# Patient Record
Sex: MALE | Race: WHITE | NOT HISPANIC OR LATINO | URBAN - METROPOLITAN AREA
[De-identification: names, ages, dates, MRNs, and addresses within clinical notes are randomized per-mention and may not be internally consistent; named-entity substitution may affect disease eponyms.]

---

## 2017-05-25 ENCOUNTER — OUTPATIENT (OUTPATIENT)
Dept: OUTPATIENT SERVICES | Facility: HOSPITAL | Age: 77
LOS: 1 days | End: 2017-05-25
Payer: COMMERCIAL

## 2017-05-25 ENCOUNTER — APPOINTMENT (OUTPATIENT)
Dept: CV DIAGNOSTICS | Facility: HOSPITAL | Age: 77
End: 2017-05-25

## 2017-05-25 DIAGNOSIS — I25.10 ATHEROSCLEROTIC HEART DISEASE OF NATIVE CORONARY ARTERY WITHOUT ANGINA PECTORIS: ICD-10-CM

## 2017-05-25 PROCEDURE — 78452 HT MUSCLE IMAGE SPECT MULT: CPT

## 2017-05-25 PROCEDURE — 93018 CV STRESS TEST I&R ONLY: CPT

## 2017-05-25 PROCEDURE — 93017 CV STRESS TEST TRACING ONLY: CPT

## 2017-05-25 PROCEDURE — A9500: CPT

## 2017-05-25 PROCEDURE — 93016 CV STRESS TEST SUPVJ ONLY: CPT

## 2017-05-25 PROCEDURE — 78452 HT MUSCLE IMAGE SPECT MULT: CPT | Mod: 26

## 2023-01-06 ENCOUNTER — INPATIENT (INPATIENT)
Facility: HOSPITAL | Age: 83
LOS: 17 days | Discharge: NURSING, REHAB OR LONG-TERM CARE | DRG: 177 | End: 2023-01-24
Attending: FAMILY MEDICINE | Admitting: INTERNAL MEDICINE
Payer: COMMERCIAL

## 2023-01-06 VITALS
HEART RATE: 98 BPM | WEIGHT: 220.02 LBS | TEMPERATURE: 98 F | DIASTOLIC BLOOD PRESSURE: 64 MMHG | OXYGEN SATURATION: 96 % | RESPIRATION RATE: 20 BRPM | SYSTOLIC BLOOD PRESSURE: 134 MMHG

## 2023-01-06 LAB
ALBUMIN SERPL ELPH-MCNC: 3.1 G/DL — LOW (ref 3.3–5)
ALP SERPL-CCNC: 58 U/L — SIGNIFICANT CHANGE UP (ref 40–120)
ALT FLD-CCNC: 25 U/L — SIGNIFICANT CHANGE UP (ref 12–78)
ANION GAP SERPL CALC-SCNC: 11 MMOL/L — SIGNIFICANT CHANGE UP (ref 5–17)
AST SERPL-CCNC: 29 U/L — SIGNIFICANT CHANGE UP (ref 15–37)
BASOPHILS # BLD AUTO: 0.01 K/UL — SIGNIFICANT CHANGE UP (ref 0–0.2)
BASOPHILS NFR BLD AUTO: 0.1 % — SIGNIFICANT CHANGE UP (ref 0–2)
BILIRUB SERPL-MCNC: 1.6 MG/DL — HIGH (ref 0.2–1.2)
BUN SERPL-MCNC: 30 MG/DL — HIGH (ref 7–23)
CALCIUM SERPL-MCNC: 8.7 MG/DL — SIGNIFICANT CHANGE UP (ref 8.5–10.1)
CHLORIDE SERPL-SCNC: 101 MMOL/L — SIGNIFICANT CHANGE UP (ref 96–108)
CO2 SERPL-SCNC: 25 MMOL/L — SIGNIFICANT CHANGE UP (ref 22–31)
CREAT SERPL-MCNC: 1.4 MG/DL — HIGH (ref 0.5–1.3)
EGFR: 51 ML/MIN/1.73M2 — LOW
EOSINOPHIL # BLD AUTO: 0 K/UL — SIGNIFICANT CHANGE UP (ref 0–0.5)
EOSINOPHIL NFR BLD AUTO: 0 % — SIGNIFICANT CHANGE UP (ref 0–6)
GLUCOSE SERPL-MCNC: 121 MG/DL — HIGH (ref 70–99)
HCT VFR BLD CALC: 38.6 % — LOW (ref 39–50)
HGB BLD-MCNC: 13.2 G/DL — SIGNIFICANT CHANGE UP (ref 13–17)
IMM GRANULOCYTES NFR BLD AUTO: 0.5 % — SIGNIFICANT CHANGE UP (ref 0–0.9)
LYMPHOCYTES # BLD AUTO: 0.45 K/UL — LOW (ref 1–3.3)
LYMPHOCYTES # BLD AUTO: 4.9 % — LOW (ref 13–44)
MAGNESIUM SERPL-MCNC: 2 MG/DL — SIGNIFICANT CHANGE UP (ref 1.6–2.6)
MCHC RBC-ENTMCNC: 29.2 PG — SIGNIFICANT CHANGE UP (ref 27–34)
MCHC RBC-ENTMCNC: 34.2 GM/DL — SIGNIFICANT CHANGE UP (ref 32–36)
MCV RBC AUTO: 85.4 FL — SIGNIFICANT CHANGE UP (ref 80–100)
MONOCYTES # BLD AUTO: 0.9 K/UL — SIGNIFICANT CHANGE UP (ref 0–0.9)
MONOCYTES NFR BLD AUTO: 9.8 % — SIGNIFICANT CHANGE UP (ref 2–14)
NEUTROPHILS # BLD AUTO: 7.73 K/UL — HIGH (ref 1.8–7.4)
NEUTROPHILS NFR BLD AUTO: 84.7 % — HIGH (ref 43–77)
NRBC # BLD: 0 /100 WBCS — SIGNIFICANT CHANGE UP (ref 0–0)
NT-PROBNP SERPL-SCNC: 2418 PG/ML — HIGH (ref 0–450)
PLATELET # BLD AUTO: 244 K/UL — SIGNIFICANT CHANGE UP (ref 150–400)
POTASSIUM SERPL-MCNC: 3.6 MMOL/L — SIGNIFICANT CHANGE UP (ref 3.5–5.3)
POTASSIUM SERPL-SCNC: 3.6 MMOL/L — SIGNIFICANT CHANGE UP (ref 3.5–5.3)
PROT SERPL-MCNC: 7.1 G/DL — SIGNIFICANT CHANGE UP (ref 6–8.3)
RBC # BLD: 4.52 M/UL — SIGNIFICANT CHANGE UP (ref 4.2–5.8)
RBC # FLD: 15.9 % — HIGH (ref 10.3–14.5)
SODIUM SERPL-SCNC: 137 MMOL/L — SIGNIFICANT CHANGE UP (ref 135–145)
TROPONIN I, HIGH SENSITIVITY RESULT: 18.8 NG/L — SIGNIFICANT CHANGE UP
TROPONIN I, HIGH SENSITIVITY RESULT: 19.9 NG/L — SIGNIFICANT CHANGE UP
WBC # BLD: 9.14 K/UL — SIGNIFICANT CHANGE UP (ref 3.8–10.5)
WBC # FLD AUTO: 9.14 K/UL — SIGNIFICANT CHANGE UP (ref 3.8–10.5)

## 2023-01-06 PROCEDURE — 99285 EMERGENCY DEPT VISIT HI MDM: CPT

## 2023-01-06 PROCEDURE — 71045 X-RAY EXAM CHEST 1 VIEW: CPT | Mod: 26

## 2023-01-06 PROCEDURE — 93010 ELECTROCARDIOGRAM REPORT: CPT

## 2023-01-06 RX ORDER — FUROSEMIDE 40 MG
40 TABLET ORAL ONCE
Refills: 0 | Status: COMPLETED | OUTPATIENT
Start: 2023-01-06 | End: 2023-01-06

## 2023-01-06 RX ORDER — ONDANSETRON 8 MG/1
4 TABLET, FILM COATED ORAL ONCE
Refills: 0 | Status: COMPLETED | OUTPATIENT
Start: 2023-01-06 | End: 2023-01-06

## 2023-01-06 RX ORDER — ATENOLOL 25 MG/1
25 TABLET ORAL ONCE
Refills: 0 | Status: COMPLETED | OUTPATIENT
Start: 2023-01-06 | End: 2023-01-06

## 2023-01-06 RX ORDER — IPRATROPIUM/ALBUTEROL SULFATE 18-103MCG
3 AEROSOL WITH ADAPTER (GRAM) INHALATION ONCE
Refills: 0 | Status: COMPLETED | OUTPATIENT
Start: 2023-01-06 | End: 2023-01-06

## 2023-01-06 RX ADMIN — Medication 3 MILLILITER(S): at 18:32

## 2023-01-06 RX ADMIN — ONDANSETRON 4 MILLIGRAM(S): 8 TABLET, FILM COATED ORAL at 20:18

## 2023-01-06 RX ADMIN — Medication 100 MILLIGRAM(S): at 21:21

## 2023-01-06 RX ADMIN — ATENOLOL 25 MILLIGRAM(S): 25 TABLET ORAL at 21:20

## 2023-01-06 RX ADMIN — Medication 40 MILLIGRAM(S): at 18:56

## 2023-01-06 NOTE — ED PROVIDER NOTE - PHYSICAL EXAMINATION
Constitutional: Awake, Alert, non-toxic. No acute distress. Well appearing, well nourished.   HEAD: Normocephalic, atraumatic.   EYES: PERRL, EOM intact, conjunctiva and sclera are clear bilaterally.  ENT: External ears normal. No rhinorrhea, no tracheal deviation   NECK: Supple, non-tender  CARDIOVASCULAR: tachycardic and irregular rate and rhythm.  RESPIRATORY: Normal respiratory effort; crackles b/l. Speaking in full sentences. No accessory muscle use.   ABDOMEN: Soft; non-tender, non-distended. No rebound or guarding.   EXTREMITIES:  no lower extremity edema, no deformities  SKIN: Warm, dry  NEURO: A&O x3. Sensory and motor functions are grossly intact. Speech is normal. No facial droop.  PSYCH: Appearance and judgement seem appropriate for gender and age.

## 2023-01-06 NOTE — ED PROVIDER NOTE - OBJECTIVE STATEMENT
Patient with a past medical history of hypertension, hyperlipidemia, paroxysmal A. fib, diabetes on aspirin and Plavix is presenting with shortness of breath and cough.  Patient symptoms started 4 days ago when he was found that he tested positive for influenza.  Symptoms have been constant since then.  Went to his primary doctor who sent him to ED for evaluation due to shortness of breath.  Patient denied any chest pain.  He is having cough and nauseousness.  No reported leg swelling.  He has been having to sit up in a chair at night to help sleep.

## 2023-01-06 NOTE — ED PROVIDER NOTE - CARE PLAN
Principal Discharge DX:	Influenza A  Secondary Diagnosis:	Acute CHF  Secondary Diagnosis:	Atrial fibrillation   1

## 2023-01-06 NOTE — ED PROVIDER NOTE - CLINICAL SUMMARY MEDICAL DECISION MAKING FREE TEXT BOX
Patient found to be flu positive likely the cause to his symptoms however with his bilateral crackles, concern now for possible new heart failure.  This also supported with his orthopnea.  Possible component of pneumonia though patient is afebrile and not hypoxic.  He was found to be wheezing with nurse prior to my arrival, given DuoNeb treatment.  However history and exam is not consistent with COPD and he did not have any wheezing on my exam.  Less likely ACS with no chest pain.  We will plan to check lab work, chest x-ray and treat as needed.

## 2023-01-06 NOTE — ED PROVIDER NOTE - PROGRESS NOTE DETAILS
elevated probnp, will give lasix. Derrick Aguilar MD. Patient found to have elevated proBNP, possible in the setting of his known flu but also he does examine volume overloaded.  IV Lasix given.  We will give patient home night meds and hold in ED for morning a.m. cardiology evaluation.  Derrick Aguilar MD.

## 2023-01-07 DIAGNOSIS — Z95.5 PRESENCE OF CORONARY ANGIOPLASTY IMPLANT AND GRAFT: Chronic | ICD-10-CM

## 2023-01-07 DIAGNOSIS — E78.5 HYPERLIPIDEMIA, UNSPECIFIED: ICD-10-CM

## 2023-01-07 DIAGNOSIS — Z29.9 ENCOUNTER FOR PROPHYLACTIC MEASURES, UNSPECIFIED: ICD-10-CM

## 2023-01-07 DIAGNOSIS — I10 ESSENTIAL (PRIMARY) HYPERTENSION: ICD-10-CM

## 2023-01-07 DIAGNOSIS — I25.10 ATHEROSCLEROTIC HEART DISEASE OF NATIVE CORONARY ARTERY WITHOUT ANGINA PECTORIS: ICD-10-CM

## 2023-01-07 DIAGNOSIS — Z95.1 PRESENCE OF AORTOCORONARY BYPASS GRAFT: Chronic | ICD-10-CM

## 2023-01-07 DIAGNOSIS — N17.9 ACUTE KIDNEY FAILURE, UNSPECIFIED: ICD-10-CM

## 2023-01-07 DIAGNOSIS — I48.91 UNSPECIFIED ATRIAL FIBRILLATION: ICD-10-CM

## 2023-01-07 DIAGNOSIS — I50.9 HEART FAILURE, UNSPECIFIED: ICD-10-CM

## 2023-01-07 DIAGNOSIS — J10.1 INFLUENZA DUE TO OTHER IDENTIFIED INFLUENZA VIRUS WITH OTHER RESPIRATORY MANIFESTATIONS: ICD-10-CM

## 2023-01-07 DIAGNOSIS — J11.1 INFLUENZA DUE TO UNIDENTIFIED INFLUENZA VIRUS WITH OTHER RESPIRATORY MANIFESTATIONS: ICD-10-CM

## 2023-01-07 LAB
FLUAV H1 2009 PAND RNA SPEC QL NAA+PROBE: DETECTED
RAPID RVP RESULT: DETECTED
SARS-COV-2 RNA SPEC QL NAA+PROBE: SIGNIFICANT CHANGE UP

## 2023-01-07 PROCEDURE — 99223 1ST HOSP IP/OBS HIGH 75: CPT

## 2023-01-07 PROCEDURE — 93306 TTE W/DOPPLER COMPLETE: CPT | Mod: 26

## 2023-01-07 RX ORDER — ATENOLOL 25 MG/1
50 TABLET ORAL DAILY
Refills: 0 | Status: DISCONTINUED | OUTPATIENT
Start: 2023-01-07 | End: 2023-01-07

## 2023-01-07 RX ORDER — CLOPIDOGREL BISULFATE 75 MG/1
75 TABLET, FILM COATED ORAL DAILY
Refills: 0 | Status: DISCONTINUED | OUTPATIENT
Start: 2023-01-07 | End: 2023-01-24

## 2023-01-07 RX ORDER — FUROSEMIDE 40 MG
40 TABLET ORAL ONCE
Refills: 0 | Status: COMPLETED | OUTPATIENT
Start: 2023-01-07 | End: 2023-01-07

## 2023-01-07 RX ORDER — LOSARTAN POTASSIUM 100 MG/1
100 TABLET, FILM COATED ORAL DAILY
Refills: 0 | Status: DISCONTINUED | OUTPATIENT
Start: 2023-01-07 | End: 2023-01-07

## 2023-01-07 RX ORDER — IPRATROPIUM/ALBUTEROL SULFATE 18-103MCG
3 AEROSOL WITH ADAPTER (GRAM) INHALATION EVERY 6 HOURS
Refills: 0 | Status: DISCONTINUED | OUTPATIENT
Start: 2023-01-07 | End: 2023-01-07

## 2023-01-07 RX ORDER — HEPARIN SODIUM 5000 [USP'U]/ML
5000 INJECTION INTRAVENOUS; SUBCUTANEOUS EVERY 8 HOURS
Refills: 0 | Status: DISCONTINUED | OUTPATIENT
Start: 2023-01-07 | End: 2023-01-07

## 2023-01-07 RX ORDER — IPRATROPIUM/ALBUTEROL SULFATE 18-103MCG
3 AEROSOL WITH ADAPTER (GRAM) INHALATION EVERY 6 HOURS
Refills: 0 | Status: DISCONTINUED | OUTPATIENT
Start: 2023-01-07 | End: 2023-01-08

## 2023-01-07 RX ORDER — GABAPENTIN 400 MG/1
100 CAPSULE ORAL
Refills: 0 | Status: DISCONTINUED | OUTPATIENT
Start: 2023-01-07 | End: 2023-01-24

## 2023-01-07 RX ORDER — ASPIRIN/CALCIUM CARB/MAGNESIUM 324 MG
81 TABLET ORAL DAILY
Refills: 0 | Status: DISCONTINUED | OUTPATIENT
Start: 2023-01-07 | End: 2023-01-07

## 2023-01-07 RX ORDER — FUROSEMIDE 40 MG
40 TABLET ORAL
Refills: 0 | Status: DISCONTINUED | OUTPATIENT
Start: 2023-01-07 | End: 2023-01-12

## 2023-01-07 RX ORDER — APIXABAN 2.5 MG/1
2.5 TABLET, FILM COATED ORAL EVERY 12 HOURS
Refills: 0 | Status: DISCONTINUED | OUTPATIENT
Start: 2023-01-07 | End: 2023-01-09

## 2023-01-07 RX ORDER — AMLODIPINE BESYLATE 2.5 MG/1
5 TABLET ORAL DAILY
Refills: 0 | Status: DISCONTINUED | OUTPATIENT
Start: 2023-01-07 | End: 2023-01-08

## 2023-01-07 RX ORDER — ATORVASTATIN CALCIUM 80 MG/1
10 TABLET, FILM COATED ORAL AT BEDTIME
Refills: 0 | Status: DISCONTINUED | OUTPATIENT
Start: 2023-01-07 | End: 2023-01-24

## 2023-01-07 RX ORDER — METOPROLOL TARTRATE 50 MG
25 TABLET ORAL
Refills: 0 | Status: DISCONTINUED | OUTPATIENT
Start: 2023-01-07 | End: 2023-01-09

## 2023-01-07 RX ORDER — ISOSORBIDE MONONITRATE 60 MG/1
60 TABLET, EXTENDED RELEASE ORAL DAILY
Refills: 0 | Status: DISCONTINUED | OUTPATIENT
Start: 2023-01-07 | End: 2023-01-24

## 2023-01-07 RX ADMIN — Medication 100 MILLIGRAM(S): at 21:47

## 2023-01-07 RX ADMIN — Medication 200 MILLIGRAM(S): at 23:05

## 2023-01-07 RX ADMIN — CLOPIDOGREL BISULFATE 75 MILLIGRAM(S): 75 TABLET, FILM COATED ORAL at 13:10

## 2023-01-07 RX ADMIN — Medication 40 MILLIGRAM(S): at 10:07

## 2023-01-07 RX ADMIN — Medication 3 MILLILITER(S): at 21:48

## 2023-01-07 RX ADMIN — AMLODIPINE BESYLATE 5 MILLIGRAM(S): 2.5 TABLET ORAL at 17:58

## 2023-01-07 RX ADMIN — Medication 25 MILLIGRAM(S): at 23:05

## 2023-01-07 RX ADMIN — Medication 30 MILLIGRAM(S): at 17:58

## 2023-01-07 RX ADMIN — Medication 40 MILLIGRAM(S): at 17:37

## 2023-01-07 RX ADMIN — GABAPENTIN 100 MILLIGRAM(S): 400 CAPSULE ORAL at 17:58

## 2023-01-07 RX ADMIN — Medication 100 MILLIGRAM(S): at 17:37

## 2023-01-07 RX ADMIN — Medication 200 MILLIGRAM(S): at 13:10

## 2023-01-07 RX ADMIN — Medication 25 MILLIGRAM(S): at 17:58

## 2023-01-07 RX ADMIN — Medication 200 MILLIGRAM(S): at 17:58

## 2023-01-07 RX ADMIN — ISOSORBIDE MONONITRATE 60 MILLIGRAM(S): 60 TABLET, EXTENDED RELEASE ORAL at 13:49

## 2023-01-07 RX ADMIN — Medication 3 MILLILITER(S): at 14:50

## 2023-01-07 RX ADMIN — ATORVASTATIN CALCIUM 10 MILLIGRAM(S): 80 TABLET, FILM COATED ORAL at 21:50

## 2023-01-07 RX ADMIN — APIXABAN 2.5 MILLIGRAM(S): 2.5 TABLET, FILM COATED ORAL at 17:58

## 2023-01-07 NOTE — PHYSICAL THERAPY INITIAL EVALUATION ADULT - PERTINENT HX OF CURRENT PROBLEM, REHAB EVAL
Pt is 82 y.o. M who presented with SOB with cough x5days, weakness, admitted for Afib, concern for CHF exacerbation and + influenza.

## 2023-01-07 NOTE — H&P ADULT - PROBLEM SELECTOR PLAN 2
Cards consulted , lolita Salazar f/u recs  - lasix 40 IV bid, strict is and os, daily wts, montor on tele, 2d echo Cards consulted , lolita Salazar f/u recs  - lasix 40 IV bid, strict is and os, daily wts, montor on tele, 2d echo  - no documented prior echo, unclear whether systolic or diastolic Cards consulted , Marie, will f/u recs  - BNP elevated, no prior to compare to  - lasix 40 IV bid, strict is and os, daily wts, montor on tele, 2d echo  - no documented prior echo, unclear whether systolic or diastolic  - f/u final cxr read - shows cardiomegaly and suggestion of pulm vasc congestion

## 2023-01-07 NOTE — H&P ADULT - HISTORY OF PRESENT ILLNESS
83 y/o m w/ PMH od CAD s/p stent, s/p CABG, HTN, HLD, CHF p/w shortness of breath and cough for the last 5 days, worsening.  Pt had began to feel weak and not well about 5 days ago, and had a mild cough that progressively worsened, He also began to feel sob, and had some sandoval.  He felt pain under his left rib side when he took inspirations or coughed, but did not have any chest pressure or palpitations.  In the last couple of days he began to feel wheezing, and also felt some sense of feverishness at home, but did not take his temperature.  He has no known history of asthma, or copd.  Pt does not recall any sick contacts, or sig lower extremity swelling.    In the ED, he was given lasix, and nebulizer treatments, and tessalon perles, and was evaluated by Dr. Salazar in Cards.

## 2023-01-07 NOTE — CONSULT NOTE ADULT - SUBJECTIVE AND OBJECTIVE BOX
[facility] - Donna Jay Pannella, Marie Carolina  Office Number: [phone]    Initial Consult Note    CHIEF COMPLAINT: Patient is a 82y old  Male who presents with a chief complaint of CHF, A fib, Flu (07 Jan 2023 08:53)      HPI:  81 y/o m w/ PMH od CAD s/p stent, s/p CABG, HTN, HLD, CHF p/w shortness of breath and cough for the last 5 days, worsening.  Pt had began to feel weak and not well about 5 days ago, and had a mild cough that progressively worsened, He also began to feel sob, and had some sandoval.  He felt pain under his left rib side when he took inspirations or coughed, but did not have any chest pressure or palpitations.  In the last couple of days he began to feel wheezing, and also felt some sense of feverishness at home, but did not take his temperature.  He has no known history of asthma, or copd.  Pt does not recall any sick contacts, or sig lower extremity swelling.    In the ED, he was given lasix, and nebulizer treatments, and tessalon perles, and was evaluated by Dr. Salazar in Cards. (07 Jan 2023 08:53)      PAST MEDICAL & SURGICAL HISTORY:  HLD (hyperlipidemia)      HTN (hypertension)      CAD (coronary artery disease)      S/P coronary artery stent placement      S/P CABG (coronary artery bypass graft)          SOCIAL HISTORY:  No tobacco, ethanol, or drug abuse.    FAMILY HISTORY:  Family history of cardiovascular disease (Sibling)    Family history of uterine cancer (Mother)      No family history of acute MI or sudden cardiac death.    MEDICATIONS  (STANDING):  albuterol/ipratropium for Nebulization 3 milliLiter(s) Nebulizer every 6 hours  amLODIPine   Tablet 5 milliGRAM(s) Oral daily  aspirin  chewable 81 milliGRAM(s) Oral daily  atenolol  Tablet 50 milliGRAM(s) Oral daily  atorvastatin 10 milliGRAM(s) Oral at bedtime  benzonatate 100 milliGRAM(s) Oral every 8 hours  clopidogrel Tablet 75 milliGRAM(s) Oral daily  furosemide   Injectable 40 milliGRAM(s) IV Push two times a day  gabapentin 100 milliGRAM(s) Oral two times a day  guaiFENesin Oral Liquid (Sugar-Free) 200 milliGRAM(s) Oral every 6 hours  heparin   Injectable 5000 Unit(s) SubCutaneous every 8 hours  isosorbide   mononitrate ER Tablet (IMDUR) 60 milliGRAM(s) Oral daily  losartan 100 milliGRAM(s) Oral daily  oseltamivir 30 milliGRAM(s) Oral two times a day    MEDICATIONS  (PRN):      Allergies    Levaquin (Unknown)  penicillin (Rash)    Intolerances        REVIEW OF SYSTEMS:    CONSTITUTIONAL: No weakness, fevers or chills  EYES/ENT: No visual changes;  No vertigo or throat pain   NECK: No pain or stiffness  RESPIRATORY: No cough, wheezing, hemoptysis; reports shortness of breath  CARDIOVASCULAR: No chest pain or palpitations  GASTROINTESTINAL: No abdominal pain. No nausea, vomiting, or hematemesis; No diarrhea or constipation. No melena or hematochezia.  GENITOURINARY: No dysuria, frequency or hematuria  NEUROLOGICAL: No numbness or weakness  SKIN: No itching or rash  All other review of systems is negative unless indicated above    VITAL SIGNS:   Vital Signs Last 24 Hrs  T(C): 37.3 (07 Jan 2023 07:31), Max: 37.6 (07 Jan 2023 05:44)  T(F): 99.2 (07 Jan 2023 07:31), Max: 99.6 (07 Jan 2023 05:44)  HR: 106 (07 Jan 2023 07:31) (97 - 107)  BP: 130/77 (07 Jan 2023 07:31) (130/77 - 148/66)  BP(mean): --  RR: 20 (07 Jan 2023 07:31) (20 - 20)  SpO2: 100% (07 Jan 2023 07:31) (96% - 100%)    Parameters below as of 07 Jan 2023 05:44  Patient On (Oxygen Delivery Method): room air        I&O's Summary      On Exam:    Constitutional: NAD, alert and oriented x 3  Lungs:  Non-labored, breath sounds are coarse bilaterally with crackles  Cardiovascular: irregular,  S1 and S2 positive.  No murmurs, rubs, gallops or clicks  Gastrointestinal: Bowel Sounds present, soft, nontender.   Lymph: mild peripheral edema. No cervical lymphadenopathy.  Neurological: Alert, no focal deficits  Skin: No rashes or ulcers   Psych:  Mood & affect appropriate.    LABS: All Labs Reviewed:                        13.2   9.14  )-----------( 244      ( 06 Jan 2023 17:00 )             38.6     06 Jan 2023 17:00    137    |  101    |  30     ----------------------------<  121    3.6     |  25     |  1.40     Ca    8.7        06 Jan 2023 17:00  Mg     2.0       06 Jan 2023 17:00    TPro  7.1    /  Alb  3.1    /  TBili  1.6    /  DBili  x      /  AST  29     /  ALT  25     /  AlkPhos  58     06 Jan 2023 17:00          Blood Culture:   01-06 @ 17:00  Pro Bnp [id number]        RADIOLOGY:    EKG af

## 2023-01-07 NOTE — CONSULT NOTE ADULT - ASSESSMENT
Mr. Robertson is a 83 y/o m w/ PMH of CAD s/p stent, s/p CABG, HTN, HLD, CHF p/w shortness of breath and cough for the last 5 days.  He has been found to be in af with rvr, congestive hf, and influenza positive.    - no sign of acute ischemia, and troponins are negative  - patient reports a sig history of cad with stents/cabg and was told that not much more could be done  - cont asa/plavix and statin  - cont imdur    - appears to be volume overloaded on exam, in the setting flu, with elevated bnp  - lasix 40 iv bid  - echocardiogram  - hold losartan in setting of augustine  - strict i/o's and daily weights    - af with rvr, without known history, and has not been on ac  - change atenolol to metoprolol 25 q6hr  - can hold norvasc to allow for more bp for rate control  - would start him on full dose ac with eliquis 2.5 bid if remains in af    - supportive care for flu    - trend creatinine and electrolytes. Keep K>4 mg>2  - will follow with you

## 2023-01-07 NOTE — H&P ADULT - NSICDXPASTMEDICALHX_GEN_ALL_CORE_FT
PAST MEDICAL HISTORY:  Atrial fibrillation     Congestive heart failure (CHF)      PAST MEDICAL HISTORY:  CAD (coronary artery disease)     Congestive heart failure (CHF)     HLD (hyperlipidemia)     HTN (hypertension)      PAST MEDICAL HISTORY:  CAD (coronary artery disease)     HLD (hyperlipidemia)     HTN (hypertension)

## 2023-01-07 NOTE — H&P ADULT - NSICDXFAMILYHX_GEN_ALL_CORE_FT
FAMILY HISTORY:  Mother  Still living? Unknown  Family history of uterine cancer, Age at diagnosis: Age Unknown    Sibling  Still living? Unknown  Family history of cardiovascular disease, Age at diagnosis: Age Unknown

## 2023-01-07 NOTE — H&P ADULT - PROBLEM SELECTOR PLAN 1
Pt started on tamiflu, tylneol prn  - cough - pt started on robitussin and natyslaon perles  - shortness of breath, wheezing - pt on duonebs q 6  - supportive care as needed

## 2023-01-07 NOTE — PROGRESS NOTE ADULT - SUBJECTIVE AND OBJECTIVE BOX
Patient is a 82y old  Male who presents with a chief complaint of CHF, A fib, Flu (07 Jan 2023 08:53)  HPI:  81 yo M with a hx of CAD, CHF, prob CKD ( " off balance" kidneys ) who presented to ED c/o " congestion ", dyspnea, malaise. Dxed with influenza. In addition,   found to have radiographic evidence of HF and elevated pro BNP. Renal evaluation requested to address elevated Cr of 1.4. Denies recent NSAIDs, Abx, angiographic dye or difficulty voiding.         PAST MEDICAL & SURGICAL HISTORY:  HLD (hyperlipidemia)  HTN (hypertension)  CAD (coronary artery disease)  S/P coronary artery stent placement  S/P CABG (coronary artery bypass graft)          Social Hx: not a smoker    FAMILY HISTORY:  Family history of cardiovascular disease (Sibling)    Family history of uterine cancer (Mother)        Allergies    Levaquin (Unknown)  penicillin (Rash)    Intolerances        MEDICATIONS  (STANDING):  albuterol/ipratropium for Nebulization 3 milliLiter(s) Nebulizer every 6 hours  amLODIPine   Tablet 5 milliGRAM(s) Oral daily  aspirin  chewable 81 milliGRAM(s) Oral daily  atenolol  Tablet 50 milliGRAM(s) Oral daily  atorvastatin 10 milliGRAM(s) Oral at bedtime  benzonatate 100 milliGRAM(s) Oral every 8 hours  clopidogrel Tablet 75 milliGRAM(s) Oral daily  furosemide   Injectable 40 milliGRAM(s) IV Push two times a day  gabapentin 100 milliGRAM(s) Oral two times a day  guaiFENesin Oral Liquid (Sugar-Free) 200 milliGRAM(s) Oral every 6 hours  heparin   Injectable 5000 Unit(s) SubCutaneous every 8 hours  isosorbide   mononitrate ER Tablet (IMDUR) 60 milliGRAM(s) Oral daily  losartan 100 milliGRAM(s) Oral daily  oseltamivir 30 milliGRAM(s) Oral two times a day    MEDICATIONS  (PRN):      Daily     Daily     Drug Dosing Weight    Weight (kg): 99.8 (06 Jan 2023 15:42)      REVIEW OF SYSTEMS:    Cough  Sinus congestion  Flud  Hx of HF  ? CKD            I&O's Detail        PHYSICAL EXAM:    GENERAL: NAD  HEAD:  Atraumatic, normocephalic  NECK: Supple. No increase in JVP  NERVOUS SYSTEM:  Alert & Oriented X3. Motor Strength 5/5 B/L upper and lower extremities; DTRs 2+ intact and symmetric  CHEST/LUNG: rhonchi  HEART: Regular rate and rhythm. No murmurs, rubs, or gallops  ABDOMEN: Soft, Nontender, Nondistended. POS BS  EXTREMITIES:  no edema    LABS:  CBC Full  -  ( 06 Jan 2023 17:00 )  WBC Count : 9.14 K/uL  RBC Count : 4.52 M/uL  Hemoglobin : 13.2 g/dL  Hematocrit : 38.6 %  Platelet Count - Automated : 244 K/uL  Mean Cell Volume : 85.4 fl  Mean Cell Hemoglobin : 29.2 pg  Mean Cell Hemoglobin Concentration : 34.2 gm/dL  Auto Neutrophil # : 7.73 K/uL  Auto Lymphocyte # : 0.45 K/uL  Auto Monocyte # : 0.90 K/uL  Auto Eosinophil # : 0.00 K/uL  Auto Basophil # : 0.01 K/uL  Auto Neutrophil % : 84.7 %  Auto Lymphocyte % : 4.9 %  Auto Monocyte % : 9.8 %  Auto Eosinophil % : 0.0 %  Auto Basophil % : 0.1 %    01-06    137  |  101  |  30<H>  ----------------------------<  121<H>  3.6   |  25  |  1.40<H>    Ca    8.7      06 Jan 2023 17:00  Mg     2.0     01-06    TPro  7.1  /  Alb  3.1<L>  /  TBili  1.6<H>  /  DBili  x   /  AST  29  /  ALT  25  /  AlkPhos  58  01-06    CAPILLARY BLOOD GLUCOSE        Impression:  * Prob CKD  * Acute HF  * influenza PNA    Recommendations:   * Monitor Cr on IV diuretic  * If Cr rises, obtain Renal US  * Check UA.   * Outpt w/u for CKD.

## 2023-01-07 NOTE — H&P ADULT - ASSESSMENT
81 y/o m w/ PMH od CAD s/p stent, s/p CABG, HTN, HLD, CHF p/w shortness of breath and cough, found to have a fib, concern for acute CHF exacerbation, + influenza  83 y/o m w/ PMH od CAD s/p stent, s/p CABG, HTN, HLD p/w shortness of breath and cough, found to have a fib, concern for acute CHF exacerbation, + influenza

## 2023-01-07 NOTE — H&P ADULT - PROBLEM SELECTOR PLAN 3
Cards consulted , lolita Salazar f/u recs  - for now pt's HR is within acceptable range, will cont pt on home atenolol, uptitrate as needed  - will check TFTs  - new onset a fib, may be 2/2 acute CHF or tamiflu  - will f/u cards on any role of AC  - f/u 2d echo

## 2023-01-07 NOTE — H&P ADULT - NSICDXPASTSURGICALHX_GEN_ALL_CORE_FT
PAST SURGICAL HISTORY:  S/P CABG (coronary artery bypass graft)     S/P coronary artery stent placement

## 2023-01-07 NOTE — H&P ADULT - PROBLEM SELECTOR PLAN 5
avoid nephrotoxins, monitor Cr on BMP  - pt on IV lasix, Cr may worsen  - will call renal consult with Dr. Lamb, and f/u recs  - will hold losartan in setting of JANES

## 2023-01-07 NOTE — H&P ADULT - NSHPPHYSICALEXAM_GEN_ALL_CORE
VITAL SIGNS:    Vital Signs Last 24 Hrs  T(C): 37.3 (07 Jan 2023 07:31), Max: 37.6 (07 Jan 2023 05:44)  T(F): 99.2 (07 Jan 2023 07:31), Max: 99.6 (07 Jan 2023 05:44)  HR: 106 (07 Jan 2023 07:31) (97 - 107)  BP: 130/77 (07 Jan 2023 07:31) (130/77 - 148/66)  BP(mean): --  RR: 20 (07 Jan 2023 07:31) (20 - 20)  SpO2: 100% (07 Jan 2023 07:31) (96% - 100%)    Parameters below as of 07 Jan 2023 05:44  Patient On (Oxygen Delivery Method): room air        PHYSICAL EXAM:     GENERAL: no acute distress  HEENT: NC/AT, EOMI, neck supple, MMM  RESPIRATORY: LCTAB/L, no rhonchi, rales, or wheezing  CARDIOVASCULAR: RRR, no murmurs, gallops, rubs  ABDOMINAL: soft, non-tender, non-distended, positive bowel sounds   EXTREMITIES: no clubbing, cyanosis, or edema  NEUROLOGICAL: alert and oriented x 3, non-focal  SKIN: no rashes or lesions   MUSCULOSKELETAL: no gross joint deformity VITAL SIGNS:    Vital Signs Last 24 Hrs  T(C): 37.3 (07 Jan 2023 07:31), Max: 37.6 (07 Jan 2023 05:44)  T(F): 99.2 (07 Jan 2023 07:31), Max: 99.6 (07 Jan 2023 05:44)  HR: 106 (07 Jan 2023 07:31) (97 - 107)  BP: 130/77 (07 Jan 2023 07:31) (130/77 - 148/66)  BP(mean): --  RR: 20 (07 Jan 2023 07:31) (20 - 20)  SpO2: 100% (07 Jan 2023 07:31) (96% - 100%)    Parameters below as of 07 Jan 2023 05:44  Patient On (Oxygen Delivery Method): room air        PHYSICAL EXAM:     GENERAL: no acute distress  HEENT: NC/AT, EOMI, neck supple, MMM  RESPIRATORY: breath sounds a little tight, no wheezing heard  CARDIOVASCULAR: irregular, tachycardic, no murmurs, gallops, rubs  ABDOMINAL: soft, non-tender, non-distended, positive bowel sounds   EXTREMITIES: no clubbing, cyanosis, no pittting edema  NEUROLOGICAL: alert and oriented x 3, non-focal  SKIN: no new rashes or lesions   MUSCULOSKELETAL: no gross joint deformity

## 2023-01-07 NOTE — CHART NOTE - NSCHARTNOTEFT_GEN_A_CORE
Care d/w Dr. Salazar in Cards - plan for eliquis 2.5 bid given a fib, and keep plavix, but d/c asa, and change atenolol to metoprolol 25 q6, and hold losartan.    Casey Ponce, Attending Physician

## 2023-01-07 NOTE — H&P ADULT - NSHPREVIEWOFSYSTEMS_GEN_ALL_CORE
CONSTITUTIONAL: No weakness, fevers or chills  EYES/ENT: No visual changes, no throat pain   RESPIRATORY: No cough, wheezing, hemoptysis; No shortness of breath  CARDIOVASCULAR: No chest pain or palpitations  GASTROINTESTINAL: No abdominal, nausea, vomiting, or hematemesis; No diarrhea or constipation. No melena or hematochezia.  GENITOURINARY: No dysuria, frequency or hematuria  NEUROLOGICAL: No dizziness, numbness, or weakness  SKIN: No itching, burning, rashes, or lesions   All other review of systems is negative unless indicated above. CONSTITUTIONAL: + weakness  EYES/ENT: No visual changes, no throat pain   RESPIRATORY: + sob, + wheezing, +cough  CARDIOVASCULAR: No chest pain or palpitations  GASTROINTESTINAL: No abdominal, nausea, vomiting, or hematemesis; No diarrhea or constipation. No melena or hematochezia.  GENITOURINARY: No dysuria, frequency or hematuria  NEUROLOGICAL: No dizziness, numbness, or weakness  SKIN: No itching, burning, rashes, or lesions   All other review of systems is negative unless indicated above.

## 2023-01-07 NOTE — H&P ADULT - NSHPLABSRESULTS_GEN_ALL_CORE
13.2   9.14  )-----------( 244      ( 06 Jan 2023 17:00 )             38.6     01-06    137  |  101  |  30<H>  ----------------------------<  121<H>  3.6   |  25  |  1.40<H>    Ca    8.7      06 Jan 2023 17:00  Mg     2.0     01-06    TPro  7.1  /  Alb  3.1<L>  /  TBili  1.6<H>  /  DBili  x   /  AST  29  /  ALT  25  /  AlkPhos  58  01-06 13.2   9.14  )-----------( 244      ( 06 Jan 2023 17:00 )             38.6     01-06    137  |  101  |  30<H>  ----------------------------<  121<H>  3.6   |  25  |  1.40<H>    Ca    8.7      06 Jan 2023 17:00  Mg     2.0     01-06    TPro  7.1  /  Alb  3.1<L>  /  TBili  1.6<H>  /  DBili  x   /  AST  29  /  ALT  25  /  AlkPhos  58  01-06    BNP = 2418    trop = negative 13.2   9.14  )-----------( 244      ( 06 Jan 2023 17:00 )             38.6     01-06    137  |  101  |  30<H>  ----------------------------<  121<H>  3.6   |  25  |  1.40<H>    Ca    8.7      06 Jan 2023 17:00  Mg     2.0     01-06    TPro  7.1  /  Alb  3.1<L>  /  TBili  1.6<H>  /  DBili  x   /  AST  29  /  ALT  25  /  AlkPhos  58  01-06    BNP = 2418    trop = negative    CXR: final read pending, personally reviewed by me : cardiomegaly, pulm vasc congestion    EKG: No acute St or T wave changes

## 2023-01-08 LAB
A1C WITH ESTIMATED AVERAGE GLUCOSE RESULT: 6.3 % — HIGH (ref 4–5.6)
ALBUMIN SERPL ELPH-MCNC: 3 G/DL — LOW (ref 3.3–5)
ALP SERPL-CCNC: 72 U/L — SIGNIFICANT CHANGE UP (ref 40–120)
ALT FLD-CCNC: 23 U/L — SIGNIFICANT CHANGE UP (ref 12–78)
ANION GAP SERPL CALC-SCNC: 12 MMOL/L — SIGNIFICANT CHANGE UP (ref 5–17)
AST SERPL-CCNC: 29 U/L — SIGNIFICANT CHANGE UP (ref 15–37)
BASOPHILS # BLD AUTO: 0.03 K/UL — SIGNIFICANT CHANGE UP (ref 0–0.2)
BASOPHILS NFR BLD AUTO: 0.4 % — SIGNIFICANT CHANGE UP (ref 0–2)
BILIRUB SERPL-MCNC: 1.9 MG/DL — HIGH (ref 0.2–1.2)
BUN SERPL-MCNC: 32 MG/DL — HIGH (ref 7–23)
CALCIUM SERPL-MCNC: 8.9 MG/DL — SIGNIFICANT CHANGE UP (ref 8.5–10.1)
CHLORIDE SERPL-SCNC: 94 MMOL/L — LOW (ref 96–108)
CHOLEST SERPL-MCNC: 62 MG/DL — SIGNIFICANT CHANGE UP
CO2 SERPL-SCNC: 33 MMOL/L — HIGH (ref 22–31)
CREAT SERPL-MCNC: 1.3 MG/DL — SIGNIFICANT CHANGE UP (ref 0.5–1.3)
EGFR: 56 ML/MIN/1.73M2 — LOW
EOSINOPHIL # BLD AUTO: 0 K/UL — SIGNIFICANT CHANGE UP (ref 0–0.5)
EOSINOPHIL NFR BLD AUTO: 0 % — SIGNIFICANT CHANGE UP (ref 0–6)
ESTIMATED AVERAGE GLUCOSE: 134 MG/DL — HIGH (ref 68–114)
GLUCOSE SERPL-MCNC: 153 MG/DL — HIGH (ref 70–99)
HCT VFR BLD CALC: 45.1 % — SIGNIFICANT CHANGE UP (ref 39–50)
HDLC SERPL-MCNC: 26 MG/DL — LOW
HGB BLD-MCNC: 15.1 G/DL — SIGNIFICANT CHANGE UP (ref 13–17)
IMM GRANULOCYTES NFR BLD AUTO: 0.6 % — SIGNIFICANT CHANGE UP (ref 0–0.9)
LIPID PNL WITH DIRECT LDL SERPL: 23 MG/DL — SIGNIFICANT CHANGE UP
LYMPHOCYTES # BLD AUTO: 0.87 K/UL — LOW (ref 1–3.3)
LYMPHOCYTES # BLD AUTO: 10.3 % — LOW (ref 13–44)
MCHC RBC-ENTMCNC: 29 PG — SIGNIFICANT CHANGE UP (ref 27–34)
MCHC RBC-ENTMCNC: 33.5 GM/DL — SIGNIFICANT CHANGE UP (ref 32–36)
MCV RBC AUTO: 86.6 FL — SIGNIFICANT CHANGE UP (ref 80–100)
MONOCYTES # BLD AUTO: 0.93 K/UL — HIGH (ref 0–0.9)
MONOCYTES NFR BLD AUTO: 11 % — SIGNIFICANT CHANGE UP (ref 2–14)
NEUTROPHILS # BLD AUTO: 6.6 K/UL — SIGNIFICANT CHANGE UP (ref 1.8–7.4)
NEUTROPHILS NFR BLD AUTO: 77.7 % — HIGH (ref 43–77)
NON HDL CHOLESTEROL: 36 MG/DL — SIGNIFICANT CHANGE UP
NRBC # BLD: 0 /100 WBCS — SIGNIFICANT CHANGE UP (ref 0–0)
PLATELET # BLD AUTO: 290 K/UL — SIGNIFICANT CHANGE UP (ref 150–400)
POTASSIUM SERPL-MCNC: 2.9 MMOL/L — CRITICAL LOW (ref 3.5–5.3)
POTASSIUM SERPL-MCNC: 3.7 MMOL/L — SIGNIFICANT CHANGE UP (ref 3.5–5.3)
POTASSIUM SERPL-SCNC: 2.9 MMOL/L — CRITICAL LOW (ref 3.5–5.3)
POTASSIUM SERPL-SCNC: 3.7 MMOL/L — SIGNIFICANT CHANGE UP (ref 3.5–5.3)
PROT SERPL-MCNC: 7.6 G/DL — SIGNIFICANT CHANGE UP (ref 6–8.3)
RBC # BLD: 5.21 M/UL — SIGNIFICANT CHANGE UP (ref 4.2–5.8)
RBC # FLD: 15.4 % — HIGH (ref 10.3–14.5)
SODIUM SERPL-SCNC: 139 MMOL/L — SIGNIFICANT CHANGE UP (ref 135–145)
T3FREE SERPL-MCNC: 1.92 PG/ML — LOW (ref 2–4.4)
T4 FREE SERPL-MCNC: 1.3 NG/DL — SIGNIFICANT CHANGE UP (ref 0.9–1.8)
TRIGL SERPL-MCNC: 66 MG/DL — SIGNIFICANT CHANGE UP
TSH SERPL-MCNC: 1.67 UIU/ML — SIGNIFICANT CHANGE UP (ref 0.36–3.74)
WBC # BLD: 8.48 K/UL — SIGNIFICANT CHANGE UP (ref 3.8–10.5)
WBC # FLD AUTO: 8.48 K/UL — SIGNIFICANT CHANGE UP (ref 3.8–10.5)

## 2023-01-08 PROCEDURE — 99233 SBSQ HOSP IP/OBS HIGH 50: CPT

## 2023-01-08 RX ORDER — POTASSIUM CHLORIDE 20 MEQ
40 PACKET (EA) ORAL ONCE
Refills: 0 | Status: DISCONTINUED | OUTPATIENT
Start: 2023-01-08 | End: 2023-01-08

## 2023-01-08 RX ORDER — POTASSIUM CHLORIDE 20 MEQ
40 PACKET (EA) ORAL EVERY 4 HOURS
Refills: 0 | Status: COMPLETED | OUTPATIENT
Start: 2023-01-08 | End: 2023-01-08

## 2023-01-08 RX ORDER — ALBUTEROL 90 UG/1
2.5 AEROSOL, METERED ORAL EVERY 8 HOURS
Refills: 0 | Status: DISCONTINUED | OUTPATIENT
Start: 2023-01-08 | End: 2023-01-10

## 2023-01-08 RX ORDER — DILTIAZEM HCL 120 MG
5 CAPSULE, EXT RELEASE 24 HR ORAL ONCE
Refills: 0 | Status: COMPLETED | OUTPATIENT
Start: 2023-01-08 | End: 2023-01-08

## 2023-01-08 RX ORDER — DILTIAZEM HCL 120 MG
30 CAPSULE, EXT RELEASE 24 HR ORAL EVERY 6 HOURS
Refills: 0 | Status: DISCONTINUED | OUTPATIENT
Start: 2023-01-08 | End: 2023-01-10

## 2023-01-08 RX ORDER — POTASSIUM CHLORIDE 20 MEQ
20 PACKET (EA) ORAL
Refills: 0 | Status: DISCONTINUED | OUTPATIENT
Start: 2023-01-08 | End: 2023-01-08

## 2023-01-08 RX ADMIN — Medication 30 MILLIGRAM(S): at 18:24

## 2023-01-08 RX ADMIN — APIXABAN 2.5 MILLIGRAM(S): 2.5 TABLET, FILM COATED ORAL at 05:38

## 2023-01-08 RX ADMIN — Medication 40 MILLIGRAM(S): at 14:20

## 2023-01-08 RX ADMIN — Medication 30 MILLIGRAM(S): at 17:59

## 2023-01-08 RX ADMIN — Medication 40 MILLIGRAM(S): at 05:38

## 2023-01-08 RX ADMIN — Medication 5 MILLIGRAM(S): at 16:26

## 2023-01-08 RX ADMIN — Medication 200 MILLIGRAM(S): at 12:32

## 2023-01-08 RX ADMIN — ALBUTEROL 2.5 MILLIGRAM(S): 90 AEROSOL, METERED ORAL at 14:25

## 2023-01-08 RX ADMIN — GABAPENTIN 100 MILLIGRAM(S): 400 CAPSULE ORAL at 18:00

## 2023-01-08 RX ADMIN — AMLODIPINE BESYLATE 5 MILLIGRAM(S): 2.5 TABLET ORAL at 05:38

## 2023-01-08 RX ADMIN — Medication 40 MILLIEQUIVALENT(S): at 12:33

## 2023-01-08 RX ADMIN — CLOPIDOGREL BISULFATE 75 MILLIGRAM(S): 75 TABLET, FILM COATED ORAL at 12:33

## 2023-01-08 RX ADMIN — Medication 3 MILLILITER(S): at 12:36

## 2023-01-08 RX ADMIN — ATORVASTATIN CALCIUM 10 MILLIGRAM(S): 80 TABLET, FILM COATED ORAL at 22:13

## 2023-01-08 RX ADMIN — Medication 3 MILLILITER(S): at 09:05

## 2023-01-08 RX ADMIN — Medication 200 MILLIGRAM(S): at 05:38

## 2023-01-08 RX ADMIN — ISOSORBIDE MONONITRATE 60 MILLIGRAM(S): 60 TABLET, EXTENDED RELEASE ORAL at 12:33

## 2023-01-08 RX ADMIN — Medication 30 MILLIGRAM(S): at 05:39

## 2023-01-08 RX ADMIN — Medication 3 MILLILITER(S): at 03:00

## 2023-01-08 RX ADMIN — Medication 25 MILLIGRAM(S): at 12:33

## 2023-01-08 RX ADMIN — Medication 40 MILLIEQUIVALENT(S): at 16:05

## 2023-01-08 RX ADMIN — Medication 100 MILLIGRAM(S): at 05:38

## 2023-01-08 RX ADMIN — Medication 100 MILLIGRAM(S): at 14:18

## 2023-01-08 RX ADMIN — GABAPENTIN 100 MILLIGRAM(S): 400 CAPSULE ORAL at 05:38

## 2023-01-08 RX ADMIN — Medication 20 MILLIEQUIVALENT(S): at 09:04

## 2023-01-08 RX ADMIN — Medication 25 MILLIGRAM(S): at 05:39

## 2023-01-08 RX ADMIN — Medication 25 MILLIGRAM(S): at 17:59

## 2023-01-08 RX ADMIN — Medication 100 MILLIGRAM(S): at 22:13

## 2023-01-08 RX ADMIN — Medication 30 MILLIGRAM(S): at 10:50

## 2023-01-08 RX ADMIN — APIXABAN 2.5 MILLIGRAM(S): 2.5 TABLET, FILM COATED ORAL at 18:00

## 2023-01-08 RX ADMIN — Medication 600 MILLIGRAM(S): at 18:00

## 2023-01-08 NOTE — PROGRESS NOTE ADULT - SUBJECTIVE AND OBJECTIVE BOX
Subjective: productive cough, myalgia. A.fib on monitor at 120-150s      MEDICATIONS  (STANDING):  albuterol/ipratropium for Nebulization 3 milliLiter(s) Nebulizer every 6 hours  amLODIPine   Tablet 5 milliGRAM(s) Oral daily  apixaban 2.5 milliGRAM(s) Oral every 12 hours  atorvastatin 10 milliGRAM(s) Oral at bedtime  benzonatate 100 milliGRAM(s) Oral every 8 hours  clopidogrel Tablet 75 milliGRAM(s) Oral daily  furosemide   Injectable 40 milliGRAM(s) IV Push two times a day  gabapentin 100 milliGRAM(s) Oral two times a day  guaiFENesin Oral Liquid (Sugar-Free) 200 milliGRAM(s) Oral every 6 hours  isosorbide   mononitrate ER Tablet (IMDUR) 60 milliGRAM(s) Oral daily  metoprolol tartrate 25 milliGRAM(s) Oral four times a day  oseltamivir 30 milliGRAM(s) Oral two times a day  potassium chloride    Tablet ER 20 milliEquivalent(s) Oral every 2 hours  potassium chloride    Tablet ER 40 milliEquivalent(s) Oral once    MEDICATIONS  (PRN):          T(C): 37.1 (01-08-23 @ 05:41), Max: 37.2 (01-07-23 @ 14:54)  HR: 120 (01-08-23 @ 05:41) (108 - 138)  BP: 112/64 (01-08-23 @ 05:41) (93/58 - 129/78)  RR: 18 (01-08-23 @ 05:41) (18 - 20)  SpO2: 96% (01-08-23 @ 05:41) (96% - 100%)  Wt(kg): --        I&O's Detail           PHYSICAL EXAM:    GENERAL: mild dyspnea at rest.   NECK: Supple, no inc in JVP  CHEST/LUNG: coarse rhonchi  HEART: S1S2  ABDOMEN: Soft  EXTREMITIES:  no edema      LABS:  CBC Full  -  ( 08 Jan 2023 06:00 )  WBC Count : 8.48 K/uL  RBC Count : 5.21 M/uL  Hemoglobin : 15.1 g/dL  Hematocrit : 45.1 %  Platelet Count - Automated : 290 K/uL  Mean Cell Volume : 86.6 fl  Mean Cell Hemoglobin : 29.0 pg  Mean Cell Hemoglobin Concentration : 33.5 gm/dL  Auto Neutrophil # : 6.60 K/uL  Auto Lymphocyte # : 0.87 K/uL  Auto Monocyte # : 0.93 K/uL  Auto Eosinophil # : 0.00 K/uL  Auto Basophil # : 0.03 K/uL  Auto Neutrophil % : 77.7 %  Auto Lymphocyte % : 10.3 %  Auto Monocyte % : 11.0 %  Auto Eosinophil % : 0.0 %  Auto Basophil % : 0.4 %    01-08    139  |  94<L>  |  32<H>  ----------------------------<  153<H>  2.9<LL>   |  33<H>  |  1.30    Ca    8.9      08 Jan 2023 06:00  Mg     2.0     01-06    TPro  7.6  /  Alb  3.0<L>  /  TBili  1.9<H>  /  DBili  x   /  AST  29  /  ALT  23  /  AlkPhos  72  01-08      Impression:  * Prob CKD  * Acute HF  * influenza PNA  * New a.fib with RVR    Recommendations:   * Monitor Cr on IV diuretic  * Supplement K  * Check UA.   * Outpt w/u for CKD.

## 2023-01-08 NOTE — PROGRESS NOTE ADULT - ASSESSMENT
82 year old male PMH of CAD s/p stent, s/p CABG, HTN, HLD, CHF p/w shortness of breath and cough for the last 5 days found to be in af with rvr, congestive hf, and influenza positive.      CAD HTN HLD CHF   - no sign of acute ischemia, and troponin are negative  - patient reports a sig history of cad with stents/cabg and was told that not much more could be done  - cont asa/plavix and statin  - cont isosorbide   -on tele with afib with rvr in setting of above , cardizem every 6 hours ordered continue lopressor   -thromboembolism on eliquis BID  - hold losartan in setting of augustine  - strict i/o's and daily weights  -Flu as per pulm/primary   - trend creatinine and electrolytes. Keep K>4 mg>2  - will follow with you    Bita LAWSP-C  Cardiology NP  [id number] [phone]      82 year old male PMH of CAD s/p stent, s/p CABG, HTN, HLD, CHF p/w shortness of breath and cough for the last 5 days found to be in af with rvr, congestive hf, and influenza positive.      CAD HTN HLD CHF   - no sign of acute ischemia, and troponin are negative  - patient reports a sig history of cad with stents/cabg and was told that not much more could be done  - cont asa/plavix and statin  - cont isosorbide   - on tele with afib with rvr in setting of above, cardizem every 6 hours ordered continue lopressor   - thromboembolism on eliquis BID  - hold losartan in setting of augustine  - strict i/o's and daily weights  - flu as per pulm/primary   - trend creatinine and electrolytes. Keep K>4 mg>2  - will follow with you    Bita LAWSP-C  Cardiology NP  [id number] [phone]

## 2023-01-08 NOTE — PROGRESS NOTE ADULT - PROBLEM SELECTOR PLAN 1
Pt started on tamiflu, tylneol prn  - cough - pt started on robitussin and tesslaon perles  - shortness of breath, wheezing - pt on duonebs q 6, continue. No distress, talking in full sentences. O2 Via NC   - supportive care as needed

## 2023-01-08 NOTE — PROGRESS NOTE ADULT - PROBLEM SELECTOR PLAN 3
Cards consulted , lolita Salazar f/u recs  - for now pt's HR is within acceptable range, will cont pt on home atenolol, uptitrate as needed  - TSH Normal   - new onset a fib, may be 2/2 acute CHF   - will f/u cards on any role of AC. Patient takes Eliquis 2.5mg BID at home

## 2023-01-08 NOTE — CHART NOTE - NSCHARTNOTEFT_GEN_A_CORE
Patient of Dr. Arrington, massage sent to her  Called by RN, HR at 150-160, bp stable, cardizem 5mg Iv x1 dose ordered  Also messaged cardiologist Dr. Salazar, will f/u recs on posisbly uptitrating cardizem dosage    Casey Ponce, Attending Physician

## 2023-01-08 NOTE — CONSULT NOTE ADULT - ASSESSMENT
81 y/o m w/ PMH od CAD s/p stent, s/p CABG, HTN, HLD, CHF p/w shortness of breath and cough 81 y/o m w/ PMH od CAD s/p stent, s/p CABG, HTN, HLD, CHF p/w shortness of breath and cough    on tamiflu for FLU  acap prn  cough rx regimen prn  extensive and advanced Heart Disease - [facility]vs rx regimen  judicious diuresis  I and O  AF - on AC - rate and rhythm control  cardio follow up  CXR clear on Admission  pulm congestion - multifactorial - FLU - Bronchitis - Pulm Edema -   cont NEBS - mucociliary clearance, mucinex, chest PT, enc cough, cvs rx regimen  monitor VS and Sat  keep sat > 88 pct  GOC discussion  spoke with daughters  FLU isolation

## 2023-01-08 NOTE — PROGRESS NOTE ADULT - SUBJECTIVE AND OBJECTIVE BOX
[facility] -- Donna Jay Pannella, Patel, [facility], [place]  Office # [phone]      Follow Up:  af     Subjective/Observations:   No events overnight resting comfortably in bed.  No complaints of chest pain, dyspnea, or palpitations reported. No signs of orthopnea or PND.      REVIEW OF SYSTEMS: All other review of systems is negative unless indicated above    PAST MEDICAL & SURGICAL HISTORY:  HLD (hyperlipidemia)      HTN (hypertension)      CAD (coronary artery disease)      S/P coronary artery stent placement      S/P CABG (coronary artery bypass graft)          MEDICATIONS  (STANDING):  albuterol/ipratropium for Nebulization 3 milliLiter(s) Nebulizer every 6 hours  apixaban 2.5 milliGRAM(s) Oral every 12 hours  atorvastatin 10 milliGRAM(s) Oral at bedtime  benzonatate 100 milliGRAM(s) Oral every 8 hours  clopidogrel Tablet 75 milliGRAM(s) Oral daily  diltiazem    Tablet 30 milliGRAM(s) Oral every 6 hours  furosemide   Injectable 40 milliGRAM(s) IV Push two times a day  gabapentin 100 milliGRAM(s) Oral two times a day  guaiFENesin Oral Liquid (Sugar-Free) 200 milliGRAM(s) Oral every 6 hours  isosorbide   mononitrate ER Tablet (IMDUR) 60 milliGRAM(s) Oral daily  metoprolol tartrate 25 milliGRAM(s) Oral four times a day  oseltamivir 30 milliGRAM(s) Oral two times a day  potassium chloride    Tablet ER 40 milliEquivalent(s) Oral every 4 hours    MEDICATIONS  (PRN):      Allergies    Levaquin (Unknown)  penicillin (Rash)    Intolerances        Vital Signs Last 24 Hrs  T(C): 37.1 (2023 12:30), Max: 37.2 (2023 14:54)  T(F): 98.7 (2023 12:30), Max: 99 (2023 14:54)  HR: 115 (2023 12:30) (112 - 138)  BP: 128/61 (2023 12:30) (93/58 - 129/78)  BP(mean): --  RR: 20 (2023 12:30) (18 - 20)  SpO2: 100% (2023 12:30) (96% - 100%)    Parameters below as of 2023 12:30  Patient On (Oxygen Delivery Method): room air        I&O's Summary        PHYSICAL EXAM:  TELE: af   Constitutional: NAD, awake and alert, well-developed  HEENT: Moist Mucous Membranes, Anicteric  Pulmonary: Non-labored, breath sounds are expiratory wheezing   Cardiovascular: irRegular, S1 and S2 nl, No murmurs, rubs, gallops or clicks  Gastrointestinal: Bowel Sounds present, soft, nontender.   Lymph: mild peripheral edema.  Skin: No visible rashes or ulcers.  Psych:  Mood & affect appropriate    LABS: All Labs Reviewed:                        15.1   8.48  )-----------( 290      ( 2023 06:00 )             45.1                         13.2   9.14  )-----------( 244      ( 2023 17:00 )             38.6     2023 06:00    139    |  94     |  32     ----------------------------<  153    2.9     |  33     |  1.30   2023 17:00    137    |  101    |  30     ----------------------------<  121    3.6     |  25     |  1.40     Ca    8.9        2023 06:00  Ca    8.7        2023 17:00  Mg     2.0       2023 17:00    TPro  7.6    /  Alb  3.0    /  TBili  1.9    /  DBili  x      /  AST  29     /  ALT  23     /  AlkPhos  72     2023 06:00  TPro  7.1    /  Alb  3.1    /  TBili  1.6    /  DBili  x      /  AST  29     /  ALT  25     /  AlkPhos  58     2023 17:00             EC Lead ECG:   Ventricular Rate 106 BPM    QRS Duration 90 ms    Q-T Interval 328 ms    QTC Calculation(Bazett) 435 ms    R Axis -26 degrees    T Axis 59 degrees    Diagnosis Line Atrial fibrillation with rapid ventricular response with premature ventricular or aberrantly conducted complexes  Nonspecific ST abnormality  Abnormal ECG  No previous ECGs available  Confirmed by munir Salazar ([id number]) on 2023 12:56:36 PM (23 @ 15:58)      ACC: [id number] EXAM:  ECHO TTE WO CON COMP W DOPP                          PROCEDURE DATE:  2023          INTERPRETATION:  INDICATION: Atrial fibrillation  Sonographer KL    Blood Pressure 130/77    Height 175 cm     Weight 99.8 kg       BSA 2.13   sq m    Dimensions:  LA 4.5       Normal Values: 2.0 - 4.0 cm  Ao 3.4        Normal Values: 2.0 - 3.8 cm  SEPTUM 0.9       Normal Values: 0.6 - 1.2 cm  PWT 0.8       Normal Values: 0.6 - 1.1 cm  LVIDd 5.3         Normal Values: 3.0 - 5.6 cm  LVIDs 3.3         Normal Values: 1.8 - 4.0 cm      OBSERVATIONS:  Technically difficult study  Mitral Valve: normal, trace physiologic MR.  Aortic Valve/Aorta: Sclerotic trileaflet aortic valve with grossly normal   opening.  Tricuspid Valve: normal withtrace TR.  Pulmonic Valve: Trace PI  Left Atrium: Enlarged  Right Atrium: Not well-visualized  Left Ventricle: The left ventricular endocardium is not well-visualized.   Overall, grossly normal LV size and systolic function, estimated LVEF of   55%. Cannot rule out segmental abnormalities  Right Ventricle: Not well-visualized  Pericardium: no significant pericardial effusion.        IMPRESSION:  Technically difficult study  The left ventricular endocardium is not well-visualized. Overall, grossly  normal LV size and systolic function, estimated LVEF of 55%.  The right ventricle is not well-visualized  Left atrial enlargement  Sclerotic trileaflet aortic valve with grossly normal opening, without AI.  Trace physiologic MR and TR.  No significant pericardial effusion.    --- End of Report ---            LIANNA CHAIREZ MD; Attending Cardiologist  This document has been electronically signed. 2023  2:15PM      Radiology:

## 2023-01-08 NOTE — CONSULT NOTE ADULT - SUBJECTIVE AND OBJECTIVE BOX
Date/Time Patient Seen:  		  Referring MD:   Data Reviewed	       Patient is a 82y old  Male who presents with a chief complaint of CHF, A fib, Flu (08 Jan 2023 09:46)      Subjective/HPI    History of Present Illness:  Reason for Admission: CHF, A fib, Flu  History of Present Illness:   81 y/o m w/ PMH od CAD s/p stent, s/p CABG, HTN, HLD, CHF p/w shortness of breath and cough for the last 5 days, worsening.  Pt had began to feel weak and not well about 5 days ago, and had a mild cough that progressively worsened, He also began to feel sob, and had some sandoval.  He felt pain under his left rib side when he took inspirations or coughed, but did not have any chest pressure or palpitations.  In the last couple of days he began to feel wheezing, and also felt some sense of feverishness at home, but did not take his temperature.  He has no known history of asthma, or copd.  Pt does not recall any sick contacts, or sig lower extremity swelling.    In the ED, he was given lasix, and nebulizer treatments, and tessalon perles, and was evaluated by Dr. Salazar in Cards.    PAST MEDICAL & SURGICAL HISTORY:  Congestive heart failure (CHF)    Atrial fibrillation    HLD (hyperlipidemia)    HTN (hypertension)    CAD (coronary artery disease)    S/P coronary artery stent placement    S/P CABG (coronary artery bypass graft)    Past Medical, Past Surgical, and Family History:  PAST MEDICAL HISTORY:  CAD (coronary artery disease)     HLD (hyperlipidemia)     HTN (hypertension).     PAST SURGICAL HISTORY:  S/P CABG (coronary artery bypass graft)     S/P coronary artery stent placement.     FAMILY HISTORY:  Mother  Still living? Unknown  Family history of uterine cancer, Age at diagnosis: Age Unknown    Sibling  Still living? Unknown  Family history of cardiovascular disease, Age at diagnosis: Age Unknown.     Social History:  · Substance use	No  · Social History (marital status, living situation, occupation, and sexual history)	lives alone  + tob quit over 30 yrs ago, smoked about 10 yrs , 1/2 ppd     Tobacco Screening:  · Core Measure Site	Yes  · Has the patient used tobacco in the past 30 days?	No    Risk Assessment:    Present on Admission:  Deep Venous Thrombosis	no  Pulmonary Embolus	no  Urinary Catheter	no  Central Venous Catheter/PICC Line	no  Surgical Site Incision	no  Pressure Ulcer(s)	no        Medication list         MEDICATIONS  (STANDING):  albuterol/ipratropium for Nebulization 3 milliLiter(s) Nebulizer every 6 hours  apixaban 2.5 milliGRAM(s) Oral every 12 hours  atorvastatin 10 milliGRAM(s) Oral at bedtime  benzonatate 100 milliGRAM(s) Oral every 8 hours  clopidogrel Tablet 75 milliGRAM(s) Oral daily  diltiazem    Tablet 30 milliGRAM(s) Oral every 6 hours  furosemide   Injectable 40 milliGRAM(s) IV Push two times a day  gabapentin 100 milliGRAM(s) Oral two times a day  guaiFENesin Oral Liquid (Sugar-Free) 200 milliGRAM(s) Oral every 6 hours  isosorbide   mononitrate ER Tablet (IMDUR) 60 milliGRAM(s) Oral daily  metoprolol tartrate 25 milliGRAM(s) Oral four times a day  oseltamivir 30 milliGRAM(s) Oral two times a day  potassium chloride    Tablet ER 40 milliEquivalent(s) Oral every 4 hours    MEDICATIONS  (PRN):         Vitals log        ICU Vital Signs Last 24 Hrs  T(C): 36.7 (08 Jan 2023 10:40), Max: 37.2 (07 Jan 2023 14:54)  T(F): 98.1 (08 Jan 2023 10:40), Max: 99 (07 Jan 2023 14:54)  HR: 136 (08 Jan 2023 10:40) (112 - 138)  BP: 129/75 (08 Jan 2023 10:40) (93/58 - 129/78)  BP(mean): --  ABP: --  ABP(mean): --  RR: 20 (08 Jan 2023 10:40) (18 - 20)  SpO2: 100% (08 Jan 2023 10:40) (96% - 100%)    O2 Parameters below as of 08 Jan 2023 10:40  Patient On (Oxygen Delivery Method): room air                 Input and Output:  I&O's Detail      Lab Data                        15.1   8.48  )-----------( 290      ( 08 Jan 2023 06:00 )             45.1     01-08    139  |  94<L>  |  32<H>  ----------------------------<  153<H>  2.9<LL>   |  33<H>  |  1.30    Ca    8.9      08 Jan 2023 06:00  Mg     2.0     01-06    TPro  7.6  /  Alb  3.0<L>  /  TBili  1.9<H>  /  DBili  x   /  AST  29  /  ALT  23  /  AlkPhos  72  01-08            Review of Systems	  cough  weakness      Objective     Physical Examination    heart s1s2  lung dec BS  head nc  verbal  alert  hard of hearing      Pertinent Lab findings & Imaging      Alvarado:  NO   Adequate UO     I&O's Detail           Discussed with:     Cultures:	        Radiology      ACC: [id number] EXAM:  XR CHEST PORTABLE URGENT 1V                          PROCEDURE DATE:  01/06/2023          INTERPRETATION:  HISTORY: ;  Chest Pain;  TECHNIQUE: Portable frontal view of the chest, 1 view.  COMPARISON: none.  FINDINGS/  IMPRESSION:    HEART:  Enlarged  LUNGS: free of consolidation,effusion, or pneumothorax.  BONES: sternotomy wires      --- End of Report ---            MARY KATE SOLIZ MD; Attending Interventional Radiologist  This document has been electronically signed. Jan 7 2023  2:16PM

## 2023-01-08 NOTE — PATIENT PROFILE ADULT - NSPROMEDSBROUGHTTOHOSP_GEN_A_NUR
Assumed care for patient at 0730 on 9/26. A&O X4. EEG slightly abnormal, but negative for seizure. SBP 150s-160s (goal for SBP <160). VSS on RA. SR with PACs/ PVCs. ECHO resulted as EF 13-01%, grade 1 diastolic dysfunction. Lungs clear/ diminished.  Debara Bosworth no

## 2023-01-08 NOTE — PROGRESS NOTE ADULT - SUBJECTIVE AND OBJECTIVE BOX
Patient is a 82y old  Male who presents with a chief complaint of CHF, A fib, Flu (07 Jan 2023 11:59)       INTERVAL HPI/OVERNIGHT EVENTS: Seen and examined at bedside. C/o mild wheezing. Denies chest pain, palpitation.     MEDICATIONS  (STANDING):  albuterol/ipratropium for Nebulization 3 milliLiter(s) Nebulizer every 6 hours  amLODIPine   Tablet 5 milliGRAM(s) Oral daily  apixaban 2.5 milliGRAM(s) Oral every 12 hours  atorvastatin 10 milliGRAM(s) Oral at bedtime  benzonatate 100 milliGRAM(s) Oral every 8 hours  clopidogrel Tablet 75 milliGRAM(s) Oral daily  furosemide   Injectable 40 milliGRAM(s) IV Push two times a day  gabapentin 100 milliGRAM(s) Oral two times a day  guaiFENesin Oral Liquid (Sugar-Free) 200 milliGRAM(s) Oral every 6 hours  isosorbide   mononitrate ER Tablet (IMDUR) 60 milliGRAM(s) Oral daily  metoprolol tartrate 25 milliGRAM(s) Oral four times a day  oseltamivir 30 milliGRAM(s) Oral two times a day  potassium chloride    Tablet ER 20 milliEquivalent(s) Oral every 2 hours  potassium chloride    Tablet ER 40 milliEquivalent(s) Oral once    MEDICATIONS  (PRN):      Allergies    Levaquin (Unknown)  penicillin (Rash)    Intolerances        REVIEW OF SYSTEMS:  CONSTITUTIONAL: No fever, + Gen weakness   EYES: No eye pain, visual disturbances, or discharge  ENMT:  No difficulty hearing, tinnitus, vertigo; No sinus or throat pain  NECK: No pain or stiffness  RESPIRATORY: + Wheezing   CARDIOVASCULAR: No chest pain, palpitations, dizziness  GASTROINTESTINAL: No abdominal or epigastric pain. No nausea, vomiting, or hematemesis; No diarrhea or constipation.  GENITOURINARY: No dysuria, frequency, hematuria, or incontinence  NEUROLOGICAL: No headaches, memory loss, loss of strength, numbness, or tremors  SKIN: No itching, burning, rashes, or lesions   LYMPH NODES: No enlarged glands  MUSCULOSKELETAL: No joint pain or swelling; No muscle, back pain   HEME/LYMPH: No easy bruising, or bleeding gums  ALLERGY AND IMMUNOLOGIC: No hives or eczema    Vital Signs Last 24 Hrs  T(C): 37.1 (08 Jan 2023 05:41), Max: 37.2 (07 Jan 2023 14:54)  T(F): 98.7 (08 Jan 2023 05:41), Max: 99 (07 Jan 2023 14:54)  HR: 120 (08 Jan 2023 05:41) (108 - 138)  BP: 112/64 (08 Jan 2023 05:41) (93/58 - 129/78)  BP(mean): --  RR: 18 (08 Jan 2023 05:41) (18 - 20)  SpO2: 96% (08 Jan 2023 05:41) (96% - 100%)    Parameters below as of 08 Jan 2023 05:41  Patient On (Oxygen Delivery Method): room air        PHYSICAL EXAM:  GENERAL: NAD, AAOx3   HEAD:  Atraumatic, Normocephalic  EYES: EOMI, PERRLA, conjunctiva and sclera clear  ENMT: No tonsillar erythema, exudates, or enlargement; Moist mucous membranes  NECK: Supple, No JVD, Normal thyroid  NERVOUS SYSTEM:  Alert & Oriented X3, No focal motor/sensory deficits noted   CHEST/LUNG: Decreased bibasilar breath sounds, + mild bilat expiratory wheezing   HEART: S1S2+, Irregular   ABDOMEN: Soft, Nontender, Nondistended; Bowel sounds present  EXTREMITIES:  2+ Peripheral Pulses, No clubbing, cyanosis  LYMPH: No lymphadenopathy noted  SKIN: No rashes, warm, dry     LABS:                        15.1   8.48  )-----------( 290      ( 08 Jan 2023 06:00 )             45.1     08 Jan 2023 06:00    139    |  94     |  32     ----------------------------<  153    2.9     |  33     |  1.30     Ca    8.9        08 Jan 2023 06:00    TPro  7.6    /  Alb  3.0    /  TBili  1.9    /  DBili  x      /  AST  29     /  ALT  23     /  AlkPhos  72     08 Jan 2023 06:00      CAPILLARY BLOOD GLUCOSE        BLOOD CULTURE    RADIOLOGY & ADDITIONAL TESTS:    Imaging Personally Reviewed:  [ ] YES     Consultant(s) Notes Reviewed:      Care Discussed with Consultants/Other Providers:

## 2023-01-08 NOTE — PATIENT PROFILE ADULT - FALL HARM RISK - RISK INTERVENTIONS

## 2023-01-08 NOTE — CHART NOTE - NSCHARTNOTEFT_GEN_A_CORE
RN called informing that heart rate still very high in 140's. D/w Dr. Salazar, cardio. Added Cardizem 30mg PO Q6 hours for now.

## 2023-01-08 NOTE — PROGRESS NOTE ADULT - ASSESSMENT
81 y/o m w/ PMH od CAD s/p stent, s/p CABG, HTN, HLD p/w shortness of breath and cough, found to have a fib, concern for acute CHF exacerbation, + influenza

## 2023-01-08 NOTE — PATIENT PROFILE ADULT - OVER THE PAST TWO WEEKS HAVE YOU FELT DOWN, DEPRESSED OR HOPELESS?
Patient recently lost his week last week. He stated he is starting to feel depressed since she has left him./no

## 2023-01-08 NOTE — PROGRESS NOTE ADULT - PROBLEM SELECTOR PLAN 2
Cards consulted , Marie, will f/u recs  - BNP elevated, no prior to compare to  - lasix 40 IV bid, strict is and os, daily wts, montor on tele, 2d echo  - no documented prior echo, unclear whether systolic or diastolic  -TTE showed Ef of 55%. No pericardial effusion

## 2023-01-09 LAB
ANION GAP SERPL CALC-SCNC: 7 MMOL/L — SIGNIFICANT CHANGE UP (ref 5–17)
BUN SERPL-MCNC: 32 MG/DL — HIGH (ref 7–23)
CALCIUM SERPL-MCNC: 9 MG/DL — SIGNIFICANT CHANGE UP (ref 8.5–10.1)
CHLORIDE SERPL-SCNC: 94 MMOL/L — LOW (ref 96–108)
CO2 SERPL-SCNC: 36 MMOL/L — HIGH (ref 22–31)
CREAT SERPL-MCNC: 1.1 MG/DL — SIGNIFICANT CHANGE UP (ref 0.5–1.3)
EGFR: 68 ML/MIN/1.73M2 — SIGNIFICANT CHANGE UP
GLUCOSE SERPL-MCNC: 152 MG/DL — HIGH (ref 70–99)
HCT VFR BLD CALC: 41.2 % — SIGNIFICANT CHANGE UP (ref 39–50)
HGB BLD-MCNC: 13.7 G/DL — SIGNIFICANT CHANGE UP (ref 13–17)
MAGNESIUM SERPL-MCNC: 2 MG/DL — SIGNIFICANT CHANGE UP (ref 1.6–2.6)
MCHC RBC-ENTMCNC: 28.9 PG — SIGNIFICANT CHANGE UP (ref 27–34)
MCHC RBC-ENTMCNC: 33.3 GM/DL — SIGNIFICANT CHANGE UP (ref 32–36)
MCV RBC AUTO: 86.9 FL — SIGNIFICANT CHANGE UP (ref 80–100)
NRBC # BLD: 0 /100 WBCS — SIGNIFICANT CHANGE UP (ref 0–0)
NT-PROBNP SERPL-SCNC: 959 PG/ML — HIGH (ref 0–450)
PLATELET # BLD AUTO: 308 K/UL — SIGNIFICANT CHANGE UP (ref 150–400)
POTASSIUM SERPL-MCNC: 3.6 MMOL/L — SIGNIFICANT CHANGE UP (ref 3.5–5.3)
POTASSIUM SERPL-SCNC: 3.6 MMOL/L — SIGNIFICANT CHANGE UP (ref 3.5–5.3)
RBC # BLD: 4.74 M/UL — SIGNIFICANT CHANGE UP (ref 4.2–5.8)
RBC # FLD: 15.5 % — HIGH (ref 10.3–14.5)
SODIUM SERPL-SCNC: 137 MMOL/L — SIGNIFICANT CHANGE UP (ref 135–145)
WBC # BLD: 8.25 K/UL — SIGNIFICANT CHANGE UP (ref 3.8–10.5)
WBC # FLD AUTO: 8.25 K/UL — SIGNIFICANT CHANGE UP (ref 3.8–10.5)

## 2023-01-09 PROCEDURE — 99233 SBSQ HOSP IP/OBS HIGH 50: CPT

## 2023-01-09 RX ORDER — METOPROLOL TARTRATE 50 MG
50 TABLET ORAL EVERY 8 HOURS
Refills: 0 | Status: DISCONTINUED | OUTPATIENT
Start: 2023-01-09 | End: 2023-01-11

## 2023-01-09 RX ORDER — APIXABAN 2.5 MG/1
5 TABLET, FILM COATED ORAL EVERY 12 HOURS
Refills: 0 | Status: DISCONTINUED | OUTPATIENT
Start: 2023-01-09 | End: 2023-01-24

## 2023-01-09 RX ORDER — METOPROLOL TARTRATE 50 MG
2.5 TABLET ORAL ONCE
Refills: 0 | Status: COMPLETED | OUTPATIENT
Start: 2023-01-09 | End: 2023-01-09

## 2023-01-09 RX ADMIN — Medication 30 MILLIGRAM(S): at 06:56

## 2023-01-09 RX ADMIN — Medication 40 MILLIGRAM(S): at 06:56

## 2023-01-09 RX ADMIN — Medication 2.5 MILLIGRAM(S): at 20:27

## 2023-01-09 RX ADMIN — Medication 600 MILLIGRAM(S): at 19:27

## 2023-01-09 RX ADMIN — GABAPENTIN 100 MILLIGRAM(S): 400 CAPSULE ORAL at 19:26

## 2023-01-09 RX ADMIN — APIXABAN 5 MILLIGRAM(S): 2.5 TABLET, FILM COATED ORAL at 19:33

## 2023-01-09 RX ADMIN — Medication 25 MILLIGRAM(S): at 06:56

## 2023-01-09 RX ADMIN — Medication 25 MILLIGRAM(S): at 19:26

## 2023-01-09 RX ADMIN — Medication 100 MILLIGRAM(S): at 14:08

## 2023-01-09 RX ADMIN — Medication 25 MILLIGRAM(S): at 00:50

## 2023-01-09 RX ADMIN — Medication 40 MILLIGRAM(S): at 14:07

## 2023-01-09 RX ADMIN — ISOSORBIDE MONONITRATE 60 MILLIGRAM(S): 60 TABLET, EXTENDED RELEASE ORAL at 12:38

## 2023-01-09 RX ADMIN — Medication 600 MILLIGRAM(S): at 06:56

## 2023-01-09 RX ADMIN — ALBUTEROL 2.5 MILLIGRAM(S): 90 AEROSOL, METERED ORAL at 12:52

## 2023-01-09 RX ADMIN — Medication 30 MILLIGRAM(S): at 19:27

## 2023-01-09 RX ADMIN — ALBUTEROL 2.5 MILLIGRAM(S): 90 AEROSOL, METERED ORAL at 23:49

## 2023-01-09 RX ADMIN — APIXABAN 2.5 MILLIGRAM(S): 2.5 TABLET, FILM COATED ORAL at 06:56

## 2023-01-09 RX ADMIN — Medication 30 MILLIGRAM(S): at 00:50

## 2023-01-09 RX ADMIN — Medication 25 MILLIGRAM(S): at 12:35

## 2023-01-09 RX ADMIN — CLOPIDOGREL BISULFATE 75 MILLIGRAM(S): 75 TABLET, FILM COATED ORAL at 12:36

## 2023-01-09 RX ADMIN — ALBUTEROL 2.5 MILLIGRAM(S): 90 AEROSOL, METERED ORAL at 07:46

## 2023-01-09 RX ADMIN — Medication 30 MILLIGRAM(S): at 12:35

## 2023-01-09 RX ADMIN — Medication 200 MILLIGRAM(S): at 14:07

## 2023-01-09 RX ADMIN — GABAPENTIN 100 MILLIGRAM(S): 400 CAPSULE ORAL at 06:56

## 2023-01-09 RX ADMIN — Medication 100 MILLIGRAM(S): at 06:56

## 2023-01-09 NOTE — PROGRESS NOTE ADULT - ASSESSMENT
* Prerenal azotemia, CKD  * Acute HF  * influenza PNA  * New a.fib with RVR      Renal indices better, To continue current meds. Encourage PO intake as tolerated.   Will follow electrolytes and renal function trend.

## 2023-01-09 NOTE — PROGRESS NOTE ADULT - SUBJECTIVE AND OBJECTIVE BOX
Date/Time Patient Seen:  		  Referring MD:   Data Reviewed	       Patient is a 82y old  Male who presents with a chief complaint of CHF, A fib, Flu (08 Jan 2023 13:07)      Subjective/HPI     PAST MEDICAL & SURGICAL HISTORY:  Congestive heart failure (CHF)    Atrial fibrillation    HLD (hyperlipidemia)    HTN (hypertension)    CAD (coronary artery disease)    S/P coronary artery stent placement    S/P CABG (coronary artery bypass graft)          Medication list         MEDICATIONS  (STANDING):  albuterol    0.083% 2.5 milliGRAM(s) Nebulizer every 8 hours  apixaban 2.5 milliGRAM(s) Oral every 12 hours  atorvastatin 10 milliGRAM(s) Oral at bedtime  benzonatate 100 milliGRAM(s) Oral every 8 hours  clopidogrel Tablet 75 milliGRAM(s) Oral daily  diltiazem    Tablet 30 milliGRAM(s) Oral every 6 hours  furosemide   Injectable 40 milliGRAM(s) IV Push two times a day  gabapentin 100 milliGRAM(s) Oral two times a day  guaiFENesin  milliGRAM(s) Oral every 12 hours  isosorbide   mononitrate ER Tablet (IMDUR) 60 milliGRAM(s) Oral daily  metoprolol tartrate 25 milliGRAM(s) Oral four times a day  oseltamivir 30 milliGRAM(s) Oral two times a day    MEDICATIONS  (PRN):  guaiFENesin Oral Liquid (Sugar-Free) 200 milliGRAM(s) Oral every 6 hours PRN Cough         Vitals log        ICU Vital Signs Last 24 Hrs  T(C): 37.7 (08 Jan 2023 21:17), Max: 37.7 (08 Jan 2023 21:17)  T(F): 99.8 (08 Jan 2023 21:17), Max: 99.8 (08 Jan 2023 21:17)  HR: 106 (08 Jan 2023 21:17) (106 - 151)  BP: 139/66 (08 Jan 2023 21:17) (109/66 - 139/66)  BP(mean): --  ABP: --  ABP(mean): --  RR: 20 (08 Jan 2023 21:17) (18 - 20)  SpO2: 99% (08 Jan 2023 21:17) (96% - 100%)    O2 Parameters below as of 08 Jan 2023 21:17  Patient On (Oxygen Delivery Method): room air                 Input and Output:  I&O's Detail      Lab Data                        15.1   8.48  )-----------( 290      ( 08 Jan 2023 06:00 )             45.1     01-08    x   |  x   |  x   ----------------------------<  x   3.7   |  x   |  x     Ca    8.9      08 Jan 2023 06:00    TPro  7.6  /  Alb  3.0<L>  /  TBili  1.9<H>  /  DBili  x   /  AST  29  /  ALT  23  /  AlkPhos  72  01-08            Review of Systems	      Objective     Physical Examination  heart s1s2  lung dc BS  head nc        Pertinent Lab findings & Imaging      Christiano:  NO   Adequate UO     I&O's Detail           Discussed with:     Cultures:	        Radiology

## 2023-01-09 NOTE — PROGRESS NOTE ADULT - SUBJECTIVE AND OBJECTIVE BOX
[facility] -- Darleen Thompson Patel, [place], [place]  Office # [phone]    Follow Up:  Afib with RVR, CHF    Subjective/Observations: Awake and alert, still on 5L/min NC.  Admits to be breathing better but still c/o cough with some inability to expectorate at times.  Denies CP or palpitations    REVIEW OF SYSTEMS: All other review of systems is negative unless indicated above  PAST MEDICAL & SURGICAL HISTORY:  HLD (hyperlipidemia)  HTN (hypertension)  CAD (coronary artery disease)  S/P coronary artery stent placement  S/P CABG (coronary artery bypass graft)    MEDICATIONS  (STANDING):  albuterol    0.083% 2.5 milliGRAM(s) Nebulizer every 8 hours  apixaban 2.5 milliGRAM(s) Oral every 12 hours  atorvastatin 10 milliGRAM(s) Oral at bedtime  benzonatate 100 milliGRAM(s) Oral every 8 hours  clopidogrel Tablet 75 milliGRAM(s) Oral daily  diltiazem    Tablet 30 milliGRAM(s) Oral every 6 hours  furosemide   Injectable 40 milliGRAM(s) IV Push two times a day  gabapentin 100 milliGRAM(s) Oral two times a day  guaiFENesin  milliGRAM(s) Oral every 12 hours  isosorbide   mononitrate ER Tablet (IMDUR) 60 milliGRAM(s) Oral daily  metoprolol tartrate 25 milliGRAM(s) Oral four times a day  oseltamivir 30 milliGRAM(s) Oral two times a day    MEDICATIONS  (PRN):  guaiFENesin Oral Liquid (Sugar-Free) 200 milliGRAM(s) Oral every 6 hours PRN Cough    Allergies    Levaquin (Unknown)  penicillin (Rash)    Intolerances    Vital Signs Last 24 Hrs  T(C): 36.4 (09 Jan 2023 08:01), Max: 37.7 (08 Jan 2023 21:17)  T(F): 97.6 (09 Jan 2023 08:01), Max: 99.8 (08 Jan 2023 21:17)  HR: 109 (09 Jan 2023 08:01) (106 - 151)  BP: 126/65 (09 Jan 2023 08:01) (109/66 - 139/66)  BP(mean): --  RR: 20 (09 Jan 2023 08:01) (18 - 20)  SpO2: 99% (09 Jan 2023 08:01) (96% - 100%)    Parameters below as of 09 Jan 2023 08:01  Patient On (Oxygen Delivery Method): nasal cannula  O2 Flow (L/min): 5    I&O's Summary      PHYSICAL EXAM:  TELE: Off tele  Constitutional: NAD, awake and alert, obese  HEENT: Moist Mucous Membranes, Anicteric  Pulmonary: Non-labored, breath sounds are diminished bilaterally, + wheezing, no rales or rhonchi  Cardiovascular: IRRR, S1 and S2, No murmurs, rubs, gallops or clicks  Gastrointestinal: Bowel Sounds present, soft, nontender.   Lymph: No peripheral edema. No lymphadenopathy.  Skin: No visible rashes or ulcers.  Psych:  Mood & affect appropriate  LABS: All Labs Reviewed:                        13.7   8.25  )-----------( 308      ( 09 Jan 2023 06:08 )             41.2                         15.1   8.48  )-----------( 290      ( 08 Jan 2023 06:00 )             45.1                         13.2   9.14  )-----------( 244      ( 06 Jan 2023 17:00 )             38.6     09 Jan 2023 06:08    137    |  94     |  32     ----------------------------<  152    3.6     |  36     |  1.10   08 Jan 2023 19:34    x      |  x      |  x      ----------------------------<  x      3.7     |  x      |  x      08 Jan 2023 06:00    139    |  94     |  32     ----------------------------<  153    2.9     |  33     |  1.30     Ca    9.0        09 Jan 2023 06:08  Ca    8.9        08 Jan 2023 06:00  Ca    8.7        06 Jan 2023 17:00  Mg     2.0       09 Jan 2023 06:08  Mg     2.0       06 Jan 2023 17:00    TPro  7.6    /  Alb  3.0    /  TBili  1.9    /  DBili  x      /  AST  29     /  ALT  23     /  AlkPhos  72     08 Jan 2023 06:00  TPro  7.1    /  Alb  3.1    /  TBili  1.6    /  DBili  x      /  AST  29     /  ALT  25     /  AlkPhos  58     06 Jan 2023 17:00      ACC: [id number] EXAM:  ECHO TTE WO CON COMP W DOPP                          PROCEDURE DATE:  01/07/2023          INTERPRETATION:  INDICATION: Atrial fibrillation  Sonographer KL    Blood Pressure 130/77    Height 175 cm     Weight 99.8 kg       BSA 2.13   sq m    Dimensions:  LA 4.5       Normal Values: 2.0 - 4.0 cm  Ao 3.4        Normal Values: 2.0 - 3.8 cm  SEPTUM 0.9       Normal Values: 0.6 - 1.2 cm  PWT 0.8       Normal Values: 0.6 - 1.1 cm  LVIDd 5.3         Normal Values: 3.0 - 5.6 cm  LVIDs 3.3         Normal Values: 1.8 - 4.0 cm      OBSERVATIONS:  Technically difficult study  Mitral Valve: normal, trace physiologic MR.  Aortic Valve/Aorta: Sclerotic trileaflet aortic valve with grossly normal   opening.  Tricuspid Valve: normal withtrace TR.  Pulmonic Valve: Trace PI  Left Atrium: Enlarged  Right Atrium: Not well-visualized  Left Ventricle: The left ventricular endocardium is not well-visualized.   Overall, grossly normal LV size and systolic function, estimated LVEF of   55%. Cannot rule out segmental abnormalities  Right Ventricle: Not well-visualized  Pericardium: no significant pericardial effusion.    IMPRESSION:  Technically difficult study  The left ventricular endocardium is not well-visualized. Overall, grossly  normal LV size and systolic function, estimated LVEF of 55%.  The right ventricle is not well-visualized  Left atrial enlargement  Sclerotic trileaflet aortic valve with grossly normal opening, without AI.  Trace physiologic MR and TR.  No significant pericardial effusion.    --- End of Report ---    LIANNA CHAIREZ MD; Attending Cardiologist  This document has been electronically signed. Jan 7 2023  2:15PM    ACC: [id number] EXAM:  XR CHEST PORTABLE URGENT 1V                          PROCEDURE DATE:  01/06/2023      INTERPRETATION:  HISTORY: ;  Chest Pain;  TECHNIQUE: Portable frontal view of the chest, 1 view.  COMPARISON: none.  FINDINGS/  IMPRESSION:    HEART:  Enlarged  LUNGS: free of consolidation, effusion, or pneumothorax.  BONES: sternotomy wires    --- End of Report ---    MARY KATE SOLIZ MD; Attending Interventional Radiologist  This document has been electronically signed. Jan7 2023  2:16PM    Ventricular Rate 106 BPM    QRS Duration 90 ms    Q-T Interval 328 ms    QTC Calculation(Bazett) 435 ms    R Axis -26 degrees    T Axis 59 degrees    Diagnosis Line Atrial fibrillation with rapid ventricular response with premature ventricular or aberrantly conducted complexes  Nonspecific ST abnormality  Abnormal ECG  No previous ECGs available  Confirmed by munir Salazar ([id number]) on 1/7/2023 12:56:36 PM

## 2023-01-09 NOTE — ED ADULT NURSE REASSESSMENT NOTE - NSIMPLEMENTINTERV_GEN_ALL_ED
Implemented All Fall with Harm Risk Interventions:  [place] to call system. Call bell, personal items and telephone within reach. Instruct patient to call for assistance. Room bathroom lighting operational. Non-slip footwear when patient is off stretcher. Physically safe environment: no spills, clutter or unnecessary equipment. Stretcher in lowest position, wheels locked, appropriate side rails in place. Provide visual cue, wrist band, yellow gown, etc. Monitor gait and stability. Monitor for mental status changes and reorient to person, place, and time. Review medications for side effects contributing to fall risk. Reinforce activity limits and safety measures with patient and family. Provide visual clues: red socks.
Implemented All Fall with Harm Risk Interventions:  [place] to call system. Call bell, personal items and telephone within reach. Instruct patient to call for assistance. Room bathroom lighting operational. Non-slip footwear when patient is off stretcher. Physically safe environment: no spills, clutter or unnecessary equipment. Stretcher in lowest position, wheels locked, appropriate side rails in place. Provide visual cue, wrist band, yellow gown, etc. Monitor gait and stability. Monitor for mental status changes and reorient to person, place, and time. Review medications for side effects contributing to fall risk. Reinforce activity limits and safety measures with patient and family. Provide visual clues: red socks.

## 2023-01-09 NOTE — PROGRESS NOTE ADULT - ASSESSMENT
83 y/o m w/ PMH od CAD s/p stent, s/p CABG, HTN, HLD, CHF p/w shortness of breath and cough    on tamiflu for FLU  acap prn  cough rx regimen prn  extensive and advanced Heart Disease - [facility]vs rx regimen  judicious diuresis  I and O  AF - on AC - rate and rhythm control  cardio follow up  CXR clear on Admission  pulm congestion - multifactorial - FLU - Bronchitis - Pulm Edema -   cont NEBS - mucociliary clearance, mucinex, chest PT, enc cough, cvs rx regimen  monitor VS and Sat  keep sat > 88 pct  GOC discussion  spoke with daughters  FLU isolation

## 2023-01-09 NOTE — PROGRESS NOTE ADULT - PROBLEM SELECTOR PLAN 2
Pt started on Tamiflu, Tylenol prn  - cough - pt started on Robitussin and tessalon perles  - shortness of breath, wheezing - pt on Duoneb q 6, continue. No distress, talking in full sentences. O2 Via NC   - supportive care as needed Pt started on Tamiflu, Tylenol prn  - cough - pt started on Robitussin and tessalon perles  - continue Duoneb   - supportive care

## 2023-01-09 NOTE — PROGRESS NOTE ADULT - ASSESSMENT
82 year old male PMH of CAD s/p stent, s/p CABG, HTN, HLD, CHF p/w shortness of breath and cough for the last 5 days found to be in af with rvr, congestive hf, and influenza positive.    CAD s/p CABG, stent, Afib with RVR, HFpEF  - No anginal symptoms, no sign of acute ischemia, and troponin are negative  - Continue ASA, BB, and statin  - Continue Imdur  - Would resume Losartan in am, 1/10, if creatinine remains normal.  Would start low dose to allow room for uptitrate AVN blocker    - Afib with rates, slightly improved to 100's-110's  - Increase BB to 50 mg q8H with parameter.  Will eventually switch to long-acting  - Continue CCB.  Would switch to long-acting once HR is controlled  - Continue Eliquis    - Compensated from HF standpoint  - Still requiring NC at 5L/min in the setting of Flu.  Downtritrate as tolerated  - TTE with EF 55%, LAE, with no significant valvular pathology  - Continue Lasix IV today and plan to switch to PO in am, 1/10  - Strict i/o's and daily weights    - Flu as per Pulm/primary   - Initiate incentive spirometer    - Trend creatinine and electrolytes. Keep K>4 mg>2  - Will follow with you    Ana Luisa Holcomb DNP, NP-C  Cardiology   Spectra #[phone]       82 year old male PMH of CAD s/p stent, s/p CABG, HTN, HLD, CHF p/w shortness of breath and cough for the last 5 days found to be in af with rvr, congestive hf, and influenza positive.    CAD s/p CABG, stent, Afib with RVR, HFpEF  - No anginal symptoms, no sign of acute ischemia, and troponin are negative  - Continue ASA, BB, and statin  - Continue Imdur  - Would resume Losartan in am, 1/10, if creatinine remains normal.  Would start low dose to allow room for uptitrate AVN blocker    - Afib with rates, slightly improved to 100's-110's.  Tachycardia  with some reactive component (in the setting of flu)  - Increase BB to 50 mg q8H with parameter.  Will eventually switch to long-acting  - Continue CCB.  Would switch to long-acting once HR is controlled  - Continue Eliquis    - Compensated from HF standpoint  - Still requiring NC at 5L/min in the setting of Flu.  Downtritrate as tolerated  - TTE with EF 55%, LAE, with no significant valvular pathology  - Continue Lasix IV today and plan to switch to PO in am, 1/10  - Strict i/o's and daily weights    - Flu as per Pulm/primary   - Initiate incentive spirometer    - Trend creatinine and electrolytes. Keep K>4 mg>2  - Will follow with you    Ana Luisa Holcomb DNP, NP-C  Cardiology   Spectra #[phone]

## 2023-01-09 NOTE — PROGRESS NOTE ADULT - PROBLEM SELECTOR PLAN 1
Increase Metoprolol to every 8 hrs to optimize rate control  Continue Eliquis 2.5mg BID at home Increase Metoprolol to every 8 hrs to optimize rate control  Continue Eliquis 2.5mg BID at home  Monitor

## 2023-01-09 NOTE — PROGRESS NOTE ADULT - SUBJECTIVE AND OBJECTIVE BOX
MEDICAL ATTENDING NOTE    Patient is a 82y old  Male who presents with a chief complaint of CHF, A fib, Flu (09 Jan 2023 12:33)      INTERVAL HPI/OVERNIGHT EVENTS: no acute issues reported    MEDICATIONS  (STANDING):  albuterol    0.083% 2.5 milliGRAM(s) Nebulizer every 8 hours  apixaban 5 milliGRAM(s) Oral every 12 hours  atorvastatin 10 milliGRAM(s) Oral at bedtime  benzonatate 100 milliGRAM(s) Oral every 8 hours  clopidogrel Tablet 75 milliGRAM(s) Oral daily  diltiazem    Tablet 30 milliGRAM(s) Oral every 6 hours  furosemide   Injectable 40 milliGRAM(s) IV Push two times a day  gabapentin 100 milliGRAM(s) Oral two times a day  guaiFENesin  milliGRAM(s) Oral every 12 hours  isosorbide   mononitrate ER Tablet (IMDUR) 60 milliGRAM(s) Oral daily  metoprolol tartrate 25 milliGRAM(s) Oral four times a day  oseltamivir 30 milliGRAM(s) Oral two times a day    MEDICATIONS  (PRN):  guaiFENesin Oral Liquid (Sugar-Free) 200 milliGRAM(s) Oral every 6 hours PRN Cough      __________________________________________________  ----------------------------------------------------------------------------------  REVIEW OF SYSTEMS: no chest pain, no SOB, No fever, no HA      Vital Signs Last 24 Hrs  T(C): 36.4 (09 Jan 2023 08:01), Max: 37.7 (08 Jan 2023 21:17)  T(F): 97.6 (09 Jan 2023 08:01), Max: 99.8 (08 Jan 2023 21:17)  HR: 109 (09 Jan 2023 08:01) (106 - 137)  BP: 126/65 (09 Jan 2023 08:01) (109/66 - 139/66)  BP(mean): --  RR: 20 (09 Jan 2023 08:01) (18 - 20)  SpO2: 92% (09 Jan 2023 12:52) (92% - 99%)    Parameters below as of 09 Jan 2023 12:52  Patient On (Oxygen Delivery Method): nasal cannula w/ humidification        _________________  PHYSICAL EXAM:  ---------------------------   NAD; Normocephalic;   LUNGS - no wheezing  HEART: S1 S2+   ABDOMEN: Soft, Nontender, non distended  EXTREMITIES: no cyanosis; no edema  NERVOUS SYSTEM:  Awake and alert; follows commands, moves extremities; no gross focal neuro deficits    _________________________________________________  LABS:                        13.7   8.25  )-----------( 308      ( 09 Jan 2023 06:08 )             41.2     01-09    137  |  94<L>  |  32<H>  ----------------------------<  152<H>  3.6   |  36<H>  |  1.10    Ca    9.0      09 Jan 2023 06:08  Mg     2.0     01-09    TPro  7.6  /  Alb  3.0<L>  /  TBili  1.9<H>  /  DBili  x   /  AST  29  /  ALT  23  /  AlkPhos  72  01-08        CAPILLARY BLOOD GLUCOSE                    Plan of care was discussed with patient;  care was aligned with patient's wishes.             MEDICAL ATTENDING NOTE    Patient is a 82y old  Male who presents with a chief complaint of CHF, A fib, Flu (09 Jan 2023 12:33)      INTERVAL HPI/OVERNIGHT EVENTS: no acute issues reported    MEDICATIONS  (STANDING):  albuterol    0.083% 2.5 milliGRAM(s) Nebulizer every 8 hours  apixaban 5 milliGRAM(s) Oral every 12 hours  atorvastatin 10 milliGRAM(s) Oral at bedtime  benzonatate 100 milliGRAM(s) Oral every 8 hours  clopidogrel Tablet 75 milliGRAM(s) Oral daily  diltiazem    Tablet 30 milliGRAM(s) Oral every 6 hours  furosemide   Injectable 40 milliGRAM(s) IV Push two times a day  gabapentin 100 milliGRAM(s) Oral two times a day  guaiFENesin  milliGRAM(s) Oral every 12 hours  isosorbide   mononitrate ER Tablet (IMDUR) 60 milliGRAM(s) Oral daily  metoprolol tartrate 25 milliGRAM(s) Oral four times a day  oseltamivir 30 milliGRAM(s) Oral two times a day    MEDICATIONS  (PRN):  guaiFENesin Oral Liquid (Sugar-Free) 200 milliGRAM(s) Oral every 6 hours PRN Cough      __________________________________________________  ----------------------------------------------------------------------------------  REVIEW OF SYSTEMS: no chest pain, no SOB, No fever, no HA      Vital Signs Last 24 Hrs  T(C): 36.4 (09 Jan 2023 08:01), Max: 37.7 (08 Jan 2023 21:17)  T(F): 97.6 (09 Jan 2023 08:01), Max: 99.8 (08 Jan 2023 21:17)  HR: 109 (09 Jan 2023 08:01) (106 - 137)  BP: 126/65 (09 Jan 2023 08:01) (109/66 - 139/66)  RR: 20 (09 Jan 2023 08:01) (18 - 20)  SpO2: 92% (09 Jan 2023 12:52) (92% - 99%)    Parameters below as of 09 Jan 2023 12:52  Patient On (Oxygen Delivery Method): nasal cannula w/ humidification        _________________  PHYSICAL EXAM:  ---------------------------   NAD; Normocephalic;   LUNGS - no wheezing  HEART: S1 S2+   ABDOMEN: Soft, Nontender, non distended, BS+  EXTREMITIES: no cyanosis; no edema  NERVOUS SYSTEM:  Awake and alert; follows commands, moves extremities; no gross focal neuro deficits    _________________________________________________  LABS:                        13.7   8.25  )-----------( 308      ( 09 Jan 2023 06:08 )             41.2     01-09    137  |  94<L>  |  32<H>  ----------------------------<  152<H>  3.6   |  36<H>  |  1.10    Ca    9.0      09 Jan 2023 06:08  Mg     2.0     01-09    TPro  7.6  /  Alb  3.0<L>  /  TBili  1.9<H>  /  DBili  x   /  AST  29  /  ALT  23  /  AlkPhos  72  01-08        CAPILLARY BLOOD GLUCOSE                    Plan of care was discussed with patient;  care was aligned with patient's wishes.

## 2023-01-09 NOTE — PROGRESS NOTE ADULT - SUBJECTIVE AND OBJECTIVE BOX
Patient is a 82y old  Male who presents with a chief complaint of CHF, A fib, Flu (09 Jan 2023 08:23)    Patient seen in follow up for       PAST MEDICAL HISTORY:  Congestive heart failure (CHF)    Atrial fibrillation    HLD (hyperlipidemia)    HTN (hypertension)    CAD (coronary artery disease)      MEDICATIONS  (STANDING):  albuterol    0.083% 2.5 milliGRAM(s) Nebulizer every 8 hours  apixaban 5 milliGRAM(s) Oral every 12 hours  atorvastatin 10 milliGRAM(s) Oral at bedtime  benzonatate 100 milliGRAM(s) Oral every 8 hours  clopidogrel Tablet 75 milliGRAM(s) Oral daily  diltiazem    Tablet 30 milliGRAM(s) Oral every 6 hours  furosemide   Injectable 40 milliGRAM(s) IV Push two times a day  gabapentin 100 milliGRAM(s) Oral two times a day  guaiFENesin  milliGRAM(s) Oral every 12 hours  isosorbide   mononitrate ER Tablet (IMDUR) 60 milliGRAM(s) Oral daily  metoprolol tartrate 25 milliGRAM(s) Oral four times a day  oseltamivir 30 milliGRAM(s) Oral two times a day    MEDICATIONS  (PRN):  guaiFENesin Oral Liquid (Sugar-Free) 200 milliGRAM(s) Oral every 6 hours PRN Cough    T(C): 36.4 (01-09-23 @ 08:01), Max: 37.7 (01-08-23 @ 21:17)  HR: 109 (01-09-23 @ 08:01) (106 - 151)  BP: 126/65 (01-09-23 @ 08:01) (109/66 - 139/66)  RR: 20 (01-09-23 @ 08:01) (18 - 20)  SpO2: 99% (01-09-23 @ 08:01) (96% - 100%)  Wt(kg): --  I&O's Detail      PHYSICAL EXAM:  General: No distress  Respiratory: b/l air entry  Cardiovascular: S1 S2  Gastrointestinal: soft  Extremities:  edema                              13.7   8.25  )-----------( 308      ( 09 Jan 2023 06:08 )             41.2     01-09    137  |  94<L>  |  32<H>  ----------------------------<  152<H>  3.6   |  36<H>  |  1.10    Ca    9.0      09 Jan 2023 06:08  Mg     2.0     01-09    TPro  7.6  /  Alb  3.0<L>  /  TBili  1.9<H>  /  DBili  x   /  AST  29  /  ALT  23  /  AlkPhos  72  01-08        LIVER FUNCTIONS - ( 08 Jan 2023 06:00 )  Alb: 3.0 g/dL / Pro: 7.6 g/dL / ALK PHOS: 72 U/L / ALT: 23 U/L / AST: 29 U/L / GGT: x               Sodium, Serum: 137 (01-09 @ 06:08)  Sodium, Serum: 139 (01-08 @ 06:00)  Sodium, Serum: 137 (01-06 @ 17:00)    Creatinine, Serum: 1.10 (01-09 @ 06:08)  Creatinine, Serum: 1.30 (01-08 @ 06:00)  Creatinine, Serum: 1.40 (01-06 @ 17:00)    Potassium, Serum: 3.6 (01-09 @ 06:08)  Potassium, Serum: 3.7 (01-08 @ 19:34)  Potassium, Serum: 2.9 (01-08 @ 06:00)  Potassium, Serum: 3.6 (01-06 @ 17:00)    Hemoglobin: 13.7 (01-09 @ 06:08)  Hemoglobin: 15.1 (01-08 @ 06:00)  Hemoglobin: 13.2 (01-06 @ 17:00)

## 2023-01-09 NOTE — CHART NOTE - NSCHARTNOTEFT_GEN_A_CORE
Called by RN as patient is in A fib w/ RVR with HR in 130-150s. Patient reciently received diltiazem and lopressor home medication 30 minutes prior to call. However, patient's HR was in the 150-160s before receiving both medications. Patient seen and examined at bedside. Patient is asymptomatic and denies chest pain/ palpitations. Will give IV Lopressor 2.5 mg x1 STAT and will change metoprolol home medication from 25mg to 50mg as per cardio recs. Rn to call with further changes. Discussed w/ Dr. Zhang Called by RN as patient is in A fib w/ RVR with HR in 130-150s. Patient reciently received diltiazem and lopressor home medication 30 minutes prior to call. However, patient's HR was in the 150-160s before receiving both medications. Patient seen and examined at bedside. Patient is asymptomatic and denies chest pain/ palpitations. Will give IV Lopressor 2.5 mg x1 STAT and will change metoprolol home medication from 25mg to 50mg as per cardio recs. Rn to call with further changes. Discussed w/ Dr. Zhang    ADDENDUM: HR have decreased to 110-115. Rn to call with further changes.

## 2023-01-09 NOTE — ED ADULT NURSE REASSESSMENT NOTE - NS ED NURSE REASSESS COMMENT FT1
0730:  patient alert / oriented x4.  no respiratory distress, but is still coughing with some wheezing.  he states that he has been under a lot of stress lately since his wife passed.  Monitor reveals afib, but patient denies ever having afib.  He does however state he is on blood thinners because "I got a crap heart.  I had surgery and I have stents".  he is pleasant, waiting cardio evaluation and hoping "to just get out of here".  zack mccarthy.
1021:  patient taken via hospital bed to echo.  he left in stable condition, no distress noted.  urinal provided as he was given lasix.  zack mccarthy.
1055:  patient back from echo, stable condition, no distress.  zack mccarthy.
Patient has dental pain. tooth was removed last week.
denies complaints at this time. remains afib on cardiac monitor. remains on Ra satting 98%. respirations even and unlabored, completing full sentences. will continue to monitor.
pt evaluated and admitted for flu awaiting no bed available at present  vital signs stable
pt reavaluated pt with sinus rhythm noted no c/o voiced medicated as ordered awaiting tele bed admission
pt resting comfortably in bed, denies complaints at this time. HR 110s on cardiac monitor. pt satting 98% on RA at this time. pt in NAD, respirations even and unlabored. will continue to monitor.
Pt resting comfortably in bed at this time, offers no complaints.  Denies any chest pain or SOB.  No n/v/d.  Pt AFib on cardiac monitor, rate elevated 130s-140s after receiving duoneb tx; intern aware, pt due for metoprolol 25 po at MN, bp borderline for parameters, will reassess.  O2 sat 100% on room air- tolerating well.  Awaiting remote tele bed.  Maintain comfort and safety.
Pt in no acute distress. Pt updated on plan of care.

## 2023-01-10 DIAGNOSIS — J96.01 ACUTE RESPIRATORY FAILURE WITH HYPOXIA: ICD-10-CM

## 2023-01-10 LAB
ALBUMIN SERPL ELPH-MCNC: 2.5 G/DL — LOW (ref 3.3–5)
ALP SERPL-CCNC: 87 U/L — SIGNIFICANT CHANGE UP (ref 40–120)
ALT FLD-CCNC: 19 U/L — SIGNIFICANT CHANGE UP (ref 12–78)
ANION GAP SERPL CALC-SCNC: 10 MMOL/L — SIGNIFICANT CHANGE UP (ref 5–17)
AST SERPL-CCNC: 21 U/L — SIGNIFICANT CHANGE UP (ref 15–37)
BASE EXCESS BLDA CALC-SCNC: 15.5 MMOL/L — HIGH (ref -2–3)
BASOPHILS # BLD AUTO: 0.08 K/UL — SIGNIFICANT CHANGE UP (ref 0–0.2)
BASOPHILS NFR BLD AUTO: 0.6 % — SIGNIFICANT CHANGE UP (ref 0–2)
BILIRUB SERPL-MCNC: 2.3 MG/DL — HIGH (ref 0.2–1.2)
BLOOD GAS COMMENTS ARTERIAL: SIGNIFICANT CHANGE UP
BUN SERPL-MCNC: 38 MG/DL — HIGH (ref 7–23)
CALCIUM SERPL-MCNC: 8.7 MG/DL — SIGNIFICANT CHANGE UP (ref 8.5–10.1)
CHLORIDE SERPL-SCNC: 87 MMOL/L — LOW (ref 96–108)
CO2 SERPL-SCNC: 36 MMOL/L — HIGH (ref 22–31)
CREAT SERPL-MCNC: 1.2 MG/DL — SIGNIFICANT CHANGE UP (ref 0.5–1.3)
EGFR: 61 ML/MIN/1.73M2 — SIGNIFICANT CHANGE UP
EOSINOPHIL # BLD AUTO: 0.01 K/UL — SIGNIFICANT CHANGE UP (ref 0–0.5)
EOSINOPHIL NFR BLD AUTO: 0.1 % — SIGNIFICANT CHANGE UP (ref 0–6)
GAS PNL BLDA: SIGNIFICANT CHANGE UP
GLUCOSE SERPL-MCNC: 219 MG/DL — HIGH (ref 70–99)
HCO3 BLDA-SCNC: 38 MMOL/L — HIGH (ref 21–28)
HCT VFR BLD CALC: 42.1 % — SIGNIFICANT CHANGE UP (ref 39–50)
HGB BLD-MCNC: 14.2 G/DL — SIGNIFICANT CHANGE UP (ref 13–17)
HOROWITZ INDEX BLDA+IHG-RTO: 100 — SIGNIFICANT CHANGE UP
IMM GRANULOCYTES NFR BLD AUTO: 1.8 % — HIGH (ref 0–0.9)
LYMPHOCYTES # BLD AUTO: 0.96 K/UL — LOW (ref 1–3.3)
LYMPHOCYTES # BLD AUTO: 7 % — LOW (ref 13–44)
MAGNESIUM SERPL-MCNC: 1.9 MG/DL — SIGNIFICANT CHANGE UP (ref 1.6–2.6)
MCHC RBC-ENTMCNC: 29 PG — SIGNIFICANT CHANGE UP (ref 27–34)
MCHC RBC-ENTMCNC: 33.7 GM/DL — SIGNIFICANT CHANGE UP (ref 32–36)
MCV RBC AUTO: 85.9 FL — SIGNIFICANT CHANGE UP (ref 80–100)
MONOCYTES # BLD AUTO: 1.07 K/UL — HIGH (ref 0–0.9)
MONOCYTES NFR BLD AUTO: 7.9 % — SIGNIFICANT CHANGE UP (ref 2–14)
NEUTROPHILS # BLD AUTO: 11.26 K/UL — HIGH (ref 1.8–7.4)
NEUTROPHILS NFR BLD AUTO: 82.6 % — HIGH (ref 43–77)
NRBC # BLD: 0 /100 WBCS — SIGNIFICANT CHANGE UP (ref 0–0)
NT-PROBNP SERPL-SCNC: 768 PG/ML — HIGH (ref 0–450)
PCO2 BLDA: 47 MMHG — SIGNIFICANT CHANGE UP (ref 35–48)
PH BLDA: 7.52 — HIGH (ref 7.35–7.45)
PHOSPHATE SERPL-MCNC: 2.7 MG/DL — SIGNIFICANT CHANGE UP (ref 2.5–4.5)
PLATELET # BLD AUTO: 390 K/UL — SIGNIFICANT CHANGE UP (ref 150–400)
PO2 BLDA: 65 MMHG — LOW (ref 83–108)
POTASSIUM SERPL-MCNC: 3.4 MMOL/L — LOW (ref 3.5–5.3)
POTASSIUM SERPL-SCNC: 3.4 MMOL/L — LOW (ref 3.5–5.3)
PROCALCITONIN SERPL-MCNC: 0.98 NG/ML — HIGH
PROT SERPL-MCNC: 7.1 G/DL — SIGNIFICANT CHANGE UP (ref 6–8.3)
RBC # BLD: 4.9 M/UL — SIGNIFICANT CHANGE UP (ref 4.2–5.8)
RBC # FLD: 15.1 % — HIGH (ref 10.3–14.5)
SAO2 % BLDA: 94.2 % — SIGNIFICANT CHANGE UP (ref 94–98)
SARS-COV-2 RNA SPEC QL NAA+PROBE: SIGNIFICANT CHANGE UP
SODIUM SERPL-SCNC: 133 MMOL/L — LOW (ref 135–145)
WBC # BLD: 13.63 K/UL — HIGH (ref 3.8–10.5)
WBC # FLD AUTO: 13.63 K/UL — HIGH (ref 3.8–10.5)

## 2023-01-10 PROCEDURE — 99232 SBSQ HOSP IP/OBS MODERATE 35: CPT

## 2023-01-10 PROCEDURE — 71275 CT ANGIOGRAPHY CHEST: CPT | Mod: 26

## 2023-01-10 PROCEDURE — 99233 SBSQ HOSP IP/OBS HIGH 50: CPT

## 2023-01-10 RX ORDER — DILTIAZEM HCL 120 MG
60 CAPSULE, EXT RELEASE 24 HR ORAL EVERY 6 HOURS
Refills: 0 | Status: DISCONTINUED | OUTPATIENT
Start: 2023-01-10 | End: 2023-01-11

## 2023-01-10 RX ORDER — AZITHROMYCIN 500 MG/1
500 TABLET, FILM COATED ORAL EVERY 24 HOURS
Refills: 0 | Status: DISCONTINUED | OUTPATIENT
Start: 2023-01-11 | End: 2023-01-12

## 2023-01-10 RX ORDER — AZITHROMYCIN 500 MG/1
500 TABLET, FILM COATED ORAL ONCE
Refills: 0 | Status: COMPLETED | OUTPATIENT
Start: 2023-01-10 | End: 2023-01-10

## 2023-01-10 RX ORDER — VANCOMYCIN HCL 1 G
1000 VIAL (EA) INTRAVENOUS ONCE
Refills: 0 | Status: COMPLETED | OUTPATIENT
Start: 2023-01-10 | End: 2023-01-10

## 2023-01-10 RX ORDER — AZITHROMYCIN 500 MG/1
TABLET, FILM COATED ORAL
Refills: 0 | Status: DISCONTINUED | OUTPATIENT
Start: 2023-01-10 | End: 2023-01-12

## 2023-01-10 RX ORDER — CEFEPIME 1 G/1
1000 INJECTION, POWDER, FOR SOLUTION INTRAMUSCULAR; INTRAVENOUS EVERY 8 HOURS
Refills: 0 | Status: DISCONTINUED | OUTPATIENT
Start: 2023-01-10 | End: 2023-01-11

## 2023-01-10 RX ORDER — METOCLOPRAMIDE HCL 10 MG
5 TABLET ORAL EVERY 8 HOURS
Refills: 0 | Status: DISCONTINUED | OUTPATIENT
Start: 2023-01-10 | End: 2023-01-13

## 2023-01-10 RX ORDER — CHLORHEXIDINE GLUCONATE 213 G/1000ML
1 SOLUTION TOPICAL
Refills: 0 | Status: DISCONTINUED | OUTPATIENT
Start: 2023-01-10 | End: 2023-01-11

## 2023-01-10 RX ORDER — CEFEPIME 1 G/1
INJECTION, POWDER, FOR SOLUTION INTRAMUSCULAR; INTRAVENOUS
Refills: 0 | Status: DISCONTINUED | OUTPATIENT
Start: 2023-01-10 | End: 2023-01-11

## 2023-01-10 RX ORDER — VANCOMYCIN HCL 1 G
1250 VIAL (EA) INTRAVENOUS EVERY 12 HOURS
Refills: 0 | Status: DISCONTINUED | OUTPATIENT
Start: 2023-01-11 | End: 2023-01-11

## 2023-01-10 RX ORDER — IPRATROPIUM/ALBUTEROL SULFATE 18-103MCG
3 AEROSOL WITH ADAPTER (GRAM) INHALATION EVERY 6 HOURS
Refills: 0 | Status: COMPLETED | OUTPATIENT
Start: 2023-01-10 | End: 2023-01-13

## 2023-01-10 RX ORDER — ONDANSETRON 8 MG/1
4 TABLET, FILM COATED ORAL ONCE
Refills: 0 | Status: DISCONTINUED | OUTPATIENT
Start: 2023-01-10 | End: 2023-01-10

## 2023-01-10 RX ORDER — CEFEPIME 1 G/1
1000 INJECTION, POWDER, FOR SOLUTION INTRAMUSCULAR; INTRAVENOUS ONCE
Refills: 0 | Status: COMPLETED | OUTPATIENT
Start: 2023-01-10 | End: 2023-01-10

## 2023-01-10 RX ADMIN — Medication 3 MILLILITER(S): at 21:23

## 2023-01-10 RX ADMIN — Medication 50 MILLIGRAM(S): at 06:29

## 2023-01-10 RX ADMIN — Medication 50 MILLIGRAM(S): at 22:10

## 2023-01-10 RX ADMIN — AZITHROMYCIN 255 MILLIGRAM(S): 500 TABLET, FILM COATED ORAL at 22:11

## 2023-01-10 RX ADMIN — Medication 5 MILLIGRAM(S): at 11:57

## 2023-01-10 RX ADMIN — APIXABAN 5 MILLIGRAM(S): 2.5 TABLET, FILM COATED ORAL at 17:27

## 2023-01-10 RX ADMIN — Medication 60 MILLIGRAM(S): at 17:27

## 2023-01-10 RX ADMIN — GABAPENTIN 100 MILLIGRAM(S): 400 CAPSULE ORAL at 17:27

## 2023-01-10 RX ADMIN — CLOPIDOGREL BISULFATE 75 MILLIGRAM(S): 75 TABLET, FILM COATED ORAL at 11:57

## 2023-01-10 RX ADMIN — Medication 50 MILLIGRAM(S): at 00:33

## 2023-01-10 RX ADMIN — Medication 600 MILLIGRAM(S): at 17:27

## 2023-01-10 RX ADMIN — APIXABAN 5 MILLIGRAM(S): 2.5 TABLET, FILM COATED ORAL at 06:28

## 2023-01-10 RX ADMIN — Medication 100 MILLIGRAM(S): at 22:10

## 2023-01-10 RX ADMIN — ALBUTEROL 2.5 MILLIGRAM(S): 90 AEROSOL, METERED ORAL at 15:19

## 2023-01-10 RX ADMIN — ALBUTEROL 2.5 MILLIGRAM(S): 90 AEROSOL, METERED ORAL at 07:45

## 2023-01-10 RX ADMIN — Medication 40 MILLIGRAM(S): at 06:29

## 2023-01-10 RX ADMIN — Medication 600 MILLIGRAM(S): at 06:28

## 2023-01-10 RX ADMIN — Medication 30 MILLIGRAM(S): at 06:28

## 2023-01-10 RX ADMIN — ISOSORBIDE MONONITRATE 60 MILLIGRAM(S): 60 TABLET, EXTENDED RELEASE ORAL at 11:58

## 2023-01-10 RX ADMIN — Medication 100 MILLIGRAM(S): at 00:34

## 2023-01-10 RX ADMIN — GABAPENTIN 100 MILLIGRAM(S): 400 CAPSULE ORAL at 06:28

## 2023-01-10 RX ADMIN — Medication 30 MILLIGRAM(S): at 17:27

## 2023-01-10 RX ADMIN — CEFEPIME 100 MILLIGRAM(S): 1 INJECTION, POWDER, FOR SOLUTION INTRAMUSCULAR; INTRAVENOUS at 22:11

## 2023-01-10 RX ADMIN — ATORVASTATIN CALCIUM 10 MILLIGRAM(S): 80 TABLET, FILM COATED ORAL at 00:34

## 2023-01-10 RX ADMIN — Medication 30 MILLIGRAM(S): at 01:18

## 2023-01-10 RX ADMIN — Medication 100 MILLIGRAM(S): at 06:28

## 2023-01-10 RX ADMIN — CEFEPIME 100 MILLIGRAM(S): 1 INJECTION, POWDER, FOR SOLUTION INTRAMUSCULAR; INTRAVENOUS at 19:01

## 2023-01-10 RX ADMIN — Medication 50 MILLIGRAM(S): at 16:03

## 2023-01-10 RX ADMIN — ATORVASTATIN CALCIUM 10 MILLIGRAM(S): 80 TABLET, FILM COATED ORAL at 22:10

## 2023-01-10 RX ADMIN — Medication 3 MILLILITER(S): at 13:10

## 2023-01-10 RX ADMIN — Medication 250 MILLIGRAM(S): at 20:31

## 2023-01-10 RX ADMIN — Medication 60 MILLIGRAM(S): at 11:58

## 2023-01-10 RX ADMIN — Medication 40 MILLIGRAM(S): at 14:31

## 2023-01-10 RX ADMIN — Medication 100 MILLIGRAM(S): at 14:31

## 2023-01-10 NOTE — PROGRESS NOTE ADULT - SUBJECTIVE AND OBJECTIVE BOX
MEDICAL ATTENDING NOTE    Patient is a 82y old  Male who presents with a chief complaint of CHF, A fib, Flu (10 Matthew 2023 10:14)      INTERVAL HPI/OVERNIGHT EVENTS: still very SOB and hypoxia    MEDICATIONS  (STANDING):  albuterol    0.083% 2.5 milliGRAM(s) Nebulizer every 8 hours  apixaban 5 milliGRAM(s) Oral every 12 hours  atorvastatin 10 milliGRAM(s) Oral at bedtime  benzonatate 100 milliGRAM(s) Oral every 8 hours  clopidogrel Tablet 75 milliGRAM(s) Oral daily  diltiazem    Tablet 60 milliGRAM(s) Oral every 6 hours  furosemide   Injectable 40 milliGRAM(s) IV Push two times a day  gabapentin 100 milliGRAM(s) Oral two times a day  guaiFENesin  milliGRAM(s) Oral every 12 hours  hydrocodone/homatropine Syrup 5 milliLiter(s) Oral every 8 hours  isosorbide   mononitrate ER Tablet (IMDUR) 60 milliGRAM(s) Oral daily  metoprolol tartrate 50 milliGRAM(s) Oral every 8 hours  ondansetron Injectable 4 milliGRAM(s) IV Push once  oseltamivir 30 milliGRAM(s) Oral two times a day    MEDICATIONS  (PRN):  bisacodyl 5 milliGRAM(s) Oral every 12 hours PRN Constipation  guaiFENesin Oral Liquid (Sugar-Free) 200 milliGRAM(s) Oral every 6 hours PRN Cough  metoclopramide Injectable 5 milliGRAM(s) IV Push every 8 hours PRN nausea/vomiting      __________________________________________________  ----------------------------------------------------------------------------------  REVIEW OF SYSTEMS: no chest pain, No fever, no HA      Vital Signs Last 24 Hrs  T(C): 36.3 (10 Matthew 2023 11:46), Max: 37.3 (09 Jan 2023 21:17)  T(F): 97.3 (10 Matthew 2023 11:46), Max: 99.2 (09 Jan 2023 21:17)  HR: 111 (10 Matthew 2023 11:46) (97 - 135)  BP: 138/85 (10 Matthew 2023 11:46) (115/69 - 138/85)  RR: 20 (10 Matthew 2023 11:46) (20 - 20)  SpO2: 89% (10 Matthew 2023 13:10) (89% - 95%)    Parameters below as of 10 Matthew 2023 13:10  Patient On (Oxygen Delivery Method): mask, nonrebreather  O2 Flow (L/min): 10      _________________  PHYSICAL EXAM:  ---------------------------   NAD; Normocephalic;   LUNGS - no wheezing  HEART: S1 S2+   ABDOMEN: Soft, Nontender, non distended, BS+  EXTREMITIES: no cyanosis; no edema  NERVOUS SYSTEM:  Awake and alert; follows commands, moves extremities; no gross focal neuro deficits    _________________________________________________  LABS:                        13.7   8.25  )-----------( 308      ( 09 Jan 2023 06:08 )             41.2     01-09    137  |  94<L>  |  32<H>  ----------------------------<  152<H>  3.6   |  36<H>  |  1.10    Ca    9.0      09 Jan 2023 06:08  Mg     2.0     01-09          CAPILLARY BLOOD GLUCOSE                    Plan of care was discussed with patient;  care was aligned with patient's wishes.             MEDICAL ATTENDING NOTE    Patient is a 82y old  Male who presents with a chief complaint of CHF, A fib, Flu (10 Matthew 2023 10:14)      INTERVAL HPI/OVERNIGHT EVENTS: still very SOB and with hypoxia this morning. Family at bedside; patient was in the bathroom for about half hour witout oxygen and on getting back to bed was noted to be hypoxic down to 83%    MEDICATIONS  (STANDING):  albuterol    0.083% 2.5 milliGRAM(s) Nebulizer every 8 hours  apixaban 5 milliGRAM(s) Oral every 12 hours  atorvastatin 10 milliGRAM(s) Oral at bedtime  benzonatate 100 milliGRAM(s) Oral every 8 hours  clopidogrel Tablet 75 milliGRAM(s) Oral daily  diltiazem    Tablet 60 milliGRAM(s) Oral every 6 hours  furosemide   Injectable 40 milliGRAM(s) IV Push two times a day  gabapentin 100 milliGRAM(s) Oral two times a day  guaiFENesin  milliGRAM(s) Oral every 12 hours  hydrocodone/homatropine Syrup 5 milliLiter(s) Oral every 8 hours  isosorbide   mononitrate ER Tablet (IMDUR) 60 milliGRAM(s) Oral daily  metoprolol tartrate 50 milliGRAM(s) Oral every 8 hours  ondansetron Injectable 4 milliGRAM(s) IV Push once  oseltamivir 30 milliGRAM(s) Oral two times a day    MEDICATIONS  (PRN):  bisacodyl 5 milliGRAM(s) Oral every 12 hours PRN Constipation  guaiFENesin Oral Liquid (Sugar-Free) 200 milliGRAM(s) Oral every 6 hours PRN Cough  metoclopramide Injectable 5 milliGRAM(s) IV Push every 8 hours PRN nausea/vomiting      __________________________________________________  ----------------------------------------------------------------------------------  REVIEW OF SYSTEMS: no chest pain, No fever, no HA; SOB++      Vital Signs Last 24 Hrs  T(C): 36.3 (10 Matthew 2023 11:46), Max: 37.3 (09 Jan 2023 21:17)  T(F): 97.3 (10 Matthew 2023 11:46), Max: 99.2 (09 Jan 2023 21:17)  HR: 111 (10 Matthew 2023 11:46) (97 - 135)  BP: 138/85 (10 Matthew 2023 11:46) (115/69 - 138/85)  RR: 20 (10 Matthew 2023 11:46) (20 - 20)  SpO2: 89% (10 Matthew 2023 13:10) (89% - 95%)    Parameters below as of 10 Matthew 2023 13:10  Patient On (Oxygen Delivery Method): mask, nonrebreather  O2 Flow (L/min): 10      _________________  PHYSICAL EXAM:  ---------------------------   NAD; Normocephalic;   LUNGS - no wheezing  HEART: S1 S2+   ABDOMEN: Soft, Nontender, non distended, BS+  EXTREMITIES: no cyanosis; no edema  NERVOUS SYSTEM:  Awake and alert; follows commands, moves extremities; no gross focal neuro deficits    _________________________________________________  LABS:                        13.7   8.25  )-----------( 308      ( 09 Jan 2023 06:08 )             41.2     01-09    137  |  94<L>  |  32<H>  ----------------------------<  152<H>  3.6   |  36<H>  |  1.10    Ca    9.0      09 Jan 2023 06:08  Mg     2.0     01-09          CAPILLARY BLOOD GLUCOSE                    Plan of care was discussed with patient;  care was aligned with patient's wishes.

## 2023-01-10 NOTE — PROGRESS NOTE ADULT - ASSESSMENT
82 year old male PMH of CAD s/p stent, s/p CABG, HTN, HLD, CHF p/w shortness of breath and cough for the last 5 days found to be in af with rvr, congestive hf, and influenza positive.    CAD s/p CABG, stent, Afib with RVR, HFpEF  - No anginal symptoms, no sign of acute ischemia, and troponin are negative  - Continue ASA, BB, and statin  - Continue Imdur  - Would resume Losartan in am, 1/10, if creatinine remains normal.  Would start low dose to allow room for uptitrate AVN blocker    - Afib with rates, slightly improved to 100s-160's.  Tachycardia  with some reactive component (in the setting of flu)  - Increase BB to 50 mg q6hr with parameter.  Will eventually switch to long-acting  - Continue Cardizem 30mg q6hrs.  Would switch to long-acting once HR is controlled  - Continue Eliquis    - Compensated from HF standpoint  - proBNP 2418-->959  - Still requiring supplemental 02 via NC in the setting of Flu.  Downtritrate as tolerated. Currently at 5L.  - TTE with EF 55%, LAE, with no significant valvular pathology  - Switch to Lasix PO, monitor bicarb which is uptrending  - Strict i/o's and daily weights    - Flu management as per Pulm/primary   - continue incentive spirometer    - Monitor and replete lytes, keep K>4, Mg>2.  - Will continue to follow.    Felipe Smart NP  Nurse Practitioner- Cardiology   Spectra #[phone] 82 year old male PMH of CAD s/p stent, s/p CABG, HTN, HLD, CHF p/w shortness of breath and cough for the last 5 days found to be in af with rvr, congestive hf, and influenza positive.    CAD s/p CABG, stent, Afib with RVR, HFpEF  - No anginal symptoms, no sign of acute ischemia, and troponin are negative  - Continue ASA, BB, and statin  - Continue Imdur  - Would resume Losartan in am, 1/10, if creatinine remains normal.  Would start low dose to allow room for uptitrate AVN blocker    - Afib with rates, slightly improved to 100s-160's.  Tachycardia  with some reactive component (in the setting of flu)  - Increase BB to 50 mg q6hr with parameter.  Will eventually switch to long-acting  - Continue Cardizem 30mg q6hrs.  Would switch to long-acting once HR is controlled  - Continue Eliquis    - Compensated from HF standpoint  - proBNP 2418-->959  - Still requiring supplemental 02 via NC in the setting of Flu.  Downtritrate as tolerated. Currently at 5L.  - TTE with EF 55%, LAE, with no significant valvular pathology  - Hco3 trending up will need to consider transition to PO soon.   - Strict i/o's and daily weights    - Flu management as per Pulm/primary   - continue incentive spirometer    - Monitor and replete lytes, keep K>4, Mg>2.  - Will continue to follow.    Felipe Smart NP  Nurse Practitioner- Cardiology   Spectra #[phone]

## 2023-01-10 NOTE — CHART NOTE - NSCHARTNOTEFT_GEN_A_CORE
Patient was noted to be hypoxic-    ABG and CT chest reviewed- PE ruled out however evidence of consolidations and opacities-  concern for superimposed bacterial infection; broad spectrum antibiotics initiated.    ICU consulted due to progressive hypoxia.  Monitor closely

## 2023-01-10 NOTE — CONSULT NOTE ADULT - SUBJECTIVE AND OBJECTIVE BOX
Patient is a 82y old  Male who presents with a chief complaint of CHF, A fib, Flu (10 Matthew 2023 13:23)      BRIEF HOSPITAL COURSE: ***    Events last 24 hours: ***    PAST MEDICAL & SURGICAL HISTORY:  HLD (hyperlipidemia)      HTN (hypertension)      CAD (coronary artery disease)      S/P coronary artery stent placement      S/P CABG (coronary artery bypass graft)          Review of Systems:  CONSTITUTIONAL: No fever, chills, or fatigue  EYES: No eye pain, visual disturbances, or discharge  ENMT:  No difficulty hearing, tinnitus, vertigo; No sinus or throat pain  NECK: No pain or stiffness  RESPIRATORY: No cough, wheezing, chills or hemoptysis; No shortness of breath  CARDIOVASCULAR: No chest pain, palpitations, dizziness, or leg swelling  GASTROINTESTINAL: No abdominal or epigastric pain. No nausea, vomiting, or hematemesis; No diarrhea or constipation. No melena or hematochezia.  GENITOURINARY: No dysuria, frequency, hematuria, or incontinence  NEUROLOGICAL: No headaches, memory loss, loss of strength, numbness, or tremors  SKIN: No itching, burning, rashes, or lesions   MUSCULOSKELETAL: No joint pain or swelling; No muscle, back, or extremity pain  PSYCHIATRIC: No depression, anxiety, mood swings, or difficulty sleeping      Medications:  azithromycin  IVPB      azithromycin  IVPB 500 milliGRAM(s) IV Intermittent once  cefepime   IVPB      cefepime   IVPB 1000 milliGRAM(s) IV Intermittent every 8 hours  oseltamivir 30 milliGRAM(s) Oral two times a day    diltiazem    Tablet 60 milliGRAM(s) Oral every 6 hours  furosemide   Injectable 40 milliGRAM(s) IV Push two times a day  isosorbide   mononitrate ER Tablet (IMDUR) 60 milliGRAM(s) Oral daily  metoprolol tartrate 50 milliGRAM(s) Oral every 8 hours    albuterol/ipratropium for Nebulization 3 milliLiter(s) Nebulizer every 6 hours  benzonatate 100 milliGRAM(s) Oral every 8 hours  guaiFENesin  milliGRAM(s) Oral every 12 hours  guaiFENesin Oral Liquid (Sugar-Free) 200 milliGRAM(s) Oral every 6 hours PRN  hydrocodone/homatropine Syrup 5 milliLiter(s) Oral every 8 hours    gabapentin 100 milliGRAM(s) Oral two times a day  metoclopramide Injectable 5 milliGRAM(s) IV Push every 8 hours PRN      apixaban 5 milliGRAM(s) Oral every 12 hours  clopidogrel Tablet 75 milliGRAM(s) Oral daily    bisacodyl 5 milliGRAM(s) Oral every 12 hours PRN      atorvastatin 10 milliGRAM(s) Oral at bedtime        chlorhexidine 4% Liquid 1 Application(s) Topical <User Schedule>            ICU Vital Signs Last 24 Hrs  T(C): 37.6 (10 Matthew 2023 19:40), Max: 37.6 (10 Matthew 2023 19:40)  T(F): 99.7 (10 Matthew 2023 19:40), Max: 99.7 (10 Matthew 2023 19:40)  HR: 103 (10 Matthew 2023 21:00) (90 - 111)  BP: 124/71 (10 Matthew 2023 21:00) (122/63 - 138/85)  BP(mean): 92 (10 Matthew 2023 21:00) (86 - 94)  ABP: --  ABP(mean): --  RR: 29 (10 Matthew 2023 21:00) (20 - 32)  SpO2: 99% (10 Matthew 2023 21:00) (89% - 100%)    O2 Parameters below as of 10 Matthew 2023 19:40  Patient On (Oxygen Delivery Method): mask, nonrebreather    O2 Concentration (%): 100        ABG - ( 10 Matthew 2023 15:28 )  pH, Arterial: 7.52  pH, Blood: x     /  pCO2: 47    /  pO2: 65    / HCO3: 38    / Base Excess: 15.5  /  SaO2: 94.2                      LABS:                        13.7   8.25  )-----------( 308      ( 09 Jan 2023 06:08 )             41.2     01-09    137  |  94<L>  |  32<H>  ----------------------------<  152<H>  3.6   |  36<H>  |  1.10    Ca    9.0      09 Jan 2023 06:08  Mg     2.0     01-09            CAPILLARY BLOOD GLUCOSE            CULTURES:  Rapid RVP Result: Detected (01-07 @ 13:50)      Physical Examination:    General: No acute distress.  Alert, oriented, interactive, nonfocal    HEENT: Pupils equal, reactive to light.  Symmetric.    PULM: Clear to auscultation bilaterally, no significant sputum production    CVS: Regular rate and rhythm, no murmurs, rubs, or gallops    ABD: Soft, nondistended, nontender, normoactive bowel sounds, no masses    EXT: No edema, nontender    SKIN: Warm and well perfused, no rashes noted.    RADIOLOGY:     ACC: [id number] EXAM:  CT ANGIO CHEST PULM ART [facility]                          PROCEDURE DATE:  01/10/2023          INTERPRETATION:  HISTORY: Admitting Dxs: J10.1 FLU DUE TO OTH IDENT   INFLUENZA VIRUS W OTH RESP MANIFEST    EXAMINATION: CTA CHEST was performed for evaluation of the pulmonary   arteries. Multiplanar reformatted images and MIPS were acquired.  CONTRAST/COMPLICATIONS:  IV Contrast: Omnipaque 350  70 cc administered   30 cc discarded  Oral Contrast: NONE  Complications: None reported at time of study completion    COMPARISON: No prior CT chest comparison available.    FINDINGS:    PULMONARY ARTERIES: No pulmonary embolism.    MEDIASTINUM: The right atrium is qualitatively dilated. Post CABG. No   pericardial effusion. Thoracic aorta normal caliber.  Mildly enlarged   mediastinal and bilateral hilar nodes.    AIRWAYS, LUNGS, PLEURA: Mild central airways secretions. Tree-in-bud   nodular opacities involving all lung lobes, but most severe throughout   the bilateral lower lobes. Confluent consolidation at the left greater   than right lung bases. No pleural effusion.    IMAGED ABDOMEN: Right renal parapelvic cysts.    SOFT TISSUES: Unremarkable.    BONES: Sternal wires. Age indeterminate loss of height of T6 vertebral   body.      IMPRESSION:.    No pulmonary embolism.    Diffuse tree-in-bud nodular opacities and bibasilar consolidation   compatible with infection.    Mediastinal and bilateral hilar lymphadenopathy, likely reactive.    --- End of Report ---             GARY DE LA PAZ MD; Attending Radiologist  This document has been electronically signed. Matthew 10 2023  4:29PM    ACC: [id number] EXAM:  ECHO TTE WO CON COMP W DOPP                          PROCEDURE DATE:  01/07/2023          INTERPRETATION:  INDICATION: Atrial fibrillation  Sonographer KL    Blood Pressure 130/77    Height 175 cm     Weight 99.8 kg       BSA 2.13   sq m    Dimensions:  LA 4.5       Normal Values: 2.0 - 4.0 cm  Ao 3.4        Normal Values: 2.0 - 3.8 cm  SEPTUM 0.9       Normal Values: 0.6 - 1.2 cm  PWT 0.8       Normal Values: 0.6 - 1.1 cm  LVIDd 5.3         Normal Values: 3.0 - 5.6 cm  LVIDs 3.3         Normal Values: 1.8 - 4.0 cm      OBSERVATIONS:  Technically difficult study  Mitral Valve: normal, trace physiologic MR.  Aortic Valve/Aorta: Sclerotic trileaflet aortic valve with grossly normal   opening.  Tricuspid Valve: normal with trace TR.  Pulmonic Valve: Trace PI  Left Atrium: Enlarged  Right Atrium: Not well-visualized  Left Ventricle: The left ventricular endocardium is not well-visualized.   Overall, grossly normal LV size and systolic function, estimated LVEF of   55%. Cannot rule out segmental abnormalities  Right Ventricle: Not well-visualized  Pericardium: no significant pericardial effusion.        IMPRESSION:  Technically difficult study  The left ventricular endocardium is not well-visualized. Overall, grossly   normal LV size and systolic function, estimated LVEF of 55%.  The right ventricle is not well-visualized  Left atrial enlargement  Sclerotic trileaflet aortic valve with grossly normal opening, without AI.  Trace physiologic MR and TR.  No significant pericardial effusion.    --- End of Report ---            LIANNA CHAIREZ MD; Attending Cardiologist  This document has been electronically signed. Jan 7 2023  2:15PM      CRITICAL CARE TIME SPENT:  35 mins assessing presenting problems of acute illness that poses high probability of life threatening deterioration or end organ damage/dysfunction.  Medical decision making including Initiating plan of care, reviewing data, reviewing radiology, direct patient bedside evaluation and interpretation of vital signs, any necessary ventilator management , discussion with multidisciplinary team, all non inclusive of procedures.    Patient is a 82y old  Male who presents with a chief complaint of CHF, A fib, Flu (10 Matthew 2023 13:23)      BRIEF HOSPITAL COURSE:   82M with PMHx CAD, HTN, sp stent/CABG    Events last 24 hours: today with progressive hypoxia despite escalating O2. NC--> NRB, tachypnea, lethargy, afebrile, CTA without PE, bilateral tree bud densities likely infectious. Transferred to ICU.     PAST MEDICAL & SURGICAL HISTORY:  HLD (hyperlipidemia)      HTN (hypertension)      CAD (coronary artery disease)      S/P coronary artery stent placement      S/P CABG (coronary artery bypass graft)          Review of Systems:  12 pt ROS negative unless otherwise stated above      Medications:  azithromycin  IVPB      azithromycin  IVPB 500 milliGRAM(s) IV Intermittent once  cefepime   IVPB      cefepime   IVPB 1000 milliGRAM(s) IV Intermittent every 8 hours  oseltamivir 30 milliGRAM(s) Oral two times a day    diltiazem    Tablet 60 milliGRAM(s) Oral every 6 hours  furosemide   Injectable 40 milliGRAM(s) IV Push two times a day  isosorbide   mononitrate ER Tablet (IMDUR) 60 milliGRAM(s) Oral daily  metoprolol tartrate 50 milliGRAM(s) Oral every 8 hours    albuterol/ipratropium for Nebulization 3 milliLiter(s) Nebulizer every 6 hours  benzonatate 100 milliGRAM(s) Oral every 8 hours  guaiFENesin  milliGRAM(s) Oral every 12 hours  guaiFENesin Oral Liquid (Sugar-Free) 200 milliGRAM(s) Oral every 6 hours PRN  hydrocodone/homatropine Syrup 5 milliLiter(s) Oral every 8 hours    gabapentin 100 milliGRAM(s) Oral two times a day  metoclopramide Injectable 5 milliGRAM(s) IV Push every 8 hours PRN      apixaban 5 milliGRAM(s) Oral every 12 hours  clopidogrel Tablet 75 milliGRAM(s) Oral daily    bisacodyl 5 milliGRAM(s) Oral every 12 hours PRN      atorvastatin 10 milliGRAM(s) Oral at bedtime        chlorhexidine 4% Liquid 1 Application(s) Topical <User Schedule>            ICU Vital Signs Last 24 Hrs  T(C): 37.6 (10 Matthew 2023 19:40), Max: 37.6 (10 Matthew 2023 19:40)  T(F): 99.7 (10 Matthew 2023 19:40), Max: 99.7 (10 Matthew 2023 19:40)  HR: 103 (10 Matthew 2023 21:00) (90 - 111)  BP: 124/71 (10 Matthew 2023 21:00) (122/63 - 138/85)  BP(mean): 92 (10 Matthew 2023 21:00) (86 - 94)  ABP: --  ABP(mean): --  RR: 29 (10 Matthew 2023 21:00) (20 - 32)  SpO2: 99% (10 Matthew 2023 21:00) (89% - 100%)    O2 Parameters below as of 10 Matthew 2023 19:40  Patient On (Oxygen Delivery Method): mask, nonrebreather    O2 Concentration (%): 100        ABG - ( 10 Matthew 2023 15:28 )  pH, Arterial: 7.52  pH, Blood: x     /  pCO2: 47    /  pO2: 65    / HCO3: 38    / Base Excess: 15.5  /  SaO2: 94.2        I&O's Summary    10 Matthew 2023 07:01  -  10 Matthew 2023 21:32  --------------------------------------------------------  IN: 250 mL / OUT: 1200 mL / NET: -950 mL      LABS:                        13.7   8.25  )-----------( 308      ( 09 Jan 2023 06:08 )             41.2     01-09    137  |  94<L>  |  32<H>  ----------------------------<  152<H>  3.6   |  36<H>  |  1.10    Ca    9.0      09 Jan 2023 06:08  Mg     2.0     01-09            CAPILLARY BLOOD GLUCOSE            CULTURES:  Rapid RVP Result: Detected (01-07 @ 13:50)      Physical Examination:    General: mild tachypnea only, on NRB, sleepy but arouseable, following commands, DERAS x 4    HEENT: Pupils equal, reactive to light.  Symmetric.    PULM: course BS bilaterally, bases > apex    CVS: Regular rate and rhythm    ABD: Soft, nondistended, nontender, normoactive bowel sounds, no rebound or guarding    EXT: No edema, nontender, SCDs    SKIN: Warm and well perfused, no rashes noted.    RADIOLOGY:     ACC: [id number] EXAM:  CT ANGIO CHEST PULM ART [facility]                          PROCEDURE DATE:  01/10/2023          INTERPRETATION:  HISTORY: Admitting Dxs: J10.1 FLU DUE TO OTH IDENT   INFLUENZA VIRUS W OTH RESP MANIFEST    EXAMINATION: CTA CHEST was performed for evaluation of the pulmonary   arteries. Multiplanar reformatted images and MIPS were acquired.  CONTRAST/COMPLICATIONS:  IV Contrast: Omnipaque 350  70 cc administered   30 cc discarded  Oral Contrast: NONE  Complications: None reported at time of study completion    COMPARISON: No prior CT chest comparison available.    FINDINGS:    PULMONARY ARTERIES: No pulmonary embolism.    MEDIASTINUM: The right atrium is qualitatively dilated. Post CABG. No   pericardial effusion. Thoracic aorta normal caliber.  Mildly enlarged   mediastinal and bilateral hilar nodes.    AIRWAYS, LUNGS, PLEURA: Mild central airways secretions. Tree-in-bud   nodular opacities involving all lung lobes, but most severe throughout   the bilateral lower lobes. Confluent consolidation at the left greater   than right lung bases. No pleural effusion.    IMAGED ABDOMEN: Right renal parapelvic cysts.    SOFT TISSUES: Unremarkable.    BONES: Sternal wires. Age indeterminate loss of height of T6 vertebral   body.      IMPRESSION:.    No pulmonary embolism.    Diffuse tree-in-bud nodular opacities and bibasilar consolidation   compatible with infection.    Mediastinal and bilateral hilar lymphadenopathy, likely reactive.    --- End of Report ---             GARY DE LA PAZ MD; Attending Radiologist  This document has been electronically signed. Matthew 10 2023  4:29PM    ACC: [id number] EXAM:  ECHO TTE WO CON COMP W DOPP                          PROCEDURE DATE:  01/07/2023          INTERPRETATION:  INDICATION: Atrial fibrillation  Sonographer KL    Blood Pressure 130/77    Height 175 cm     Weight 99.8 kg       BSA 2.13   sq m    Dimensions:  LA 4.5       Normal Values: 2.0 - 4.0 cm  Ao 3.4        Normal Values: 2.0 - 3.8 cm  SEPTUM 0.9       Normal Values: 0.6 - 1.2 cm  PWT 0.8       Normal Values: 0.6 - 1.1 cm  LVIDd 5.3         Normal Values: 3.0 - 5.6 cm  LVIDs 3.3         Normal Values: 1.8 - 4.0 cm      OBSERVATIONS:  Technically difficult study  Mitral Valve: normal, trace physiologic MR.  Aortic Valve/Aorta: Sclerotic trileaflet aortic valve with grossly normal   opening.  Tricuspid Valve: normal with trace TR.  Pulmonic Valve: Trace PI  Left Atrium: Enlarged  Right Atrium: Not well-visualized  Left Ventricle: The left ventricular endocardium is not well-visualized.   Overall, grossly normal LV size and systolic function, estimated LVEF of   55%. Cannot rule out segmental abnormalities  Right Ventricle: Not well-visualized  Pericardium: no significant pericardial effusion.        IMPRESSION:  Technically difficult study  The left ventricular endocardium is not well-visualized. Overall, grossly   normal LV size and systolic function, estimated LVEF of 55%.  The right ventricle is not well-visualized  Left atrial enlargement  Sclerotic trileaflet aortic valve with grossly normal opening, without AI.  Trace physiologic MR and TR.  No significant pericardial effusion.    --- End of Report ---            LIANNA CHAIREZ MD; Attending Cardiologist  This document has been electronically signed. Jan 7 2023  2:15PM      CRITICAL CARE TIME SPENT:  35 mins assessing presenting problems of acute illness that poses high probability of life threatening deterioration or end organ damage/dysfunction.  Medical decision making including Initiating plan of care, reviewing data, reviewing radiology, direct patient bedside evaluation and interpretation of vital signs, any necessary ventilator management , discussion with multidisciplinary team, all non inclusive of procedures.

## 2023-01-10 NOTE — PROGRESS NOTE ADULT - SUBJECTIVE AND OBJECTIVE BOX
Date/Time Patient Seen:  		  Referring MD:   Data Reviewed	       Patient is a 82y old  Male who presents with a chief complaint of CHF, A fib, Flu (09 Jan 2023 16:30)      Subjective/HPI     PAST MEDICAL & SURGICAL HISTORY:  Congestive heart failure (CHF)    Atrial fibrillation    HLD (hyperlipidemia)    HTN (hypertension)    CAD (coronary artery disease)    S/P coronary artery stent placement    S/P CABG (coronary artery bypass graft)          Medication list         MEDICATIONS  (STANDING):  albuterol    0.083% 2.5 milliGRAM(s) Nebulizer every 8 hours  apixaban 5 milliGRAM(s) Oral every 12 hours  atorvastatin 10 milliGRAM(s) Oral at bedtime  benzonatate 100 milliGRAM(s) Oral every 8 hours  clopidogrel Tablet 75 milliGRAM(s) Oral daily  diltiazem    Tablet 30 milliGRAM(s) Oral every 6 hours  furosemide   Injectable 40 milliGRAM(s) IV Push two times a day  gabapentin 100 milliGRAM(s) Oral two times a day  guaiFENesin  milliGRAM(s) Oral every 12 hours  hydrocodone/homatropine Syrup 5 milliLiter(s) Oral every 8 hours  isosorbide   mononitrate ER Tablet (IMDUR) 60 milliGRAM(s) Oral daily  metoprolol tartrate 50 milliGRAM(s) Oral every 8 hours  oseltamivir 30 milliGRAM(s) Oral two times a day    MEDICATIONS  (PRN):  guaiFENesin Oral Liquid (Sugar-Free) 200 milliGRAM(s) Oral every 6 hours PRN Cough         Vitals log        ICU Vital Signs Last 24 Hrs  T(C): 36.8 (10 Matthew 2023 04:59), Max: 37.3 (09 Jan 2023 21:17)  T(F): 98.3 (10 Matthew 2023 04:59), Max: 99.2 (09 Jan 2023 21:17)  HR: 105 (10 Matthew 2023 04:59) (100 - 135)  BP: 137/76 (10 Matthew 2023 04:59) (115/69 - 137/76)  BP(mean): --  ABP: --  ABP(mean): --  RR: 20 (10 Matthew 2023 04:59) (18 - 20)  SpO2: 95% (10 Matthew 2023 04:59) (91% - 99%)    O2 Parameters below as of 10 Matthew 2023 04:59  Patient On (Oxygen Delivery Method): nasal cannula  O2 Flow (L/min): 5               Input and Output:  I&O's Detail      Lab Data                        13.7   8.25  )-----------( 308      ( 09 Jan 2023 06:08 )             41.2     01-09    137  |  94<L>  |  32<H>  ----------------------------<  152<H>  3.6   |  36<H>  |  1.10    Ca    9.0      09 Jan 2023 06:08  Mg     2.0     01-09    TPro  7.6  /  Alb  3.0<L>  /  TBili  1.9<H>  /  DBili  x   /  AST  29  /  ALT  23  /  AlkPhos  72  01-08            Review of Systems	      Objective     Physical Examination    heart s1s2  lung dec bS  head nc      Pertinent Lab findings & Imaging      Christiano:  NO   Adequate UO     I&O's Detail           Discussed with:     Cultures:	        Radiology

## 2023-01-10 NOTE — PROGRESS NOTE ADULT - SUBJECTIVE AND OBJECTIVE BOX
[facility] -- Pierre Mcfarlane,  Donna, Ge Morejon Savella, Goodger  Office # [phone]    Follow Up:   Afib with RVR, CHF    Subjective/Observations: AF with -160s, asymptomatic. Given Metoprolol 2.5mg IVP x1, with HR improvement to 110-115 bpm. Reports cough with minimal expectorate. On supplemental 02 via NC. No complaints of chest pain, dyspnea, or palpitations reported. No signs of orthopnea or PND.      REVIEW OF SYSTEMS: All other review of systems is negative unless indicated above  PAST MEDICAL & SURGICAL HISTORY:  HLD (hyperlipidemia)      HTN (hypertension)      CAD (coronary artery disease)      S/P coronary artery stent placement      S/P CABG (coronary artery bypass graft)        MEDICATIONS  (STANDING):  albuterol    0.083% 2.5 milliGRAM(s) Nebulizer every 8 hours  apixaban 5 milliGRAM(s) Oral every 12 hours  atorvastatin 10 milliGRAM(s) Oral at bedtime  benzonatate 100 milliGRAM(s) Oral every 8 hours  clopidogrel Tablet 75 milliGRAM(s) Oral daily  diltiazem    Tablet 30 milliGRAM(s) Oral every 6 hours  furosemide   Injectable 40 milliGRAM(s) IV Push two times a day  gabapentin 100 milliGRAM(s) Oral two times a day  guaiFENesin  milliGRAM(s) Oral every 12 hours  hydrocodone/homatropine Syrup 5 milliLiter(s) Oral every 8 hours  isosorbide   mononitrate ER Tablet (IMDUR) 60 milliGRAM(s) Oral daily  metoprolol tartrate 50 milliGRAM(s) Oral every 8 hours  oseltamivir 30 milliGRAM(s) Oral two times a day    MEDICATIONS  (PRN):  guaiFENesin Oral Liquid (Sugar-Free) 200 milliGRAM(s) Oral every 6 hours PRN Cough    Allergies    Levaquin (Unknown)  penicillin (Rash)    Intolerances      Vital Signs Last 24 Hrs  T(C): 36.8 (10 Matthew 2023 04:59), Max: 37.3 (09 Jan 2023 21:17)  T(F): 98.3 (10 Matthew 2023 04:59), Max: 99.2 (09 Jan 2023 21:17)  HR: 97 (10 Matthew 2023 07:45) (97 - 135)  BP: 137/76 (10 Matthew 2023 04:59) (115/69 - 137/76)  BP(mean): --  RR: 20 (10 Matthew 2023 04:59) (20 - 20)  SpO2: 95% (10 Matthew 2023 07:45) (91% - 95%)    Parameters below as of 10 Matthew 2023 08:19  Patient On (Oxygen Delivery Method): nasal cannula      I&O's Summary      TELE: AF RVR, 100-130 bpm  PHYSICAL EXAM:  Constitutional: NAD, awake and alert, obese  HEENT: Moist Mucous Membranes, Anicteric  Pulmonary: Non-labored, breath sounds are diminished bilaterally, + wheezing, no rales or rhonchi  Cardiovascular: IRRR, tachycardic,  S1 and S2, No murmurs, rubs, gallops or clicks  Gastrointestinal: Bowel Sounds present, soft, nontender.   Lymph: No peripheral edema. No lymphadenopathy.  Skin: No visible rashes or ulcers.  Psych:  Mood & affect appropriate    LABS: All Labs Reviewed:                        13.7   8.25  )-----------( 308      ( 09 Jan 2023 06:08 )             41.2                         15.1   8.48  )-----------( 290      ( 08 Jan 2023 06:00 )             45.1     09 Jan 2023 06:08    137    |  94     |  32     ----------------------------<  152    3.6     |  36     |  1.10   08 Jan 2023 19:34    x      |  x      |  x      ----------------------------<  x      3.7     |  x      |  x      08 Jan 2023 06:00    139    |  94     |  32     ----------------------------<  153    2.9     |  33     |  1.30     Ca    9.0        09 Jan 2023 06:08  Ca    8.9        08 Jan 2023 06:00  Mg     2.0       09 Jan 2023 06:08    TPro  7.6    /  Alb  3.0    /  TBili  1.9    /  DBili  x      /  AST  29     /  ALT  23     /  AlkPhos  72     08 Jan 2023 06:00          12 Lead ECG:   Ventricular Rate 106 BPM    QRS Duration 90 ms    Q-T Interval 328 ms    QTC Calculation(Bazett) 435 ms    R Axis -26 degrees    T Axis 59 degrees    Diagnosis Line Atrial fibrillation with rapid ventricular response with premature ventricular or aberrantly conducted complexes  Nonspecific ST abnormality  Abnormal ECG  No previous ECGs available  Confirmed by munir Salazar ([id number]) on 1/7/2023 12:56:36 PM (01-06-23 @ 15:58)      ACC: [id number] EXAM:  ECHO TTE WO CON COMP W DOPP                          PROCEDURE DATE:  01/07/2023          INTERPRETATION:  INDICATION: Atrial fibrillation  Sonographer KL    Blood Pressure 130/77    Height 175 cm     Weight 99.8 kg       BSA 2.13   sq m    Dimensions:  LA 4.5       Normal Values: 2.0 - 4.0 cm  Ao 3.4        Normal Values: 2.0 - 3.8 cm  SEPTUM 0.9       Normal Values: 0.6 - 1.2 cm  PWT 0.8       Normal Values: 0.6 - 1.1 cm  LVIDd 5.3         Normal Values: 3.0 - 5.6 cm  LVIDs 3.3         Normal Values: 1.8 - 4.0 cm      OBSERVATIONS:  Technically difficult study  Mitral Valve: normal, trace physiologic MR.  Aortic Valve/Aorta: Sclerotic trileaflet aortic valve with grossly normal   opening.  Tricuspid Valve: normal withtrace TR.  Pulmonic Valve: Trace PI  Left Atrium: Enlarged  Right Atrium: Not well-visualized  Left Ventricle: The left ventricular endocardium is not well-visualized.   Overall, grossly normal LV size and systolic function, estimated LVEF of   55%. Cannot rule out segmental abnormalities  Right Ventricle: Not well-visualized  Pericardium: no significant pericardial effusion.        IMPRESSION:  Technically difficult study  The left ventricular endocardium is not well-visualized. Overall, grossly  normal LV size and systolic function, estimated LVEF of 55%.  The right ventricle is not well-visualized  Left atrial enlargement  Sclerotic trileaflet aortic valve with grossly normal opening, without AI.  Trace physiologic MR and TR.  No significant pericardial effusion.    --- End of Report ---            LIANNA CHAIREZ MD; Attending Cardiologist  This document has been electronically signed. Jan 7 2023  2:15PM

## 2023-01-10 NOTE — PROGRESS NOTE ADULT - PROBLEM SELECTOR PLAN 1
Patient noted with hypoxia today despite NRM  ABG noted- CTA done earlier today - PE ruled out but evidence of consolidation  Given Vanco x 1; Cefepime started;   Due to risk of decompensation and progressive hypoxia, ICU consulted- accepted to ICU    Discussed with patient and family multiple times at bedside

## 2023-01-10 NOTE — PROGRESS NOTE ADULT - ASSESSMENT
81 y/o m w/ PMH od CAD s/p stent, s/p CABG, HTN, HLD p/w shortness of breath and cough, found to have a fib, concern for acute CHF exacerbation, + influenza.  1/10- patient noted to be significantly hypoxic despite increased supplemental oxygen. He was placed n Vent mask and then NRM with only slight improvement in saturation Lung exam with decreased air entry; CT angio ordered- PE rule out however there was evidence of consolidations concerning for infection. ABG reviewed   Broad spectrum IV antibiotics ordered.   Due to progressive hypoxia and concern for risk of decompensation, ICU consulted.       1. Hypoxic respiratory failure  2. Influenza A infection with suspected superimposed Bacterial infection  3. Congestive heart failure  4, AFIB - with RVR  5. JANES  6. CAD- s/p coronary stent, s/p CABG; - continue asa, Plavix statin  7. Essential HTN- cont pt on atenolol, Imdur Norvasc - holding losartan in setting of JANES  8. Hyperlipidemia- continue statins

## 2023-01-10 NOTE — CONSULT NOTE ADULT - ASSESSMENT
Impression:  1. acute hypoxemic respiratory failure  2. influenza A viral PNA  3. atypical PNA      Plan:  Neuro - Sedation neuromuscular blockade to facilitate safe ventilation    CV -  Pressor support as needed to maintain MAP 65           Avoiding fluid challenges          QTC monitoring while on Azithromycin and Hydroxychloroquine.    Pulm -  ARDS-NET 4-6cc/kg IBW TV as able to maintain plateau pressures <30               Prone ventilation consideration as feasible  Pa02/Fi02 < 150 on Fi02 >60% and PEEP at least 5                 Vent bundle Reviewed     GI -  PPI  Enteric feeds as tolerated in tandem with NMB and prone ventilation    Renal - Even to negative fluid balance as tolerated by hemodynamics and renal fx.  Feeds to be provided in lieu of IVF.     Heme -  Pharmacologic DVT PPx  in addition to SCD's    ID - ABX discontinuation based on discussion with ID in conjunction with clinical features, culture data, and judicious procalcitonin monitoring.      Endo -  Aggressive glycemic control to limit FS glucose to < 180mg/dl.         Impression:  1. acute hypoxemic respiratory failure  2. influenza A viral PNA  3. atypical PNA  4. CAD  5. afib new onset  6. JANES      Plan:  Tx to ICU. Case and plan discussed with ICU attending Dr Calderon.  Neuro - avoiding all neurosuppressants               nonfocal               CO2 47               TSH normal    CV -  hemodynamics stable, NSR          Echo normal EF          fluid balance net negative          trop negative, no CP          new onset afib, no further RVR          cont rate control with lopressor and diltiazem, goal HR<110          TSH normal          full AC with eliquis           APT with plavix          cont statin, LDL appropriate          f/u cardio    Pulm -  starting HFNC with high level flow for PEEP effect to enhance alveolar recruitment             actively titrating FiO2 for sats>91%, currently on 100%             high risk for decompensation requiring intubation             CTA negative for PE             + flu A              now with bilateral tree bud opacities at bases > apex bilaterally              broad spectrum IV abx with Vanco/Cefepime and atypical coverage with Azithro              tamiflu              bronchodialators              check SCx/BcX              legionella and strept Ag              check fungitell/galatamannan              check AMOS/ANCA    GI -  clears    Renal - Cr improved, avoid MAP<65, renally adjust all meds, avoid nephrotoxins, strict I/Os     Heme -  full AC with eliquis, no signs of active bleeding, H/H stable    ID - afebrile, no leukocytosis, flu A+, full RVP otherwise negative, empiric broad spectrum IV abx Cefepime/Vanco/Azithro, MRSA PCR, BCx/SCx, other workup as above, check procal, abx adjustments         based on clinical features and culture data.    Endo -  Aggressive glycemic control to limit FS glucose to < 180mg/dl, BS stable, A1c 6.4

## 2023-01-10 NOTE — PROGRESS NOTE ADULT - PROBLEM SELECTOR PLAN 2
Increase Metoprolol to every 8 hrs to optimize rate control  Continue Eliquis 2.5mg BID at home  Monitor

## 2023-01-10 NOTE — PROGRESS NOTE ADULT - TIME BILLING
direct care of a critically ill patient including but not limited to reviewing chart, medications ,laboratory data, imaging reports, discussion of plan of care with consultants on the case, coordination of care with multidisciplinary team involved in the case and discussion of plan with ICU team and patient's family.      Transfer to ICU  ID consult - Dr Barrera  Continue broad spectrum antibiotics  Discussed with Pulm attending  Cardiology follow up ongoing

## 2023-01-10 NOTE — PROGRESS NOTE ADULT - ASSESSMENT
81 y/o m w/ PMH od CAD s/p stent, s/p CABG, HTN, HLD, CHF p/w shortness of breath and cough    AF overnight  on hycodan for cough  vs noted    on tamiflu for FLU  acap prn  cough rx regimen prn  extensive and advanced Heart Disease - cvs rx regimen  judicious diuresis  I and O  AF - on AC - rate and rhythm control  cardio follow up  CXR clear on Admission  pulm congestion - multifactorial - FLU - Bronchitis - Pulm Edema -   cont NEBS - mucociliary clearance, mucinex, chest PT, enc cough, cvs rx regimen  monitor VS and Sat  keep sat > 88 pct  GOC discussion  spoke with daughters  FLU isolation

## 2023-01-11 LAB
ALBUMIN SERPL ELPH-MCNC: 2.6 G/DL — LOW (ref 3.3–5)
ALP SERPL-CCNC: 94 U/L — SIGNIFICANT CHANGE UP (ref 40–120)
ALT FLD-CCNC: 21 U/L — SIGNIFICANT CHANGE UP (ref 12–78)
ANION GAP SERPL CALC-SCNC: 6 MMOL/L — SIGNIFICANT CHANGE UP (ref 5–17)
APPEARANCE UR: CLEAR — SIGNIFICANT CHANGE UP
APTT BLD: 38.1 SEC — HIGH (ref 27.5–35.5)
AST SERPL-CCNC: 19 U/L — SIGNIFICANT CHANGE UP (ref 15–37)
B PERT DNA SPEC QL NAA+PROBE: SIGNIFICANT CHANGE UP
BACTERIA # UR AUTO: ABNORMAL
BASE EXCESS BLDA CALC-SCNC: 17.4 MMOL/L — HIGH (ref -2–3)
BASOPHILS # BLD AUTO: 0 K/UL — SIGNIFICANT CHANGE UP (ref 0–0.2)
BASOPHILS NFR BLD AUTO: 0 % — SIGNIFICANT CHANGE UP (ref 0–2)
BILIRUB SERPL-MCNC: 2.6 MG/DL — HIGH (ref 0.2–1.2)
BILIRUB UR-MCNC: NEGATIVE — SIGNIFICANT CHANGE UP
BLOOD GAS COMMENTS ARTERIAL: SIGNIFICANT CHANGE UP
BUN SERPL-MCNC: 35 MG/DL — HIGH (ref 7–23)
C PNEUM DNA SPEC QL NAA+PROBE: SIGNIFICANT CHANGE UP
CALCIUM SERPL-MCNC: 9.1 MG/DL — SIGNIFICANT CHANGE UP (ref 8.5–10.1)
CHLORIDE SERPL-SCNC: 88 MMOL/L — LOW (ref 96–108)
CO2 SERPL-SCNC: 40 MMOL/L — HIGH (ref 22–31)
COLOR SPEC: YELLOW — SIGNIFICANT CHANGE UP
CREAT SERPL-MCNC: 1 MG/DL — SIGNIFICANT CHANGE UP (ref 0.5–1.3)
DIFF PNL FLD: ABNORMAL
EGFR: 76 ML/MIN/1.73M2 — SIGNIFICANT CHANGE UP
EOSINOPHIL # BLD AUTO: 0 K/UL — SIGNIFICANT CHANGE UP (ref 0–0.5)
EOSINOPHIL NFR BLD AUTO: 0 % — SIGNIFICANT CHANGE UP (ref 0–6)
EPI CELLS # UR: SIGNIFICANT CHANGE UP
ERYTHROCYTE [SEDIMENTATION RATE] IN BLOOD: 74 MM/HR — HIGH (ref 0–20)
FLUAV H1 2009 PAND RNA SPEC QL NAA+PROBE: SIGNIFICANT CHANGE UP
FLUAV H1 RNA SPEC QL NAA+PROBE: SIGNIFICANT CHANGE UP
FLUAV H3 RNA SPEC QL NAA+PROBE: SIGNIFICANT CHANGE UP
FLUAV SUBTYP SPEC NAA+PROBE: DETECTED
FLUBV RNA SPEC QL NAA+PROBE: SIGNIFICANT CHANGE UP
GAS PNL BLDA: SIGNIFICANT CHANGE UP
GLUCOSE SERPL-MCNC: 126 MG/DL — HIGH (ref 70–99)
GLUCOSE UR QL: NEGATIVE — SIGNIFICANT CHANGE UP
HADV DNA SPEC QL NAA+PROBE: SIGNIFICANT CHANGE UP
HCO3 BLDA-SCNC: 41 MMOL/L — HIGH (ref 21–28)
HCOV PNL SPEC NAA+PROBE: SIGNIFICANT CHANGE UP
HCT VFR BLD CALC: 45.5 % — SIGNIFICANT CHANGE UP (ref 39–50)
HGB BLD-MCNC: 15.2 G/DL — SIGNIFICANT CHANGE UP (ref 13–17)
HMPV RNA SPEC QL NAA+PROBE: SIGNIFICANT CHANGE UP
HOROWITZ INDEX BLDA+IHG-RTO: 60 — SIGNIFICANT CHANGE UP
HPIV1 RNA SPEC QL NAA+PROBE: SIGNIFICANT CHANGE UP
HPIV2 RNA SPEC QL NAA+PROBE: SIGNIFICANT CHANGE UP
HPIV3 RNA SPEC QL NAA+PROBE: SIGNIFICANT CHANGE UP
HPIV4 RNA SPEC QL NAA+PROBE: SIGNIFICANT CHANGE UP
INR BLD: 2.47 RATIO — HIGH (ref 0.88–1.16)
KETONES UR-MCNC: NEGATIVE — SIGNIFICANT CHANGE UP
LEGIONELLA AG UR QL: NEGATIVE — SIGNIFICANT CHANGE UP
LEUKOCYTE ESTERASE UR-ACNC: NEGATIVE — SIGNIFICANT CHANGE UP
LYMPHOCYTES # BLD AUTO: 1.72 K/UL — SIGNIFICANT CHANGE UP (ref 1–3.3)
LYMPHOCYTES # BLD AUTO: 13 % — SIGNIFICANT CHANGE UP (ref 13–44)
MAGNESIUM SERPL-MCNC: 2.7 MG/DL — HIGH (ref 1.6–2.6)
MCHC RBC-ENTMCNC: 29.2 PG — SIGNIFICANT CHANGE UP (ref 27–34)
MCHC RBC-ENTMCNC: 33.4 GM/DL — SIGNIFICANT CHANGE UP (ref 32–36)
MCV RBC AUTO: 87.5 FL — SIGNIFICANT CHANGE UP (ref 80–100)
MONOCYTES # BLD AUTO: 1.06 K/UL — HIGH (ref 0–0.9)
MONOCYTES NFR BLD AUTO: 8 % — SIGNIFICANT CHANGE UP (ref 2–14)
MRSA PCR RESULT.: SIGNIFICANT CHANGE UP
NEUTROPHILS # BLD AUTO: 9.78 K/UL — HIGH (ref 1.8–7.4)
NEUTROPHILS NFR BLD AUTO: 71 % — SIGNIFICANT CHANGE UP (ref 43–77)
NITRITE UR-MCNC: NEGATIVE — SIGNIFICANT CHANGE UP
NRBC # BLD: SIGNIFICANT CHANGE UP /100 WBCS (ref 0–0)
PCO2 BLDA: 52 MMHG — HIGH (ref 35–48)
PH BLDA: 7.5 — HIGH (ref 7.35–7.45)
PH UR: 5 — SIGNIFICANT CHANGE UP (ref 5–8)
PHOSPHATE SERPL-MCNC: 2.3 MG/DL — LOW (ref 2.5–4.5)
PLATELET # BLD AUTO: 400 K/UL — SIGNIFICANT CHANGE UP (ref 150–400)
PO2 BLDA: 71 MMHG — LOW (ref 83–108)
POTASSIUM SERPL-MCNC: 3.6 MMOL/L — SIGNIFICANT CHANGE UP (ref 3.5–5.3)
POTASSIUM SERPL-SCNC: 3.6 MMOL/L — SIGNIFICANT CHANGE UP (ref 3.5–5.3)
PROT SERPL-MCNC: 7.6 G/DL — SIGNIFICANT CHANGE UP (ref 6–8.3)
PROT UR-MCNC: 15
PROTHROM AB SERPL-ACNC: 29.2 SEC — HIGH (ref 10.5–13.4)
RAPID RVP RESULT: DETECTED
RBC # BLD: 5.2 M/UL — SIGNIFICANT CHANGE UP (ref 4.2–5.8)
RBC # FLD: 15.2 % — HIGH (ref 10.3–14.5)
RBC CASTS # UR COMP ASSIST: SIGNIFICANT CHANGE UP /HPF (ref 0–4)
RV+EV RNA SPEC QL NAA+PROBE: SIGNIFICANT CHANGE UP
S AUREUS DNA NOSE QL NAA+PROBE: SIGNIFICANT CHANGE UP
SAO2 % BLDA: 95.6 % — SIGNIFICANT CHANGE UP (ref 94–98)
SARS-COV-2 RNA SPEC QL NAA+PROBE: SIGNIFICANT CHANGE UP
SODIUM SERPL-SCNC: 134 MMOL/L — LOW (ref 135–145)
SP GR SPEC: 1.01 — SIGNIFICANT CHANGE UP (ref 1.01–1.02)
UROBILINOGEN FLD QL: NEGATIVE — SIGNIFICANT CHANGE UP
WBC # BLD: 13.21 K/UL — HIGH (ref 3.8–10.5)
WBC # FLD AUTO: 13.21 K/UL — HIGH (ref 3.8–10.5)
WBC UR QL: SIGNIFICANT CHANGE UP

## 2023-01-11 PROCEDURE — 99233 SBSQ HOSP IP/OBS HIGH 50: CPT

## 2023-01-11 PROCEDURE — 99222 1ST HOSP IP/OBS MODERATE 55: CPT

## 2023-01-11 PROCEDURE — 99291 CRITICAL CARE FIRST HOUR: CPT | Mod: GC

## 2023-01-11 PROCEDURE — 99291 CRITICAL CARE FIRST HOUR: CPT

## 2023-01-11 RX ORDER — POTASSIUM CHLORIDE 20 MEQ
20 PACKET (EA) ORAL ONCE
Refills: 0 | Status: COMPLETED | OUTPATIENT
Start: 2023-01-11 | End: 2023-01-11

## 2023-01-11 RX ORDER — SODIUM,POTASSIUM PHOSPHATES 278-250MG
1 POWDER IN PACKET (EA) ORAL ONCE
Refills: 0 | Status: COMPLETED | OUTPATIENT
Start: 2023-01-11 | End: 2023-01-11

## 2023-01-11 RX ORDER — CEFEPIME 1 G/1
2000 INJECTION, POWDER, FOR SOLUTION INTRAMUSCULAR; INTRAVENOUS EVERY 8 HOURS
Refills: 0 | Status: DISCONTINUED | OUTPATIENT
Start: 2023-01-11 | End: 2023-01-18

## 2023-01-11 RX ORDER — DILTIAZEM HCL 120 MG
60 CAPSULE, EXT RELEASE 24 HR ORAL EVERY 6 HOURS
Refills: 0 | Status: DISCONTINUED | OUTPATIENT
Start: 2023-01-11 | End: 2023-01-18

## 2023-01-11 RX ORDER — METOPROLOL TARTRATE 50 MG
50 TABLET ORAL EVERY 6 HOURS
Refills: 0 | Status: DISCONTINUED | OUTPATIENT
Start: 2023-01-11 | End: 2023-01-24

## 2023-01-11 RX ORDER — MAGNESIUM SULFATE 500 MG/ML
2 VIAL (ML) INJECTION ONCE
Refills: 0 | Status: COMPLETED | OUTPATIENT
Start: 2023-01-11 | End: 2023-01-11

## 2023-01-11 RX ADMIN — APIXABAN 5 MILLIGRAM(S): 2.5 TABLET, FILM COATED ORAL at 05:24

## 2023-01-11 RX ADMIN — Medication 40 MILLIGRAM(S): at 14:38

## 2023-01-11 RX ADMIN — Medication 60 MILLIGRAM(S): at 05:24

## 2023-01-11 RX ADMIN — Medication 600 MILLIGRAM(S): at 05:23

## 2023-01-11 RX ADMIN — AZITHROMYCIN 255 MILLIGRAM(S): 500 TABLET, FILM COATED ORAL at 18:12

## 2023-01-11 RX ADMIN — Medication 100 MILLIGRAM(S): at 21:28

## 2023-01-11 RX ADMIN — ATORVASTATIN CALCIUM 10 MILLIGRAM(S): 80 TABLET, FILM COATED ORAL at 21:28

## 2023-01-11 RX ADMIN — Medication 40 MILLIGRAM(S): at 05:23

## 2023-01-11 RX ADMIN — ISOSORBIDE MONONITRATE 60 MILLIGRAM(S): 60 TABLET, EXTENDED RELEASE ORAL at 12:07

## 2023-01-11 RX ADMIN — GABAPENTIN 100 MILLIGRAM(S): 400 CAPSULE ORAL at 18:12

## 2023-01-11 RX ADMIN — Medication 100 MILLIGRAM(S): at 05:24

## 2023-01-11 RX ADMIN — Medication 3 MILLILITER(S): at 07:50

## 2023-01-11 RX ADMIN — Medication 166.67 MILLIGRAM(S): at 09:40

## 2023-01-11 RX ADMIN — Medication 50 MILLIGRAM(S): at 12:06

## 2023-01-11 RX ADMIN — APIXABAN 5 MILLIGRAM(S): 2.5 TABLET, FILM COATED ORAL at 18:11

## 2023-01-11 RX ADMIN — Medication 60 MILLIGRAM(S): at 18:11

## 2023-01-11 RX ADMIN — CEFEPIME 100 MILLIGRAM(S): 1 INJECTION, POWDER, FOR SOLUTION INTRAMUSCULAR; INTRAVENOUS at 21:28

## 2023-01-11 RX ADMIN — Medication 30 MILLIGRAM(S): at 18:11

## 2023-01-11 RX ADMIN — Medication 1 PACKET(S): at 09:40

## 2023-01-11 RX ADMIN — Medication 20 MILLIEQUIVALENT(S): at 00:14

## 2023-01-11 RX ADMIN — CEFEPIME 100 MILLIGRAM(S): 1 INJECTION, POWDER, FOR SOLUTION INTRAMUSCULAR; INTRAVENOUS at 05:22

## 2023-01-11 RX ADMIN — Medication 50 MILLIGRAM(S): at 05:24

## 2023-01-11 RX ADMIN — CEFEPIME 100 MILLIGRAM(S): 1 INJECTION, POWDER, FOR SOLUTION INTRAMUSCULAR; INTRAVENOUS at 14:39

## 2023-01-11 RX ADMIN — Medication 3 MILLILITER(S): at 19:42

## 2023-01-11 RX ADMIN — Medication 30 MILLIGRAM(S): at 05:24

## 2023-01-11 RX ADMIN — CLOPIDOGREL BISULFATE 75 MILLIGRAM(S): 75 TABLET, FILM COATED ORAL at 12:06

## 2023-01-11 RX ADMIN — Medication 50 MILLIGRAM(S): at 18:11

## 2023-01-11 RX ADMIN — Medication 3 MILLILITER(S): at 14:14

## 2023-01-11 RX ADMIN — Medication 3 MILLILITER(S): at 00:39

## 2023-01-11 RX ADMIN — Medication 60 MILLIGRAM(S): at 12:07

## 2023-01-11 RX ADMIN — Medication 25 GRAM(S): at 00:14

## 2023-01-11 RX ADMIN — GABAPENTIN 100 MILLIGRAM(S): 400 CAPSULE ORAL at 05:24

## 2023-01-11 RX ADMIN — Medication 60 MILLIGRAM(S): at 00:14

## 2023-01-11 NOTE — PROGRESS NOTE ADULT - ASSESSMENT
82 year old male PMH of CAD s/p stent, s/p CABG, HTN, HLD, CHF p/w shortness of breath and cough for the last 5 days found to be in af with rvr, congestive hf, and influenza positive.    CAD s/p CABG, stent, Afib with RVR, HFpEF  - No anginal symptoms, no sign of acute ischemia, and troponin are negative  - Continue ASA, BB, and statin  - Continue Imdur  - Would resume Losartan if creatinine remains normal.  Would start low dose to allow room for uptitrate AVN blocker    - Afib with rates improved.  Tachycardia  with some reactive component (in the setting of flu)  - c/w BB, will eventually switch to long-acting  - Continue Cardizem 30mg q6hrs.  Would switch to long-acting once HR is controlled  - Continue Eliquis    - HFpEF  - proBNP 2418-->959  - Still requiring supplemental 02 via HFNC  - c/w IV lasix for now  - TTE with EF 55%, LAE, with no significant valvular pathology  - Strict i/o's and daily weights    - Flu management as per Pulm/primary /ICU team  - continue incentive spirometer    - Monitor and replete lytes, keep K>4, Mg>2.  - Will continue to follow.

## 2023-01-11 NOTE — PROGRESS NOTE ADULT - SUBJECTIVE AND OBJECTIVE BOX
MEDICAL ATTENDING NOTE    Patient is a 82y old  Male who presents with a chief complaint of CHF, A fib, Flu (11 Jan 2023 14:38)      INTERVAL HPI/OVERNIGHT EVENTS: no acute issues reported    MEDICATIONS  (STANDING):  albuterol/ipratropium for Nebulization 3 milliLiter(s) Nebulizer every 6 hours  apixaban 5 milliGRAM(s) Oral every 12 hours  atorvastatin 10 milliGRAM(s) Oral at bedtime  azithromycin  IVPB      azithromycin  IVPB 500 milliGRAM(s) IV Intermittent every 24 hours  benzonatate 100 milliGRAM(s) Oral every 8 hours  cefepime   IVPB      cefepime   IVPB 1000 milliGRAM(s) IV Intermittent every 8 hours  clopidogrel Tablet 75 milliGRAM(s) Oral daily  diltiazem    Tablet 60 milliGRAM(s) Oral every 6 hours  furosemide   Injectable 40 milliGRAM(s) IV Push two times a day  gabapentin 100 milliGRAM(s) Oral two times a day  hydrocodone/homatropine Syrup 5 milliLiter(s) Oral every 8 hours  isosorbide   mononitrate ER Tablet (IMDUR) 60 milliGRAM(s) Oral daily  metoprolol tartrate 50 milliGRAM(s) Oral every 6 hours  oseltamivir 30 milliGRAM(s) Oral two times a day    MEDICATIONS  (PRN):  bisacodyl 5 milliGRAM(s) Oral every 12 hours PRN Constipation  guaiFENesin Oral Liquid (Sugar-Free) 200 milliGRAM(s) Oral every 6 hours PRN Cough  metoclopramide Injectable 5 milliGRAM(s) IV Push every 8 hours PRN nausea/vomiting      __________________________________________________  ----------------------------------------------------------------------------------  REVIEW OF SYSTEMS: no chest pain, no SOB, No fever, no HA      Vital Signs Last 24 Hrs  T(C): 36.9 (11 Jan 2023 12:00), Max: 37.6 (10 Matthew 2023 19:40)  T(F): 98.4 (11 Jan 2023 12:00), Max: 99.7 (10 Matthew 2023 19:40)  HR: 82 (11 Jan 2023 13:00) (80 - 110)  BP: 108/60 (11 Jan 2023 13:00) (102/61 - 134/65)  BP(mean): 79 (11 Jan 2023 13:00) (76 - 94)  RR: 25 (11 Jan 2023 13:00) (20 - 37)  SpO2: 94% (11 Jan 2023 13:00) (92% - 100%)    Parameters below as of 11 Jan 2023 05:41  Patient On (Oxygen Delivery Method): nasal cannula, high flow        _________________  PHYSICAL EXAM:  ---------------------------   NAD; Normocephalic;   LUNGS - no wheezing  HEART: S1 S2+   ABDOMEN: Soft, Nontender, non distended  EXTREMITIES: no cyanosis; no edema  NERVOUS SYSTEM:  Awake and alert; follows commands, moves extremities; no gross focal neuro deficits    _________________________________________________  LABS:                        15.2   13.21 )-----------( 400      ( 11 Jan 2023 05:30 )             45.5     01-11    134<L>  |  88<L>  |  35<H>  ----------------------------<  126<H>  3.6   |  40<H>  |  1.00    Ca    9.1      11 Jan 2023 05:30  Phos  2.3     01-11  Mg     2.7     01-11    TPro  7.6  /  Alb  2.6<L>  /  TBili  2.6<H>  /  DBili  x   /  AST  19  /  ALT  21  /  AlkPhos  94  01-11    PT/INR - ( 11 Jan 2023 05:30 )   PT: 29.2 sec;   INR: 2.47 ratio         PTT - ( 11 Jan 2023 05:30 )  PTT:38.1 sec    CAPILLARY BLOOD GLUCOSE                    Plan of care was discussed with patient and daughter at bedside;  care was aligned with patient's wishes.             MEDICAL ATTENDING NOTE    Patient is a 82y old  Male who presents with a chief complaint of CHF, A fib, Flu (11 Jan 2023 14:38)      INTERVAL HPI/OVERNIGHT EVENTS: no acute issues reported    MEDICATIONS  (STANDING):  albuterol/ipratropium for Nebulization 3 milliLiter(s) Nebulizer every 6 hours  apixaban 5 milliGRAM(s) Oral every 12 hours  atorvastatin 10 milliGRAM(s) Oral at bedtime  azithromycin  IVPB      azithromycin  IVPB 500 milliGRAM(s) IV Intermittent every 24 hours  benzonatate 100 milliGRAM(s) Oral every 8 hours  cefepime   IVPB      cefepime   IVPB 1000 milliGRAM(s) IV Intermittent every 8 hours  clopidogrel Tablet 75 milliGRAM(s) Oral daily  diltiazem    Tablet 60 milliGRAM(s) Oral every 6 hours  furosemide   Injectable 40 milliGRAM(s) IV Push two times a day  gabapentin 100 milliGRAM(s) Oral two times a day  hydrocodone/homatropine Syrup 5 milliLiter(s) Oral every 8 hours  isosorbide   mononitrate ER Tablet (IMDUR) 60 milliGRAM(s) Oral daily  metoprolol tartrate 50 milliGRAM(s) Oral every 6 hours  oseltamivir 30 milliGRAM(s) Oral two times a day    MEDICATIONS  (PRN):  bisacodyl 5 milliGRAM(s) Oral every 12 hours PRN Constipation  guaiFENesin Oral Liquid (Sugar-Free) 200 milliGRAM(s) Oral every 6 hours PRN Cough  metoclopramide Injectable 5 milliGRAM(s) IV Push every 8 hours PRN nausea/vomiting      __________________________________________________  ----------------------------------------------------------------------------------  REVIEW OF SYSTEMS: no chest pain, ++ SOB, No fever, no HA      Vital Signs Last 24 Hrs  T(C): 36.9 (11 Jan 2023 12:00), Max: 37.6 (10 Matthew 2023 19:40)  T(F): 98.4 (11 Jan 2023 12:00), Max: 99.7 (10 Matthew 2023 19:40)  HR: 82 (11 Jan 2023 13:00) (80 - 110)  BP: 108/60 (11 Jan 2023 13:00) (102/61 - 134/65)  BP(mean): 79 (11 Jan 2023 13:00) (76 - 94)  RR: 25 (11 Jan 2023 13:00) (20 - 37)  SpO2: 94% (11 Jan 2023 13:00) (92% - 100%)    Parameters below as of 11 Jan 2023 05:41  Patient On (Oxygen Delivery Method): nasal cannula, high flow        _________________  PHYSICAL EXAM:  ---------------------------   NAD; Normocephalic;   LUNGS - no wheezing  HEART: S1 S2+   ABDOMEN: Soft, Nontender, non distended, BS+  EXTREMITIES: no cyanosis; no edema  NERVOUS SYSTEM:  Awake and alert; follows commands, moves extremities; no gross focal neuro deficits    _________________________________________________  LABS:                        15.2   13.21 )-----------( 400      ( 11 Jan 2023 05:30 )             45.5     01-11    134<L>  |  88<L>  |  35<H>  ----------------------------<  126<H>  3.6   |  40<H>  |  1.00    Ca    9.1      11 Jan 2023 05:30  Phos  2.3     01-11  Mg     2.7     01-11    TPro  7.6  /  Alb  2.6<L>  /  TBili  2.6<H>  /  DBili  x   /  AST  19  /  ALT  21  /  AlkPhos  94  01-11    PT/INR - ( 11 Jan 2023 05:30 )   PT: 29.2 sec;   INR: 2.47 ratio         PTT - ( 11 Jan 2023 05:30 )  PTT:38.1 sec    CAPILLARY BLOOD GLUCOSE                    Plan of care was discussed with patient and daughter at bedside;  care was aligned with patient's wishes.

## 2023-01-11 NOTE — PROGRESS NOTE ADULT - SUBJECTIVE AND OBJECTIVE BOX
[facility] - Donna Jay, Ge Morejon Sav[place], [place]  Office Number: [phone]    Follow Up:   Afib with RVR, CHF    Subjective/Observations:   AF with -160s, asymptomatic. Given Metoprolol 2.5mg IVP x1, with HR improvement to 110-115 bpm. Transferred to ICU for hypoxia. On supplemental HFNC. No complaints of chest pain, dyspnea, or palpitations reported. No signs of orthopnea or PND.    REVIEW OF SYSTEMS:   All other review of systems are negative unless indicated above    TELE: AF 80-90's    PAST MEDICAL & SURGICAL HISTORY:  HLD (hyperlipidemia)      HTN (hypertension)      CAD (coronary artery disease)      S/P coronary artery stent placement      S/P CABG (coronary artery bypass graft)          SOCIAL HISTORY:  No tobacco, ethanol, or drug abuse.    FAMILY HISTORY:  Family history of cardiovascular disease (Sibling)    Family history of uterine cancer (Mother)      No family history of acute MI or sudden cardiac death.    MEDICATIONS  (STANDING):  albuterol/ipratropium for Nebulization 3 milliLiter(s) Nebulizer every 6 hours  apixaban 5 milliGRAM(s) Oral every 12 hours  atorvastatin 10 milliGRAM(s) Oral at bedtime  azithromycin  IVPB      azithromycin  IVPB 500 milliGRAM(s) IV Intermittent every 24 hours  benzonatate 100 milliGRAM(s) Oral every 8 hours  cefepime   IVPB      cefepime   IVPB 1000 milliGRAM(s) IV Intermittent every 8 hours  clopidogrel Tablet 75 milliGRAM(s) Oral daily  diltiazem    Tablet 60 milliGRAM(s) Oral every 6 hours  furosemide   Injectable 40 milliGRAM(s) IV Push two times a day  gabapentin 100 milliGRAM(s) Oral two times a day  guaiFENesin  milliGRAM(s) Oral every 12 hours  hydrocodone/homatropine Syrup 5 milliLiter(s) Oral every 8 hours  isosorbide   mononitrate ER Tablet (IMDUR) 60 milliGRAM(s) Oral daily  metoprolol tartrate 50 milliGRAM(s) Oral every 6 hours  oseltamivir 30 milliGRAM(s) Oral two times a day  vancomycin  IVPB 1250 milliGRAM(s) IV Intermittent every 12 hours    MEDICATIONS  (PRN):  bisacodyl 5 milliGRAM(s) Oral every 12 hours PRN Constipation  guaiFENesin Oral Liquid (Sugar-Free) 200 milliGRAM(s) Oral every 6 hours PRN Cough  metoclopramide Injectable 5 milliGRAM(s) IV Push every 8 hours PRN nausea/vomiting      Allergies    Levaquin (Unknown)  penicillin (Rash)    Intolerances        VITAL SIGNS:   Vital Signs Last 24 Hrs  T(C): 36.5 (11 Jan 2023 07:00), Max: 37.6 (10 Matthew 2023 19:40)  T(F): 97.7 (11 Jan 2023 07:00), Max: 99.7 (10 Matthew 2023 19:40)  HR: 81 (11 Jan 2023 08:00) (80 - 111)  BP: 122/69 (11 Jan 2023 08:00) (102/61 - 138/85)  BP(mean): 88 (11 Jan 2023 08:00) (76 - 94)  RR: 33 (11 Jan 2023 08:00) (20 - 33)  SpO2: 96% (11 Jan 2023 08:00) (89% - 100%)    Parameters below as of 11 Jan 2023 05:41  Patient On (Oxygen Delivery Method): nasal cannula, high flow        I&O's Summary    10 Matthew 2023 07:01  -  11 Jan 2023 07:00  --------------------------------------------------------  IN: 1490 mL / OUT: 1200 mL / NET: 290 mL        On Exam:  Constitutional: NAD, awake and alert, well-developed  HEENT: Moist Mucous Membranes, Anicteric  Pulmonary: Non-labored, breath sounds are clear bilaterally, No wheezing, rales or rhonchi  Cardiovascular: Regular, S1 and S2, No murmurs, rubs, gallops or clicks  Gastrointestinal: Bowel Sounds present, soft, nontender.   Lymph: No peripheral edema. No lymphadenopathy.  Skin: No visible rashes or ulcers.  Psych:  Mood & affect appropriate    LABS: All Labs Reviewed:                        15.2   13.21 )-----------( 400      ( 11 Jan 2023 05:30 )             45.5                         14.2   13.63 )-----------( 390      ( 10 Matthew 2023 21:15 )             42.1                         13.7   8.25  )-----------( 308      ( 09 Jan 2023 06:08 )             41.2     11 Jan 2023 05:30    134    |  88     |  35     ----------------------------<  126    3.6     |  40     |  1.00   10 Matthew 2023 21:15    133    |  87     |  38     ----------------------------<  219    3.4     |  36     |  1.20   09 Jan 2023 06:08    137    |  94     |  32     ----------------------------<  152    3.6     |  36     |  1.10     Ca    9.1        11 Jan 2023 05:30  Ca    8.7        10 Matthew 2023 21:15  Ca    9.0        09 Jan 2023 06:08  Phos  2.3       11 Jan 2023 05:30  Phos  2.7       10 Jan 2023 21:15  Mg     2.7       11 Jan 2023 05:30  Mg     1.9       10 Matthew 2023 21:15  Mg     2.0       09 Jan 2023 06:08    TPro  7.6    /  Alb  2.6    /  TBili  2.6    /  DBili  x      /  AST  19     /  ALT  21     /  AlkPhos  94     11 Jan 2023 05:30  TPro  7.1    /  Alb  2.5    /  TBili  2.3    /  DBili  x      /  AST  21     /  ALT  19     /  AlkPhos  87     10 Matthew 2023 21:15    PT/INR - ( 11 Jan 2023 05:30 )   PT: 29.2 sec;   INR: 2.47 ratio         PTT - ( 11 Jan 2023 05:30 )  PTT:38.1 sec      Blood Culture:   01-10 @ 21:15  Pro Bnp 768  01-09 @ 06:08  Pro Bnp 959        RADIOLOGY:    ECG:

## 2023-01-11 NOTE — PROGRESS NOTE ADULT - SUBJECTIVE AND OBJECTIVE BOX
Date/Time Patient Seen:  		  Referring MD:   Data Reviewed	       Patient is a 82y old  Male who presents with a chief complaint of CHF, A fib, Flu (10 Matthew 2023 21:23)      Subjective/HPI     PAST MEDICAL & SURGICAL HISTORY:  Congestive heart failure (CHF)    Atrial fibrillation    HLD (hyperlipidemia)    HTN (hypertension)    CAD (coronary artery disease)    S/P coronary artery stent placement    S/P CABG (coronary artery bypass graft)          Medication list         MEDICATIONS  (STANDING):  albuterol/ipratropium for Nebulization 3 milliLiter(s) Nebulizer every 6 hours  apixaban 5 milliGRAM(s) Oral every 12 hours  atorvastatin 10 milliGRAM(s) Oral at bedtime  azithromycin  IVPB      azithromycin  IVPB 500 milliGRAM(s) IV Intermittent every 24 hours  benzonatate 100 milliGRAM(s) Oral every 8 hours  cefepime   IVPB      cefepime   IVPB 1000 milliGRAM(s) IV Intermittent every 8 hours  chlorhexidine 4% Liquid 1 Application(s) Topical <User Schedule>  clopidogrel Tablet 75 milliGRAM(s) Oral daily  diltiazem    Tablet 60 milliGRAM(s) Oral every 6 hours  furosemide   Injectable 40 milliGRAM(s) IV Push two times a day  gabapentin 100 milliGRAM(s) Oral two times a day  guaiFENesin  milliGRAM(s) Oral every 12 hours  hydrocodone/homatropine Syrup 5 milliLiter(s) Oral every 8 hours  isosorbide   mononitrate ER Tablet (IMDUR) 60 milliGRAM(s) Oral daily  metoprolol tartrate 50 milliGRAM(s) Oral every 6 hours  oseltamivir 30 milliGRAM(s) Oral two times a day  vancomycin  IVPB 1250 milliGRAM(s) IV Intermittent every 12 hours    MEDICATIONS  (PRN):  bisacodyl 5 milliGRAM(s) Oral every 12 hours PRN Constipation  guaiFENesin Oral Liquid (Sugar-Free) 200 milliGRAM(s) Oral every 6 hours PRN Cough  metoclopramide Injectable 5 milliGRAM(s) IV Push every 8 hours PRN nausea/vomiting         Vitals log        ICU Vital Signs Last 24 Hrs  T(C): 36.7 (11 Jan 2023 04:40), Max: 37.6 (10 Matthew 2023 19:40)  T(F): 98 (11 Jan 2023 04:40), Max: 99.7 (10 Matthew 2023 19:40)  HR: 90 (11 Jan 2023 04:00) (85 - 111)  BP: 128/70 (11 Jan 2023 04:00) (102/61 - 138/85)  BP(mean): 93 (11 Jan 2023 04:00) (76 - 94)  ABP: --  ABP(mean): --  RR: 30 (11 Jan 2023 04:00) (20 - 32)  SpO2: 95% (11 Jan 2023 04:00) (89% - 100%)    O2 Parameters below as of 11 Jan 2023 00:39  Patient On (Oxygen Delivery Method): nasal cannula, high flow                 Input and Output:  I&O's Detail    10 Matthew 2023 07:01  -  11 Jan 2023 05:36  --------------------------------------------------------  IN:    IV PiggyBack: 250 mL    IV PiggyBack: 250 mL    IV PiggyBack: 50 mL    IV PiggyBack: 50 mL    Oral Fluid: 360 mL  Total IN: 960 mL    OUT:    Voided (mL): 1200 mL  Total OUT: 1200 mL    Total NET: -240 mL          Lab Data                        14.2   13.63 )-----------( 390      ( 10 Matthew 2023 21:15 )             42.1     01-10    133<L>  |  87<L>  |  38<H>  ----------------------------<  219<H>  3.4<L>   |  36<H>  |  1.20    Ca    8.7      10 Matthew 2023 21:15  Phos  2.7     01-10  Mg     1.9     01-10    TPro  7.1  /  Alb  2.5<L>  /  TBili  2.3<H>  /  DBili  x   /  AST  21  /  ALT  19  /  AlkPhos  87  01-10    ABG - ( 10 Matthew 2023 15:28 )  pH, Arterial: 7.52  pH, Blood: x     /  pCO2: 47    /  pO2: 65    / HCO3: 38    / Base Excess: 15.5  /  SaO2: 94.2                    Review of Systems	      Objective     Physical Examination    heart s1s2  lung dc BS  head nc      Pertinent Lab findings & Imaging      Christiano:  NO   Adequate UO     I&O's Detail    10 Matthew 2023 07:01  -  11 Jan 2023 05:36  --------------------------------------------------------  IN:    IV PiggyBack: 250 mL    IV PiggyBack: 250 mL    IV PiggyBack: 50 mL    IV PiggyBack: 50 mL    Oral Fluid: 360 mL  Total IN: 960 mL    OUT:    Voided (mL): 1200 mL  Total OUT: 1200 mL    Total NET: -240 mL               Discussed with:     Cultures:	        Radiology

## 2023-01-11 NOTE — PROGRESS NOTE ADULT - ASSESSMENT
81 y/o m w/ PMH od CAD s/p stent, s/p CABG, HTN, HLD, CHF, admitted for acute CHF exacerbation, found to be positive for influenza, and with new onset AFib. Had progressive hypoxia, and was transferred to ICU for management of acute hypoxemic respiratory failure, influenza A viral PNA, atypical PNA, new onset AFib, and JANES.    Neuro - pt is mentating well     CV - hemodynamics stable, NSR  -HFpEF:  Echo shows EF 55%  On Lasix 40 mg IV BID  -New onset AFib: cont rate control with lopressor and diltiazem, goal HR<110  full AC with eliquis   -CAD: trop negative   cont statin and plavix  f/u cardio    Pulm -  acute hypoxemic resp failure on HFNC, etiology unknown,   f/u infectious vs. rheum workup.  Currently HFNC on 40 L at 65%  actively titrating FiO2 for sats>91%  CTA negative for PE  continue bronchodilators   Bad cough - tessalon perles, hycodan PRN, d/c mucinex  FU AMOS/ANCA    GI -  clears    Renal - Cr improved, avoid MAP<65, renally adjust all meds, avoid nephrotoxins, strict I/Os   repleted phos    ID - Pt with influenza A viral PNA and suspect bacterial PNA  CTA: with bilateral tree bud opacities at bases > apex bilaterally  Continue broad spectrum IV abx with Vanco/Cefepime and atypical coverage with Azithro  continue tamiflu  full RVP otherwise negative,   Procal: 0.98   FU SCx/BcX  FU legionella and strept Ag  FU fungitell/galatamannan  FU MRSA PCR    Endo - no insulin necessary    Heme -  full AC with eliquis, no signs of active bleeding, H/H stable    Skin: no lines or roth    Dispo: Full code  - family updated at bedside

## 2023-01-11 NOTE — PROGRESS NOTE ADULT - ASSESSMENT
83 y/o m w/ PMH od CAD s/p stent, s/p CABG, HTN, HLD p/w shortness of breath and cough, found to have a fib, concern for acute CHF exacerbation, + influenza.  1/10- patient noted to be significantly hypoxic despite increased supplemental oxygen. He was placed n Vent mask and then NRM with only slight improvement in saturation Lung exam with decreased air entry; CT angio ordered- PE rule out however there was evidence of consolidations concerning for infection. ABG reviewed   Broad spectrum IV antibiotics ordered.   Due to progressive hypoxia and concern for risk of decompensation, ICU consulted.       1. Hypoxic respiratory failure  2. Influenza A infection with suspected superimposed Bacterial infection  3. Congestive heart failure  4, AFIB - with RVR  5. JANES  6. CAD- s/p coronary stent, s/p CABG; - continue asa, Plavix statin  7. Essential HTN- cont pt on atenolol, Imdur Norvasc - holding losartan in setting of JANES  8. Hyperlipidemia- continue statins

## 2023-01-11 NOTE — PROGRESS NOTE ADULT - ASSESSMENT
83 y/o m w/ PMH od CAD s/p stent, s/p CABG, HTN, HLD, CHF p/w shortness of breath and cough    HF NC  VS noted  Labs reviewed  NEBS  LASIX  ABX  prognosis guarded  ICU care in progress    on tamiflu for FLU  acap prn  cough rx regimen prn  extensive and advanced Heart Disease - cvs rx regimen  judicious diuresis  I and O  AF - on AC - rate and rhythm control  cardio follow up  CXR clear on Admission  pulm congestion - multifactorial - FLU - Bronchitis - Pulm Edema -   cont NEBS - mucociliary clearance, mucinex, chest PT, enc cough, cvs rx regimen  monitor VS and Sat  keep sat > 88 pct  GOC discussion  spoke with daughters  FLU isolation

## 2023-01-11 NOTE — PROGRESS NOTE ADULT - PROBLEM SELECTOR PLAN 1
Acute respiratory failure as a result of Influenza with superimposed bacterial PNA  Continue broad spectrum antibiotics  Cultures if develops fever  On High Flow in the ICU - saturating ok  Continue bronchodilators as needed  Continue to monitor  Care per critical care team  ID consult nnoted

## 2023-01-11 NOTE — PROGRESS NOTE ADULT - SUBJECTIVE AND OBJECTIVE BOX
Patient is a 82y old  Male who presents with a chief complaint of CHF, A fib, Flu (09 Jan 2023 08:23)    Patient seen in follow up for abnormal renal function        PAST MEDICAL HISTORY:  Congestive heart failure (CHF)    Atrial fibrillation    HLD (hyperlipidemia)    HTN (hypertension)    CAD (coronary artery disease)      MEDICATIONS  (STANDING):  albuterol/ipratropium for Nebulization 3 milliLiter(s) Nebulizer every 6 hours  apixaban 5 milliGRAM(s) Oral every 12 hours  atorvastatin 10 milliGRAM(s) Oral at bedtime  azithromycin  IVPB      azithromycin  IVPB 500 milliGRAM(s) IV Intermittent every 24 hours  benzonatate 100 milliGRAM(s) Oral every 8 hours  cefepime   IVPB      cefepime   IVPB 1000 milliGRAM(s) IV Intermittent every 8 hours  clopidogrel Tablet 75 milliGRAM(s) Oral daily  diltiazem    Tablet 60 milliGRAM(s) Oral every 6 hours  furosemide   Injectable 40 milliGRAM(s) IV Push two times a day  gabapentin 100 milliGRAM(s) Oral two times a day  hydrocodone/homatropine Syrup 5 milliLiter(s) Oral every 8 hours  isosorbide   mononitrate ER Tablet (IMDUR) 60 milliGRAM(s) Oral daily  metoprolol tartrate 50 milliGRAM(s) Oral every 6 hours  oseltamivir 30 milliGRAM(s) Oral two times a day    MEDICATIONS  (PRN):  bisacodyl 5 milliGRAM(s) Oral every 12 hours PRN Constipation  guaiFENesin Oral Liquid (Sugar-Free) 200 milliGRAM(s) Oral every 6 hours PRN Cough  metoclopramide Injectable 5 milliGRAM(s) IV Push every 8 hours PRN nausea/vomiting    T(C): 36.9 (01-11-23 @ 12:00), Max: 37.6 (01-10-23 @ 19:40)  HR: 82 (01-11-23 @ 13:00) (80 - 135)  BP: 108/60 (01-11-23 @ 13:00) (102/61 - 138/85)  RR: 25 (01-11-23 @ 13:00)  SpO2: 94% (01-11-23 @ 13:00)  Wt(kg): --  I&O's Detail    10 Matthew 2023 07:01  -  11 Jan 2023 07:00  --------------------------------------------------------  IN:    IV PiggyBack: 250 mL    IV PiggyBack: 250 mL    IV PiggyBack: 50 mL    IV PiggyBack: 100 mL    Oral Fluid: 840 mL  Total IN: 1490 mL    OUT:    Voided (mL): 1200 mL  Total OUT: 1200 mL    Total NET: 290 mL      11 Jan 2023 07:01  -  11 Jan 2023 14:39  --------------------------------------------------------  IN:    IV PiggyBack: 250 mL    Oral Fluid: 240 mL  Total IN: 490 mL    OUT:    Voided (mL): 400 mL  Total OUT: 400 mL    Total NET: 90 mL          PHYSICAL EXAM:  General: No distress  Respiratory: b/l air entry  Cardiovascular: S1 S2  Gastrointestinal: soft  Extremities:  edema                           LABORATORY:                        15.2   13.21 )-----------( 400      ( 11 Jan 2023 05:30 )             45.5     01-11    134<L>  |  88<L>  |  35<H>  ----------------------------<  126<H>  3.6   |  40<H>  |  1.00    Ca    9.1      11 Jan 2023 05:30  Phos  2.3     01-11  Mg     2.7     01-11    TPro  7.6  /  Alb  2.6<L>  /  TBili  2.6<H>  /  DBili  x   /  AST  19  /  ALT  21  /  AlkPhos  94  01-11    Sodium, Serum: 134 mmol/L (01-11 @ 05:30)  Sodium, Serum: 133 mmol/L (01-10 @ 21:15)    Potassium, Serum: 3.6 mmol/L (01-11 @ 05:30)  Potassium, Serum: 3.4 mmol/L (01-10 @ 21:15)    Hemoglobin: 15.2 g/dL (01-11 @ 05:30)  Hemoglobin: 14.2 g/dL (01-10 @ 21:15)  Hemoglobin: 13.7 g/dL (01-09 @ 06:08)    Creatinine, Serum 1.00 (01-11 @ 05:30)  Creatinine, Serum 1.20 (01-10 @ 21:15)  Creatinine, Serum 1.10 (01-09 @ 06:08)        LIVER FUNCTIONS - ( 11 Jan 2023 05:30 )  Alb: 2.6 g/dL / Pro: 7.6 g/dL / ALK PHOS: 94 U/L / ALT: 21 U/L / AST: 19 U/L / GGT: x             ABG - ( 11 Jan 2023 14:14 )  pH, Arterial: 7.50  pH, Blood: x     /  pCO2: 52    /  pO2: 71    / HCO3: 41    / Base Excess: 17.4  /  SaO2: 95.6

## 2023-01-11 NOTE — CONSULT NOTE ADULT - ASSESSMENT
CHARTING IN PROGRESS, INCOMPLETE  CHARTING IN PROGRESS, INCOMPLETE  CHARTING IN PROGRESS, INCOMPLETE      81 y/o m w/ PMH od CAD s/p stent, s/p CABG, HTN, HLD, CHF presented to \[facility]"" ED 1/7/23 for shortness of breath and wheezing. He was found to be influenza positive and with CHF exacerbation and new onset afib. Started on tamiflu and IV lasix, eliquis and metoprolol for afib. Now transferred to ICU for worsening acute hypoxic respiratory failure. CTA negative for PE but showing likely superimposed bacterial PNA. ID consultation requested.     #Acute hypoxic respiratory failure  #influenza A  #Superimposed bacterial pna  - pt admitted for acute CHF exacerbation, found to be influenza A positive and now with worsening hypoxia. Procal 0.98 Likely due to superimposed bacterial pna  - MRSA nares PCR negative, would discontinue vancomycin. Start zosyn.  - f/u blood cultures     83 y/o m w/ PMH od CAD s/p stent, s/p CABG, HTN, HLD, CHF presented to [facility] ED 1/7/23 for shortness of breath and wheezing. He was found to be influenza positive and with CHF exacerbation and new onset afib. Started on tamiflu and IV lasix, eliquis and metoprolol for afib. Now transferred to ICU for worsening acute hypoxic respiratory failure. CTA negative for PE but showing likely superimposed bacterial PNA. ID consultation requested.     #Acute hypoxic respiratory failure  #influenza A  #Superimposed bacterial pna  - pt admitted for acute CHF exacerbation, found to be influenza A positive and now with worsening hypoxia. Procal 0.98 Likely due to superimposed bacterial pna  - MRSA nares PCR negative, would discontinue vancomycin. Start zosyn and add zithro for atypical coverage.  - check Strep pneumo and legionella urine antigens  - check sputum culture  - f/u blood cultures  - Will continue to follow    83 y/o m w/ PMH od CAD s/p stent, s/p CABG, HTN, HLD, CHF presented to [facility] ED 1/7/23 for shortness of breath and wheezing. He was found to be influenza positive and with CHF exacerbation and new onset afib. Started on tamiflu and IV lasix, eliquis and metoprolol for afib. Now transferred to ICU for worsening acute hypoxic respiratory failure. CTA negative for PE but showing likely superimposed bacterial PNA. ID consultation requested.     #Acute hypoxic respiratory failure  #influenza A  #Superimposed bacterial pna  - pt admitted for acute CHF exacerbation, found to be influenza A positive and now with worsening hypoxia. Procal 0.98 Likely due to superimposed bacterial pna  - MRSA nares PCR negative, would discontinue vancomycin.   - continue cefepime and zithro  - check Strep pneumo and legionella urine antigens  - check sputum culture  - f/u blood cultures  - Will continue to follow    83 y/o m w/ PMH od CAD s/p stent, s/p CABG, HTN, HLD, CHF presented to [facility] ED 1/7/23 for shortness of breath and wheezing. He was found to be influenza positive and with CHF exacerbation and new onset afib. Started on tamiflu and IV lasix, eliquis and metoprolol for afib. Now transferred to ICU for worsening acute hypoxic respiratory failure. CTA negative for PE but showing likely superimposed bacterial PNA. ID consultation requested.     #Acute hypoxic respiratory failure  #influenza A  #Superimposed bacterial pna  - pt admitted for acute CHF exacerbation, found to be influenza A positive and now with worsening hypoxia. Procal 0.98 Likely due to superimposed bacterial pna  - MRSA/MSSA nares PCR negative, would discontinue vancomycin.   - continue cefepime and zithro  - check Strep pneumo and legionella urine antigens  - check sputum culture  - f/u blood cultures  - Will continue to follow

## 2023-01-11 NOTE — PROGRESS NOTE ADULT - SUBJECTIVE AND OBJECTIVE BOX
INTERVAL HPI/OVERNIGHT EVENTS: yesterday pt was on the floors and had progressive hypoxia despite escalating O2. NC--> NRB, tachypnea, lethargy, afebrile, CTA without PE, bilateral tree bud densities likely infectious. Transferred to ICU for management. Afebrile, vitals stable except some tachycardia to 110 last night.    SUBJECTIVE: Patient seen and examined at bedside.     ROS:      OBJECTIVE:    VITAL SIGNS:  ICU Vital Signs Last 24 Hrs  T(C): 36.5 (11 Jan 2023 07:00), Max: 37.6 (10 Matthew 2023 19:40)  T(F): 97.7 (11 Jan 2023 07:00), Max: 99.7 (10 Matthew 2023 19:40)  HR: 89 (11 Jan 2023 11:00) (80 - 111)  BP: 132/64 (11 Jan 2023 11:00) (102/61 - 138/85)  BP(mean): 89 (11 Jan 2023 11:00) (76 - 94)  ABP: --  ABP(mean): --  RR: 24 (11 Jan 2023 11:00) (20 - 37)  SpO2: 97% (11 Jan 2023 11:00) (89% - 100%)    O2 Parameters below as of 11 Jan 2023 05:41  Patient On (Oxygen Delivery Method): nasal cannula, high flow              01-10 @ 07:01  -  01-11 @ 07:00  --------------------------------------------------------  IN: 1490 mL / OUT: 1200 mL / NET: 290 mL      CAPILLARY BLOOD GLUCOSE          PHYSICAL EXAM:  General: NAD, appears comfortable  HEENT: NCAT  Respiratory: crackles at bases  Cardiovascular: RRR  Abdomen: soft, NT/ND  Extremities: no LE edema or calf tenderness  Skin: WWP  Neurology: mentating well    MEDICATIONS:  MEDICATIONS  (STANDING):  albuterol/ipratropium for Nebulization 3 milliLiter(s) Nebulizer every 6 hours  apixaban 5 milliGRAM(s) Oral every 12 hours  atorvastatin 10 milliGRAM(s) Oral at bedtime  azithromycin  IVPB      azithromycin  IVPB 500 milliGRAM(s) IV Intermittent every 24 hours  benzonatate 100 milliGRAM(s) Oral every 8 hours  cefepime   IVPB      cefepime   IVPB 1000 milliGRAM(s) IV Intermittent every 8 hours  clopidogrel Tablet 75 milliGRAM(s) Oral daily  diltiazem    Tablet 60 milliGRAM(s) Oral every 6 hours  furosemide   Injectable 40 milliGRAM(s) IV Push two times a day  gabapentin 100 milliGRAM(s) Oral two times a day  hydrocodone/homatropine Syrup 5 milliLiter(s) Oral every 8 hours  isosorbide   mononitrate ER Tablet (IMDUR) 60 milliGRAM(s) Oral daily  metoprolol tartrate 50 milliGRAM(s) Oral every 6 hours  oseltamivir 30 milliGRAM(s) Oral two times a day    MEDICATIONS  (PRN):  bisacodyl 5 milliGRAM(s) Oral every 12 hours PRN Constipation  guaiFENesin Oral Liquid (Sugar-Free) 200 milliGRAM(s) Oral every 6 hours PRN Cough  metoclopramide Injectable 5 milliGRAM(s) IV Push every 8 hours PRN nausea/vomiting      ALLERGIES:  Allergies    Levaquin (Unknown)  penicillin (Rash)    Intolerances        LABS:                        15.2   13.21 )-----------( 400      ( 11 Jan 2023 05:30 )             45.5     01-11    134<L>  |  88<L>  |  35<H>  ----------------------------<  126<H>  3.6   |  40<H>  |  1.00    Ca    9.1      11 Jan 2023 05:30  Phos  2.3     01-11  Mg     2.7     01-11    TPro  7.6  /  Alb  2.6<L>  /  TBili  2.6<H>  /  DBili  x   /  AST  19  /  ALT  21  /  AlkPhos  94  01-11    PT/INR - ( 11 Jan 2023 05:30 )   PT: 29.2 sec;   INR: 2.47 ratio         PTT - ( 11 Jan 2023 05:30 )  PTT:38.1 sec      RADIOLOGY & ADDITIONAL TESTS: Reviewed.   EXAM:  CT ANGIO CHEST PULM ART [facility]                          PROCEDURE DATE:  01/10/2023          INTERPRETATION:  HISTORY: Admitting Dxs: J10.1 FLU DUE TO OTH IDENT   INFLUENZA VIRUS W OTH RESP MANIFEST    EXAMINATION: CTA CHEST was performed for evaluation of the pulmonary   arteries. Multiplanar reformatted images and MIPS were acquired.  CONTRAST/COMPLICATIONS:  IV Contrast: Omnipaque 350  70 cc administered   30 cc discarded  Oral Contrast: NONE  Complications: None reported at time of study completion    COMPARISON: No prior CT chest comparison available.    FINDINGS:    PULMONARY ARTERIES: No pulmonary embolism.    MEDIASTINUM: The right atrium is qualitatively dilated. Post CABG. No   pericardial effusion. Thoracic aorta normal caliber.  Mildly enlarged   mediastinal and bilateral hilar nodes.    AIRWAYS, LUNGS, PLEURA: Mild central airways secretions. Tree-in-bud   nodular opacities involving all lung lobes, but most severe throughout   the bilateral lower lobes. Confluent consolidation at the left greater   than right lung bases. No pleural effusion.    IMAGED ABDOMEN: Right renal parapelvic cysts.    SOFT TISSUES: Unremarkable.    BONES: Sternal wires. Age indeterminate loss of height of T6 vertebral   body.      IMPRESSION:.    No pulmonary embolism.    Diffuse tree-in-bud nodular opacities and bibasilar consolidation   compatible with infection.    Mediastinal and bilateral hilar lymphadenopathy, likely reactive.      CENTRAL LINE: N        DATE INSERTED:             REMOVE: N  RUBY: N                       DATE INSERTED:              REMOVE: Y/N  A-LINE: N                       DATE INSERTED:              REMOVE: Y/N      GLOBAL ISSUE/BEST PRACTICE  Analgesia: N  Sedation: N  HOB elevation: yes  Stress ulcer prophylaxis: N  VTE prophylaxis: Y  Glycemic control: N  Nutrition: Y    CODE STATUS: Full Code INTERVAL HPI/OVERNIGHT EVENTS: yesterday pt was on the floors and had progressive hypoxia despite escalating O2. NC--> NRB, tachypnea, lethargy, afebrile, CTA without PE, bilateral tree bud densities likely infectious. Transferred to ICU for management. Afebrile, NRB transitioned to HFNC 40L and 100%, this am weaned to FiO2 60%    SUBJECTIVE: Patient seen and examined at bedside.     ROS:      OBJECTIVE:    VITAL SIGNS:  ICU Vital Signs Last 24 Hrs  T(C): 36.5 (11 Jan 2023 07:00), Max: 37.6 (10 Matthew 2023 19:40)  T(F): 97.7 (11 Jan 2023 07:00), Max: 99.7 (10 Matthew 2023 19:40)  HR: 89 (11 Jan 2023 11:00) (80 - 111)  BP: 132/64 (11 Jan 2023 11:00) (102/61 - 138/85)  BP(mean): 89 (11 Jan 2023 11:00) (76 - 94)  ABP: --  ABP(mean): --  RR: 24 (11 Jan 2023 11:00) (20 - 37)  SpO2: 97% (11 Jan 2023 11:00) (89% - 100%)    O2 Parameters below as of 11 Jan 2023 05:41  Patient On (Oxygen Delivery Method): nasal cannula, high flow              01-10 @ 07:01  -  01-11 @ 07:00  --------------------------------------------------------  IN: 1490 mL / OUT: 1200 mL / NET: 290 mL      CAPILLARY BLOOD GLUCOSE          PHYSICAL EXAM:  General: NAD, appears comfortable  HEENT: NCAT  Respiratory: crackles at bases b/l  Cardiovascular: RRR  Abdomen: soft, NT/ND  Extremities: no LE edema or calf tenderness  Skin: WWP  Neurology: mentating well    MEDICATIONS:  MEDICATIONS  (STANDING):  albuterol/ipratropium for Nebulization 3 milliLiter(s) Nebulizer every 6 hours  apixaban 5 milliGRAM(s) Oral every 12 hours  atorvastatin 10 milliGRAM(s) Oral at bedtime  azithromycin  IVPB      azithromycin  IVPB 500 milliGRAM(s) IV Intermittent every 24 hours  benzonatate 100 milliGRAM(s) Oral every 8 hours  cefepime   IVPB      cefepime   IVPB 1000 milliGRAM(s) IV Intermittent every 8 hours  clopidogrel Tablet 75 milliGRAM(s) Oral daily  diltiazem    Tablet 60 milliGRAM(s) Oral every 6 hours  furosemide   Injectable 40 milliGRAM(s) IV Push two times a day  gabapentin 100 milliGRAM(s) Oral two times a day  hydrocodone/homatropine Syrup 5 milliLiter(s) Oral every 8 hours  isosorbide   mononitrate ER Tablet (IMDUR) 60 milliGRAM(s) Oral daily  metoprolol tartrate 50 milliGRAM(s) Oral every 6 hours  oseltamivir 30 milliGRAM(s) Oral two times a day    MEDICATIONS  (PRN):  bisacodyl 5 milliGRAM(s) Oral every 12 hours PRN Constipation  guaiFENesin Oral Liquid (Sugar-Free) 200 milliGRAM(s) Oral every 6 hours PRN Cough  metoclopramide Injectable 5 milliGRAM(s) IV Push every 8 hours PRN nausea/vomiting      ALLERGIES:  Allergies    Levaquin (Unknown)  penicillin (Rash)    Intolerances        LABS:                        15.2   13.21 )-----------( 400      ( 11 Jan 2023 05:30 )             45.5     01-11    134<L>  |  88<L>  |  35<H>  ----------------------------<  126<H>  3.6   |  40<H>  |  1.00    Ca    9.1      11 Jan 2023 05:30  Phos  2.3     01-11  Mg     2.7     01-11    TPro  7.6  /  Alb  2.6<L>  /  TBili  2.6<H>  /  DBili  x   /  AST  19  /  ALT  21  /  AlkPhos  94  01-11    PT/INR - ( 11 Jan 2023 05:30 )   PT: 29.2 sec;   INR: 2.47 ratio         PTT - ( 11 Jan 2023 05:30 )  PTT:38.1 sec      RADIOLOGY & ADDITIONAL TESTS: Reviewed.   EXAM:  CT ANGIO CHEST PULM ART [facility]                          PROCEDURE DATE:  01/10/2023          INTERPRETATION:  HISTORY: Admitting Dxs: J10.1 FLU DUE TO OTH IDENT   INFLUENZA VIRUS W OTH RESP MANIFEST    EXAMINATION: CTA CHEST was performed for evaluation of the pulmonary   arteries. Multiplanar reformatted images and MIPS were acquired.  CONTRAST/COMPLICATIONS:  IV Contrast: Omnipaque 350  70 cc administered   30 cc discarded  Oral Contrast: NONE  Complications: None reported at time of study completion    COMPARISON: No prior CT chest comparison available.    FINDINGS:    PULMONARY ARTERIES: No pulmonary embolism.    MEDIASTINUM: The right atrium is qualitatively dilated. Post CABG. No   pericardial effusion. Thoracic aorta normal caliber.  Mildly enlarged   mediastinal and bilateral hilar nodes.    AIRWAYS, LUNGS, PLEURA: Mild central airways secretions. Tree-in-bud   nodular opacities involving all lung lobes, but most severe throughout   the bilateral lower lobes. Confluent consolidation at the left greater   than right lung bases. No pleural effusion.    IMAGED ABDOMEN: Right renal parapelvic cysts.    SOFT TISSUES: Unremarkable.    BONES: Sternal wires. Age indeterminate loss of height of T6 vertebral   body.      IMPRESSION:.    No pulmonary embolism.    Diffuse tree-in-bud nodular opacities and bibasilar consolidation   compatible with infection.    Mediastinal and bilateral hilar lymphadenopathy, likely reactive.      CENTRAL LINE: N        DATE INSERTED:             REMOVE: N  RUBY: N                       DATE INSERTED:              REMOVE: Y/N  A-LINE: N                       DATE INSERTED:              REMOVE: Y/N      GLOBAL ISSUE/BEST PRACTICE  Analgesia: N  Sedation: N  HOB elevation: yes  Stress ulcer prophylaxis: N  VTE prophylaxis: Y  Glycemic control: N  Nutrition: Y    CODE STATUS: Full Code

## 2023-01-11 NOTE — PROGRESS NOTE ADULT - PROBLEM SELECTOR PLAN 4
continue Lasix; switch=ch to oral once ok by Cardio  Monitor  Echo - showed EF of 55%. No pericardial effusion

## 2023-01-11 NOTE — PROGRESS NOTE ADULT - ASSESSMENT
* Prerenal azotemia, CKD  * Acute HF  * influenza PNA  * New a.fib with RVR      Events noted. Pt transferred to ICU. Renal indices stable. To continue current meds. D/w ICU team.   Will follow electrolytes and renal function trend.

## 2023-01-11 NOTE — PROGRESS NOTE ADULT - NSPROGADDITIONALINFOA_GEN_ALL_CORE
Plan discussed with patient's family in details at bedside and all their questions / concerns were addressed.
Plan discussed with patient's family in details at bedside and all their questions / concerns were addressed.  OOb to chair  PT as tolerated

## 2023-01-11 NOTE — CONSULT NOTE ADULT - SUBJECTIVE AND OBJECTIVE BOX
[facility]  INFECTIOUS DISEASES   [address] [place] [address]  Tel: [phone]     Fax: [phone]  ========================================================  MD Radha Hutchison Kaushal, MD Cho, Michelle, MD Sunjit, Jaspal, MD  ========================================================    MRN-[id number]  OPAL NUGENT     CC: Patient is a 82y old  Male who presents with a chief complaint of CHF, A fib, Flu (11 Jan 2023 10:39)    Brief Hospital course: 83 y/o m w/ PMH od CAD s/p stent, s/p CABG, HTN, HLD, CHF presented to [facility] ED 1/7/23 for shortness of breath and wheezing. He was found to be influenza positive and with CHF exacerbation and new onset afib. Started on tamiflu and IV lasix, eliquis and metoprolol for afib.  1/10/23 patient with progressively worsening hypoxia, he was escalated from nasal cannula to NRB and transferred to ICU. Labs are significant for leukocytosis of 13, ESR 74, hyponatremia na 134, metabolic acidosis on ABG with PH 7.52, FLU A positive, Covid negative, procal 0.98. Xray on admission with clear lungs. CTA chest was performed 1/10/23 and negative for pulmonary embolism but revealed  diffuse tree-in-bud nodular opacities and bibasilar consolidation compatible with infection with mediastinal lymphadenopathy. Started on vancomycin by ICU team for likely superimposed bacterial PNA. ID consulted for influenza and worsening AHRF likely due to superimposed bacterial PNA.     Interval history: Patient transferred to ICU for worsening AHRF. Afebrile, HDS. Currently on HFNC. CTA negative for PE but revealing likely PNA. Started on IV vancomycin. Patient seen and examined at bedside.           PAST MEDICAL & SURGICAL HISTORY:  HLD (hyperlipidemia)      HTN (hypertension)      CAD (coronary artery disease)      S/P coronary artery stent placement      S/P CABG (coronary artery bypass graft)          Social Hx:     FAMILY HISTORY:  Family history of cardiovascular disease (Sibling)    Family history of uterine cancer (Mother)        Allergies    Levaquin (Unknown)  penicillin (Rash)    Intolerances        Antibiotics:  MEDICATIONS  (STANDING):  albuterol/ipratropium for Nebulization 3 milliLiter(s) Nebulizer every 6 hours  apixaban 5 milliGRAM(s) Oral every 12 hours  atorvastatin 10 milliGRAM(s) Oral at bedtime  azithromycin  IVPB      azithromycin  IVPB 500 milliGRAM(s) IV Intermittent every 24 hours  benzonatate 100 milliGRAM(s) Oral every 8 hours  cefepime   IVPB      cefepime   IVPB 1000 milliGRAM(s) IV Intermittent every 8 hours  clopidogrel Tablet 75 milliGRAM(s) Oral daily  diltiazem    Tablet 60 milliGRAM(s) Oral every 6 hours  furosemide   Injectable 40 milliGRAM(s) IV Push two times a day  gabapentin 100 milliGRAM(s) Oral two times a day  hydrocodone/homatropine Syrup 5 milliLiter(s) Oral every 8 hours  isosorbide   mononitrate ER Tablet (IMDUR) 60 milliGRAM(s) Oral daily  metoprolol tartrate 50 milliGRAM(s) Oral every 6 hours  oseltamivir 30 milliGRAM(s) Oral two times a day    MEDICATIONS  (PRN):  bisacodyl 5 milliGRAM(s) Oral every 12 hours PRN Constipation  guaiFENesin Oral Liquid (Sugar-Free) 200 milliGRAM(s) Oral every 6 hours PRN Cough  metoclopramide Injectable 5 milliGRAM(s) IV Push every 8 hours PRN nausea/vomiting       REVIEW OF SYSTEMS:  CONSTITUTIONAL:  No Fever or chills  HEENT:  No diplopia or blurred vision.  No sore throat or runny nose.  CARDIOVASCULAR:  No chest pain or SOB.  RESPIRATORY:  No cough, shortness of breath, PND or orthopnea.  GASTROINTESTINAL:  No nausea, vomiting or diarrhea.  GENITOURINARY:  No dysuria, frequency or urgency. No Blood in urine  MUSCULOSKELETAL:  no joint aches, no muscle pain  SKIN:  No change in skin, hair or nails.  NEUROLOGIC:  No paresthesias or weakness.  PSYCHIATRIC:  No disorder of thought or mood.  ENDOCRINE:  No heat or cold intolerance, polyuria or polydipsia.  HEMATOLOGICAL:  No easy bruising or bleeding.     Physical Exam:  Vital Signs Last 24 Hrs  T(C): 36.5 (11 Jan 2023 07:00), Max: 37.6 (10 Matthew 2023 19:40)  T(F): 97.7 (11 Jan 2023 07:00), Max: 99.7 (10 Matthew 2023 19:40)  HR: 89 (11 Jan 2023 11:00) (80 - 111)  BP: 132/64 (11 Jan 2023 11:00) (102/61 - 138/85)  BP(mean): 89 (11 Jan 2023 11:00) (76 - 94)  RR: 24 (11 Jan 2023 11:00) (20 - 37)  SpO2: 97% (11 Jan 2023 11:00) (89% - 100%)    Parameters below as of 11 Jan 2023 05:41  Patient On (Oxygen Delivery Method): nasal cannula, high flow      Height (cm): 172.7 (01-10 @ 19:40)  Weight (kg): 84.5 (01-10 @ 19:40)  BMI (kg/m2): 28.3 (01-10 @ 19:40)  BSA (m2): 1.98 (01-10 @ 19:40)  GEN: NAD  HEENT: normocephalic and atraumatic. EOMI. PERRL.    NECK: Supple.  No lymphadenopathy   LUNGS: Clear to auscultation.  HEART: Regular rate and rhythm without murmur.  ABDOMEN: Soft, nontender, and nondistended.  Positive bowel sounds.    : No CVA tenderness  EXTREMITIES: Without edema.  NEUROLOGIC: grossly intact.  PSYCHIATRIC: Appropriate affect .  SKIN: No rash     Labs:  01-11    134<L>  |  88<L>  |  35<H>  ----------------------------<  126<H>  3.6   |  40<H>  |  1.00    Ca    9.1      11 Jan 2023 05:30  Phos  2.3     01-11  Mg     2.7     01-11    TPro  7.6  /  Alb  2.6<L>  /  TBili  2.6<H>  /  DBili  x   /  AST  19  /  ALT  21  /  AlkPhos  94  01-11                          15.2   13.21 )-----------( 400      ( 11 Jan 2023 05:30 )             45.5     PT/INR - ( 11 Jan 2023 05:30 )   PT: 29.2 sec;   INR: 2.47 ratio         PTT - ( 11 Jan 2023 05:30 )  PTT:38.1 sec    LIVER FUNCTIONS - ( 11 Jan 2023 05:30 )  Alb: 2.6 g/dL / Pro: 7.6 g/dL / ALK PHOS: 94 U/L / ALT: 21 U/L / AST: 19 U/L / GGT: x               ABG - ( 10 Matthew 2023 15:28 )  pH, Arterial: 7.52  pH, Blood: x     /  pCO2: 47    /  pO2: 65    / HCO3: 38    / Base Excess: 15.5  /  SaO2: 94.2              Procalcitonin, Serum: 0.98 ng/mL (01-10-23 @ 22:00)      Sedimentation Rate, Erythrocyte: 74 mm/hr (01-11-23 @ 05:30)    COVID-19 PCR: NotDetec (01-10-23 @ 21:30)  SARS-CoV-2: NotDetec (01-10-23 @ 20:25)  SARS-CoV-2: NotDetec (01-07-23 @ 13:50)      RECENT CULTURES:  01-10 @ 20:25          Detected  01-07 @ 13:50          Detected        All imaging and other data have been reviewed.      Assessment and Plan:       Thank you for courtesy of this consult.     Will follow.  Discussed with the primary team.     Murphy Barrera MD  Division of Infectious Diseases   Please call ID service at [phone] with any question.      55 minutes spent on total encounter assessing patient, examination, chart review, counseling and coordinating care by the attending physician/nurse/care manager.   [facility]  INFECTIOUS DISEASES   [address] [place] [address]  Tel: [phone]     Fax: [phone]  ========================================================  MD Radha Hutchison Kaushal, MD Cho, Michelle, MD Sunjit, Jaspal, MD  ========================================================    MRN-[id number]  OPAL NUGENT     CC: Patient is a 82y old  Male who presents with a chief complaint of CHF, A fib, Flu (11 Jan 2023 10:39)    Brief Hospital course: 81 y/o m w/ PMH od CAD s/p stent, s/p CABG, HTN, HLD, CHF presented to [facility] ED 1/7/23 for shortness of breath and wheezing. He was found to be influenza positive and with CHF exacerbation and new onset afib. Started on tamiflu and IV lasix, eliquis and metoprolol for afib.  1/10/23 patient with progressively worsening hypoxia, he was escalated from nasal cannula to NRB and transferred to ICU. Labs are significant for leukocytosis of 13, ESR 74, hyponatremia na 134, metabolic acidosis on ABG with PH 7.52, FLU A positive, Covid negative, procal 0.98. Xray on admission with clear lungs. CTA chest was performed 1/10/23 and negative for pulmonary embolism but revealed  diffuse tree-in-bud nodular opacities and bibasilar consolidation compatible with infection with mediastinal lymphadenopathy. Started on vancomycin by ICU team for likely superimposed bacterial PNA. ID consulted for influenza and worsening AHRF likely due to superimposed bacterial PNA.     Interval history: Patient transferred to ICU for worsening AHRF. Afebrile, HDS. Currently on HFNC. CTA negative for PE but revealing likely PNA. Started on IV vancomycin. Patient seen and examined at bedside.           PAST MEDICAL & SURGICAL HISTORY:  HLD (hyperlipidemia)      HTN (hypertension)      CAD (coronary artery disease)      S/P coronary artery stent placement      S/P CABG (coronary artery bypass graft)          Social Hx:     FAMILY HISTORY:  Family history of cardiovascular disease (Sibling)    Family history of uterine cancer (Mother)        Allergies    Levaquin (Unknown)  penicillin (Rash)    Intolerances        Antibiotics:  MEDICATIONS  (STANDING):  albuterol/ipratropium for Nebulization 3 milliLiter(s) Nebulizer every 6 hours  apixaban 5 milliGRAM(s) Oral every 12 hours  atorvastatin 10 milliGRAM(s) Oral at bedtime  azithromycin  IVPB      azithromycin  IVPB 500 milliGRAM(s) IV Intermittent every 24 hours  benzonatate 100 milliGRAM(s) Oral every 8 hours  cefepime   IVPB      cefepime   IVPB 1000 milliGRAM(s) IV Intermittent every 8 hours  clopidogrel Tablet 75 milliGRAM(s) Oral daily  diltiazem    Tablet 60 milliGRAM(s) Oral every 6 hours  furosemide   Injectable 40 milliGRAM(s) IV Push two times a day  gabapentin 100 milliGRAM(s) Oral two times a day  hydrocodone/homatropine Syrup 5 milliLiter(s) Oral every 8 hours  isosorbide   mononitrate ER Tablet (IMDUR) 60 milliGRAM(s) Oral daily  metoprolol tartrate 50 milliGRAM(s) Oral every 6 hours  oseltamivir 30 milliGRAM(s) Oral two times a day    MEDICATIONS  (PRN):  bisacodyl 5 milliGRAM(s) Oral every 12 hours PRN Constipation  guaiFENesin Oral Liquid (Sugar-Free) 200 milliGRAM(s) Oral every 6 hours PRN Cough  metoclopramide Injectable 5 milliGRAM(s) IV Push every 8 hours PRN nausea/vomiting       REVIEW OF SYSTEMS:  CONSTITUTIONAL:  No Fever or chills  HEENT:  No diplopia or blurred vision.  No sore throat or runny nose.  CARDIOVASCULAR:  No chest pain or SOB.  RESPIRATORY:  No cough, shortness of breath, PND or orthopnea.  GASTROINTESTINAL:  No nausea, vomiting or diarrhea.  GENITOURINARY:  No dysuria, frequency or urgency. No Blood in urine  MUSCULOSKELETAL:  no joint aches, no muscle pain  SKIN:  No change in skin, hair or nails.  NEUROLOGIC:  No paresthesias or weakness.  PSYCHIATRIC:  No disorder of thought or mood.  ENDOCRINE:  No heat or cold intolerance, polyuria or polydipsia.  HEMATOLOGICAL:  No easy bruising or bleeding.     Physical Exam:  Vital Signs Last 24 Hrs  T(C): 36.5 (11 Jan 2023 07:00), Max: 37.6 (10 Matthew 2023 19:40)  T(F): 97.7 (11 Jan 2023 07:00), Max: 99.7 (10 Matthew 2023 19:40)  HR: 89 (11 Jan 2023 11:00) (80 - 111)  BP: 132/64 (11 Jan 2023 11:00) (102/61 - 138/85)  BP(mean): 89 (11 Jan 2023 11:00) (76 - 94)  RR: 24 (11 Jan 2023 11:00) (20 - 37)  SpO2: 97% (11 Jan 2023 11:00) (89% - 100%)    Parameters below as of 11 Jan 2023 05:41  Patient On (Oxygen Delivery Method): nasal cannula, high flow      Height (cm): 172.7 (01-10 @ 19:40)  Weight (kg): 84.5 (01-10 @ 19:40)  BMI (kg/m2): 28.3 (01-10 @ 19:40)  BSA (m2): 1.98 (01-10 @ 19:40)  GEN: NAD, HFNC in place  HEENT: normocephalic and atraumatic. EOMI. PERRL.    NECK: Supple.  No lymphadenopathy   LUNGS: Rales diffuse bilateral lung fields, no wheezing  HEART: Regular rate and rhythm without murmur.  ABDOMEN: Soft, nontender, and nondistended.  Positive bowel sounds.    : No CVA tenderness  EXTREMITIES: Without edema.  NEUROLOGIC: grossly intact.  PSYCHIATRIC: Appropriate affect .  SKIN: No rash     Labs:  01-11    134<L>  |  88<L>  |  35<H>  ----------------------------<  126<H>  3.6   |  40<H>  |  1.00    Ca    9.1      11 Jan 2023 05:30  Phos  2.3     01-11  Mg     2.7     01-11    TPro  7.6  /  Alb  2.6<L>  /  TBili  2.6<H>  /  DBili  x   /  AST  19  /  ALT  21  /  AlkPhos  94  01-11                          15.2   13.21 )-----------( 400      ( 11 Jan 2023 05:30 )             45.5     PT/INR - ( 11 Jan 2023 05:30 )   PT: 29.2 sec;   INR: 2.47 ratio         PTT - ( 11 Jan 2023 05:30 )  PTT:38.1 sec    LIVER FUNCTIONS - ( 11 Jan 2023 05:30 )  Alb: 2.6 g/dL / Pro: 7.6 g/dL / ALK PHOS: 94 U/L / ALT: 21 U/L / AST: 19 U/L / GGT: x               ABG - ( 10 Matthew 2023 15:28 )  pH, Arterial: 7.52  pH, Blood: x     /  pCO2: 47    /  pO2: 65    / HCO3: 38    / Base Excess: 15.5  /  SaO2: 94.2              Procalcitonin, Serum: 0.98 ng/mL (01-10-23 @ 22:00)      Sedimentation Rate, Erythrocyte: 74 mm/hr (01-11-23 @ 05:30)    COVID-19 PCR: NotDetec (01-10-23 @ 21:30)  SARS-CoV-2: NotDetec (01-10-23 @ 20:25)  SARS-CoV-2: NotDetec (01-07-23 @ 13:50)      RECENT CULTURES:  01-10 @ 20:25          Detected  01-07 @ 13:50          Detected        All imaging and other data have been reviewed.      Assessment and Plan:       Thank you for courtesy of this consult.     Will follow.  Discussed with the primary team.     Murphy Barrera MD  Division of Infectious Diseases   Please call ID service at [phone] with any question.      55 minutes spent on total encounter assessing patient, examination, chart review, counseling and coordinating care by the attending physician/nurse/care manager.   [facility]  INFECTIOUS DISEASES   [address] [place] [address]  Tel: [phone]     Fax: [phone]  ========================================================  MD Radha Hutchison Kaushal, MD Cho, Michelle, MD Sunjit, Jaspal, MD  ========================================================    MRN-[id number]  OPAL NUGENT     CC: Patient is a 82y old  Male who presents with a chief complaint of CHF, A fib, Flu (11 Jan 2023 10:39)    Brief Hospital course: 83 y/o m w/ PMH od CAD s/p stent, s/p CABG, HTN, HLD, CHF presented to [facility] ED 1/7/23 for shortness of breath and wheezing. He was found to be influenza positive and with CHF exacerbation and new onset afib. Started on tamiflu and IV lasix, eliquis and metoprolol for afib.  1/10/23 patient with progressively worsening hypoxia, he was escalated from nasal cannula to NRB and transferred to ICU. Labs are significant for leukocytosis of 13, ESR 74, hyponatremia na 134, metabolic acidosis on ABG with PH 7.52, FLU A positive, Covid negative, procal 0.98. Xray on admission with clear lungs. CTA chest was performed 1/10/23 and negative for pulmonary embolism but revealed  diffuse tree-in-bud nodular opacities and bibasilar consolidation compatible with infection with mediastinal lymphadenopathy. Started on cefepime and zithro 1/10. Vancomycin added by ICU team for likely superimposed bacterial PNA. ID consulted for influenza and worsening AHRF likely due to superimposed bacterial PNA.     Interval history: Patient transferred to ICU for worsening AHRF. Afebrile, HDS. Currently on HFNC. CTA negative for PE but revealing likely PNA. Started on IV vancomycin. Patient seen and examined at bedside.           PAST MEDICAL & SURGICAL HISTORY:  HLD (hyperlipidemia)      HTN (hypertension)      CAD (coronary artery disease)      S/P coronary artery stent placement      S/P CABG (coronary artery bypass graft)          Social Hx:     FAMILY HISTORY:  Family history of cardiovascular disease (Sibling)    Family history of uterine cancer (Mother)        Allergies    Levaquin (Unknown)  penicillin (Rash)    Intolerances        Antibiotics:  MEDICATIONS  (STANDING):  albuterol/ipratropium for Nebulization 3 milliLiter(s) Nebulizer every 6 hours  apixaban 5 milliGRAM(s) Oral every 12 hours  atorvastatin 10 milliGRAM(s) Oral at bedtime  azithromycin  IVPB      azithromycin  IVPB 500 milliGRAM(s) IV Intermittent every 24 hours  benzonatate 100 milliGRAM(s) Oral every 8 hours  cefepime   IVPB      cefepime   IVPB 1000 milliGRAM(s) IV Intermittent every 8 hours  clopidogrel Tablet 75 milliGRAM(s) Oral daily  diltiazem    Tablet 60 milliGRAM(s) Oral every 6 hours  furosemide   Injectable 40 milliGRAM(s) IV Push two times a day  gabapentin 100 milliGRAM(s) Oral two times a day  hydrocodone/homatropine Syrup 5 milliLiter(s) Oral every 8 hours  isosorbide   mononitrate ER Tablet (IMDUR) 60 milliGRAM(s) Oral daily  metoprolol tartrate 50 milliGRAM(s) Oral every 6 hours  oseltamivir 30 milliGRAM(s) Oral two times a day    MEDICATIONS  (PRN):  bisacodyl 5 milliGRAM(s) Oral every 12 hours PRN Constipation  guaiFENesin Oral Liquid (Sugar-Free) 200 milliGRAM(s) Oral every 6 hours PRN Cough  metoclopramide Injectable 5 milliGRAM(s) IV Push every 8 hours PRN nausea/vomiting       REVIEW OF SYSTEMS:  CONSTITUTIONAL:  No Fever or chills  HEENT:  No diplopia or blurred vision.  No sore throat or runny nose.  CARDIOVASCULAR:  No chest pain or SOB.  RESPIRATORY:  No cough, shortness of breath, PND or orthopnea.  GASTROINTESTINAL:  No nausea, vomiting or diarrhea.  GENITOURINARY:  No dysuria, frequency or urgency. No Blood in urine  MUSCULOSKELETAL:  no joint aches, no muscle pain  SKIN:  No change in skin, hair or nails.  NEUROLOGIC:  No paresthesias or weakness.  PSYCHIATRIC:  No disorder of thought or mood.  ENDOCRINE:  No heat or cold intolerance, polyuria or polydipsia.  HEMATOLOGICAL:  No easy bruising or bleeding.     Physical Exam:  Vital Signs Last 24 Hrs  T(C): 36.5 (11 Jan 2023 07:00), Max: 37.6 (10 Matthew 2023 19:40)  T(F): 97.7 (11 Jan 2023 07:00), Max: 99.7 (10 Matthew 2023 19:40)  HR: 89 (11 Jan 2023 11:00) (80 - 111)  BP: 132/64 (11 Jan 2023 11:00) (102/61 - 138/85)  BP(mean): 89 (11 Jan 2023 11:00) (76 - 94)  RR: 24 (11 Jan 2023 11:00) (20 - 37)  SpO2: 97% (11 Jan 2023 11:00) (89% - 100%)    Parameters below as of 11 Jan 2023 05:41  Patient On (Oxygen Delivery Method): nasal cannula, high flow      Height (cm): 172.7 (01-10 @ 19:40)  Weight (kg): 84.5 (01-10 @ 19:40)  BMI (kg/m2): 28.3 (01-10 @ 19:40)  BSA (m2): 1.98 (01-10 @ 19:40)  GEN: NAD, HFNC in place  HEENT: normocephalic and atraumatic. EOMI. PERRL.    NECK: Supple.  No lymphadenopathy   LUNGS: Rales diffuse bilateral lung fields, no wheezing  HEART: Regular rate and rhythm without murmur.  ABDOMEN: Soft, nontender, and nondistended.  Positive bowel sounds.    : No CVA tenderness  EXTREMITIES: Without edema.  NEUROLOGIC: grossly intact.  PSYCHIATRIC: Appropriate affect .  SKIN: No rash     Labs:  01-11    134<L>  |  88<L>  |  35<H>  ----------------------------<  126<H>  3.6   |  40<H>  |  1.00    Ca    9.1      11 Jan 2023 05:30  Phos  2.3     01-11  Mg     2.7     01-11    TPro  7.6  /  Alb  2.6<L>  /  TBili  2.6<H>  /  DBili  x   /  AST  19 /  ALT  21  /  AlkPhos  94  01-11                          15.2   13.21 )-----------( 400      ( 11 Jan 2023 05:30 )             45.5     PT/INR - ( 11 Jan 2023 05:30 )   PT: 29.2 sec;   INR: 2.47 ratio         PTT - ( 11 Jan 2023 05:30 )  PTT:38.1 sec    LIVER FUNCTIONS - ( 11 Jan 2023 05:30 )  Alb: 2.6 g/dL / Pro: 7.6 g/dL / ALK PHOS: 94 U/L / ALT: 21 U/L / AST: 19 U/L / GGT: x               ABG - ( 10 Matthew 2023 15:28 )  pH, Arterial: 7.52  pH, Blood: x     /  pCO2: 47    /  pO2: 65    / HCO3: 38    / Base Excess: 15.5  /  SaO2: 94.2              Procalcitonin, Serum: 0.98 ng/mL (01-10-23 @ 22:00)      Sedimentation Rate, Erythrocyte: 74 mm/hr (01-11-23 @ 05:30)    COVID-19 PCR: NotDetec (01-10-23 @ 21:30)  SARS-CoV-2: NotDetec (01-10-23 @ 20:25)  SARS-CoV-2: NotDetec (01-07-23 @ 13:50)      RECENT CULTURES:  01-10 @ 20:25          Detected  01-07 @ 13:50          Detected        All imaging and other data have been reviewed.      Assessment and Plan:       Thank you for courtesy of this consult.     Will follow.  Discussed with the primary team.     Murphy Barrera MD  Division of Infectious Diseases   Please call ID service at [phone] with any question.      55 minutes spent on total encounter assessing patient, examination, chart review, counseling and coordinating care by the attending physician/nurse/care manager.   [facility]  INFECTIOUS DISEASES   [address] [place] [address]  Tel: [phone]     Fax: [phone]  ========================================================  MD Radha Hutchison Kaushal, MD Cho, Michelle, MD Sunjit, Jaspal, MD  ========================================================    MRN-[id number]  OPAL NUGENT     CC: Patient is a 82y old  Male who presents with a chief complaint of CHF, A fib, Flu (11 Jan 2023 10:39)    Brief Hospital course: 81 y/o m w/ PMH od CAD s/p stent, s/p CABG, HTN, HLD, CHF presented to [facility] ED 1/7/23 for shortness of breath and wheezing. He was found to be influenza positive and with CHF exacerbation and new onset afib. Started on tamiflu and IV lasix, eliquis and metoprolol for afib.  1/10/23 patient with progressively worsening hypoxia, he was escalated from nasal cannula to NRB and transferred to ICU. Labs are significant for leukocytosis of 13, ESR 74, hyponatremia na 134, metabolic acidosis on ABG with PH 7.52, FLU A positive, Covid negative, procal 0.98. Xray on admission with clear lungs. CTA chest was performed 1/10/23 and negative for pulmonary embolism but revealed  diffuse tree-in-bud nodular opacities and bibasilar consolidation compatible with infection with mediastinal lymphadenopathy. Started on cefepime and zithro 1/10. Vancomycin added by ICU team for likely superimposed bacterial PNA. ID consulted for influenza and worsening AHRF likely due to superimposed bacterial PNA.     Interval history: Patient transferred to ICU for worsening AHRF. Afebrile, HDS. Currently on HFNC. CTA negative for PE but revealing likely PNA. Started on IV vancomycin. Patient seen and examined at bedside.     PAST MEDICAL & SURGICAL HISTORY:  HLD (hyperlipidemia)  HTN (hypertension)  CAD (coronary artery disease)  S/P coronary artery stent placement  S/P CABG (coronary artery bypass graft)    Social Hx: Former, smoked more 50years ago, no ETOH or drugs     FAMILY HISTORY:  Family history of cardiovascular disease (Sibling)    Family history of uterine cancer (Mother)    Allergies  Levaquin (Unknown)  penicillin (Rash)    Antibiotics:  MEDICATIONS  (STANDING):  albuterol/ipratropium for Nebulization 3 milliLiter(s) Nebulizer every 6 hours  apixaban 5 milliGRAM(s) Oral every 12 hours  atorvastatin 10 milliGRAM(s) Oral at bedtime  azithromycin  IVPB      azithromycin  IVPB 500 milliGRAM(s) IV Intermittent every 24 hours  benzonatate 100 milliGRAM(s) Oral every 8 hours  cefepime   IVPB      cefepime   IVPB 1000 milliGRAM(s) IV Intermittent every 8 hours  clopidogrel Tablet 75 milliGRAM(s) Oral daily  diltiazem    Tablet 60 milliGRAM(s) Oral every 6 hours  furosemide   Injectable 40 milliGRAM(s) IV Push two times a day  gabapentin 100 milliGRAM(s) Oral two times a day  hydrocodone/homatropine Syrup 5 milliLiter(s) Oral every 8 hours  isosorbide   mononitrate ER Tablet (IMDUR) 60 milliGRAM(s) Oral daily  metoprolol tartrate 50 milliGRAM(s) Oral every 6 hours  oseltamivir 30 milliGRAM(s) Oral two times a day    MEDICATIONS  (PRN):  bisacodyl 5 milliGRAM(s) Oral every 12 hours PRN Constipation  guaiFENesin Oral Liquid (Sugar-Free) 200 milliGRAM(s) Oral every 6 hours PRN Cough  metoclopramide Injectable 5 milliGRAM(s) IV Push every 8 hours PRN nausea/vomiting     REVIEW OF SYSTEMS:  CONSTITUTIONAL:  No Fever or chills  HEENT:  No diplopia or blurred vision.  No sore throat or runny nose.  CARDIOVASCULAR:  No chest pain   RESPIRATORY:  +cough, + shortness of breath, + sputum  GASTROINTESTINAL:  No nausea, vomiting or diarrhea.  GENITOURINARY:  No dysuria, frequency or urgency. No Blood in urine  MUSCULOSKELETAL:  no joint aches, no muscle pain  SKIN:  No change in skin, hair or nails.  NEUROLOGIC:  No paresthesias or weakness.  PSYCHIATRIC:  No disorder of thought or mood.  ENDOCRINE:  No heat or cold intolerance, polyuria or polydipsia.  HEMATOLOGICAL:  No easy bruising or bleeding.     Physical Exam:  Vital Signs Last 24 Hrs  T(C): 36.5 (11 Jan 2023 07:00), Max: 37.6 (10 Matthew 2023 19:40)  T(F): 97.7 (11 Jan 2023 07:00), Max: 99.7 (10 Matthew 2023 19:40)  HR: 89 (11 Jan 2023 11:00) (80 - 111)  BP: 132/64 (11 Jan 2023 11:00) (102/61 - 138/85)  BP(mean): 89 (11 Jan 2023 11:00) (76 - 94)  RR: 24 (11 Jan 2023 11:00) (20 - 37)  SpO2: 97% (11 Jan 2023 11:00) (89% - 100%)  Parameters below as of 11 Jan 2023 05:41  Patient On (Oxygen Delivery Method): nasal cannula, high flow  Height (cm): 172.7 (01-10 @ 19:40)  Weight (kg): 84.5 (01-10 @ 19:40)  BMI (kg/m2): 28.3 (01-10 @ 19:40)  BSA (m2): 1.98 (01-10 @ 19:40)  GEN: NAD, HFNC in place   HEENT: normocephalic and atraumatic. EOMI. PERRL.    NECK: Supple.  No lymphadenopathy   LUNGS: Rales diffuse bilateral lung fields, no wheezing  HEART: Regular rate and rhythm without murmur.  ABDOMEN: Soft, nontender, and nondistended.  Positive bowel sounds.    : No CVA tenderness  EXTREMITIES: Without edema.  NEUROLOGIC: grossly intact.  PSYCHIATRIC: Appropriate affect .  SKIN: No rash     Labs:  01-11    134<L>  |  88<L>  |  35<H>  ----------------------------<  126<H>  3.6   |  40<H>  |  1.00    Ca    9.1      11 Jan 2023 05:30  Phos  2.3     01-11  Mg     2.7     01-11    TPro  7.6  /  Alb  2.6<L>  /  TBili  2.6<H>  /  DBili  x   /  AST  19  /  ALT  21  /  AlkPhos  94  01-11                        15.2   13.21 )-----------( 400      ( 11 Jan 2023 05:30 )             45.5     PT/INR - ( 11 Jan 2023 05:30 )   PT: 29.2 sec;   INR: 2.47 ratio    PTT - ( 11 Jan 2023 05:30 )  PTT:38.1 sec    LIVER FUNCTIONS - ( 11 Jan 2023 05:30 )  Alb: 2.6 g/dL / Pro: 7.6 g/dL / ALK PHOS: 94 U/L / ALT: 21 U/L / AST: 19 U/L / GGT: x           ABG - ( 10 Matthew 2023 15:28 )  pH, Arterial: 7.52  pH, Blood: x     /  pCO2: 47    /  pO2: 65    / HCO3: 38    / Base Excess: 15.5  /  SaO2: 94.2      Procalcitonin, Serum: 0.98 ng/mL (01-10-23 @ 22:00)    Sedimentation Rate, Erythrocyte: 74 mm/hr (01-11-23 @ 05:30)    COVID-19 PCR: NotDetec (01-10-23 @ 21:30)  SARS-CoV-2: NotDetec (01-10-23 @ 20:25)  SARS-CoV-2: NotDetec (01-07-23 @ 13:50)    RECENT CULTURES:  01-10 @ 20:25      Detected    01-07 @ 13:50      Detected    All imaging and other data have been reviewed.  < from: CT Angio Chest PE Protocol w/ IV Cont (01.10.23 @ 15:47) >  IMPRESSION:.  No pulmonary embolism.  Diffuse tree-in-bud nodular opacities and bibasilar consolidation   compatible with infection.  Mediastinal and bilateral hilar lymphadenopathy, likely reactive.    Assessment and Plan:    81 y/o m w/ PMH od CAD s/p stent, s/p CABG, HTN, HLD, CHF presented to [facility] ED 1/7/23 for shortness of breath and wheezing. He was found to be influenza positive and with CHF exacerbation and new onset afib. Started on tamiflu and IV lasix, eliquis and metoprolol for afib. Now transferred to ICU for worsening acute hypoxic respiratory failure. CTA negative for PE but showing likely superimposed bacterial PNA. ID consultation requested.     #Acute hypoxic respiratory failure  #influenza A  #Superimposed bacterial pna  - pt admitted for acute CHF exacerbation, found to be influenza A positive and now with worsening hypoxia. Procal 0.98 Likely due to superimposed bacterial pna  - MRSA/MSSA nares PCR negative, would discontinue vancomycin.   - continue cefepime and zithro  - check Strep pneumo and legionella urine antigens  - check sputum culture  - f/u blood cultures  - Will continue to follow    Thank you for courtesy of this consult.     Will follow.  Discussed with the primary team.     Murphy Barrera MD  Division of Infectious Diseases   Please call ID service at [phone] with any question.      55 minutes spent on total encounter assessing patient, examination, chart review, counseling and coordinating care by the attending physician/nurse/care manager.

## 2023-01-12 LAB
ALBUMIN SERPL ELPH-MCNC: 2.2 G/DL — LOW (ref 3.3–5)
ALP SERPL-CCNC: 110 U/L — SIGNIFICANT CHANGE UP (ref 40–120)
ALT FLD-CCNC: 16 U/L — SIGNIFICANT CHANGE UP (ref 12–78)
ANA TITR SER: NEGATIVE — SIGNIFICANT CHANGE UP
ANION GAP SERPL CALC-SCNC: 8 MMOL/L — SIGNIFICANT CHANGE UP (ref 5–17)
APTT BLD: 36.1 SEC — HIGH (ref 27.5–35.5)
AST SERPL-CCNC: 19 U/L — SIGNIFICANT CHANGE UP (ref 15–37)
BASE EXCESS BLDA CALC-SCNC: 20.1 MMOL/L — HIGH (ref -2–3)
BASOPHILS # BLD AUTO: 0.2 K/UL — SIGNIFICANT CHANGE UP (ref 0–0.2)
BASOPHILS NFR BLD AUTO: 1 % — SIGNIFICANT CHANGE UP (ref 0–2)
BILIRUB SERPL-MCNC: 2.5 MG/DL — HIGH (ref 0.2–1.2)
BLOOD GAS COMMENTS ARTERIAL: SIGNIFICANT CHANGE UP
BUN SERPL-MCNC: 31 MG/DL — HIGH (ref 7–23)
CALCIUM SERPL-MCNC: 8.8 MG/DL — SIGNIFICANT CHANGE UP (ref 8.5–10.1)
CHLORIDE SERPL-SCNC: 86 MMOL/L — LOW (ref 96–108)
CO2 SERPL-SCNC: 38 MMOL/L — HIGH (ref 22–31)
CREAT SERPL-MCNC: 1.1 MG/DL — SIGNIFICANT CHANGE UP (ref 0.5–1.3)
EGFR: 68 ML/MIN/1.73M2 — SIGNIFICANT CHANGE UP
EOSINOPHIL # BLD AUTO: 0 K/UL — SIGNIFICANT CHANGE UP (ref 0–0.5)
EOSINOPHIL NFR BLD AUTO: 0 % — SIGNIFICANT CHANGE UP (ref 0–6)
FUNGITELL: <31 PG/ML — SIGNIFICANT CHANGE UP
GLUCOSE SERPL-MCNC: 154 MG/DL — HIGH (ref 70–99)
GRAM STN FLD: SIGNIFICANT CHANGE UP
GRAM STN FLD: SIGNIFICANT CHANGE UP
HCO3 BLDA-SCNC: 42 MMOL/L — HIGH (ref 21–28)
HCT VFR BLD CALC: 41.7 % — SIGNIFICANT CHANGE UP (ref 39–50)
HGB BLD-MCNC: 14.1 G/DL — SIGNIFICANT CHANGE UP (ref 13–17)
INR BLD: 2.52 RATIO — HIGH (ref 0.88–1.16)
LYMPHOCYTES # BLD AUTO: 1.01 K/UL — SIGNIFICANT CHANGE UP (ref 1–3.3)
LYMPHOCYTES # BLD AUTO: 5 % — LOW (ref 13–44)
MAGNESIUM SERPL-MCNC: 2 MG/DL — SIGNIFICANT CHANGE UP (ref 1.6–2.6)
MCHC RBC-ENTMCNC: 29.1 PG — SIGNIFICANT CHANGE UP (ref 27–34)
MCHC RBC-ENTMCNC: 33.8 GM/DL — SIGNIFICANT CHANGE UP (ref 32–36)
MCV RBC AUTO: 86 FL — SIGNIFICANT CHANGE UP (ref 80–100)
MONOCYTES # BLD AUTO: 0.6 K/UL — SIGNIFICANT CHANGE UP (ref 0–0.9)
MONOCYTES NFR BLD AUTO: 3 % — SIGNIFICANT CHANGE UP (ref 2–14)
MPO AB + PR3 PNL SER: SIGNIFICANT CHANGE UP
NEUTROPHILS # BLD AUTO: 17.94 K/UL — HIGH (ref 1.8–7.4)
NEUTROPHILS NFR BLD AUTO: 88 % — HIGH (ref 43–77)
NRBC # BLD: SIGNIFICANT CHANGE UP /100 WBCS (ref 0–0)
PCO2 BLDA: 46 MMHG — SIGNIFICANT CHANGE UP (ref 35–48)
PH BLDA: 7.57 — HIGH (ref 7.35–7.45)
PHOSPHATE SERPL-MCNC: 1.8 MG/DL — LOW (ref 2.5–4.5)
PLATELET # BLD AUTO: 414 K/UL — HIGH (ref 150–400)
PO2 BLDA: 69 MMHG — LOW (ref 83–108)
POTASSIUM SERPL-MCNC: 3.4 MMOL/L — LOW (ref 3.5–5.3)
POTASSIUM SERPL-SCNC: 3.4 MMOL/L — LOW (ref 3.5–5.3)
PROT SERPL-MCNC: 7.3 G/DL — SIGNIFICANT CHANGE UP (ref 6–8.3)
PROTHROM AB SERPL-ACNC: 29.8 SEC — HIGH (ref 10.5–13.4)
RBC # BLD: 4.85 M/UL — SIGNIFICANT CHANGE UP (ref 4.2–5.8)
RBC # FLD: 15.1 % — HIGH (ref 10.3–14.5)
SAO2 % BLDA: 96.6 % — SIGNIFICANT CHANGE UP (ref 94–98)
SODIUM SERPL-SCNC: 132 MMOL/L — LOW (ref 135–145)
SPECIMEN SOURCE: SIGNIFICANT CHANGE UP
SPECIMEN SOURCE: SIGNIFICANT CHANGE UP
WBC # BLD: 20.16 K/UL — HIGH (ref 3.8–10.5)
WBC # FLD AUTO: 20.16 K/UL — HIGH (ref 3.8–10.5)

## 2023-01-12 PROCEDURE — 99291 CRITICAL CARE FIRST HOUR: CPT

## 2023-01-12 PROCEDURE — 99291 CRITICAL CARE FIRST HOUR: CPT | Mod: GC

## 2023-01-12 PROCEDURE — 99233 SBSQ HOSP IP/OBS HIGH 50: CPT

## 2023-01-12 RX ORDER — POTASSIUM PHOSPHATE, MONOBASIC POTASSIUM PHOSPHATE, DIBASIC 236; 224 MG/ML; MG/ML
30 INJECTION, SOLUTION INTRAVENOUS ONCE
Refills: 0 | Status: COMPLETED | OUTPATIENT
Start: 2023-01-12 | End: 2023-01-12

## 2023-01-12 RX ORDER — ACETAMINOPHEN 500 MG
650 TABLET ORAL ONCE
Refills: 0 | Status: COMPLETED | OUTPATIENT
Start: 2023-01-12 | End: 2023-01-12

## 2023-01-12 RX ORDER — FUROSEMIDE 40 MG
40 TABLET ORAL DAILY
Refills: 0 | Status: DISCONTINUED | OUTPATIENT
Start: 2023-01-13 | End: 2023-01-13

## 2023-01-12 RX ADMIN — ISOSORBIDE MONONITRATE 60 MILLIGRAM(S): 60 TABLET, EXTENDED RELEASE ORAL at 13:59

## 2023-01-12 RX ADMIN — Medication 3 MILLILITER(S): at 00:54

## 2023-01-12 RX ADMIN — Medication 60 MILLIGRAM(S): at 06:18

## 2023-01-12 RX ADMIN — Medication 50 MILLIGRAM(S): at 18:50

## 2023-01-12 RX ADMIN — CEFEPIME 100 MILLIGRAM(S): 1 INJECTION, POWDER, FOR SOLUTION INTRAMUSCULAR; INTRAVENOUS at 05:16

## 2023-01-12 RX ADMIN — Medication 50 MILLIGRAM(S): at 23:19

## 2023-01-12 RX ADMIN — Medication 60 MILLIGRAM(S): at 18:50

## 2023-01-12 RX ADMIN — Medication 60 MILLIGRAM(S): at 12:27

## 2023-01-12 RX ADMIN — Medication 30 MILLIGRAM(S): at 06:18

## 2023-01-12 RX ADMIN — APIXABAN 5 MILLIGRAM(S): 2.5 TABLET, FILM COATED ORAL at 06:18

## 2023-01-12 RX ADMIN — Medication 50 MILLIGRAM(S): at 12:29

## 2023-01-12 RX ADMIN — Medication 650 MILLIGRAM(S): at 13:25

## 2023-01-12 RX ADMIN — Medication 100 MILLIGRAM(S): at 21:33

## 2023-01-12 RX ADMIN — ATORVASTATIN CALCIUM 10 MILLIGRAM(S): 80 TABLET, FILM COATED ORAL at 21:33

## 2023-01-12 RX ADMIN — Medication 3 MILLILITER(S): at 08:47

## 2023-01-12 RX ADMIN — Medication 50 MILLIGRAM(S): at 00:12

## 2023-01-12 RX ADMIN — Medication 650 MILLIGRAM(S): at 12:27

## 2023-01-12 RX ADMIN — Medication 100 MILLIGRAM(S): at 06:18

## 2023-01-12 RX ADMIN — POTASSIUM PHOSPHATE, MONOBASIC POTASSIUM PHOSPHATE, DIBASIC 83.33 MILLIMOLE(S): 236; 224 INJECTION, SOLUTION INTRAVENOUS at 10:06

## 2023-01-12 RX ADMIN — GABAPENTIN 100 MILLIGRAM(S): 400 CAPSULE ORAL at 18:49

## 2023-01-12 RX ADMIN — Medication 3 MILLILITER(S): at 14:47

## 2023-01-12 RX ADMIN — CEFEPIME 100 MILLIGRAM(S): 1 INJECTION, POWDER, FOR SOLUTION INTRAMUSCULAR; INTRAVENOUS at 14:25

## 2023-01-12 RX ADMIN — Medication 60 MILLIGRAM(S): at 23:19

## 2023-01-12 RX ADMIN — Medication 100 MILLIGRAM(S): at 14:26

## 2023-01-12 RX ADMIN — APIXABAN 5 MILLIGRAM(S): 2.5 TABLET, FILM COATED ORAL at 18:50

## 2023-01-12 RX ADMIN — Medication 3 MILLILITER(S): at 20:05

## 2023-01-12 RX ADMIN — CEFEPIME 100 MILLIGRAM(S): 1 INJECTION, POWDER, FOR SOLUTION INTRAMUSCULAR; INTRAVENOUS at 21:33

## 2023-01-12 RX ADMIN — CLOPIDOGREL BISULFATE 75 MILLIGRAM(S): 75 TABLET, FILM COATED ORAL at 12:29

## 2023-01-12 RX ADMIN — Medication 200 MILLIGRAM(S): at 10:07

## 2023-01-12 RX ADMIN — Medication 50 MILLIGRAM(S): at 06:18

## 2023-01-12 RX ADMIN — Medication 60 MILLIGRAM(S): at 00:12

## 2023-01-12 RX ADMIN — Medication 40 MILLIGRAM(S): at 05:16

## 2023-01-12 RX ADMIN — GABAPENTIN 100 MILLIGRAM(S): 400 CAPSULE ORAL at 06:18

## 2023-01-12 NOTE — PROGRESS NOTE ADULT - SUBJECTIVE AND OBJECTIVE BOX
MEDICAL ATTENDING NOTE    Patient is a 82y old  Male who presents with a chief complaint of CHF, A fib, Flu (2023 14:24)      INTERVAL HPI/OVERNIGHT EVENTS: no acute issues reported    MEDICATIONS  (STANDING):  albuterol/ipratropium for Nebulization 3 milliLiter(s) Nebulizer every 6 hours  apixaban 5 milliGRAM(s) Oral every 12 hours  atorvastatin 10 milliGRAM(s) Oral at bedtime  benzonatate 100 milliGRAM(s) Oral every 8 hours  cefepime   IVPB 2000 milliGRAM(s) IV Intermittent every 8 hours  clopidogrel Tablet 75 milliGRAM(s) Oral daily  diltiazem    Tablet 60 milliGRAM(s) Oral every 6 hours  furosemide   Injectable 40 milliGRAM(s) IV Push daily  gabapentin 100 milliGRAM(s) Oral two times a day  isosorbide   mononitrate ER Tablet (IMDUR) 60 milliGRAM(s) Oral daily  metoprolol tartrate 50 milliGRAM(s) Oral every 6 hours    MEDICATIONS  (PRN):  bisacodyl 5 milliGRAM(s) Oral every 12 hours PRN Constipation  guaiFENesin Oral Liquid (Sugar-Free) 200 milliGRAM(s) Oral every 6 hours PRN Cough  metoclopramide Injectable 5 milliGRAM(s) IV Push every 8 hours PRN nausea/vomiting      __________________________________________________  ----------------------------------------------------------------------------------  REVIEW OF SYSTEMS: no chest pain, No fever, no HA; SOB++, cough with yellow phlegmn+      Vital Signs Last 24 Hrs  T(C): 36.7 (2023 08:00), Max: 36.8 (2023 16:00)  T(F): 98.1 (2023 08:00), Max: 98.3 (2023 00:06)  HR: 88 (2023 12:16) (71 - 112)  BP: 127/72 (2023 12:00) (98/60 - 136/64)  BP(mean): 91 (2023 12:00) (74 - 95)  RR: 26 (2023 12:00) (21 - 32)  SpO2: 93% (2023 12:16) (90% - 96%)    Parameters below as of 2023 12:16  Patient On (Oxygen Delivery Method): nasal cannula, high flow        _________________  PHYSICAL EXAM:  ---------------------------   NAD; Normocephalic;   LUNGS - no wheezing  HEART: S1 S2+   ABDOMEN: Soft, Nontender, non distended  EXTREMITIES: no cyanosis; no edema  NERVOUS SYSTEM:  Awake and alert; follows commands, moves extremities; no gross focal neuro deficits    _________________________________________________  LABS:                        14.1   20.16 )-----------( 414      ( 2023 05:40 )             41.7     01-12    132<L>  |  86<L>  |  31<H>  ----------------------------<  154<H>  3.4<L>   |  38<H>  |  1.10    Ca    8.8      2023 05:40  Phos  1.8     01-12  Mg     2.0     -12    TPro  7.3  /  Alb  2.2<L>  /  TBili  2.5<H>  /  DBili  x   /  AST  19  /  ALT  16  /  AlkPhos  110  01-12    PT/INR - ( 2023 05:40 )   PT: 29.8 sec;   INR: 2.52 ratio         PTT - ( 2023 05:40 )  PTT:36.1 sec  Urinalysis Basic - ( 2023 18:00 )    Color: Yellow / Appearance: Clear / S.015 / pH: x  Gluc: x / Ketone: Negative  / Bili: Negative / Urobili: Negative   Blood: x / Protein: 15 / Nitrite: Negative   Leuk Esterase: Negative / RBC: 0-2 /HPF / WBC 0-2   Sq Epi: x / Non Sq Epi: Occasional / Bacteria: Occasional      CAPILLARY BLOOD GLUCOSE                    Plan of care was discussed with patient;  care was aligned with patient's wishes.             MEDICAL ATTENDING NOTE    Patient is a 82y old  Male who presents with a chief complaint of CHF, A fib, Flu (2023 14:24)      INTERVAL HPI/OVERNIGHT EVENTS: no acute issues reported; still on high flow     MEDICATIONS  (STANDING):  albuterol/ipratropium for Nebulization 3 milliLiter(s) Nebulizer every 6 hours  apixaban 5 milliGRAM(s) Oral every 12 hours  atorvastatin 10 milliGRAM(s) Oral at bedtime  benzonatate 100 milliGRAM(s) Oral every 8 hours  cefepime   IVPB 2000 milliGRAM(s) IV Intermittent every 8 hours  clopidogrel Tablet 75 milliGRAM(s) Oral daily  diltiazem    Tablet 60 milliGRAM(s) Oral every 6 hours  furosemide   Injectable 40 milliGRAM(s) IV Push daily  gabapentin 100 milliGRAM(s) Oral two times a day  isosorbide   mononitrate ER Tablet (IMDUR) 60 milliGRAM(s) Oral daily  metoprolol tartrate 50 milliGRAM(s) Oral every 6 hours    MEDICATIONS  (PRN):  bisacodyl 5 milliGRAM(s) Oral every 12 hours PRN Constipation  guaiFENesin Oral Liquid (Sugar-Free) 200 milliGRAM(s) Oral every 6 hours PRN Cough  metoclopramide Injectable 5 milliGRAM(s) IV Push every 8 hours PRN nausea/vomiting      __________________________________________________  ----------------------------------------------------------------------------------  REVIEW OF SYSTEMS: no chest pain, No fever, no HA; SOB++, cough with yellow phlegm      Vital Signs Last 24 Hrs  T(C): 36.7 (2023 08:00), Max: 36.8 (2023 16:00)  T(F): 98.1 (2023 08:00), Max: 98.3 (2023 00:06)  HR: 88 (2023 12:16) (71 - 112)  BP: 127/72 (2023 12:00) (98/60 - 136/64)  BP(mean): 91 (2023 12:00) (74 - 95)  RR: 26 (2023 12:00) (21 - 32)  SpO2: 93% (2023 12:16) (90% - 96%)    Parameters below as of 2023 12:16  Patient On (Oxygen Delivery Method): nasal cannula, high flow        _________________  PHYSICAL EXAM:  ---------------------------   NAD; Normocephalic;   LUNGS - no wheezing  HEART: S1 S2+   ABDOMEN: Soft, Nontender, non distended, BS+  EXTREMITIES: no cyanosis; no edema  NERVOUS SYSTEM:  Awake and alert; follows commands, moves extremities; no gross focal neuro deficits    _________________________________________________  LABS:                        14.1   20.16 )-----------( 414      ( 2023 05:40 )             41.7     01-12    132<L>  |  86<L>  |  31<H>  ----------------------------<  154<H>  3.4<L>   |  38<H>  |  1.10    Ca    8.8      2023 05:40  Phos  1.8     01-12  Mg     2.0     01-12    TPro  7.3  /  Alb  2.2<L>  /  TBili  2.5<H>  /  DBili  x   /  AST  19  /  ALT  16  /  AlkPhos  110  01-12    PT/INR - ( 2023 05:40 )   PT: 29.8 sec;   INR: 2.52 ratio         PTT - ( 2023 05:40 )  PTT:36.1 sec  Urinalysis Basic - ( 2023 18:00 )    Color: Yellow / Appearance: Clear / S.015 / pH: x  Gluc: x / Ketone: Negative  / Bili: Negative / Urobili: Negative   Blood: x / Protein: 15 / Nitrite: Negative   Leuk Esterase: Negative / RBC: 0-2 /HPF / WBC 0-2   Sq Epi: x / Non Sq Epi: Occasional / Bacteria: Occasional      CAPILLARY BLOOD GLUCOSE                    Plan of care was discussed with patient and daughters at bedside;  care was aligned with patient's wishes.

## 2023-01-12 NOTE — DIETITIAN INITIAL EVALUATION ADULT - PERTINENT LABORATORY DATA
01-12    132<L>  |  86<L>  |  31<H>  ----------------------------<  154<H>  3.4<L>   |  38<H>  |  1.10    Ca    8.8      12 Jan 2023 05:40  Phos  1.8     01-12  Mg     2.0     01-12    TPro  7.3  /  Alb  2.2<L>  /  TBili  2.5<H>  /  DBili  x   /  AST  19  /  ALT  16  /  AlkPhos  110  01-12  A1C with Estimated Average Glucose Result: 6.3 % (01-08-23 @ 06:00)

## 2023-01-12 NOTE — PROGRESS NOTE ADULT - PROBLEM SELECTOR PLAN 4
continue Lasix; switch=ch to oral once ok by Cardio  Monitor  Echo - showed EF of 55%. No pericardial effusion resolving  Echo - showed EF of 55%. No pericardial effusion  Consider holding iV Lasix due to suspected contraction alkalosis  Monitor electrolytes

## 2023-01-12 NOTE — DIETITIAN INITIAL EVALUATION ADULT - OTHER INFO
Pt is a "83 y/o m w/ PMH od CAD s/p stent, s/p CABG, HTN, HLD, CHF, admitted for acute CHF exacerbation, found to be positive for influenza, and with new onset AFib. Had progressive hypoxia, and was transferred to ICU for management of acute hypoxemic respiratory failure, influenza A viral PNA, atypical PNA, new onset AFib, and JANES."    Visited pt at bedside this am. Pt transferred to ICU 1/10 2/2 progressive hypoxia. Pt resting during time of visit. On clear liquid diet x 2 days. Pt consumed some broth, jello, and juice this am. Pt reports fair appetite/intake at this time. Pt feeds self, minimal assistance needed with tray set-up. No chewing/swallowing difficulties. No food allergies. Denies N/V/D/C. No BMs thus far; bowel regimen rx. CBW on admission 186#. No edema noted. Skin intact. Labs reviewed, K and phos low; supplementation rx. Pt lethargic during visit, diet education not appropriate at this time. Recommend advancing diet as medically feasible to better meet pt's estimated energy/protein needs.

## 2023-01-12 NOTE — PROGRESS NOTE ADULT - ASSESSMENT
81 y/o m w/ PMH od CAD s/p stent, s/p CABG, HTN, HLD, CHF, admitted for acute CHF exacerbation, found to be positive for influenza, and with new onset uncontrolled AFib. Improved with tamiflu, diuresis, and rate control. Had progressive hypoxia, and CT chest showing bilateral tree-in-bud opacities, with more confluent consolidation at bases bilaterally, raising suspicion for atypical PNA. He was transferred to ICU for management of acute hypoxemic respiratory failure, new onset AFib, influenza A viral PNA, and suspected atypical PNA.    Neuro - pt is mentating well   tylenol for back pain    CV - hemodynamics stable, NSR  -HFpEF:  Echo shows EF 55%  change to Lasix 40 mg IV daily  -New onset AFib: cont rate control with lopressor and diltiazem, goal HR<110  full AC with eliquis   -CAD: trop negative   cont Imdur, statin and plavix  f/u cardio    Pulm -  acute hypoxemic resp failure on HFNC, etiology unknown,   possible atypical pneumonia v bacterial v noninfectious  f/u infectious and rheum workup including fungal serologies, ANCA, PCP PCR  AMOS neg  Currently HFNC on 40 L at 60%, wean as tolerates  CTA negative for PE  continue bronchodilators   Bad cough - tessalon perles, hycodan PRN, d/c mucinex    GI - start DASH diet    Renal - renal function stable  hyponatremia, fu urine lytes  hypokalemia and hypophosphatemia, repleted     ID - leukocytosis of 20   Pt with influenza A viral PNA and suspect atypical bacterial PNA  CTA: with bilateral tree bud opacities at bases > apex bilaterally  Continue broad spectrum IV abx with Cefepime  dc Vanco as MRSA swab neg  dc Azithro as legionella neg  finished course of tamiflu  full RVP otherwise negative  Procal: 0.98   BCx NGTD  AMOS neg  SCx: few gram negative rods   FU strep Ag  fungitell neg  FU galatamannan  FU PJP PCR    Endo - no insulin necessary    Heme -  full AC with eliquis, no signs of active bleeding, H/H stable    Skin: no lines or roth    Dispo: Full code  - PT eval

## 2023-01-12 NOTE — DIETITIAN INITIAL EVALUATION ADULT - NUTRITION CONSULT
[facility] at [phone] [facility] at [phone].     [place] on Mental Illness (www.angel.org): 818.923.7274 or 812-358-1629.     Mental Health Association (www.mentalhealth.org): 689.779.8408 or 776-719-7591.    [place] Crisis Text Line: Text [place] to 286412    Suicide LifeLine Chat: [email or web address] Labsline.org/chat    
no

## 2023-01-12 NOTE — DIETITIAN INITIAL EVALUATION ADULT - ADD RECOMMEND
1) Pt to remain on clear liquid diet at this time; advance to DASH/TLC as medically feasible  2) Monitor po intake, diet tolerance, weight trends, labs, GI function, skin integrity

## 2023-01-12 NOTE — DIETITIAN INITIAL EVALUATION ADULT - PERTINENT MEDS FT
MEDICATIONS  (STANDING):  albuterol/ipratropium for Nebulization 3 milliLiter(s) Nebulizer every 6 hours  apixaban 5 milliGRAM(s) Oral every 12 hours  atorvastatin 10 milliGRAM(s) Oral at bedtime  azithromycin  IVPB      azithromycin  IVPB 500 milliGRAM(s) IV Intermittent every 24 hours  benzonatate 100 milliGRAM(s) Oral every 8 hours  cefepime   IVPB 2000 milliGRAM(s) IV Intermittent every 8 hours  clopidogrel Tablet 75 milliGRAM(s) Oral daily  diltiazem    Tablet 60 milliGRAM(s) Oral every 6 hours  furosemide   Injectable 40 milliGRAM(s) IV Push two times a day  gabapentin 100 milliGRAM(s) Oral two times a day  hydrocodone/homatropine Syrup 5 milliLiter(s) Oral every 8 hours  isosorbide   mononitrate ER Tablet (IMDUR) 60 milliGRAM(s) Oral daily  metoprolol tartrate 50 milliGRAM(s) Oral every 6 hours    MEDICATIONS  (PRN):  bisacodyl 5 milliGRAM(s) Oral every 12 hours PRN Constipation  guaiFENesin Oral Liquid (Sugar-Free) 200 milliGRAM(s) Oral every 6 hours PRN Cough  metoclopramide Injectable 5 milliGRAM(s) IV Push every 8 hours PRN nausea/vomiting

## 2023-01-12 NOTE — PROGRESS NOTE ADULT - SUBJECTIVE AND OBJECTIVE BOX
[facility]  INFECTIOUS DISEASES   [address] [place] [address]  Tel: [phone]     Fax: [phone]  ========================================================  MD Radha Hutchison Kaushal, MD Cho, Michelle, MD Sunjit, Jaspal, MD  ========================================================    [id number]  OPAL NUGENT     Follow up: Pneumonia?, Respiratory failure, Influenza     In ICU, on High flow NC 40L/60%, with good sat. Comfortable.   No fever.     PAST MEDICAL & SURGICAL HISTORY:  HLD (hyperlipidemia)  HTN (hypertension)  CAD (coronary artery disease)  S/P coronary artery stent placement  S/P CABG (coronary artery bypass graft)    Social Hx: Former, smoked more 50years ago, no ETOH or drugs     FAMILY HISTORY:  Family history of cardiovascular disease (Sibling)    Family history of uterine cancer (Mother)    Allergies  Levaquin (Unknown)  penicillin (Rash)    Antibiotics:  MEDICATIONS  (STANDING):  albuterol/ipratropium for Nebulization 3 milliLiter(s) Nebulizer every 6 hours  apixaban 5 milliGRAM(s) Oral every 12 hours  atorvastatin 10 milliGRAM(s) Oral at bedtime  azithromycin  IVPB      azithromycin  IVPB 500 milliGRAM(s) IV Intermittent every 24 hours  benzonatate 100 milliGRAM(s) Oral every 8 hours  cefepime   IVPB      cefepime   IVPB 1000 milliGRAM(s) IV Intermittent every 8 hours  clopidogrel Tablet 75 milliGRAM(s) Oral daily  diltiazem    Tablet 60 milliGRAM(s) Oral every 6 hours  furosemide   Injectable 40 milliGRAM(s) IV Push two times a day  gabapentin 100 milliGRAM(s) Oral two times a day  hydrocodone/homatropine Syrup 5 milliLiter(s) Oral every 8 hours  isosorbide   mononitrate ER Tablet (IMDUR) 60 milliGRAM(s) Oral daily  metoprolol tartrate 50 milliGRAM(s) Oral every 6 hours  oseltamivir 30 milliGRAM(s) Oral two times a day    MEDICATIONS  (PRN):  bisacodyl 5 milliGRAM(s) Oral every 12 hours PRN Constipation  guaiFENesin Oral Liquid (Sugar-Free) 200 milliGRAM(s) Oral every 6 hours PRN Cough  metoclopramide Injectable 5 milliGRAM(s) IV Push every 8 hours PRN nausea/vomiting     REVIEW OF SYSTEMS:  CONSTITUTIONAL:  No Fever or chills  HEENT:  No diplopia or blurred vision.  No sore throat or runny nose.  CARDIOVASCULAR:  No chest pain   RESPIRATORY:  +cough, + shortness of breath, + sputum  GASTROINTESTINAL:  No nausea, vomiting or diarrhea.  GENITOURINARY:  No dysuria, frequency or urgency. No Blood in urine  MUSCULOSKELETAL:  no joint aches, no muscle pain  SKIN:  No change in skin, hair or nails.  NEUROLOGIC:  No paresthesias or weakness.  PSYCHIATRIC:  No disorder of thought or mood.  ENDOCRINE:  No heat or cold intolerance, polyuria or polydipsia.  HEMATOLOGICAL:  No easy bruising or bleeding.     Physical Exam:  Vital Signs Last 24 Hrs  T(C): 36.7 (12 Jan 2023 08:00), Max: 36.8 (11 Jan 2023 16:00)  T(F): 98.1 (12 Jan 2023 08:00), Max: 98.3 (12 Jan 2023 00:06)  HR: 88 (12 Jan 2023 12:16) (71 - 112)  BP: 127/72 (12 Jan 2023 12:00) (98/60 - 136/64)  BP(mean): 91 (12 Jan 2023 12:00) (74 - 95)  RR: 26 (12 Jan 2023 12:00) (21 - 32)  SpO2: 93% (12 Jan 2023 12:16) (90% - 96%)  Parameters below as of 12 Jan 2023 12:16  Patient On (Oxygen Delivery Method): nasal cannula, high flow  GEN: NAD, HFNC in place   HEENT: normocephalic and atraumatic. EOMI. PERRL.    NECK: Supple.  No lymphadenopathy   LUNGS: Rales diffuse bilateral lung fields, no wheezing  HEART: Regular rate and rhythm without murmur.  ABDOMEN: Soft, nontender, and nondistended.  Positive bowel sounds.    : No CVA tenderness  EXTREMITIES: Without edema.  NEUROLOGIC: grossly intact.  PSYCHIATRIC: Appropriate affect .  SKIN: No rash     Labs:                        14.1   20.16 )-----------( 414      ( 12 Jan 2023 05:40 )             41.7     01-12    132<L>  |  86<L>  |  31<H>  ----------------------------<  154<H>  3.4<L>   |  38<H>  |  1.10    Ca    8.8      12 Jan 2023 05:40  Phos  1.8     01-12  Mg     2.0     01-12    TPro  7.3  /  Alb  2.2<L>  /  TBili  2.5<H>  /  DBili  x   /  AST  19  /  ALT  16  /  AlkPhos  110  01-12    Culture - Sputum (collected 01-11-23 @ 18:00)  Source: .Sputum Sputum  Gram Stain (01-12-23 @ 10:14):    Numerous polymorphonuclear leukocytes per low power field    No Squamous epithelial cells per low power field    Few Gram Negative Rods seen per oil power field    Culture - Blood (collected 01-10-23 @ 22:05)  Source: .Blood Blood-Peripheral    Culture - Blood (collected 01-10-23 @ 22:00)  Source: .Blood Blood-Peripheral    WBC Count: 20.16 K/uL (01-12-23 @ 05:40)  WBC Count: 13.21 K/uL (01-11-23 @ 05:30)  WBC Count: 13.63 K/uL (01-10-23 @ 21:15)  WBC Count: 8.25 K/uL (01-09-23 @ 06:08)  WBC Count: 8.48 K/uL (01-08-23 @ 06:00)    Creatinine, Serum: 1.10 mg/dL (01-12-23 @ 05:40)  Creatinine, Serum: 1.00 mg/dL (01-11-23 @ 05:30)  Creatinine, Serum: 1.20 mg/dL (01-10-23 @ 21:15)  Creatinine, Serum: 1.10 mg/dL (01-09-23 @ 06:08)  Creatinine, Serum: 1.30 mg/dL (01-08-23 @ 06:00)    Sedimentation Rate, Erythrocyte: 74 mm/hr (01-11-23 @ 05:30)    Procalcitonin, Serum: 0.98 ng/mL (01-10-23 @ 22:00)     COVID-19 PCR: NotDetec (01-10-23 @ 21:30)  SARS-CoV-2: NotDetec (01-10-23 @ 20:25)  SARS-CoV-2: NotDetec (01-07-23 @ 13:50)    All imaging and other data have been reviewed.  < from: CT Angio Chest PE Protocol w/ IV Cont (01.10.23 @ 15:47) >  IMPRESSION:.  No pulmonary embolism.  Diffuse tree-in-bud nodular opacities and bibasilar consolidation   compatible with infection.  Mediastinal and bilateral hilar lymphadenopathy, likely reactive.    Assessment and Plan:    83 y/o m w/ PMH od CAD s/p stent, s/p CABG, HTN, HLD, CHF presented to [facility] ED 1/7/23 for shortness of breath and wheezing. He was found to be influenza positive and with CHF exacerbation and new onset afib. Started on tamiflu and IV lasix, eliquis and metoprolol for afib. Now transferred to ICU for worsening acute hypoxic respiratory failure. CTA negative for PE but showing likely superimposed bacterial PNA. ID consultation requested.   MRSA/MSSA nares PCR negative  Legionella negative   Acute CHF exacerbation, found to be influenza A positive and now with worsening hypoxia. Procal 0.98 Likely due to superimposed bacterial pna    #Acute hypoxic respiratory failure  #influenza A  #Superimposed bacterial pna    - Continue cefepime  - Stop Azithromycin   - Will follow sputum culture growing GNR  - Blood cultures NGTD will follow     Will follow.  Discussed with the primary team.     Murphy Barrera MD  Division of Infectious Diseases   Please call ID service at [phone] with any question.      35 minutes spent on total encounter assessing patient, examination, chart review, counseling and coordinating care by the attending physician/nurse/care manager.

## 2023-01-12 NOTE — PROGRESS NOTE ADULT - SUBJECTIVE AND OBJECTIVE BOX
[facility] - Donna Jay Pannella, Patel, Savella  Office Number:  [phone]    Patient resting comfortably in bed in NAD.    says his breathing is a little better though remains on hf02    ROS: negative unless otherwise mentioned.    Telemetry:  af    MEDICATIONS  (STANDING):  albuterol/ipratropium for Nebulization 3 milliLiter(s) Nebulizer every 6 hours  apixaban 5 milliGRAM(s) Oral every 12 hours  atorvastatin 10 milliGRAM(s) Oral at bedtime  azithromycin  IVPB      azithromycin  IVPB 500 milliGRAM(s) IV Intermittent every 24 hours  benzonatate 100 milliGRAM(s) Oral every 8 hours  cefepime   IVPB 2000 milliGRAM(s) IV Intermittent every 8 hours  clopidogrel Tablet 75 milliGRAM(s) Oral daily  diltiazem    Tablet 60 milliGRAM(s) Oral every 6 hours  furosemide   Injectable 40 milliGRAM(s) IV Push two times a day  gabapentin 100 milliGRAM(s) Oral two times a day  hydrocodone/homatropine Syrup 5 milliLiter(s) Oral every 8 hours  isosorbide   mononitrate ER Tablet (IMDUR) 60 milliGRAM(s) Oral daily  metoprolol tartrate 50 milliGRAM(s) Oral every 6 hours    MEDICATIONS  (PRN):  bisacodyl 5 milliGRAM(s) Oral every 12 hours PRN Constipation  guaiFENesin Oral Liquid (Sugar-Free) 200 milliGRAM(s) Oral every 6 hours PRN Cough  metoclopramide Injectable 5 milliGRAM(s) IV Push every 8 hours PRN nausea/vomiting      Allergies    Levaquin (Unknown)  penicillin (Rash)    Intolerances        Vital Signs Last 24 Hrs  T(C): 36.7 (12 Jan 2023 08:00), Max: 36.9 (11 Jan 2023 12:00)  T(F): 98.1 (12 Jan 2023 08:00), Max: 98.4 (11 Jan 2023 12:00)  HR: 90 (12 Jan 2023 11:00) (71 - 112)  BP: 120/66 (12 Jan 2023 11:00) (98/60 - 136/64)  BP(mean): 86 (12 Jan 2023 11:00) (74 - 95)  RR: 29 (12 Jan 2023 11:00) (21 - 35)  SpO2: 92% (12 Jan 2023 11:00) (90% - 96%)    Parameters below as of 12 Jan 2023 10:00  Patient On (Oxygen Delivery Method): nasal cannula, high flow        I&O's Summary    11 Jan 2023 07:01  -  12 Jan 2023 07:00  --------------------------------------------------------  IN: 1250 mL / OUT: 1700 mL / NET: -450 mL    12 Jan 2023 07:01  -  12 Jan 2023 11:16  --------------------------------------------------------  IN: 366.6 mL / OUT: 550 mL / NET: -183.4 mL        ON EXAM:    Constitutional: NAD, awake and alert, well-developed  HEENT: Moist Mucous Membranes, Anicteric  Pulmonary: Non-labored, breath sounds are coarse bilaterally, No wheezing, rales or rhonchi  Cardiovascular: irregular, S1 and S2, No murmurs, rubs, gallops or clicks  Gastrointestinal: Bowel Sounds present, soft, nontender.   Lymph: No peripheral edema. No lymphadenopathy.  Skin: No visible rashes or ulcers.  Psych:  Mood & affect appropriate    LABS: All Labs Reviewed:                        14.1   20.16 )-----------( 414      ( 12 Jan 2023 05:40 )             41.7                         15.2   13.21 )-----------( 400      ( 11 Jan 2023 05:30 )             45.5                         14.2   13.63 )-----------( 390      ( 10 Matthew 2023 21:15 )             42.1     12 Jan 2023 05:40    132    |  86     |  31     ----------------------------<  154    3.4     |  38     |  1.10   11 Jan 2023 05:30    134    |  88     |  35     ----------------------------<  126    3.6     |  40     |  1.00   10 Jan 2023 21:15    133    |  87     |  38     ----------------------------<  219    3.4     |  36     |  1.20     Ca    8.8        12 Jan 2023 05:40  Ca    9.1        11 Jan 2023 05:30  Ca    8.7        10 Jan 2023 21:15  Phos  1.8       12 Jan 2023 05:40  Phos  2.3       11 Jan 2023 05:30  Phos  2.7       10 Jan 2023 21:15  Mg     2.0       12 Jan 2023 05:40  Mg     2.7       11 Jan 2023 05:30  Mg     1.9       10 Jan 2023 21:15    TPro  7.3    /  Alb  2.2    /  TBili  2.5    /  DBili  x      /  AST  19     /  ALT  16     /  AlkPhos  110    12 Jan 2023 05:40  TPro  7.6    /  Alb  2.6    /  TBili  2.6    /  DBili  x      /  AST  19     /  ALT  21     /  AlkPhos  94     11 Jan 2023 05:30  TPro  7.1    /  Alb  2.5    /  TBili  2.3    /  DBili  x      /  AST  21     /  ALT  19     /  AlkPhos  87     10 Jan 2023 21:15    PT/INR - ( 12 Jan 2023 05:40 )   PT: 29.8 sec;   INR: 2.52 ratio         PTT - ( 12 Jan 2023 05:40 )  PTT:36.1 sec      Blood Culture: Organism --  Gram Stain Blood -- Gram Stain   Numerous polymorphonuclear leukocytes per low power field  No Squamous epithelial cells per low power field  Few Gram Negative Rods seen per oil power field  Specimen Source .Sputum Sputum  Culture-Blood --    Organism --  Gram Stain Blood -- Gram Stain --  Specimen Source .Blood Blood-Peripheral  Culture-Blood --    Organism --  Gram Stain Blood -- Gram Stain --  Specimen Source .Blood Blood-Peripheral  Culture-Blood --      01-10 @ 21:15  Pro Bnp [id number]

## 2023-01-12 NOTE — PROGRESS NOTE ADULT - PROBLEM SELECTOR PLAN 1
Acute respiratory failure as a result of Influenza with superimposed bacterial PNA  Continue broad spectrum antibiotics  Cultures if develops fever  On High Flow in the ICU - saturating ok  Continue bronchodilators as needed  Continue to monitor  Care per critical care team  ID consult follow up ongoing Acute respiratory failure as a result of Influenza with superimposed bacterial PNA  Continue broad spectrum antibiotics  On High Flow in the ICU - saturating ok  Continue bronchodilators   Continue to monitor  Care per critical care team  ID consult follow up ongoing

## 2023-01-12 NOTE — PROGRESS NOTE ADULT - ASSESSMENT
82 year old male PMH of CAD s/p stent, s/p CABG, HTN, HLD, CHF p/w shortness of breath and cough for the last 5 days found to be in af with rvr, congestive hf, and influenza positive.    CAD s/p CABG, stent, Afib with RVR, HFpEF  - No anginal symptoms, no sign of acute ischemia, and troponin are negative  - Continue ASA, BB, and statin  - Continue Imdur    - Afib with rates improved.  Tachycardia  with some reactive component (in the setting of flu)  - c/w BB, will eventually switch to long-acting  - Continue Cardizem 30mg q6hrs.  Would switch to long-acting once HR is controlled  - Continue Eliquis    - HFpEF  - proBNP 2418-->959  - Still requiring supplemental 02 via HFNC  - c/w IV lasix for now  - TTE with EF 55%, LAE, with no significant valvular pathology  - Strict i/o's and daily weights    - Flu management as per Pulm/primary /ICU team  - continue incentive spirometer    - Monitor and replete lytes, keep K>4, Mg>2.  - Will continue to follow.  - very high risk of decompensation, with hypoxic resp failur, hf requiring hf02, and af with rvr

## 2023-01-12 NOTE — DIETITIAN INITIAL EVALUATION ADULT - ETIOLOGY
related to decreased ability to consume sufficient energy, acute hypoxemic respiratory failure related to cardiac hx

## 2023-01-12 NOTE — DIETITIAN INITIAL EVALUATION ADULT - SIGNS/SYMPTOMS
as evidenced by variable po intake during hospital stay, clear liquid diet.  as evidenced by acute CHF exacerbation, on IV lasix.

## 2023-01-12 NOTE — PROGRESS NOTE ADULT - SUBJECTIVE AND OBJECTIVE BOX
Date/Time Patient Seen:  		  Referring MD:   Data Reviewed	       Patient is a 82y old  Male who presents with a chief complaint of CHF, A fib, Flu (11 Jan 2023 15:31)      Subjective/HPI     PAST MEDICAL & SURGICAL HISTORY:  Congestive heart failure (CHF)    Atrial fibrillation    HLD (hyperlipidemia)    HTN (hypertension)    CAD (coronary artery disease)    S/P coronary artery stent placement    S/P CABG (coronary artery bypass graft)          Medication list         MEDICATIONS  (STANDING):  albuterol/ipratropium for Nebulization 3 milliLiter(s) Nebulizer every 6 hours  apixaban 5 milliGRAM(s) Oral every 12 hours  atorvastatin 10 milliGRAM(s) Oral at bedtime  azithromycin  IVPB      azithromycin  IVPB 500 milliGRAM(s) IV Intermittent every 24 hours  benzonatate 100 milliGRAM(s) Oral every 8 hours  cefepime   IVPB 2000 milliGRAM(s) IV Intermittent every 8 hours  clopidogrel Tablet 75 milliGRAM(s) Oral daily  diltiazem    Tablet 60 milliGRAM(s) Oral every 6 hours  furosemide   Injectable 40 milliGRAM(s) IV Push two times a day  gabapentin 100 milliGRAM(s) Oral two times a day  hydrocodone/homatropine Syrup 5 milliLiter(s) Oral every 8 hours  isosorbide   mononitrate ER Tablet (IMDUR) 60 milliGRAM(s) Oral daily  metoprolol tartrate 50 milliGRAM(s) Oral every 6 hours  oseltamivir 30 milliGRAM(s) Oral two times a day    MEDICATIONS  (PRN):  bisacodyl 5 milliGRAM(s) Oral every 12 hours PRN Constipation  guaiFENesin Oral Liquid (Sugar-Free) 200 milliGRAM(s) Oral every 6 hours PRN Cough  metoclopramide Injectable 5 milliGRAM(s) IV Push every 8 hours PRN nausea/vomiting         Vitals log        ICU Vital Signs Last 24 Hrs  T(C): 36.8 (12 Jan 2023 04:20), Max: 36.9 (11 Jan 2023 12:00)  T(F): 98.3 (12 Jan 2023 04:20), Max: 98.4 (11 Jan 2023 12:00)  HR: 101 (12 Jan 2023 05:00) (71 - 101)  BP: 136/64 (12 Jan 2023 05:00) (98/60 - 136/64)  BP(mean): 92 (12 Jan 2023 05:00) (74 - 92)  ABP: --  ABP(mean): --  RR: 24 (12 Jan 2023 05:00) (21 - 37)  SpO2: 95% (12 Jan 2023 05:00) (90% - 97%)    O2 Parameters below as of 12 Jan 2023 04:00  Patient On (Oxygen Delivery Method): nasal cannula, high flow  O2 Flow (L/min): 40  O2 Concentration (%): 60             Input and Output:  I&O's Detail    10 Matthew 2023 07:01  -  11 Jan 2023 07:00  --------------------------------------------------------  IN:    IV PiggyBack: 100 mL    IV PiggyBack: 250 mL    IV PiggyBack: 250 mL    IV PiggyBack: 50 mL    Oral Fluid: 840 mL  Total IN: 1490 mL    OUT:    Voided (mL): 1200 mL  Total OUT: 1200 mL    Total NET: 290 mL      11 Jan 2023 07:01  -  12 Jan 2023 05:46  --------------------------------------------------------  IN:    IV PiggyBack: 250 mL    IV PiggyBack: 50 mL    Oral Fluid: 600 mL  Total IN: 900 mL    OUT:    Voided (mL): 1300 mL  Total OUT: 1300 mL    Total NET: -400 mL          Lab Data                        15.2   13.21 )-----------( 400      ( 11 Jan 2023 05:30 )             45.5     01-11    134<L>  |  88<L>  |  35<H>  ----------------------------<  126<H>  3.6   |  40<H>  |  1.00    Ca    9.1      11 Jan 2023 05:30  Phos  2.3     01-11  Mg     2.7     01-11    TPro  7.6  /  Alb  2.6<L>  /  TBili  2.6<H>  /  DBili  x   /  AST  19  /  ALT  21  /  AlkPhos  94  01-11    ABG - ( 11 Jan 2023 14:14 )  pH, Arterial: 7.50  pH, Blood: x     /  pCO2: 52    /  pO2: 71    / HCO3: 41    / Base Excess: 17.4  /  SaO2: 95.6                    Review of Systems	      Objective     Physical Examination    heart s1s2  lung dec BS  head nc      Pertinent Lab findings & Imaging      Christiano:  NO   Adequate UO     I&O's Detail    10 Matthew 2023 07:01  -  11 Jan 2023 07:00  --------------------------------------------------------  IN:    IV PiggyBack: 100 mL    IV PiggyBack: 250 mL    IV PiggyBack: 250 mL    IV PiggyBack: 50 mL    Oral Fluid: 840 mL  Total IN: 1490 mL    OUT:    Voided (mL): 1200 mL  Total OUT: 1200 mL    Total NET: 290 mL      11 Jan 2023 07:01  -  12 Jan 2023 05:46  --------------------------------------------------------  IN:    IV PiggyBack: 250 mL    IV PiggyBack: 50 mL    Oral Fluid: 600 mL  Total IN: 900 mL    OUT:    Voided (mL): 1300 mL  Total OUT: 1300 mL    Total NET: -400 mL               Discussed with:     Cultures:	        Radiology

## 2023-01-12 NOTE — DIETITIAN INITIAL EVALUATION ADULT - ORAL INTAKE PTA/DIET HISTORY
Pt reports eating well at home PTA. Typically consumes 3 meals/day. Follows a regular diet at home.

## 2023-01-12 NOTE — PROGRESS NOTE ADULT - SUBJECTIVE AND OBJECTIVE BOX
INTERVAL HPI/OVERNIGHT EVENTS:    SUBJECTIVE: Patient seen and examined at bedside.     ROS:  CV: Denies chest pain  Resp: Denies SOB  GI: Denies abdominal pain, constipation, diarrhea, nausea, vomiting  : Denies dysuria  ID: Denies fevers, chills  MSK: Denies joint pain     OBJECTIVE:    VITAL SIGNS:  ICU Vital Signs Last 24 Hrs  T(C): 36.7 (2023 08:00), Max: 36.9 (2023 12:00)  T(F): 98.1 (2023 08:00), Max: 98.4 (2023 12:00)  HR: 90 (2023 11:00) (71 - 112)  BP: 120/66 (2023 11:00) (98/60 - 136/64)  BP(mean): 86 (2023 11:00) (74 - 95)  ABP: --  ABP(mean): --  RR: 29 (2023 11:00) (21 - 35)  SpO2: 92% (2023 11:00) (90% - 96%)    O2 Parameters below as of 2023 10:00  Patient On (Oxygen Delivery Method): nasal cannula, high flow               @ 07: @ 07:00  --------------------------------------------------------  IN: 1250 mL / OUT: 1700 mL / NET: -450 mL     @ 07:  -   @ 11:40  --------------------------------------------------------  IN: 366.6 mL / OUT: 550 mL / NET: -183.4 mL      CAPILLARY BLOOD GLUCOSE          PHYSICAL EXAM:  General: NAD, comfortable  HEENT: NCAT, clear conjunctiva, no scleral icterus  Respiratory: CTA b/l, no wheezing, rhonchi, rales  Cardiovascular: RRR, normal S1S2, no M/R/G  Abdomen: soft, NT/ND, bowel sounds present, no palpable masses  Extremities: no LE edema or calf tenderness  Neurology: A&Ox3, nonfocal    MEDICATIONS:  MEDICATIONS  (STANDING):  albuterol/ipratropium for Nebulization 3 milliLiter(s) Nebulizer every 6 hours  apixaban 5 milliGRAM(s) Oral every 12 hours  atorvastatin 10 milliGRAM(s) Oral at bedtime  azithromycin  IVPB      azithromycin  IVPB 500 milliGRAM(s) IV Intermittent every 24 hours  benzonatate 100 milliGRAM(s) Oral every 8 hours  cefepime   IVPB 2000 milliGRAM(s) IV Intermittent every 8 hours  clopidogrel Tablet 75 milliGRAM(s) Oral daily  diltiazem    Tablet 60 milliGRAM(s) Oral every 6 hours  furosemide   Injectable 40 milliGRAM(s) IV Push two times a day  gabapentin 100 milliGRAM(s) Oral two times a day  hydrocodone/homatropine Syrup 5 milliLiter(s) Oral every 8 hours  isosorbide   mononitrate ER Tablet (IMDUR) 60 milliGRAM(s) Oral daily  metoprolol tartrate 50 milliGRAM(s) Oral every 6 hours    MEDICATIONS  (PRN):  bisacodyl 5 milliGRAM(s) Oral every 12 hours PRN Constipation  guaiFENesin Oral Liquid (Sugar-Free) 200 milliGRAM(s) Oral every 6 hours PRN Cough  metoclopramide Injectable 5 milliGRAM(s) IV Push every 8 hours PRN nausea/vomiting      ALLERGIES:  Allergies    Levaquin (Unknown)  penicillin (Rash)    Intolerances        LABS:                        14.1   20.16 )-----------( 414      ( 2023 05:40 )             41.7     -12    132<L>  |  86<L>  |  31<H>  ----------------------------<  154<H>  3.4<L>   |  38<H>  |  1.10    Ca    8.8      2023 05:40  Phos  1.8       Mg     2.0         TPro  7.3  /  Alb  2.2<L>  /  TBili  2.5<H>  /  DBili  x   /  AST  19  /  ALT  16  /  AlkPhos  110  12    PT/INR - ( 2023 05:40 )   PT: 29.8 sec;   INR: 2.52 ratio         PTT - ( 2023 05:40 )  PTT:36.1 sec  Urinalysis Basic - ( 2023 18:00 )    Color: Yellow / Appearance: Clear / S.015 / pH: x  Gluc: x / Ketone: Negative  / Bili: Negative / Urobili: Negative   Blood: x / Protein: 15 / Nitrite: Negative   Leuk Esterase: Negative / RBC: 0-2 /HPF / WBC 0-2   Sq Epi: x / Non Sq Epi: Occasional / Bacteria: Occasional        RADIOLOGY & ADDITIONAL TESTS: Reviewed. ***  BEDSIDE LUNG ULTRASOUND: ***  BEDSIDE ECHO: ***    CENTRAL LINE: N        DATE INSERTED:             REMOVE: N  RUBY: Y                       DATE INSERTED:              REMOVE: Y/N  A-LINE: N                       DATE INSERTED:              REMOVE: Y/N      GLOBAL ISSUE/BEST PRACTICE  Analgesia: Y  Sedation: Y  HOB elevation: yes  Stress ulcer prophylaxis: Y  VTE prophylaxis: Y  Glycemic control: Y  Nutrition: Y    CODE STATUS: Full Code INTERVAL HPI/OVERNIGHT EVENTS: No acute events overnight. Pt was afebrile, hemodynamically stable. On HFNC at 40 L 60%.    SUBJECTIVE: Patient seen and examined at bedside. He is awake and alert. Endorses cough. Complains of right upper back pain, not worse with deep breaths. Denies CP or SOB.     ROS:  CV: Denies chest pain  Resp: + cough. Denies SOB  MSK: + back pain     OBJECTIVE:    VITAL SIGNS:  ICU Vital Signs Last 24 Hrs  T(C): 36.7 (2023 08:00), Max: 36.9 (2023 12:00)  T(F): 98.1 (2023 08:00), Max: 98.4 (2023 12:00)  HR: 90 (2023 11:00) (71 - 112)  BP: 120/66 (2023 11:00) (98/60 - 136/64)  BP(mean): 86 (2023 11:00) (74 - 95)  ABP: --  ABP(mean): --  RR: 29 (2023 11:00) (21 - 35)  SpO2: 92% (2023 11:00) (90% - 96%)    O2 Parameters below as of 2023 10:00  Patient On (Oxygen Delivery Method): nasal cannula, high flow               @ 07:  -   @ 07:00  --------------------------------------------------------  IN: 1250 mL / OUT: 1700 mL / NET: -450 mL     @ 07:01  -   @ 11:40  --------------------------------------------------------  IN: 366.6 mL / OUT: 550 mL / NET: -183.4 mL      CAPILLARY BLOOD GLUCOSE          PHYSICAL EXAM:  General: NAD, elderly gentleman with HFNC in place.  HEENT: NCAT  Respiratory: CTA b/l, no wheezing, rhonchi, rales  Cardiovascular: irregular rhythm  Abdomen: soft, NT/ND  Extremities: no LE edema or calf tenderness  Neurology: awake and alert, nonfocal    MEDICATIONS:  MEDICATIONS  (STANDING):  albuterol/ipratropium for Nebulization 3 milliLiter(s) Nebulizer every 6 hours  apixaban 5 milliGRAM(s) Oral every 12 hours  atorvastatin 10 milliGRAM(s) Oral at bedtime  azithromycin  IVPB      azithromycin  IVPB 500 milliGRAM(s) IV Intermittent every 24 hours  benzonatate 100 milliGRAM(s) Oral every 8 hours  cefepime   IVPB 2000 milliGRAM(s) IV Intermittent every 8 hours  clopidogrel Tablet 75 milliGRAM(s) Oral daily  diltiazem    Tablet 60 milliGRAM(s) Oral every 6 hours  furosemide   Injectable 40 milliGRAM(s) IV Push two times a day  gabapentin 100 milliGRAM(s) Oral two times a day  hydrocodone/homatropine Syrup 5 milliLiter(s) Oral every 8 hours  isosorbide   mononitrate ER Tablet (IMDUR) 60 milliGRAM(s) Oral daily  metoprolol tartrate 50 milliGRAM(s) Oral every 6 hours    MEDICATIONS  (PRN):  bisacodyl 5 milliGRAM(s) Oral every 12 hours PRN Constipation  guaiFENesin Oral Liquid (Sugar-Free) 200 milliGRAM(s) Oral every 6 hours PRN Cough  metoclopramide Injectable 5 milliGRAM(s) IV Push every 8 hours PRN nausea/vomiting      ALLERGIES:  Allergies    Levaquin (Unknown)  penicillin (Rash)    Intolerances        LABS:                        14.1   20.16 )-----------( 414      ( 2023 05:40 )             41.7     01-12    132<L>  |  86<L>  |  31<H>  ----------------------------<  154<H>  3.4<L>   |  38<H>  |  1.10    Ca    8.8      2023 05:40  Phos  1.8     -12  Mg     2.0     -12    TPro  7.3  /  Alb  2.2<L>  /  TBili  2.5<H>  /  DBili  x   /  AST  19  /  ALT  16  /  AlkPhos  110  -    PT/INR - ( 2023 05:40 )   PT: 29.8 sec;   INR: 2.52 ratio         PTT - ( 2023 05:40 )  PTT:36.1 sec  Urinalysis Basic - ( 2023 18:00 )    Color: Yellow / Appearance: Clear / S.015 / pH: x  Gluc: x / Ketone: Negative  / Bili: Negative / Urobili: Negative   Blood: x / Protein: 15 / Nitrite: Negative   Leuk Esterase: Negative / RBC: 0-2 /HPF / WBC 0-2   Sq Epi: x / Non Sq Epi: Occasional / Bacteria: Occasional      RADIOLOGY & ADDITIONAL TESTS: Reviewed.   EXAM:  CT ANGIO CHEST PULM ART [facility]                          PROCEDURE DATE:  01/10/2023          INTERPRETATION:  HISTORY: Admitting Dxs: J10.1 FLU DUE TO OTH IDENT   INFLUENZA VIRUS W OTH RESP MANIFEST    EXAMINATION: CTA CHEST was performed for evaluation of the pulmonary   arteries. Multiplanar reformatted images and MIPS were acquired.  CONTRAST/COMPLICATIONS:  IV Contrast: Omnipaque 350  70 cc administered   30 cc discarded  Oral Contrast: NONE  Complications: None reported at time of study completion    COMPARISON: No prior CT chest comparison available.    FINDINGS:    PULMONARY ARTERIES: No pulmonary embolism.    MEDIASTINUM: The right atrium is qualitatively dilated. Post CABG. No   pericardial effusion. Thoracic aorta normal caliber.  Mildly enlarged   mediastinal and bilateral hilar nodes.    AIRWAYS, LUNGS, PLEURA: Mild central airways secretions. Tree-in-bud   nodular opacities involving all lung lobes, but most severe throughout   the bilateral lower lobes. Confluent consolidation at the left greater   than right lung bases. No pleural effusion.    IMAGED ABDOMEN: Right renal parapelvic cysts.    SOFT TISSUES: Unremarkable.    BONES: Sternal wires. Age indeterminate loss of height of T6 vertebral   body.      IMPRESSION:.    No pulmonary embolism.    Diffuse tree-in-bud nodular opacities and bibasilar consolidation   compatible with infection.    Mediastinal and bilateral hilar lymphadenopathy, likely reactive.      CENTRAL LINE: N        DATE INSERTED:             REMOVE: N  RUBY: N                       DATE INSERTED:              REMOVE: Y/N  A-LINE: N                       DATE INSERTED:              REMOVE: Y/N      GLOBAL ISSUE/BEST PRACTICE  Analgesia: N  Sedation: N  HOB elevation: yes  Stress ulcer prophylaxis: N  VTE prophylaxis: Y  Glycemic control: N  Nutrition: Y    CODE STATUS: Full Code   INTERVAL HPI/OVERNIGHT EVENTS: No acute events overnight. Pt was afebrile, hemodynamically stable. On HFNC at 40 L 60%.    SUBJECTIVE: Patient seen and examined at bedside. He is awake and alert. Endorses cough. Complains of right upper back pain, not worse with deep breaths. Denies CP or SOB.     ROS:  CV: Denies chest pain  Resp: + cough. Denies SOB  MSK: + back pain     OBJECTIVE:    VITAL SIGNS:  ICU Vital Signs Last 24 Hrs  T(C): 36.7 (2023 08:00), Max: 36.9 (2023 12:00)  T(F): 98.1 (2023 08:00), Max: 98.4 (2023 12:00)  HR: 90 (2023 11:00) (71 - 112)  BP: 120/66 (2023 11:00) (98/60 - 136/64)  BP(mean): 86 (2023 11:00) (74 - 95)  ABP: --  ABP(mean): --  RR: 29 (2023 11:00) (21 - 35)  SpO2: 92% (2023 11:00) (90% - 96%)    O2 Parameters below as of 2023 10:00  Patient On (Oxygen Delivery Method): nasal cannula, high flow               @ 07:  -   @ 07:00  --------------------------------------------------------  IN: 1250 mL / OUT: 1700 mL / NET: -450 mL     @ 07:01  -   @ 11:40  --------------------------------------------------------  IN: 366.6 mL / OUT: 550 mL / NET: -183.4 mL      CAPILLARY BLOOD GLUCOSE          PHYSICAL EXAM:  General: NAD, elderly gentleman with HFNC in place.  HEENT: NCAT  Respiratory: CTA b/l, no wheezing, rhonchi, rales  Cardiovascular: irregular rhythm  Abdomen: soft, NT/ND  Extremities: no LE edema or calf tenderness.  Neurology: awake and alert, nonfocal    MEDICATIONS:  MEDICATIONS  (STANDING):  albuterol/ipratropium for Nebulization 3 milliLiter(s) Nebulizer every 6 hours  apixaban 5 milliGRAM(s) Oral every 12 hours  atorvastatin 10 milliGRAM(s) Oral at bedtime  azithromycin  IVPB      azithromycin  IVPB 500 milliGRAM(s) IV Intermittent every 24 hours  benzonatate 100 milliGRAM(s) Oral every 8 hours  cefepime   IVPB 2000 milliGRAM(s) IV Intermittent every 8 hours  clopidogrel Tablet 75 milliGRAM(s) Oral daily  diltiazem    Tablet 60 milliGRAM(s) Oral every 6 hours  furosemide   Injectable 40 milliGRAM(s) IV Push two times a day  gabapentin 100 milliGRAM(s) Oral two times a day  hydrocodone/homatropine Syrup 5 milliLiter(s) Oral every 8 hours  isosorbide   mononitrate ER Tablet (IMDUR) 60 milliGRAM(s) Oral daily  metoprolol tartrate 50 milliGRAM(s) Oral every 6 hours    MEDICATIONS  (PRN):  bisacodyl 5 milliGRAM(s) Oral every 12 hours PRN Constipation  guaiFENesin Oral Liquid (Sugar-Free) 200 milliGRAM(s) Oral every 6 hours PRN Cough  metoclopramide Injectable 5 milliGRAM(s) IV Push every 8 hours PRN nausea/vomiting      ALLERGIES:  Allergies    Levaquin (Unknown)  penicillin (Rash)    Intolerances        LABS:                        14.1   20.16 )-----------( 414      ( 2023 05:40 )             41.7     01-12    132<L>  |  86<L>  |  31<H>  ----------------------------<  154<H>  3.4<L>   |  38<H>  |  1.10    Ca    8.8      2023 05:40  Phos  1.8     12  Mg     2.0     -12    TPro  7.3  /  Alb  2.2<L>  /  TBili  2.5<H>  /  DBili  x   /  AST  19  /  ALT  16  /  AlkPhos  110  -    PT/INR - ( 2023 05:40 )   PT: 29.8 sec;   INR: 2.52 ratio         PTT - ( 2023 05:40 )  PTT:36.1 sec  Urinalysis Basic - ( 2023 18:00 )    Color: Yellow / Appearance: Clear / S.015 / pH: x  Gluc: x / Ketone: Negative  / Bili: Negative / Urobili: Negative   Blood: x / Protein: 15 / Nitrite: Negative   Leuk Esterase: Negative / RBC: 0-2 /HPF / WBC 0-2   Sq Epi: x / Non Sq Epi: Occasional / Bacteria: Occasional      RADIOLOGY & ADDITIONAL TESTS: Reviewed.   EXAM:  CT ANGIO CHEST PULM ART [facility]                          PROCEDURE DATE:  01/10/2023          INTERPRETATION:  HISTORY: Admitting Dxs: J10.1 FLU DUE TO OTH IDENT   INFLUENZA VIRUS W OTH RESP MANIFEST    EXAMINATION: CTA CHEST was performed for evaluation of the pulmonary   arteries. Multiplanar reformatted images and MIPS were acquired.  CONTRAST/COMPLICATIONS:  IV Contrast: Omnipaque 350  70 cc administered   30 cc discarded  Oral Contrast: NONE  Complications: None reported at time of study completion    COMPARISON: No prior CT chest comparison available.    FINDINGS:    PULMONARY ARTERIES: No pulmonary embolism.    MEDIASTINUM: The right atrium is qualitatively dilated. Post CABG. No   pericardial effusion. Thoracic aorta normal caliber.  Mildly enlarged   mediastinal and bilateral hilar nodes.    AIRWAYS, LUNGS, PLEURA: Mild central airways secretions. Tree-in-bud   nodular opacities involving all lung lobes, but most severe throughout   the bilateral lower lobes. Confluent consolidation at the left greater   than right lung bases. No pleural effusion.    IMAGED ABDOMEN: Right renal parapelvic cysts.    SOFT TISSUES: Unremarkable.    BONES: Sternal wires. Age indeterminate loss of height of T6 vertebral   body.      IMPRESSION:.    No pulmonary embolism.    Diffuse tree-in-bud nodular opacities and bibasilar consolidation   compatible with infection.    Mediastinal and bilateral hilar lymphadenopathy, likely reactive.      CENTRAL LINE: N        DATE INSERTED:             REMOVE: N  RUBY: N                       DATE INSERTED:              REMOVE: Y/N  A-LINE: N                       DATE INSERTED:              REMOVE: Y/N      GLOBAL ISSUE/BEST PRACTICE  Analgesia: N  Sedation: N  HOB elevation: yes  Stress ulcer prophylaxis: N  VTE prophylaxis: Y  Glycemic control: N  Nutrition: Y    CODE STATUS: Full Code   INTERVAL HPI/OVERNIGHT EVENTS: No acute events overnight. Pt was afebrile, hemodynamically stable. On HFNC at 40 L 60%.    SUBJECTIVE: Patient seen and examined at bedside. He is awake and alert. Endorses cough. Complains of right upper back pain, not worse with deep breaths. Denies CP or SOB.     ROS:  CV: Denies chest pain  Resp: + cough. Denies SOB  MSK: + back pain     OBJECTIVE:    VITAL SIGNS:  ICU Vital Signs Last 24 Hrs  T(C): 36.7 (2023 08:00), Max: 36.9 (2023 12:00)  T(F): 98.1 (2023 08:00), Max: 98.4 (2023 12:00)  HR: 90 (2023 11:00) (71 - 112)  BP: 120/66 (2023 11:00) (98/60 - 136/64)  BP(mean): 86 (2023 11:00) (74 - 95)  ABP: --  ABP(mean): --  RR: 29 (2023 11:00) (21 - 35)  SpO2: 92% (2023 11:00) (90% - 96%)    O2 Parameters below as of 2023 10:00  Patient On (Oxygen Delivery Method): nasal cannula, high flow               @ 07:  -   @ 07:00  --------------------------------------------------------  IN: 1250 mL / OUT: 1700 mL / NET: -450 mL     @ 07:01  -   @ 11:40  --------------------------------------------------------  IN: 366.6 mL / OUT: 550 mL / NET: -183.4 mL      CAPILLARY BLOOD GLUCOSE          PHYSICAL EXAM:  General: NAD, elderly gentleman with HFNC in place.  HEENT: NCAT  Respiratory: CTA b/l, no wheezing, rhonchi, rales  Cardiovascular: irregular rhythm  Abdomen: soft, NT/ND  Extremities: no LE edema or calf tenderness.  Neurology: awake and alert    MEDICATIONS:  MEDICATIONS  (STANDING):  albuterol/ipratropium for Nebulization 3 milliLiter(s) Nebulizer every 6 hours  apixaban 5 milliGRAM(s) Oral every 12 hours  atorvastatin 10 milliGRAM(s) Oral at bedtime  azithromycin  IVPB      azithromycin  IVPB 500 milliGRAM(s) IV Intermittent every 24 hours  benzonatate 100 milliGRAM(s) Oral every 8 hours  cefepime   IVPB 2000 milliGRAM(s) IV Intermittent every 8 hours  clopidogrel Tablet 75 milliGRAM(s) Oral daily  diltiazem    Tablet 60 milliGRAM(s) Oral every 6 hours  furosemide   Injectable 40 milliGRAM(s) IV Push two times a day  gabapentin 100 milliGRAM(s) Oral two times a day  hydrocodone/homatropine Syrup 5 milliLiter(s) Oral every 8 hours  isosorbide   mononitrate ER Tablet (IMDUR) 60 milliGRAM(s) Oral daily  metoprolol tartrate 50 milliGRAM(s) Oral every 6 hours    MEDICATIONS  (PRN):  bisacodyl 5 milliGRAM(s) Oral every 12 hours PRN Constipation  guaiFENesin Oral Liquid (Sugar-Free) 200 milliGRAM(s) Oral every 6 hours PRN Cough  metoclopramide Injectable 5 milliGRAM(s) IV Push every 8 hours PRN nausea/vomiting      ALLERGIES:  Allergies    Levaquin (Unknown)  penicillin (Rash)    Intolerances        LABS:                        14.1   20.16 )-----------( 414      ( 2023 05:40 )             41.7     -    132<L>  |  86<L>  |  31<H>  ----------------------------<  154<H>  3.4<L>   |  38<H>  |  1.10    Ca    8.8      2023 05:40  Phos  1.8       Mg     2.0         TPro  7.3  /  Alb  2.2<L>  /  TBili  2.5<H>  /  DBili  x   /  AST  19  /  ALT  16  /  AlkPhos  110  -    PT/INR - ( 2023 05:40 )   PT: 29.8 sec;   INR: 2.52 ratio         PTT - ( 2023 05:40 )  PTT:36.1 sec  Urinalysis Basic - ( 2023 18:00 )    Color: Yellow / Appearance: Clear / S.015 / pH: x  Gluc: x / Ketone: Negative  / Bili: Negative / Urobili: Negative   Blood: x / Protein: 15 / Nitrite: Negative   Leuk Esterase: Negative / RBC: 0-2 /HPF / WBC 0-2   Sq Epi: x / Non Sq Epi: Occasional / Bacteria: Occasional      RADIOLOGY & ADDITIONAL TESTS: Reviewed.   EXAM:  CT ANGIO CHEST PULM ART [facility]                          PROCEDURE DATE:  01/10/2023          INTERPRETATION:  HISTORY: Admitting Dxs: J10.1 FLU DUE TO OTH IDENT   INFLUENZA VIRUS W OTH RESP MANIFEST    EXAMINATION: CTA CHEST was performed for evaluation of the pulmonary   arteries. Multiplanar reformatted images and MIPS were acquired.  CONTRAST/COMPLICATIONS:  IV Contrast: Omnipaque 350  70 cc administered   30 cc discarded  Oral Contrast: NONE  Complications: None reported at time of study completion    COMPARISON: No prior CT chest comparison available.    FINDINGS:    PULMONARY ARTERIES: No pulmonary embolism.    MEDIASTINUM: The right atrium is qualitatively dilated. Post CABG. No   pericardial effusion. Thoracic aorta normal caliber.  Mildly enlarged   mediastinal and bilateral hilar nodes.    AIRWAYS, LUNGS, PLEURA: Mild central airways secretions. Tree-in-bud   nodular opacities involving all lung lobes, but most severe throughout   the bilateral lower lobes. Confluent consolidation at the left greater   than right lung bases. No pleural effusion.    IMAGED ABDOMEN: Right renal parapelvic cysts.    SOFT TISSUES: Unremarkable.    BONES: Sternal wires. Age indeterminate loss of height of T6 vertebral   body.      IMPRESSION:.    No pulmonary embolism.    Diffuse tree-in-bud nodular opacities and bibasilar consolidation   compatible with infection.    Mediastinal and bilateral hilar lymphadenopathy, likely reactive.      CENTRAL LINE: N        DATE INSERTED:             REMOVE: N  RUBY: N                       DATE INSERTED:              REMOVE: Y/N  A-LINE: N                       DATE INSERTED:              REMOVE: Y/N      GLOBAL ISSUE/BEST PRACTICE  Analgesia: N  Sedation: N  HOB elevation: yes  Stress ulcer prophylaxis: N  VTE prophylaxis: Y  Glycemic control: N  Nutrition: Y    CODE STATUS: Full Code

## 2023-01-12 NOTE — PROGRESS NOTE ADULT - ASSESSMENT
83 y/o m w/ PMH od CAD s/p stent, s/p CABG, HTN, HLD, CHF p/w shortness of breath and cough    o2 fio2 titration  vs noted  on ABX  labs reviewed  ABG noted  ID eval noted      on tamiflu for FLU  acap prn  cough rx regimen prn  extensive and advanced Heart Disease - cvs rx regimen  judicious diuresis  I and O  AF - on AC - rate and rhythm control  cardio follow up  CXR clear on Admission  pulm congestion - multifactorial - FLU - Bronchitis - Pulm Edema -   cont NEBS - mucociliary clearance, mucinex, chest PT, enc cough, cvs rx regimen  monitor VS and Sat  keep sat > 88 pct  GOC discussion  spoke with daughters  FLU isolation

## 2023-01-13 LAB
ALBUMIN SERPL ELPH-MCNC: 2.1 G/DL — LOW (ref 3.3–5)
ALP SERPL-CCNC: 105 U/L — SIGNIFICANT CHANGE UP (ref 40–120)
ALT FLD-CCNC: 13 U/L — SIGNIFICANT CHANGE UP (ref 12–78)
ANION GAP SERPL CALC-SCNC: 5 MMOL/L — SIGNIFICANT CHANGE UP (ref 5–17)
AST SERPL-CCNC: 18 U/L — SIGNIFICANT CHANGE UP (ref 15–37)
BASOPHILS # BLD AUTO: 0.09 K/UL — SIGNIFICANT CHANGE UP (ref 0–0.2)
BASOPHILS NFR BLD AUTO: 0.4 % — SIGNIFICANT CHANGE UP (ref 0–2)
BILIRUB SERPL-MCNC: 2.2 MG/DL — HIGH (ref 0.2–1.2)
BUN SERPL-MCNC: 29 MG/DL — HIGH (ref 7–23)
CALCIUM SERPL-MCNC: 8.9 MG/DL — SIGNIFICANT CHANGE UP (ref 8.5–10.1)
CHLORIDE SERPL-SCNC: 88 MMOL/L — LOW (ref 96–108)
CO2 SERPL-SCNC: 39 MMOL/L — HIGH (ref 22–31)
CREAT SERPL-MCNC: 1 MG/DL — SIGNIFICANT CHANGE UP (ref 0.5–1.3)
EGFR: 76 ML/MIN/1.73M2 — SIGNIFICANT CHANGE UP
EOSINOPHIL # BLD AUTO: 0.07 K/UL — SIGNIFICANT CHANGE UP (ref 0–0.5)
EOSINOPHIL NFR BLD AUTO: 0.3 % — SIGNIFICANT CHANGE UP (ref 0–6)
GLUCOSE SERPL-MCNC: 190 MG/DL — HIGH (ref 70–99)
HCT VFR BLD CALC: 41.8 % — SIGNIFICANT CHANGE UP (ref 39–50)
HGB BLD-MCNC: 14 G/DL — SIGNIFICANT CHANGE UP (ref 13–17)
IMM GRANULOCYTES NFR BLD AUTO: 4.1 % — HIGH (ref 0–0.9)
LYMPHOCYTES # BLD AUTO: 0.81 K/UL — LOW (ref 1–3.3)
LYMPHOCYTES # BLD AUTO: 4 % — LOW (ref 13–44)
MAGNESIUM SERPL-MCNC: 2.1 MG/DL — SIGNIFICANT CHANGE UP (ref 1.6–2.6)
MCHC RBC-ENTMCNC: 28.5 PG — SIGNIFICANT CHANGE UP (ref 27–34)
MCHC RBC-ENTMCNC: 33.5 GM/DL — SIGNIFICANT CHANGE UP (ref 32–36)
MCV RBC AUTO: 85.1 FL — SIGNIFICANT CHANGE UP (ref 80–100)
MONOCYTES # BLD AUTO: 1.08 K/UL — HIGH (ref 0–0.9)
MONOCYTES NFR BLD AUTO: 5.4 % — SIGNIFICANT CHANGE UP (ref 2–14)
NEUTROPHILS # BLD AUTO: 17.27 K/UL — HIGH (ref 1.8–7.4)
NEUTROPHILS NFR BLD AUTO: 85.8 % — HIGH (ref 43–77)
NRBC # BLD: 0 /100 WBCS — SIGNIFICANT CHANGE UP (ref 0–0)
PHOSPHATE SERPL-MCNC: 2 MG/DL — LOW (ref 2.5–4.5)
PLATELET # BLD AUTO: 437 K/UL — HIGH (ref 150–400)
POTASSIUM SERPL-MCNC: 3.3 MMOL/L — LOW (ref 3.5–5.3)
POTASSIUM SERPL-SCNC: 3.3 MMOL/L — LOW (ref 3.5–5.3)
PROT SERPL-MCNC: 7.6 G/DL — SIGNIFICANT CHANGE UP (ref 6–8.3)
RBC # BLD: 4.91 M/UL — SIGNIFICANT CHANGE UP (ref 4.2–5.8)
RBC # FLD: 15.2 % — HIGH (ref 10.3–14.5)
S PNEUM AG UR QL: NEGATIVE — SIGNIFICANT CHANGE UP
SODIUM SERPL-SCNC: 132 MMOL/L — LOW (ref 135–145)
WBC # BLD: 20.14 K/UL — HIGH (ref 3.8–10.5)
WBC # FLD AUTO: 20.14 K/UL — HIGH (ref 3.8–10.5)

## 2023-01-13 PROCEDURE — 99233 SBSQ HOSP IP/OBS HIGH 50: CPT

## 2023-01-13 PROCEDURE — 99291 CRITICAL CARE FIRST HOUR: CPT | Mod: GC

## 2023-01-13 PROCEDURE — 99291 CRITICAL CARE FIRST HOUR: CPT

## 2023-01-13 RX ORDER — SENNA PLUS 8.6 MG/1
2 TABLET ORAL AT BEDTIME
Refills: 0 | Status: DISCONTINUED | OUTPATIENT
Start: 2023-01-13 | End: 2023-01-24

## 2023-01-13 RX ORDER — LIDOCAINE 4 G/100G
2 CREAM TOPICAL ONCE
Refills: 0 | Status: COMPLETED | OUTPATIENT
Start: 2023-01-13 | End: 2023-01-13

## 2023-01-13 RX ORDER — POTASSIUM CHLORIDE 20 MEQ
40 PACKET (EA) ORAL EVERY 4 HOURS
Refills: 0 | Status: DISCONTINUED | OUTPATIENT
Start: 2023-01-13 | End: 2023-01-13

## 2023-01-13 RX ORDER — FUROSEMIDE 40 MG
40 TABLET ORAL DAILY
Refills: 0 | Status: DISCONTINUED | OUTPATIENT
Start: 2023-01-13 | End: 2023-01-17

## 2023-01-13 RX ORDER — MORPHINE SULFATE 50 MG/1
2 CAPSULE, EXTENDED RELEASE ORAL ONCE
Refills: 0 | Status: DISCONTINUED | OUTPATIENT
Start: 2023-01-13 | End: 2023-01-13

## 2023-01-13 RX ORDER — POTASSIUM CHLORIDE 20 MEQ
40 PACKET (EA) ORAL ONCE
Refills: 0 | Status: COMPLETED | OUTPATIENT
Start: 2023-01-13 | End: 2023-01-13

## 2023-01-13 RX ORDER — POLYETHYLENE GLYCOL 3350 17 G/17G
17 POWDER, FOR SOLUTION ORAL DAILY
Refills: 0 | Status: DISCONTINUED | OUTPATIENT
Start: 2023-01-13 | End: 2023-01-24

## 2023-01-13 RX ORDER — POTASSIUM PHOSPHATE, MONOBASIC POTASSIUM PHOSPHATE, DIBASIC 236; 224 MG/ML; MG/ML
15 INJECTION, SOLUTION INTRAVENOUS ONCE
Refills: 0 | Status: COMPLETED | OUTPATIENT
Start: 2023-01-13 | End: 2023-01-13

## 2023-01-13 RX ORDER — ACETAMINOPHEN 500 MG
1000 TABLET ORAL ONCE
Refills: 0 | Status: COMPLETED | OUTPATIENT
Start: 2023-01-13 | End: 2023-01-13

## 2023-01-13 RX ADMIN — Medication 100 MILLIGRAM(S): at 05:02

## 2023-01-13 RX ADMIN — SENNA PLUS 2 TABLET(S): 8.6 TABLET ORAL at 21:24

## 2023-01-13 RX ADMIN — LIDOCAINE 2 PATCH: 4 CREAM TOPICAL at 19:14

## 2023-01-13 RX ADMIN — Medication 50 MILLIGRAM(S): at 17:04

## 2023-01-13 RX ADMIN — CEFEPIME 100 MILLIGRAM(S): 1 INJECTION, POWDER, FOR SOLUTION INTRAMUSCULAR; INTRAVENOUS at 21:24

## 2023-01-13 RX ADMIN — Medication 60 MILLIGRAM(S): at 05:03

## 2023-01-13 RX ADMIN — CEFEPIME 100 MILLIGRAM(S): 1 INJECTION, POWDER, FOR SOLUTION INTRAMUSCULAR; INTRAVENOUS at 14:24

## 2023-01-13 RX ADMIN — Medication 50 MILLIGRAM(S): at 05:03

## 2023-01-13 RX ADMIN — Medication 40 MILLIGRAM(S): at 05:02

## 2023-01-13 RX ADMIN — Medication 1000 MILLIGRAM(S): at 18:00

## 2023-01-13 RX ADMIN — Medication 3 MILLILITER(S): at 00:47

## 2023-01-13 RX ADMIN — POTASSIUM PHOSPHATE, MONOBASIC POTASSIUM PHOSPHATE, DIBASIC 62.5 MILLIMOLE(S): 236; 224 INJECTION, SOLUTION INTRAVENOUS at 08:58

## 2023-01-13 RX ADMIN — APIXABAN 5 MILLIGRAM(S): 2.5 TABLET, FILM COATED ORAL at 17:04

## 2023-01-13 RX ADMIN — MORPHINE SULFATE 2 MILLIGRAM(S): 50 CAPSULE, EXTENDED RELEASE ORAL at 07:00

## 2023-01-13 RX ADMIN — GABAPENTIN 100 MILLIGRAM(S): 400 CAPSULE ORAL at 05:02

## 2023-01-13 RX ADMIN — CEFEPIME 100 MILLIGRAM(S): 1 INJECTION, POWDER, FOR SOLUTION INTRAMUSCULAR; INTRAVENOUS at 05:02

## 2023-01-13 RX ADMIN — Medication 60 MILLIGRAM(S): at 11:36

## 2023-01-13 RX ADMIN — GABAPENTIN 100 MILLIGRAM(S): 400 CAPSULE ORAL at 17:07

## 2023-01-13 RX ADMIN — MORPHINE SULFATE 2 MILLIGRAM(S): 50 CAPSULE, EXTENDED RELEASE ORAL at 04:42

## 2023-01-13 RX ADMIN — LIDOCAINE 2 PATCH: 4 CREAM TOPICAL at 08:46

## 2023-01-13 RX ADMIN — Medication 3 MILLILITER(S): at 08:02

## 2023-01-13 RX ADMIN — Medication 400 MILLIGRAM(S): at 17:26

## 2023-01-13 RX ADMIN — Medication 60 MILLIGRAM(S): at 23:17

## 2023-01-13 RX ADMIN — Medication 100 MILLIGRAM(S): at 21:24

## 2023-01-13 RX ADMIN — Medication 50 MILLIGRAM(S): at 23:17

## 2023-01-13 RX ADMIN — Medication 40 MILLIEQUIVALENT(S): at 08:58

## 2023-01-13 RX ADMIN — Medication 50 MILLIGRAM(S): at 11:37

## 2023-01-13 RX ADMIN — Medication 60 MILLIGRAM(S): at 17:04

## 2023-01-13 RX ADMIN — Medication 100 MILLIGRAM(S): at 14:24

## 2023-01-13 RX ADMIN — MORPHINE SULFATE 2 MILLIGRAM(S): 50 CAPSULE, EXTENDED RELEASE ORAL at 05:11

## 2023-01-13 RX ADMIN — CLOPIDOGREL BISULFATE 75 MILLIGRAM(S): 75 TABLET, FILM COATED ORAL at 11:37

## 2023-01-13 RX ADMIN — MORPHINE SULFATE 2 MILLIGRAM(S): 50 CAPSULE, EXTENDED RELEASE ORAL at 08:00

## 2023-01-13 RX ADMIN — APIXABAN 5 MILLIGRAM(S): 2.5 TABLET, FILM COATED ORAL at 05:02

## 2023-01-13 RX ADMIN — ISOSORBIDE MONONITRATE 60 MILLIGRAM(S): 60 TABLET, EXTENDED RELEASE ORAL at 11:37

## 2023-01-13 RX ADMIN — ATORVASTATIN CALCIUM 10 MILLIGRAM(S): 80 TABLET, FILM COATED ORAL at 21:24

## 2023-01-13 NOTE — PROGRESS NOTE ADULT - ASSESSMENT
83 y/o m w/ PMH od CAD s/p stent, s/p CABG, HTN, HLD p/w shortness of breath and cough, found to have a fib, concern for acute CHF exacerbation, + influenza.  1/10- patient noted to be significantly hypoxic despite increased supplemental oxygen. He was placed n Vent mask and then NRM with only slight improvement in saturation Lung exam with decreased air entry; CT angio ordered- PE rule out however there was evidence of consolidations concerning for infection. ABG reviewed   Broad spectrum IV antibiotics ordered.   Due to progressive hypoxia and concern for risk of decompensation, ICU consulted.       1. Hypoxic respiratory failure  2. Influenza A infection with suspected superimposed Bacterial infection  3. Congestive heart failure  4, AFIB - with RVR  5. JANES  6. CAD- s/p coronary stent, s/p CABG; - continue asa, Plavix statin  7. Essential HTN- cont pt on atenolol, Imdur Norvasc - holding losartan in setting of JANSE  8. Hyperlipidemia- continue statins

## 2023-01-13 NOTE — PROGRESS NOTE ADULT - ASSESSMENT
* Prerenal azotemia, CKD  * Acute HF  * influenza PNA  * New a.fib with RVR  * Hypokalemia      Stable renal indices. Potassium supplementation. To continue current meds.  Will follow electrolytes and renal function trend.

## 2023-01-13 NOTE — PROGRESS NOTE ADULT - NS ATTEST RISK PROBLEM GEN_ALL_CORE FT
acute illness resulting in acute respiratory failure now requiring Hi flow oxygen in ICU.
Acute illness with acute respiratory failure
Critical illness resulting in acute respiratory failure;  requiring ICU care; at risk for sudden decompensation.
Critical illness requiring ICU care. He remains at risk of cardiopulmonary decompensation and needs continued close monitoring in ICU.
Acute illness with Influenza and rapid atrial fibrillation; at risk of sudden cardiac decompensation

## 2023-01-13 NOTE — PROGRESS NOTE ADULT - SUBJECTIVE AND OBJECTIVE BOX
[facility]  INFECTIOUS DISEASES   [address] [place] [address]  Tel: [phone]     Fax: [phone]  ========================================================  MD Radha Hutchison Kaushal, MD Cho, Michelle, MD Sunjit, Jaspal, MD  ========================================================    [facility]-[id number]  OPAL NUGENT     Follow up: Pneumonia?, Respiratory failure, Influenza     In ICU, on High flow NC 60L/60%, with good sat. Comfortable.   No fever. Sitting on chair. Son in law at the bedside.     PAST MEDICAL & SURGICAL HISTORY:  HLD (hyperlipidemia)  HTN (hypertension)  CAD (coronary artery disease)  S/P coronary artery stent placement  S/P CABG (coronary artery bypass graft)    Social Hx: Former, smoked more 50years ago, no ETOH or drugs     FAMILY HISTORY:  Family history of cardiovascular disease (Sibling)    Family history of uterine cancer (Mother)    Allergies  Levaquin (Unknown)  penicillin (Rash)    Antibiotics:  MEDICATIONS  (STANDING):  albuterol/ipratropium for Nebulization 3 milliLiter(s) Nebulizer every 6 hours  apixaban 5 milliGRAM(s) Oral every 12 hours  atorvastatin 10 milliGRAM(s) Oral at bedtime  azithromycin  IVPB      azithromycin  IVPB 500 milliGRAM(s) IV Intermittent every 24 hours  benzonatate 100 milliGRAM(s) Oral every 8 hours  cefepime   IVPB      cefepime   IVPB 1000 milliGRAM(s) IV Intermittent every 8 hours  clopidogrel Tablet 75 milliGRAM(s) Oral daily  diltiazem    Tablet 60 milliGRAM(s) Oral every 6 hours  furosemide   Injectable 40 milliGRAM(s) IV Push two times a day  gabapentin 100 milliGRAM(s) Oral two times a day  hydrocodone/homatropine Syrup 5 milliLiter(s) Oral every 8 hours  isosorbide   mononitrate ER Tablet (IMDUR) 60 milliGRAM(s) Oral daily  metoprolol tartrate 50 milliGRAM(s) Oral every 6 hours  oseltamivir 30 milliGRAM(s) Oral two times a day    MEDICATIONS  (PRN):  bisacodyl 5 milliGRAM(s) Oral every 12 hours PRN Constipation  guaiFENesin Oral Liquid (Sugar-Free) 200 milliGRAM(s) Oral every 6 hours PRN Cough  metoclopramide Injectable 5 milliGRAM(s) IV Push every 8 hours PRN nausea/vomiting     REVIEW OF SYSTEMS:  CONSTITUTIONAL:  No Fever or chills  HEENT:  No diplopia or blurred vision.  No sore throat or runny nose.  CARDIOVASCULAR:  No chest pain   RESPIRATORY:  +cough, + shortness of breath, + sputum  GASTROINTESTINAL:  No nausea, vomiting or diarrhea.  GENITOURINARY:  No dysuria, frequency or urgency. No Blood in urine  MUSCULOSKELETAL:  no joint aches, no muscle pain  SKIN:  No change in skin, hair or nails.  NEUROLOGIC:  No paresthesias or weakness.  PSYCHIATRIC:  No disorder of thought or mood.  ENDOCRINE:  No heat or cold intolerance, polyuria or polydipsia.  HEMATOLOGICAL:  No easy bruising or bleeding.     Physical Exam:  Vital Signs Last 24 Hrs  T(C): 36.7 (13 Jan 2023 12:17), Max: 37.7 (13 Jan 2023 09:03)  T(F): 98.1 (13 Jan 2023 12:17), Max: 99.8 (13 Jan 2023 09:03)  HR: 89 (13 Jan 2023 13:00) (48 - 110)  BP: 100/58 (13 Jan 2023 13:00) (94/56 - 147/77)  BP(mean): 73 (13 Jan 2023 13:00) (68 - 106)  RR: 30 (13 Jan 2023 13:00) (17 - 35)  SpO2: 93% (13 Jan 2023 13:00) (89% - 96%)  Parameters below as of 13 Jan 2023 12:12  Patient On (Oxygen Delivery Method): nasal cannula, high flow,60% @ 60LPM  GEN: NAD, HFNC in place   HEENT: normocephalic and atraumatic. EOMI. PERRL.    NECK: Supple.  No lymphadenopathy   LUNGS: Rales diffuse bilateral lung fields, no wheezing  HEART: Regular rate and rhythm without murmur.  ABDOMEN: Soft, nontender, and nondistended.  Positive bowel sounds.    : No CVA tenderness  EXTREMITIES: Without edema.  NEUROLOGIC: grossly intact.  PSYCHIATRIC: Appropriate affect .  SKIN: No rash     Labs:                        14.0   20.14 )-----------( 437      ( 13 Jan 2023 06:15 )             41.8     01-13    132<L>  |  88<L>  |  29<H>  ----------------------------<  190<H>  3.3<L>   |  39<H>  |  1.00    Ca    8.9      13 Jan 2023 06:15  Phos  2.0     01-13  Mg     2.1     01-13    TPro  7.6  /  Alb  2.1<L>  /  TBili  2.2<H>  /  DBili  x   /  AST  18  /  ALT  13  /  AlkPhos  105  01-13    Culture - Sputum (collected 01-12-23 @ 11:00)  Source: .Sputum Sputum  Gram Stain (01-12-23 @ 23:47):    Few polymorphonuclear leukocytes per low power field    Rare Squamous epithelial cells per low power field    Few Gram Variable Rods per oil power field    Culture - Sputum (collected 01-11-23 @ 18:00)  Source: .Sputum Sputum  Gram Stain (01-12-23 @ 10:14):    Numerous polymorphonuclear leukocytes per low power field    No Squamous epithelial cells per low power field    Few Gram Negative Rods seen per oil power field    Culture - Blood (collected 01-10-23 @ 22:05)  Source: .Blood Blood-Peripheral    Culture - Blood (collected 01-10-23 @ 22:00)  Source: .Blood Blood-Peripheral    WBC Count: 20.14 K/uL (01-13-23 @ 06:15)  WBC Count: 20.16 K/uL (01-12-23 @ 05:40)  WBC Count: 13.21 K/uL (01-11-23 @ 05:30)  WBC Count: 13.63 K/uL (01-10-23 @ 21:15)  WBC Count: 8.25 K/uL (01-09-23 @ 06:08)    Creatinine, Serum: 1.00 mg/dL (01-13-23 @ 06:15)  Creatinine, Serum: 1.10 mg/dL (01-12-23 @ 05:40)  Creatinine, Serum: 1.00 mg/dL (01-11-23 @ 05:30)  Creatinine, Serum: 1.20 mg/dL (01-10-23 @ 21:15)  Creatinine, Serum: 1.10 mg/dL (01-09-23 @ 06:08)    Sedimentation Rate, Erythrocyte: 74 mm/hr (01-11-23 @ 05:30)    Procalcitonin, Serum: 0.98 ng/mL (01-10-23 @ 22:00)     COVID-19 PCR: NotDetec (01-10-23 @ 21:30)  SARS-CoV-2: NotDetec (01-10-23 @ 20:25)  SARS-CoV-2: NotDetec (01-07-23 @ 13:50)    All imaging and other data have been reviewed.  < from: CT Angio Chest PE Protocol w/ IV Cont (01.10.23 @ 15:47) >  IMPRESSION:.  No pulmonary embolism.  Diffuse tree-in-bud nodular opacities and bibasilar consolidation   compatible with infection.  Mediastinal and bilateral hilar lymphadenopathy, likely reactive.    Assessment and Plan:    83 y/o m w/ PMH od CAD s/p stent, s/p CABG, HTN, HLD, CHF presented to [facility] ED 1/7/23 for shortness of breath and wheezing. He was found to be influenza positive and with CHF exacerbation and new onset afib. Started on tamiflu and IV lasix, eliquis and metoprolol for afib. Now transferred to ICU for worsening acute hypoxic respiratory failure. CTA negative for PE but showing likely superimposed bacterial PNA. ID consultation requested.   MRSA/MSSA nares PCR negative  Legionella negative   Acute CHF exacerbation, found to be influenza A positive and now with worsening hypoxia. Procal 0.98 Likely due to superimposed bacterial pna    #Acute hypoxic respiratory failure  #influenza A  #Superimposed bacterial pna    - Will follow sputum culture growing Pseudomonas will follow sensitivity   - Continue cefepime  - Will modify treatment based on sputum culture sensitivity    Will follow.    Murphy Barrera MD  Division of Infectious Diseases   Please call ID service at [phone] with any question.      35 minutes spent on total encounter assessing patient, examination, chart review, counseling and coordinating care by the attending physician/nurse/care manager.

## 2023-01-13 NOTE — PROGRESS NOTE ADULT - SUBJECTIVE AND OBJECTIVE BOX
MEDICAL ATTENDING NOTE    Patient is a 82y old  Male who presents with a chief complaint of CHF, A fib, Flu (2023 14:17)      INTERVAL HPI/OVERNIGHT EVENTS: no acute issues reported    MEDICATIONS  (STANDING):  apixaban 5 milliGRAM(s) Oral every 12 hours  atorvastatin 10 milliGRAM(s) Oral at bedtime  benzonatate 100 milliGRAM(s) Oral every 8 hours  cefepime   IVPB 2000 milliGRAM(s) IV Intermittent every 8 hours  clopidogrel Tablet 75 milliGRAM(s) Oral daily  diltiazem    Tablet 60 milliGRAM(s) Oral every 6 hours  furosemide    Tablet 40 milliGRAM(s) Oral daily  gabapentin 100 milliGRAM(s) Oral two times a day  isosorbide   mononitrate ER Tablet (IMDUR) 60 milliGRAM(s) Oral daily  metoprolol tartrate 50 milliGRAM(s) Oral every 6 hours  polyethylene glycol 3350 17 Gram(s) Oral daily  senna 2 Tablet(s) Oral at bedtime    MEDICATIONS  (PRN):  bisacodyl 5 milliGRAM(s) Oral every 12 hours PRN Constipation  guaiFENesin Oral Liquid (Sugar-Free) 200 milliGRAM(s) Oral every 6 hours PRN Cough      __________________________________________________  ----------------------------------------------------------------------------------  REVIEW OF SYSTEMS: no chest pain, no SOB, No fever, no HA      Vital Signs Last 24 Hrs  T(C): 36.7 (2023 12:17), Max: 37.7 (2023 09:03)  T(F): 98.1 (2023 12:17), Max: 99.8 (2023 09:03)  HR: 96 (2023 14:00) (67 - 110)  BP: 115/61 (2023 14:00) (94/56 - 147/77)  BP(mean): 82 (2023 14:00) (68 - 106)  RR: 25 (2023 14:00) (17 - 35)  SpO2: 94% (2023 14:00) (89% - 96%)    Parameters below as of 2023 12:12  Patient On (Oxygen Delivery Method): nasal cannula, high flow,60% @ 60LPM        _________________  PHYSICAL EXAM:  ---------------------------   NAD; Normocephalic;   LUNGS - no wheezing  HEART: S1 S2+   ABDOMEN: Soft, Nontender, non distended  EXTREMITIES: no cyanosis; no edema  NERVOUS SYSTEM:  Awake and alert; follows commands, moves extremities; no gross focal neuro deficits    _________________________________________________  LABS:                        14.0   20.14 )-----------( 437      ( 2023 06:15 )             41.8     01-13    132<L>  |  88<L>  |  29<H>  ----------------------------<  190<H>  3.3<L>   |  39<H>  |  1.00    Ca    8.9      2023 06:15  Phos  2.0     -  Mg     2.1     -    TPro  7.6  /  Alb  2.1<L>  /  TBili  2.2<H>  /  DBili  x   /  AST  18  /  ALT  13  /  AlkPhos  105  -13    PT/INR - ( 2023 05:40 )   PT: 29.8 sec;   INR: 2.52 ratio         PTT - ( 2023 05:40 )  PTT:36.1 sec  Urinalysis Basic - ( 2023 18:00 )    Color: Yellow / Appearance: Clear / S.015 / pH: x  Gluc: x / Ketone: Negative  / Bili: Negative / Urobili: Negative   Blood: x / Protein: 15 / Nitrite: Negative   Leuk Esterase: Negative / RBC: 0-2 /HPF / WBC 0-2   Sq Epi: x / Non Sq Epi: Occasional / Bacteria: Occasional      CAPILLARY BLOOD GLUCOSE                    Plan of care was discussed with patient;  care was aligned with patient's wishes.             MEDICAL ATTENDING NOTE    Patient is a 82y old  Male who presents with a chief complaint of CHF, A fib, Flu (2023 14:17)      INTERVAL HPI/OVERNIGHT EVENTS: no acute issues reported    MEDICATIONS  (STANDING):  apixaban 5 milliGRAM(s) Oral every 12 hours  atorvastatin 10 milliGRAM(s) Oral at bedtime  benzonatate 100 milliGRAM(s) Oral every 8 hours  cefepime   IVPB 2000 milliGRAM(s) IV Intermittent every 8 hours  clopidogrel Tablet 75 milliGRAM(s) Oral daily  diltiazem    Tablet 60 milliGRAM(s) Oral every 6 hours  furosemide    Tablet 40 milliGRAM(s) Oral daily  gabapentin 100 milliGRAM(s) Oral two times a day  isosorbide   mononitrate ER Tablet (IMDUR) 60 milliGRAM(s) Oral daily  metoprolol tartrate 50 milliGRAM(s) Oral every 6 hours  polyethylene glycol 3350 17 Gram(s) Oral daily  senna 2 Tablet(s) Oral at bedtime    MEDICATIONS  (PRN):  bisacodyl 5 milliGRAM(s) Oral every 12 hours PRN Constipation  guaiFENesin Oral Liquid (Sugar-Free) 200 milliGRAM(s) Oral every 6 hours PRN Cough      __________________________________________________  ----------------------------------------------------------------------------------  REVIEW OF SYSTEMS: no chest pain, afebrile; mild sore throat; no vomiting    Vital Signs Last 24 Hrs  T(C): 36.7 (2023 12:17), Max: 37.7 (2023 09:03)  T(F): 98.1 (2023 12:17), Max: 99.8 (2023 09:03)  HR: 96 (2023 14:00) (67 - 110)  BP: 115/61 (2023 14:00) (94/56 - 147/77)  BP(mean): 82 (2023 14:00) (68 - 106)  RR: 25 (2023 14:00) (17 - 35)  SpO2: 94% (2023 14:00) (89% - 96%)    Parameters below as of 2023 12:12  Patient On (Oxygen Delivery Method): nasal cannula, high flow,60% @ 60LPM        _________________  PHYSICAL EXAM:  ---------------------------   NAD; Normocephalic;   LUNGS - no wheezing, fair bilateral air entry; right sided rales  HEART: S1 S2+   ABDOMEN: Soft, Nontender, non distended, BS+  EXTREMITIES: no cyanosis; no edema, no calf tenderness  NERVOUS SYSTEM:  Awake and alert; follows commands, moves extremities; no gross focal neuro deficits    _________________________________________________  LABS:                        14.0   20.14 )-----------( 437      ( 2023 06:15 )             41.8     01-13    132<L>  |  88<L>  |  29<H>  ----------------------------<  190<H>  3.3<L>   |  39<H>  |  1.00    Ca    8.9      2023 06:15  Phos  2.0     01-13  Mg     2.1     -13    TPro  7.6  /  Alb  2.1<L>  /  TBili  2.2<H>  /  DBili  x   /  AST  18  /  ALT  13  /  AlkPhos  105  01-13    PT/INR - ( 2023 05:40 )   PT: 29.8 sec;   INR: 2.52 ratio         PTT - ( 2023 05:40 )  PTT:36.1 sec  Urinalysis Basic - ( 2023 18:00 )    Color: Yellow / Appearance: Clear / S.015 / pH: x  Gluc: x / Ketone: Negative  / Bili: Negative / Urobili: Negative   Blood: x / Protein: 15 / Nitrite: Negative   Leuk Esterase: Negative / RBC: 0-2 /HPF / WBC 0-2   Sq Epi: x / Non Sq Epi: Occasional / Bacteria: Occasional        .Sputum Sputum   @ 11:00 --  --    Few polymorphonuclear leukocytes per low power field  Rare Squamous epithelial cells per low power field  Few Gram Variable Rods per oil power field      .Sputum Sputum   @ 18:00   Few Pseudomonas aeruginosa  Normal Respiratory Erin present  --    Numerous polymorphonuclear leukocytes per low power field  No Squamous epithelial cells per low power field  Few Gram Negative Rods seen per oil power field      .Blood Blood-Peripheral  01-10 @ 22:05   No growth to date.  --  --      .Blood Blood-Peripheral  01-10 @ 22:00   No growth to date.  --  --              Plan of care was discussed with patient;  care was aligned with patient's wishes.

## 2023-01-13 NOTE — PROGRESS NOTE ADULT - PROBLEM SELECTOR PLAN 3
Continue Eliquis  Rate control better; continue with Cardizem and Metoprolol  Monitor Continue Eliquis  Rate better controlled; continue with Cardizem and Metoprolol  Monitor  cardio follow up ongoing

## 2023-01-13 NOTE — PROGRESS NOTE ADULT - ASSESSMENT
83 y/o m w/ PMH od CAD s/p stent, s/p CABG, HTN, HLD, CHF, admitted for acute CHF exacerbation, found to be positive for influenza, and with new onset uncontrolled AFib. Improved with tamiflu, diuresis, and rate control. Had progressive hypoxia, and CT chest showing bilateral tree-in-bud opacities, with more confluent consolidation at bases bilaterally, raising suspicion for atypical PNA. He was transferred to ICU for management of acute hypoxemic respiratory failure, new onset AFib, influenza A viral PNA, and suspected atypical PNA.    Neuro - somnolent, possibly hypoactive delirium  s/p morphine for back/chest pain, lidocaine patches now appear to be helping the MSK pain.    CV - hemodynamics stable, NSR  -HFpEF:  Echo shows EF 55%  change to Lasix 40 mg PO daily starting tomorrow  -New onset AFib: cont rate control with lopressor and diltiazem, goal HR<110  full AC with eliquis   -CAD: cont Imdur, statin and plavix    Pulm -  acute hypoxemic resp failure on HFNC, with Pseudomonas PNA  Currently HFNC on 60 L at 80%, wean as tolerates  continue bronchodilators  cough - tessalon perles, hycodan PRN, d/c mucinex    GI -  DASH diet  start bowel regimen: senna and miralax    Renal - renal function stable  hyponatremia, fu urine lytes  K and phos repleted    ID - leukocytosis improving   Pt with influenza A viral PNA and Pseudomonas PNA  Continue Cefepime  finished course of tamiflu  SCx: Pseudomonas  BCx NGTD  AMOS neg, ANCA neg  fungitell neg  FU strep Ag  FU galatamannan  FU PJP PCR    Endo - no insulin necessary    Heme -  full AC with eliquis, no signs of active bleeding, H/H stable    Skin: no lines or roth    Dispo: Full code  - son-in-law updated at bedside

## 2023-01-13 NOTE — PROGRESS NOTE ADULT - SUBJECTIVE AND OBJECTIVE BOX
[facility]    Donna Jay, Ge Morejon Savella      [phone]    CHIEF COMPLAINT: Patient is a 82y old  Male who presents with a chief complaint of CHF, A fib, Flu (12 Jan 2023 14:36)      Follow Up: pna, hypoxic resp failure    Interim history: The patient reports no new symptoms.  Denies chest discomfort.  Persistent shortness of breath, on hfnc.  No abdominal pain.  No new neurologic symptoms.      MEDICATIONS  (STANDING):  apixaban 5 milliGRAM(s) Oral every 12 hours  atorvastatin 10 milliGRAM(s) Oral at bedtime  benzonatate 100 milliGRAM(s) Oral every 8 hours  cefepime   IVPB 2000 milliGRAM(s) IV Intermittent every 8 hours  clopidogrel Tablet 75 milliGRAM(s) Oral daily  diltiazem    Tablet 60 milliGRAM(s) Oral every 6 hours  furosemide   Injectable 40 milliGRAM(s) IV Push daily  gabapentin 100 milliGRAM(s) Oral two times a day  isosorbide   mononitrate ER Tablet (IMDUR) 60 milliGRAM(s) Oral daily  metoprolol tartrate 50 milliGRAM(s) Oral every 6 hours    MEDICATIONS  (PRN):  bisacodyl 5 milliGRAM(s) Oral every 12 hours PRN Constipation  guaiFENesin Oral Liquid (Sugar-Free) 200 milliGRAM(s) Oral every 6 hours PRN Cough  metoclopramide Injectable 5 milliGRAM(s) IV Push every 8 hours PRN nausea/vomiting      REVIEW OF SYSTEMS:  eye, ent, GI, , allergic, dermatologic, musculoskeletal and neurologic are negative except as described above    Vital Signs Last 24 Hrs  T(C): 37.7 (13 Jan 2023 09:03), Max: 37.7 (13 Jan 2023 09:03)  T(F): 99.8 (13 Jan 2023 09:03), Max: 99.8 (13 Jan 2023 09:03)  HR: 110 (13 Jan 2023 10:00) (48 - 110)  BP: 119/57 (13 Jan 2023 10:00) (109/59 - 147/77)  BP(mean): 82 (13 Jan 2023 10:00) (76 - 106)  RR: 17 (13 Jan 2023 10:00) (17 - 35)  SpO2: 92% (13 Jan 2023 10:00) (89% - 96%)    Parameters below as of 13 Jan 2023 09:51  Patient On (Oxygen Delivery Method): nasal cannula, high flow        I&O's Summary    12 Jan 2023 07:01  -  13 Jan 2023 07:00  --------------------------------------------------------  IN: 1550 mL / OUT: 1910 mL / NET: -360 mL    13 Jan 2023 07:01  -  13 Jan 2023 11:22  --------------------------------------------------------  IN: 485 mL / OUT: 400 mL / NET: 85 mL        Telemetry past 24h:  af gen controlled    PHYSICAL EXAM:    Constitutional: well-nourished, well-developed, NAD   HEENT:  MMM, sclerae anicteric, conjunctivae clear, no oral cyanosis.  Pulmonary: Non-labored, breath sounds are clear ant, No wheezing, rales or rhonchi  Cardiovascular: irregular, S1 and S2.  No murmur.  No rubs, gallops or clicks  Gastrointestinal: Bowel Sounds present, soft, nontender.   Lymph: No peripheral edema.   Neurological: Alert, no focal deficits  Skin: No rashes.  Psych:  Mood & affect appropriate    LABS: All Labs Reviewed:                        14.0   20.14 )-----------( 437      ( 13 Jan 2023 06:15 )             41.8                         14.1   20.16 )-----------( 414      ( 12 Jan 2023 05:40 )             41.7                         15.2   13.21 )-----------( 400      ( 11 Jan 2023 05:30 )             45.5     13 Jan 2023 06:15    132    |  88     |  29     ----------------------------<  190    3.3     |  39     |  1.00   12 Jan 2023 05:40    132    |  86     |  31     ----------------------------<  154    3.4     |  38     |  1.10   11 Jan 2023 05:30    134    |  88     |  35     ----------------------------<  126    3.6     |  40     |  1.00     Ca    8.9        13 Jan 2023 06:15  Ca    8.8        12 Jan 2023 05:40  Ca    9.1        11 Jan 2023 05:30  Phos  2.0       13 Jan 2023 06:15  Phos  1.8       12 Jan 2023 05:40  Phos  2.3       11 Jan 2023 05:30  Mg     2.1       13 Jan 2023 06:15  Mg     2.0       12 Jan 2023 05:40  Mg     2.7       11 Jan 2023 05:30    TPro  7.6    /  Alb  2.1    /  TBili  2.2    /  DBili  x      /  AST  18     /  ALT  13     /  AlkPhos  105    13 Jan 2023 06:15  TPro  7.3    /  Alb  2.2    /  TBili  2.5    /  DBili  x      /  AST  19     /  ALT  16     /  AlkPhos  110    12 Jan 2023 05:40  TPro  7.6    /  Alb  2.6    /  TBili  2.6    /  DBili  x      /  AST  19     /  ALT  21     /  AlkPhos  94     11 Jan 2023 05:30    PT/INR - ( 12 Jan 2023 05:40 )   PT: 29.8 sec;   INR: 2.52 ratio         PTT - ( 12 Jan 2023 05:40 )  PTT:36.1 sec      Blood Culture: Organism --  Gram Stain Blood -- Gram Stain   Few polymorphonuclear leukocytes per low power field  Rare Squamous epithelial cells per low power field  Few Gram Variable Rods per oil power field  Specimen Source .Sputum Sputum  Culture-Blood --    Organism --  Gram Stain Blood -- Gram Stain   Numerous polymorphonuclear leukocytes per low power field  No Squamous epithelial cells per low power field  Few Gram Negative Rods seen per oil power field  Specimen Source .Sputum Sputum  Culture-Blood --    Organism --  Gram Stain Blood -- Gram Stain --  Specimen Source .Blood Blood-Peripheral  Culture-Blood --    Organism --  Gram Stain Blood -- Gram Stain --  Specimen Source .Blood Blood-Peripheral  Culture-Blood --      01-10 @ 21:15  Pro Bnp [id number]        RADIOLOGY:    EKG:    Echo:

## 2023-01-13 NOTE — PROGRESS NOTE ADULT - PROBLEM SELECTOR PLAN 1
Acute respiratory failure as a result of Influenza with superimposed bacterial PNA  Continue broad spectrum antibiotics  On High Flow in the ICU - saturating ok  Continue bronchodilators   Continue to monitor  Care per critical care team  ID consult follow up ongoing Acute respiratory failure as a result of Influenza with superimposed bacterial PNA  Continue broad spectrum antibiotics- on Cefepime  Sputum culture growing Pseudomonas  Still requiring high Flow in the ICU - saturating ok  Continue bronchodilators   Continue to monitor  Care per critical care team  ID consult follow up ongoing

## 2023-01-13 NOTE — PROGRESS NOTE ADULT - SUBJECTIVE AND OBJECTIVE BOX
INTERVAL HPI/OVERNIGHT EVENTS: OVERNIGHT: Cough, worsening pain in chest and abd. Received morphine overnight, did not help the pain. Now lidocaine patches, which seem to be helping. Sputum culture grew Pseudomonas, pt is currently on cefepime.    SUBJECTIVE: Patient seen and examined at bedside. He is sitting up in the chair, but is drowsy. He has not been eating much. Lidocaine patches have helped his pain.    ROS:  Resp: + cough  GI: Decreased appetitie    OBJECTIVE:    VITAL SIGNS:  ICU Vital Signs Last 24 Hrs  T(C): 36.7 (2023 12:17), Max: 37.7 (2023 09:03)  T(F): 98.1 (2023 12:17), Max: 99.8 (2023 09:03)  HR: 96 (2023 14:00) (67 - 110)  BP: 115/61 (2023 14:00) (94/56 - 147/77)  BP(mean): 82 (2023 14:00) (68 - 106)  ABP: --  ABP(mean): --  RR: 25 (2023 14:00) (17 - 35)  SpO2: 94% (2023 14:00) (89% - 96%)    O2 Parameters below as of 2023 12:12  Patient On (Oxygen Delivery Method): nasal cannula, high flow,60% @ 60LPM               @ 07: @ 07:00  --------------------------------------------------------  IN: 1550 mL / OUT: 1910 mL / NET: -360 mL     @ 07: @ 14:35  --------------------------------------------------------  IN: 610 mL / OUT: 400 mL / NET: 210 mL      CAPILLARY BLOOD GLUCOSE          PHYSICAL EXAM:  General: NAD, sitting up in chair, somnolent  HEENT: NCAT, HFNC in place  Respiratory: diffuse crackles bilat  Cardiovascular: irregular rate  Abdomen: soft, NT/ND  Extremities: no LE edema or calf tenderness  Neurology: somnolent    MEDICATIONS:  MEDICATIONS  (STANDING):  apixaban 5 milliGRAM(s) Oral every 12 hours  atorvastatin 10 milliGRAM(s) Oral at bedtime  benzonatate 100 milliGRAM(s) Oral every 8 hours  cefepime   IVPB 2000 milliGRAM(s) IV Intermittent every 8 hours  clopidogrel Tablet 75 milliGRAM(s) Oral daily  diltiazem    Tablet 60 milliGRAM(s) Oral every 6 hours  furosemide    Tablet 40 milliGRAM(s) Oral daily  gabapentin 100 milliGRAM(s) Oral two times a day  isosorbide   mononitrate ER Tablet (IMDUR) 60 milliGRAM(s) Oral daily  metoprolol tartrate 50 milliGRAM(s) Oral every 6 hours  polyethylene glycol 3350 17 Gram(s) Oral daily  senna 2 Tablet(s) Oral at bedtime    MEDICATIONS  (PRN):  bisacodyl 5 milliGRAM(s) Oral every 12 hours PRN Constipation  guaiFENesin Oral Liquid (Sugar-Free) 200 milliGRAM(s) Oral every 6 hours PRN Cough      ALLERGIES:  Allergies    Levaquin (Unknown)  penicillin (Rash)    Intolerances        LABS:                        14.0   20.14 )-----------( 437      ( 2023 06:15 )             41.8     01-13    132<L>  |  88<L>  |  29<H>  ----------------------------<  190<H>  3.3<L>   |  39<H>  |  1.00    Ca    8.9      2023 06:15  Phos  2.0     -  Mg     2.1         TPro  7.6  /  Alb  2.1<L>  /  TBili  2.2<H>  /  DBili  x   /  AST  18  /  ALT  13  /  AlkPhos  105  -13    PT/INR - ( 2023 05:40 )   PT: 29.8 sec;   INR: 2.52 ratio         PTT - ( 2023 05:40 )  PTT:36.1 sec  Urinalysis Basic - ( 2023 18:00 )    Color: Yellow / Appearance: Clear / S.015 / pH: x  Gluc: x / Ketone: Negative  / Bili: Negative / Urobili: Negative   Blood: x / Protein: 15 / Nitrite: Negative   Leuk Esterase: Negative / RBC: 0-2 /HPF / WBC 0-2   Sq Epi: x / Non Sq Epi: Occasional / Bacteria: Occasional        RADIOLOGY & ADDITIONAL TESTS: Reviewed.   EXAM:  CT ANGIO CHEST PULM ART [facility]                          PROCEDURE DATE:  01/10/2023          INTERPRETATION:  HISTORY: Admitting Dxs: J10.1 FLU DUE TO OTH IDENT   INFLUENZA VIRUS W OTH RESP MANIFEST    EXAMINATION: CTA CHEST was performed for evaluation of the pulmonary   arteries. Multiplanar reformatted images and MIPS were acquired.  CONTRAST/COMPLICATIONS:  IV Contrast: Omnipaque 350  70 cc administered   30 cc discarded  Oral Contrast: NONE  Complications: None reported at time of study completion    COMPARISON: No prior CT chest comparison available.    FINDINGS:    PULMONARY ARTERIES: No pulmonary embolism.    MEDIASTINUM: The right atrium is qualitatively dilated. Post CABG. No   pericardial effusion. Thoracic aorta normal caliber.  Mildly enlarged   mediastinal and bilateral hilar nodes.    AIRWAYS, LUNGS, PLEURA: Mild central airways secretions. Tree-in-bud   nodular opacities involving all lung lobes, but most severe throughout   the bilateral lower lobes. Confluent consolidation at the left greater   than right lung bases. No pleural effusion.    IMAGED ABDOMEN: Right renal parapelvic cysts.    SOFT TISSUES: Unremarkable.    BONES: Sternal wires. Age indeterminate loss of height of T6 vertebral   body.      IMPRESSION:.    No pulmonary embolism.    Diffuse tree-in-bud nodular opacities and bibasilar consolidation   compatible with infection.    Mediastinal and bilateral hilar lymphadenopathy, likely reactive.      CENTRAL LINE: N        DATE INSERTED:             REMOVE: N  RUBY: N                       DATE INSERTED:              REMOVE: Y/N  A-LINE: N                       DATE INSERTED:              REMOVE: Y/N      GLOBAL ISSUE/BEST PRACTICE  Analgesia: Y  Sedation: N  HOB elevation: yes  Stress ulcer prophylaxis: N  VTE prophylaxis: Y  Glycemic control: N  Nutrition: Y    CODE STATUS: Full Code   INTERVAL HPI/OVERNIGHT EVENTS: OVERNIGHT: Cough, worsening pain in chest and abd. Received morphine overnight, did not help the pain. Now lidocaine patches, which seem to be helping. Sputum culture grew Pseudomonas, pt is currently on cefepime.    SUBJECTIVE: Patient seen and examined at bedside. He is sitting up in the chair, but is drowsy. He has not been eating much. Lidocaine patches have helped his back pain.    ROS:  Resp: + cough  GI: Decreased appetitie    OBJECTIVE:    VITAL SIGNS:  ICU Vital Signs Last 24 Hrs  T(C): 36.7 (2023 12:17), Max: 37.7 (2023 09:03)  T(F): 98.1 (2023 12:17), Max: 99.8 (2023 09:03)  HR: 96 (2023 14:00) (67 - 110)  BP: 115/61 (2023 14:00) (94/56 - 147/77)  BP(mean): 82 (2023 14:00) (68 - 106)  ABP: --  ABP(mean): --  RR: 25 (2023 14:00) (17 - 35)  SpO2: 94% (2023 14:00) (89% - 96%)    O2 Parameters below as of 2023 12:12  Patient On (Oxygen Delivery Method): nasal cannula, high flow,60% @ 60LPM               @ 07: @ 07:00  --------------------------------------------------------  IN: 1550 mL / OUT: 1910 mL / NET: -360 mL     @ 07:  -   @ 14:35  --------------------------------------------------------  IN: 610 mL / OUT: 400 mL / NET: 210 mL      CAPILLARY BLOOD GLUCOSE          PHYSICAL EXAM:  General: NAD, sitting up in chair, somnolent  HEENT: NCAT, HFNC in place  Respiratory: diffuse crackles bilat  Cardiovascular: irregular rate  Abdomen: soft, NT/ND  Extremities: no LE edema or calf tenderness  Neurology: somnolent but easily awoken, answers questions appropriately    MEDICATIONS:  MEDICATIONS  (STANDING):  apixaban 5 milliGRAM(s) Oral every 12 hours  atorvastatin 10 milliGRAM(s) Oral at bedtime  benzonatate 100 milliGRAM(s) Oral every 8 hours  cefepime   IVPB 2000 milliGRAM(s) IV Intermittent every 8 hours  clopidogrel Tablet 75 milliGRAM(s) Oral daily  diltiazem    Tablet 60 milliGRAM(s) Oral every 6 hours  furosemide    Tablet 40 milliGRAM(s) Oral daily  gabapentin 100 milliGRAM(s) Oral two times a day  isosorbide   mononitrate ER Tablet (IMDUR) 60 milliGRAM(s) Oral daily  metoprolol tartrate 50 milliGRAM(s) Oral every 6 hours  polyethylene glycol 3350 17 Gram(s) Oral daily  senna 2 Tablet(s) Oral at bedtime    MEDICATIONS  (PRN):  bisacodyl 5 milliGRAM(s) Oral every 12 hours PRN Constipation  guaiFENesin Oral Liquid (Sugar-Free) 200 milliGRAM(s) Oral every 6 hours PRN Cough      ALLERGIES:  Allergies    Levaquin (Unknown)  penicillin (Rash)    Intolerances        LABS:                        14.0   20.14 )-----------( 437      ( 2023 06:15 )             41.8     01-13    132<L>  |  88<L>  |  29<H>  ----------------------------<  190<H>  3.3<L>   |  39<H>  |  1.00    Ca    8.9      2023 06:15  Phos  2.0     -  Mg     2.1         TPro  7.6  /  Alb  2.1<L>  /  TBili  2.2<H>  /  DBili  x   /  AST  18  /  ALT  13  /  AlkPhos  105  01-13    PT/INR - ( 2023 05:40 )   PT: 29.8 sec;   INR: 2.52 ratio         PTT - ( 2023 05:40 )  PTT:36.1 sec  Urinalysis Basic - ( 2023 18:00 )    Color: Yellow / Appearance: Clear / S.015 / pH: x  Gluc: x / Ketone: Negative  / Bili: Negative / Urobili: Negative   Blood: x / Protein: 15 / Nitrite: Negative   Leuk Esterase: Negative / RBC: 0-2 /HPF / WBC 0-2   Sq Epi: x / Non Sq Epi: Occasional / Bacteria: Occasional        RADIOLOGY & ADDITIONAL TESTS: Reviewed.   EXAM:  CT ANGIO CHEST PULM ART [facility]                          PROCEDURE DATE:  01/10/2023          INTERPRETATION:  HISTORY: Admitting Dxs: J10.1 FLU DUE TO OTH IDENT   INFLUENZA VIRUS W OTH RESP MANIFEST    EXAMINATION: CTA CHEST was performed for evaluation of the pulmonary   arteries. Multiplanar reformatted images and MIPS were acquired.  CONTRAST/COMPLICATIONS:  IV Contrast: Omnipaque 350  70 cc administered   30 cc discarded  Oral Contrast: NONE  Complications: None reported at time of study completion    COMPARISON: No prior CT chest comparison available.    FINDINGS:    PULMONARY ARTERIES: No pulmonary embolism.    MEDIASTINUM: The right atrium is qualitatively dilated. Post CABG. No   pericardial effusion. Thoracic aorta normal caliber.  Mildly enlarged   mediastinal and bilateral hilar nodes.    AIRWAYS, LUNGS, PLEURA: Mild central airways secretions. Tree-in-bud   nodular opacities involving all lung lobes, but most severe throughout   the bilateral lower lobes. Confluent consolidation at the left greater   than right lung bases. No pleural effusion.    IMAGED ABDOMEN: Right renal parapelvic cysts.    SOFT TISSUES: Unremarkable.    BONES: Sternal wires. Age indeterminate loss of height of T6 vertebral   body.      IMPRESSION:.    No pulmonary embolism.    Diffuse tree-in-bud nodular opacities and bibasilar consolidation   compatible with infection.    Mediastinal and bilateral hilar lymphadenopathy, likely reactive.      CENTRAL LINE: N        DATE INSERTED:             REMOVE: N  RUBY: N                       DATE INSERTED:              REMOVE: Y/N  A-LINE: N                       DATE INSERTED:              REMOVE: Y/N      GLOBAL ISSUE/BEST PRACTICE  Analgesia: Y  Sedation: N  HOB elevation: yes  Stress ulcer prophylaxis: N  VTE prophylaxis: Y  Glycemic control: N  Nutrition: Y    CODE STATUS: Full Code

## 2023-01-13 NOTE — PROGRESS NOTE ADULT - PROBLEM SELECTOR PLAN 5
avoid nephrotoxins,   monitor renal functions  Stable creatinine avoid nephrotoxins,   monitor renal functions  Stable creatinine    Hypokalemia- replace potassium

## 2023-01-13 NOTE — PROGRESS NOTE ADULT - PROBLEM SELECTOR PLAN 4
resolving  Echo - showed EF of 55%. No pericardial effusion  Consider holding iV Lasix due to suspected contraction alkalosis  Monitor electrolytes resolved  Echo - showed EF of 55%. No pericardial effusion  Off IV Lasix- monitor electrolytes  Monitor electrolytes

## 2023-01-13 NOTE — PROGRESS NOTE ADULT - SUBJECTIVE AND OBJECTIVE BOX
Patient is a 82y old  Male who presents with a chief complaint of CHF, A fib, Flu (2023 08:23)  Patient seen in follow up for prerenal azotemia.        PAST MEDICAL HISTORY:  Congestive heart failure (CHF)  Atrial fibrillation  HLD (hyperlipidemia)  HTN (hypertension)  CAD (coronary artery disease)      MEDICATIONS  (STANDING):  apixaban 5 milliGRAM(s) Oral every 12 hours  atorvastatin 10 milliGRAM(s) Oral at bedtime  benzonatate 100 milliGRAM(s) Oral every 8 hours  cefepime   IVPB 2000 milliGRAM(s) IV Intermittent every 8 hours  clopidogrel Tablet 75 milliGRAM(s) Oral daily  diltiazem    Tablet 60 milliGRAM(s) Oral every 6 hours  furosemide    Tablet 40 milliGRAM(s) Oral daily  gabapentin 100 milliGRAM(s) Oral two times a day  isosorbide   mononitrate ER Tablet (IMDUR) 60 milliGRAM(s) Oral daily  metoprolol tartrate 50 milliGRAM(s) Oral every 6 hours  polyethylene glycol 3350 17 Gram(s) Oral daily  senna 2 Tablet(s) Oral at bedtime    MEDICATIONS  (PRN):  bisacodyl 5 milliGRAM(s) Oral every 12 hours PRN Constipation  guaiFENesin Oral Liquid (Sugar-Free) 200 milliGRAM(s) Oral every 6 hours PRN Cough    T(C): 36.7 (23 @ 12:17), Max: 37.7 (23 @ 09:03)  HR: 96 (23 @ 14:00) (48 - 112)  BP: 115/61 (23 @ 14:00) (94/56 - 147/77)  RR: 25 (23 @ 14:00)  SpO2: 94% (23 @ 14:00)  Wt(kg): --  I&O's Detail    2023 07:01  -  2023 07:00  --------------------------------------------------------  IN:    IV PiggyBack: 500 mL    IV PiggyBack: 150 mL    Oral Fluid: 900 mL  Total IN: 1550 mL    OUT:    Voided (mL): 1910 mL  Total OUT: 1910 mL    Total NET: -360 mL      2023 07:01  -  2023 14:18  --------------------------------------------------------  IN:    IV PiggyBack: 250 mL    Oral Fluid: 360 mL  Total IN: 610 mL    OUT:    Voided (mL): 400 mL  Total OUT: 400 mL    Total NET: 210 mL              PHYSICAL EXAM:  General: No distress  Respiratory: b/l air entry  Cardiovascular: S1 S2  Gastrointestinal: soft  Extremities:  edema                         LABORATORY:                        14.0   20.14 )-----------( 437      ( 2023 06:15 )             41.8         132<L>  |  88<L>  |  29<H>  ----------------------------<  190<H>  3.3<L>   |  39<H>  |  1.00    Ca    8.9      2023 06:15  Phos  2.0       Mg     2.1         TPro  7.6  /  Alb  2.1<L>  /  TBili  2.2<H>  /  DBili  x   /  AST  18  /  ALT  13  /  AlkPhos  105      Sodium, Serum: 132 mmol/L ( 06:15)  Sodium, Serum: 132 mmol/L ( @ 05:40)    Potassium, Serum: 3.3 mmol/L ( @ 06:15)  Potassium, Serum: 3.4 mmol/L ( @ 05:40)    Hemoglobin: 14.0 g/dL ( @ 06:15)  Hemoglobin: 14.1 g/dL ( @ 05:40)  Hemoglobin: 15.2 g/dL ( @ 05:30)  Hemoglobin: 14.2 g/dL (01-10 @ 21:15)    Creatinine, Serum 1.00 ( @ 06:15)  Creatinine, Serum 1.10 ( @ 05:40)  Creatinine, Serum 1.00 ( @ 05:30)  Creatinine, Serum 1.20 (01-10 @ 21:15)        LIVER FUNCTIONS - ( 2023 06:15 )  Alb: 2.1 g/dL / Pro: 7.6 g/dL / ALK PHOS: 105 U/L / ALT: 13 U/L / AST: 18 U/L / GGT: x           Urinalysis Basic - ( 2023 18:00 )    Color: Yellow / Appearance: Clear / S.015 / pH: x  Gluc: x / Ketone: Negative  / Bili: Negative / Urobili: Negative   Blood: x / Protein: 15 / Nitrite: Negative   Leuk Esterase: Negative / RBC: 0-2 /HPF / WBC 0-2   Sq Epi: x / Non Sq Epi: Occasional / Bacteria: Occasional      ABG - ( 2023 20:55 )  pH, Arterial: 7.57  pH, Blood: x     /  pCO2: 46    /  pO2: 69    / HCO3: 42    / Base Excess: 20.1  /  SaO2: 96.6

## 2023-01-13 NOTE — PROGRESS NOTE ADULT - ASSESSMENT
82 year old male PMH of CAD s/p stent, s/p CABG, HTN, HLD, CHF p/w shortness of breath and cough for the last 5 days found to be in af with rvr, congestive hf, and influenza positive.    CAD s/p CABG, stent, Afib with RVR, HFpEF  - No anginal symptoms, no sign of acute ischemia, and troponin are negative  - Continue ASA, BB, and statin  - Continue Imdur    - Afib with rates improved.  Tachycardia  with some reactive component (in the setting of flu)  - c/w BB, will eventually switch to long-acting  - Continue Cardizem 30mg q6hrs.  Would switch to long-acting once HR is controlled  - Continue Eliquis    - HFpEF  - proBNP 2418-->959  - Still with severe hypoxemic resp failure req supplemental 02 via HFNC  - c/w IV lasix for now, mildly net negative  - TTE with EF 55%, LAE, with no significant valvular pathology  etiology of hypoxemia is not felt to be primarily hf but felt more likely to be primarily pulmonary, with flu and superimposed bact pna  - Strict i/o's and daily weights       - Monitor and replete lytes, keep K>4, Mg>2.  - Will continue to follow.  - very high risk of decompensation, with hypoxic resp failur, hf requiring hf02, and af with rvr

## 2023-01-14 LAB
-  AMIKACIN: SIGNIFICANT CHANGE UP
-  AZTREONAM: SIGNIFICANT CHANGE UP
-  CEFEPIME: SIGNIFICANT CHANGE UP
-  CEFTAZIDIME: SIGNIFICANT CHANGE UP
-  CIPROFLOXACIN: SIGNIFICANT CHANGE UP
-  GENTAMICIN: SIGNIFICANT CHANGE UP
-  IMIPENEM: SIGNIFICANT CHANGE UP
-  LEVOFLOXACIN: SIGNIFICANT CHANGE UP
-  MEROPENEM: SIGNIFICANT CHANGE UP
-  PIPERACILLIN/TAZOBACTAM: SIGNIFICANT CHANGE UP
-  TOBRAMYCIN: SIGNIFICANT CHANGE UP
ALBUMIN SERPL ELPH-MCNC: 1.9 G/DL — LOW (ref 3.3–5)
ALP SERPL-CCNC: 111 U/L — SIGNIFICANT CHANGE UP (ref 40–120)
ALT FLD-CCNC: 15 U/L — SIGNIFICANT CHANGE UP (ref 12–78)
ANION GAP SERPL CALC-SCNC: 6 MMOL/L — SIGNIFICANT CHANGE UP (ref 5–17)
APTT BLD: 37.3 SEC — HIGH (ref 27.5–35.5)
AST SERPL-CCNC: 24 U/L — SIGNIFICANT CHANGE UP (ref 15–37)
BASOPHILS # BLD AUTO: 0.08 K/UL — SIGNIFICANT CHANGE UP (ref 0–0.2)
BASOPHILS NFR BLD AUTO: 0.4 % — SIGNIFICANT CHANGE UP (ref 0–2)
BILIRUB SERPL-MCNC: 2 MG/DL — HIGH (ref 0.2–1.2)
BUN SERPL-MCNC: 29 MG/DL — HIGH (ref 7–23)
CALCIUM SERPL-MCNC: 8.9 MG/DL — SIGNIFICANT CHANGE UP (ref 8.5–10.1)
CHLORIDE SERPL-SCNC: 92 MMOL/L — LOW (ref 96–108)
CO2 SERPL-SCNC: 37 MMOL/L — HIGH (ref 22–31)
CREAT SERPL-MCNC: 0.88 MG/DL — SIGNIFICANT CHANGE UP (ref 0.5–1.3)
CULTURE RESULTS: SIGNIFICANT CHANGE UP
CULTURE RESULTS: SIGNIFICANT CHANGE UP
EGFR: 87 ML/MIN/1.73M2 — SIGNIFICANT CHANGE UP
EOSINOPHIL # BLD AUTO: 0.25 K/UL — SIGNIFICANT CHANGE UP (ref 0–0.5)
EOSINOPHIL NFR BLD AUTO: 1.4 % — SIGNIFICANT CHANGE UP (ref 0–6)
GLUCOSE SERPL-MCNC: 139 MG/DL — HIGH (ref 70–99)
HCT VFR BLD CALC: 41.1 % — SIGNIFICANT CHANGE UP (ref 39–50)
HGB BLD-MCNC: 13.6 G/DL — SIGNIFICANT CHANGE UP (ref 13–17)
IMM GRANULOCYTES NFR BLD AUTO: 4.3 % — HIGH (ref 0–0.9)
INR BLD: 2.37 RATIO — HIGH (ref 0.88–1.16)
LYMPHOCYTES # BLD AUTO: 0.76 K/UL — LOW (ref 1–3.3)
LYMPHOCYTES # BLD AUTO: 4.1 % — LOW (ref 13–44)
MAGNESIUM SERPL-MCNC: 2 MG/DL — SIGNIFICANT CHANGE UP (ref 1.6–2.6)
MCHC RBC-ENTMCNC: 28.6 PG — SIGNIFICANT CHANGE UP (ref 27–34)
MCHC RBC-ENTMCNC: 33.1 GM/DL — SIGNIFICANT CHANGE UP (ref 32–36)
MCV RBC AUTO: 86.3 FL — SIGNIFICANT CHANGE UP (ref 80–100)
METHOD TYPE: SIGNIFICANT CHANGE UP
MONOCYTES # BLD AUTO: 0.93 K/UL — HIGH (ref 0–0.9)
MONOCYTES NFR BLD AUTO: 5 % — SIGNIFICANT CHANGE UP (ref 2–14)
NEUTROPHILS # BLD AUTO: 15.6 K/UL — HIGH (ref 1.8–7.4)
NEUTROPHILS NFR BLD AUTO: 84.8 % — HIGH (ref 43–77)
NRBC # BLD: 0 /100 WBCS — SIGNIFICANT CHANGE UP (ref 0–0)
ORGANISM # SPEC MICROSCOPIC CNT: SIGNIFICANT CHANGE UP
ORGANISM # SPEC MICROSCOPIC CNT: SIGNIFICANT CHANGE UP
PHOSPHATE SERPL-MCNC: 1.6 MG/DL — LOW (ref 2.5–4.5)
PLATELET # BLD AUTO: 402 K/UL — HIGH (ref 150–400)
POTASSIUM SERPL-MCNC: 3.7 MMOL/L — SIGNIFICANT CHANGE UP (ref 3.5–5.3)
POTASSIUM SERPL-SCNC: 3.7 MMOL/L — SIGNIFICANT CHANGE UP (ref 3.5–5.3)
PROT SERPL-MCNC: 7.5 G/DL — SIGNIFICANT CHANGE UP (ref 6–8.3)
PROTHROM AB SERPL-ACNC: 28 SEC — HIGH (ref 10.5–13.4)
RBC # BLD: 4.76 M/UL — SIGNIFICANT CHANGE UP (ref 4.2–5.8)
RBC # FLD: 15.4 % — HIGH (ref 10.3–14.5)
SODIUM SERPL-SCNC: 135 MMOL/L — SIGNIFICANT CHANGE UP (ref 135–145)
SPECIMEN SOURCE: SIGNIFICANT CHANGE UP
SPECIMEN SOURCE: SIGNIFICANT CHANGE UP
WBC # BLD: 18.42 K/UL — HIGH (ref 3.8–10.5)
WBC # FLD AUTO: 18.42 K/UL — HIGH (ref 3.8–10.5)

## 2023-01-14 PROCEDURE — 99233 SBSQ HOSP IP/OBS HIGH 50: CPT | Mod: GC

## 2023-01-14 PROCEDURE — 99291 CRITICAL CARE FIRST HOUR: CPT

## 2023-01-14 PROCEDURE — 99291 CRITICAL CARE FIRST HOUR: CPT | Mod: GC

## 2023-01-14 RX ORDER — ONDANSETRON 8 MG/1
4 TABLET, FILM COATED ORAL EVERY 8 HOURS
Refills: 0 | Status: DISCONTINUED | OUTPATIENT
Start: 2023-01-14 | End: 2023-01-24

## 2023-01-14 RX ORDER — SODIUM,POTASSIUM PHOSPHATES 278-250MG
1 POWDER IN PACKET (EA) ORAL THREE TIMES A DAY
Refills: 0 | Status: COMPLETED | OUTPATIENT
Start: 2023-01-14 | End: 2023-01-15

## 2023-01-14 RX ORDER — SUCRALFATE 1 G
1 TABLET ORAL
Refills: 0 | Status: DISCONTINUED | OUTPATIENT
Start: 2023-01-14 | End: 2023-01-24

## 2023-01-14 RX ORDER — FLUCONAZOLE 150 MG/1
TABLET ORAL
Refills: 0 | Status: DISCONTINUED | OUTPATIENT
Start: 2023-01-14 | End: 2023-01-17

## 2023-01-14 RX ORDER — FLUCONAZOLE 150 MG/1
100 TABLET ORAL EVERY 24 HOURS
Refills: 0 | Status: DISCONTINUED | OUTPATIENT
Start: 2023-01-15 | End: 2023-01-17

## 2023-01-14 RX ORDER — ACETAMINOPHEN 500 MG
1000 TABLET ORAL ONCE
Refills: 0 | Status: COMPLETED | OUTPATIENT
Start: 2023-01-14 | End: 2023-01-14

## 2023-01-14 RX ORDER — FLUCONAZOLE 150 MG/1
200 TABLET ORAL ONCE
Refills: 0 | Status: COMPLETED | OUTPATIENT
Start: 2023-01-14 | End: 2023-01-14

## 2023-01-14 RX ADMIN — ONDANSETRON 4 MILLIGRAM(S): 8 TABLET, FILM COATED ORAL at 13:07

## 2023-01-14 RX ADMIN — APIXABAN 5 MILLIGRAM(S): 2.5 TABLET, FILM COATED ORAL at 17:18

## 2023-01-14 RX ADMIN — CEFEPIME 100 MILLIGRAM(S): 1 INJECTION, POWDER, FOR SOLUTION INTRAMUSCULAR; INTRAVENOUS at 13:10

## 2023-01-14 RX ADMIN — Medication 60 MILLIGRAM(S): at 11:37

## 2023-01-14 RX ADMIN — APIXABAN 5 MILLIGRAM(S): 2.5 TABLET, FILM COATED ORAL at 05:00

## 2023-01-14 RX ADMIN — Medication 50 MILLIGRAM(S): at 05:01

## 2023-01-14 RX ADMIN — Medication 100 MILLIGRAM(S): at 05:01

## 2023-01-14 RX ADMIN — Medication 1 PACKET(S): at 13:11

## 2023-01-14 RX ADMIN — Medication 60 MILLIGRAM(S): at 05:00

## 2023-01-14 RX ADMIN — Medication 60 MILLIGRAM(S): at 17:18

## 2023-01-14 RX ADMIN — POLYETHYLENE GLYCOL 3350 17 GRAM(S): 17 POWDER, FOR SOLUTION ORAL at 11:37

## 2023-01-14 RX ADMIN — GABAPENTIN 100 MILLIGRAM(S): 400 CAPSULE ORAL at 05:00

## 2023-01-14 RX ADMIN — ATORVASTATIN CALCIUM 10 MILLIGRAM(S): 80 TABLET, FILM COATED ORAL at 23:09

## 2023-01-14 RX ADMIN — CEFEPIME 100 MILLIGRAM(S): 1 INJECTION, POWDER, FOR SOLUTION INTRAMUSCULAR; INTRAVENOUS at 05:00

## 2023-01-14 RX ADMIN — Medication 50 MILLIGRAM(S): at 11:40

## 2023-01-14 RX ADMIN — CLOPIDOGREL BISULFATE 75 MILLIGRAM(S): 75 TABLET, FILM COATED ORAL at 11:37

## 2023-01-14 RX ADMIN — FLUCONAZOLE 100 MILLIGRAM(S): 150 TABLET ORAL at 16:53

## 2023-01-14 RX ADMIN — Medication 100 MILLIGRAM(S): at 13:11

## 2023-01-14 RX ADMIN — Medication 400 MILLIGRAM(S): at 13:08

## 2023-01-14 RX ADMIN — CEFEPIME 100 MILLIGRAM(S): 1 INJECTION, POWDER, FOR SOLUTION INTRAMUSCULAR; INTRAVENOUS at 22:00

## 2023-01-14 RX ADMIN — Medication 1 PACKET(S): at 23:10

## 2023-01-14 RX ADMIN — Medication 100 MILLIGRAM(S): at 23:09

## 2023-01-14 RX ADMIN — SENNA PLUS 2 TABLET(S): 8.6 TABLET ORAL at 23:10

## 2023-01-14 RX ADMIN — Medication 1000 MILLIGRAM(S): at 13:38

## 2023-01-14 RX ADMIN — ISOSORBIDE MONONITRATE 60 MILLIGRAM(S): 60 TABLET, EXTENDED RELEASE ORAL at 12:38

## 2023-01-14 RX ADMIN — Medication 50 MILLIGRAM(S): at 17:18

## 2023-01-14 RX ADMIN — Medication 1 GRAM(S): at 17:19

## 2023-01-14 RX ADMIN — Medication 40 MILLIGRAM(S): at 05:00

## 2023-01-14 RX ADMIN — GABAPENTIN 100 MILLIGRAM(S): 400 CAPSULE ORAL at 17:18

## 2023-01-14 NOTE — PROGRESS NOTE ADULT - PROBLEM SELECTOR PLAN 4
s/p acute diastolic chf   Echo - showed EF of 55%. No pericardial effusion  Off IV Lasix- monitor electrolytes

## 2023-01-14 NOTE — PROGRESS NOTE ADULT - PROBLEM SELECTOR PLAN 5
avoid nephrotoxins,   monitor renal functions  Stable creatinine- .88     Hypokalemia- replace potassium - k wnl

## 2023-01-14 NOTE — PROGRESS NOTE ADULT - SUBJECTIVE AND OBJECTIVE BOX
[facility] - Donna Jay, Ge Morejon Savella, [place]  Office Number: [phone]    Follow Up: pna, hypoxic resp failure    Interim history: The patient reports no new symptoms.  Denies chest discomfort.  Persistent shortness of breath, on hfnc.  No abdominal pain.  No new neurologic symptoms.    REVIEW OF SYSTEMS:   All other review of systems are negative unless indicated above      PAST MEDICAL & SURGICAL HISTORY:  HLD (hyperlipidemia)      HTN (hypertension)      CAD (coronary artery disease)      S/P coronary artery stent placement      S/P CABG (coronary artery bypass graft)          SOCIAL HISTORY:  No tobacco, ethanol, or drug abuse.    FAMILY HISTORY:  Family history of cardiovascular disease (Sibling)    Family history of uterine cancer (Mother)      No family history of acute MI or sudden cardiac death.    MEDICATIONS  (STANDING):  apixaban 5 milliGRAM(s) Oral every 12 hours  atorvastatin 10 milliGRAM(s) Oral at bedtime  benzonatate 100 milliGRAM(s) Oral every 8 hours  cefepime   IVPB 2000 milliGRAM(s) IV Intermittent every 8 hours  clopidogrel Tablet 75 milliGRAM(s) Oral daily  diltiazem    Tablet 60 milliGRAM(s) Oral every 6 hours  furosemide    Tablet 40 milliGRAM(s) Oral daily  gabapentin 100 milliGRAM(s) Oral two times a day  isosorbide   mononitrate ER Tablet (IMDUR) 60 milliGRAM(s) Oral daily  metoprolol tartrate 50 milliGRAM(s) Oral every 6 hours  polyethylene glycol 3350 17 Gram(s) Oral daily  potassium phosphate / sodium phosphate Powder (PHOS-NaK) 1 Packet(s) Oral three times a day  senna 2 Tablet(s) Oral at bedtime    MEDICATIONS  (PRN):  bisacodyl 5 milliGRAM(s) Oral every 12 hours PRN Constipation  guaiFENesin Oral Liquid (Sugar-Free) 200 milliGRAM(s) Oral every 6 hours PRN Cough      Allergies    Levaquin (Unknown)  penicillin (Rash)    Intolerances        VITAL SIGNS:   Vital Signs Last 24 Hrs  T(C): 37.2 (14 Jan 2023 08:00), Max: 37.5 (13 Jan 2023 16:21)  T(F): 99 (14 Jan 2023 08:00), Max: 99.5 (13 Jan 2023 16:21)  HR: 126 (14 Jan 2023 12:05) (77 - 126)  BP: 135/64 (14 Jan 2023 10:00) (100/58 - 154/70)  BP(mean): 92 (14 Jan 2023 10:00) (73 - 101)  RR: 36 (14 Jan 2023 10:00) (25 - 38)  SpO2: 92% (14 Jan 2023 12:05) (92% - 96%)    Parameters below as of 14 Jan 2023 12:05  Patient On (Oxygen Delivery Method): nasal cannula, high flow,45% @ 40LPM        I&O's Summary    13 Jan 2023 07:01  -  14 Jan 2023 07:00  --------------------------------------------------------  IN: 1400 mL / OUT: 1000 mL / NET: 400 mL    14 Jan 2023 07:01  -  14 Jan 2023 12:07  --------------------------------------------------------  IN: 240 mL / OUT: 0 mL / NET: 240 mL        On Exam:  Constitutional: NAD, awake and alert, well-developed  HEENT: Moist Mucous Membranes, Anicteric  Pulmonary: Non-labored, breath sounds are clear bilaterally, No wheezing, rales or rhonchi  Cardiovascular: Regular, S1 and S2, No murmurs, rubs, gallops or clicks  Gastrointestinal: Bowel Sounds present, soft, nontender.   Lymph: No peripheral edema. No lymphadenopathy.  Skin: No visible rashes or ulcers.  Psych:  Mood & affect appropriate    LABS: All Labs Reviewed:                        13.6   18.42 )-----------( 402      ( 14 Jan 2023 06:11 )             41.1                         14.0   20.14 )-----------( 437      ( 13 Jan 2023 06:15 )             41.8                         14.1   20.16 )-----------( 414      ( 12 Jan 2023 05:40 )             41.7     14 Jan 2023 06:11    135    |  92     |  29     ----------------------------<  139    3.7     |  37     |  0.88   13 Jan 2023 06:15    132    |  88     |  29     ----------------------------<  190    3.3     |  39     |  1.00   12 Jan 2023 05:40    132    |  86     |  31     ----------------------------<  154    3.4     |  38     |  1.10     Ca    8.9        14 Jan 2023 06:11  Ca    8.9        13 Jan 2023 06:15  Ca    8.8        12 Jan 2023 05:40  Phos  1.6       14 Jan 2023 06:11  Phos  2.0       13 Jan 2023 06:15  Phos  1.8       12 Jan 2023 05:40  Mg     2.0       14 Jan 2023 06:11  Mg     2.1       13 Jan 2023 06:15  Mg     2.0       12 Jan 2023 05:40    TPro  7.5    /  Alb  1.9    /  TBili  2.0    /  DBili  x      /  AST  24     /  ALT  15     /  AlkPhos  111    14 Jan 2023 06:11  TPro  7.6    /  Alb  2.1    /  TBili  2.2    /  DBili  x      /  AST  18     /  ALT  13     /  AlkPhos  105    13 Jan 2023 06:15  TPro  7.3    /  Alb  2.2    /  TBili  2.5    /  DBili  x      /  AST  19     /  ALT  16     /  AlkPhos  110    12 Jan 2023 05:40    PT/INR - ( 14 Jan 2023 06:11 )   PT: 28.0 sec;   INR: 2.37 ratio         PTT - ( 14 Jan 2023 06:11 )  PTT:37.3 sec      Blood Culture: Organism --  Gram Stain Blood -- Gram Stain   Few polymorphonuclear leukocytes per low power field  Rare Squamous epithelial cells per low power field  Few Gram Variable Rods per oil power field  Specimen Source .Sputum Sputum  Culture-Blood --    Organism Pseudomonas aeruginosa  Gram Stain Blood -- Gram Stain   Numerous polymorphonuclear leukocytes per low power field  No Squamous epithelial cells per low power field  Few Gram Negative Rods seen per oil power field  Specimen Source .Sputum Sputum  Culture-Blood --    Organism --  Gram Stain Blood -- Gram Stain --  Specimen Source .Blood Blood-Peripheral  Culture-Blood --    Organism --  Gram Stain Blood -- Gram Stain --  Specimen Source .Blood Blood-Peripheral  Culture-Blood --            RADIOLOGY:    ECG:

## 2023-01-14 NOTE — PROGRESS NOTE ADULT - SUBJECTIVE AND OBJECTIVE BOX
****** CHARTING IN PROGRESS *******    INTERVAL HPI/OVERNIGHT EVENTS: No acute events overnight. Patient tolerating HFNC 45% on 40L, improved from yesterday.    SUBJECTIVE: Patient seen and examined at bedside. Patient wants to go home to his couch, states he is frustrated with his lack of progress. States he still has midsternal chest pain and abd pain present with coughing. States cough is same. Denies worsening SOB.    ROS:  CV: CP with coughing  Resp: +productive cough, Denies worsening SOB  GI: Denies nausea, vomiting  ID: Denies fevers, chills  MSK: +back pain    OBJECTIVE:    VITAL SIGNS:  ICU Vital Signs Last 24 Hrs  T(C): 37.2 (14 Jan 2023 08:00), Max: 37.5 (13 Jan 2023 16:21)  T(F): 99 (14 Jan 2023 08:00), Max: 99.5 (13 Jan 2023 16:21)  HR: 126 (14 Jan 2023 12:05) (77 - 126)  BP: 135/64 (14 Jan 2023 10:00) (100/58 - 154/70)  BP(mean): 92 (14 Jan 2023 10:00) (73 - 101)  ABP: --  ABP(mean): --  RR: 36 (14 Jan 2023 10:00) (25 - 38)  SpO2: 92% (14 Jan 2023 12:05) (92% - 96%)    O2 Parameters below as of 14 Jan 2023 12:05  Patient On (Oxygen Delivery Method): nasal cannula, high flow,45% @ 40LPM              01-13 @ 07:01 - 01-14 @ 07:00  --------------------------------------------------------  IN: 1400 mL / OUT: 1000 mL / NET: 400 mL    01-14 @ 07:01  -  01-14 @ 12:38  --------------------------------------------------------  IN: 240 mL / OUT: 0 mL / NET: 240 mL      CAPILLARY BLOOD GLUCOSE          PHYSICAL EXAM:  General: NAD, somnolent  HEENT: clear conjunctiva, no scleral icterus  Neck: no JVD  Respiratory: coarse breath sounds bilat, no obvious wheezing  Cardiovascular: irregularly irregular rhythm, rate controlled   Abdomen: soft, NT/ND, +BS  Extremities: WWP, no clubbing, cyanosis, or edema  Neurology: somnolent but easily awakens to verbal stimuli, answering questions appropriately, moving extremities spontaneously    MEDICATIONS:  MEDICATIONS  (STANDING):  apixaban 5 milliGRAM(s) Oral every 12 hours  atorvastatin 10 milliGRAM(s) Oral at bedtime  benzonatate 100 milliGRAM(s) Oral every 8 hours  cefepime   IVPB 2000 milliGRAM(s) IV Intermittent every 8 hours  clopidogrel Tablet 75 milliGRAM(s) Oral daily  diltiazem    Tablet 60 milliGRAM(s) Oral every 6 hours  furosemide    Tablet 40 milliGRAM(s) Oral daily  gabapentin 100 milliGRAM(s) Oral two times a day  isosorbide   mononitrate ER Tablet (IMDUR) 60 milliGRAM(s) Oral daily  metoprolol tartrate 50 milliGRAM(s) Oral every 6 hours  polyethylene glycol 3350 17 Gram(s) Oral daily  potassium phosphate / sodium phosphate Powder (PHOS-NaK) 1 Packet(s) Oral three times a day  senna 2 Tablet(s) Oral at bedtime    MEDICATIONS  (PRN):  bisacodyl 5 milliGRAM(s) Oral every 12 hours PRN Constipation  guaiFENesin Oral Liquid (Sugar-Free) 200 milliGRAM(s) Oral every 6 hours PRN Cough      ALLERGIES:  Allergies    Levaquin (Unknown)  penicillin (Rash)    Intolerances        LABS:                        13.6   18.42 )-----------( 402      ( 14 Jan 2023 06:11 )             41.1     01-14    135  |  92<L>  |  29<H>  ----------------------------<  139<H>  3.7   |  37<H>  |  0.88    Ca    8.9      14 Jan 2023 06:11  Phos  1.6     01-14  Mg     2.0     01-14    TPro  7.5  /  Alb  1.9<L>  /  TBili  2.0<H>  /  DBili  x   /  AST  24  /  ALT  15  /  AlkPhos  111  01-14    PT/INR - ( 14 Jan 2023 06:11 )   PT: 28.0 sec;   INR: 2.37 ratio         PTT - ( 14 Jan 2023 06:11 )  PTT:37.3 sec        CENTRAL LINE: N        DATE INSERTED:             REMOVE: N  RUBY: N                       DATE INSERTED:              REMOVE: Y/N  A-LINE: N                       DATE INSERTED:              REMOVE: Y/N      GLOBAL ISSUE/BEST PRACTICE  Analgesia: Y  Sedation: N  HOB elevation: yes  Stress ulcer prophylaxis: N  VTE prophylaxis: Y  Glycemic control: N  Nutrition: Y    CODE STATUS: Full Code INTERVAL HPI/OVERNIGHT EVENTS: No acute events overnight. Patient tolerating HFNC 45% on 40L, improved from yesterday.    SUBJECTIVE: Patient seen and examined at bedside. Patient wants to go home to his couch, states he is frustrated with his lack of progress. States he still has midsternal chest pain and abd pain present with coughing. States cough is same. Denies worsening SOB.    ROS:  CV: CP with coughing  Resp: +productive cough, Denies worsening SOB  GI: Denies nausea, vomiting  ID: Denies fevers, chills  MSK: +back pain    OBJECTIVE:    VITAL SIGNS:  ICU Vital Signs Last 24 Hrs  T(C): 37.2 (14 Jan 2023 08:00), Max: 37.5 (13 Jan 2023 16:21)  T(F): 99 (14 Jan 2023 08:00), Max: 99.5 (13 Jan 2023 16:21)  HR: 126 (14 Jan 2023 12:05) (77 - 126)  BP: 135/64 (14 Jan 2023 10:00) (100/58 - 154/70)  BP(mean): 92 (14 Jan 2023 10:00) (73 - 101)  ABP: --  ABP(mean): --  RR: 36 (14 Jan 2023 10:00) (25 - 38)  SpO2: 92% (14 Jan 2023 12:05) (92% - 96%)    O2 Parameters below as of 14 Jan 2023 12:05  Patient On (Oxygen Delivery Method): nasal cannula, high flow,45% @ 40LPM              01-13 @ 07:01  -  01-14 @ 07:00  --------------------------------------------------------  IN: 1400 mL / OUT: 1000 mL / NET: 400 mL    01-14 @ 07:01  -  01-14 @ 12:38  --------------------------------------------------------  IN: 240 mL / OUT: 0 mL / NET: 240 mL      CAPILLARY BLOOD GLUCOSE          PHYSICAL EXAM:  General: NAD, somnolent  HEENT: clear conjunctiva, no scleral icterus  Neck: no JVD  Respiratory: coarse breath sounds bilat, no obvious wheezing  Cardiovascular: irregularly irregular rhythm, rate controlled   Abdomen: soft, NT/ND, +BS  Extremities: WWP, no clubbing, cyanosis, or edema  Neurology: somnolent but easily awakens to verbal stimuli, answering questions appropriately, moving extremities spontaneously    MEDICATIONS:  MEDICATIONS  (STANDING):  apixaban 5 milliGRAM(s) Oral every 12 hours  atorvastatin 10 milliGRAM(s) Oral at bedtime  benzonatate 100 milliGRAM(s) Oral every 8 hours  cefepime   IVPB 2000 milliGRAM(s) IV Intermittent every 8 hours  clopidogrel Tablet 75 milliGRAM(s) Oral daily  diltiazem    Tablet 60 milliGRAM(s) Oral every 6 hours  furosemide    Tablet 40 milliGRAM(s) Oral daily  gabapentin 100 milliGRAM(s) Oral two times a day  isosorbide   mononitrate ER Tablet (IMDUR) 60 milliGRAM(s) Oral daily  metoprolol tartrate 50 milliGRAM(s) Oral every 6 hours  polyethylene glycol 3350 17 Gram(s) Oral daily  potassium phosphate / sodium phosphate Powder (PHOS-NaK) 1 Packet(s) Oral three times a day  senna 2 Tablet(s) Oral at bedtime    MEDICATIONS  (PRN):  bisacodyl 5 milliGRAM(s) Oral every 12 hours PRN Constipation  guaiFENesin Oral Liquid (Sugar-Free) 200 milliGRAM(s) Oral every 6 hours PRN Cough      ALLERGIES:  Allergies    Levaquin (Unknown)  penicillin (Rash)    Intolerances        LABS:                        13.6   18.42 )-----------( 402      ( 14 Jan 2023 06:11 )             41.1     01-14    135  |  92<L>  |  29<H>  ----------------------------<  139<H>  3.7   |  37<H>  |  0.88    Ca    8.9      14 Jan 2023 06:11  Phos  1.6     01-14  Mg     2.0     01-14    TPro  7.5  /  Alb  1.9<L>  /  TBili  2.0<H>  /  DBili  x   /  AST  24  /  ALT  15  /  AlkPhos  111  01-14    PT/INR - ( 14 Jan 2023 06:11 )   PT: 28.0 sec;   INR: 2.37 ratio         PTT - ( 14 Jan 2023 06:11 )  PTT:37.3 sec        CENTRAL LINE: N        DATE INSERTED:             REMOVE: N  RUBY: N                       DATE INSERTED:              REMOVE: Y/N  A-LINE: N                       DATE INSERTED:              REMOVE: Y/N      GLOBAL ISSUE/BEST PRACTICE  Analgesia: Y  Sedation: N  HOB elevation: yes  Stress ulcer prophylaxis: N  VTE prophylaxis: Y  Glycemic control: N  Nutrition: Y    CODE STATUS: Full Code INTERVAL HPI/OVERNIGHT EVENTS: No acute events overnight. Patient tolerating HFNC 45% on 40L, improved from yesterday.    SUBJECTIVE: Patient seen and examined at bedside. Patient wants to go home to his couch, states he is frustrated with his lack of progress. States he still has midsternal chest pain and abd pain present with coughing. States cough is same. Denies worsening SOB.    ROS:  CV: CP with coughing  Resp: +productive cough, Denies worsening SOB  GI: Denies nausea, vomiting  ID: Denies fevers, chills  MSK: +back pain    OBJECTIVE:    VITAL SIGNS:  ICU Vital Signs Last 24 Hrs  T(C): 37.2 (14 Jan 2023 08:00), Max: 37.5 (13 Jan 2023 16:21)  T(F): 99 (14 Jan 2023 08:00), Max: 99.5 (13 Jan 2023 16:21)  HR: 126 (14 Jan 2023 12:05) (77 - 126)  BP: 135/64 (14 Jan 2023 10:00) (100/58 - 154/70)  BP(mean): 92 (14 Jan 2023 10:00) (73 - 101)  ABP: --  ABP(mean): --  RR: 36 (14 Jan 2023 10:00) (25 - 38)  SpO2: 92% (14 Jan 2023 12:05) (92% - 96%)    O2 Parameters below as of 14 Jan 2023 12:05  Patient On (Oxygen Delivery Method): nasal cannula, high flow,45% @ 40LPM              01-13 @ 07:01  -  01-14 @ 07:00  --------------------------------------------------------  IN: 1400 mL / OUT: 1000 mL / NET: 400 mL    01-14 @ 07:01  -  01-14 @ 12:38  --------------------------------------------------------  IN: 240 mL / OUT: 0 mL / NET: 240 mL      CAPILLARY BLOOD GLUCOSE          PHYSICAL EXAM:  General: NAD, somnolent  HEENT: clear conjunctiva, no scleral icterus  Neck: no JVD  Respiratory: coarse breath sounds bilat  Cardiovascular: irregularly irregular rhythm, rate controlled   Abdomen: soft, NT/ND, +BS  Extremities: WWP, no clubbing, cyanosis, or edema  Neurology: somnolent but easily awakens to verbal stimuli, answering questions appropriately, moving extremities spontaneously    MEDICATIONS:  MEDICATIONS  (STANDING):  apixaban 5 milliGRAM(s) Oral every 12 hours  atorvastatin 10 milliGRAM(s) Oral at bedtime  benzonatate 100 milliGRAM(s) Oral every 8 hours  cefepime   IVPB 2000 milliGRAM(s) IV Intermittent every 8 hours  clopidogrel Tablet 75 milliGRAM(s) Oral daily  diltiazem    Tablet 60 milliGRAM(s) Oral every 6 hours  furosemide    Tablet 40 milliGRAM(s) Oral daily  gabapentin 100 milliGRAM(s) Oral two times a day  isosorbide   mononitrate ER Tablet (IMDUR) 60 milliGRAM(s) Oral daily  metoprolol tartrate 50 milliGRAM(s) Oral every 6 hours  polyethylene glycol 3350 17 Gram(s) Oral daily  potassium phosphate / sodium phosphate Powder (PHOS-NaK) 1 Packet(s) Oral three times a day  senna 2 Tablet(s) Oral at bedtime    MEDICATIONS  (PRN):  bisacodyl 5 milliGRAM(s) Oral every 12 hours PRN Constipation  guaiFENesin Oral Liquid (Sugar-Free) 200 milliGRAM(s) Oral every 6 hours PRN Cough      ALLERGIES:  Allergies    Levaquin (Unknown)  penicillin (Rash)    Intolerances        LABS:                        13.6   18.42 )-----------( 402      ( 14 Jan 2023 06:11 )             41.1     01-14    135  |  92<L>  |  29<H>  ----------------------------<  139<H>  3.7   |  37<H>  |  0.88    Ca    8.9      14 Jan 2023 06:11  Phos  1.6     01-14  Mg     2.0     01-14    TPro  7.5  /  Alb  1.9<L>  /  TBili  2.0<H>  /  DBili  x   /  AST  24  /  ALT  15  /  AlkPhos  111  01-14    PT/INR - ( 14 Jan 2023 06:11 )   PT: 28.0 sec;   INR: 2.37 ratio         PTT - ( 14 Jan 2023 06:11 )  PTT:37.3 sec        CENTRAL LINE: N        DATE INSERTED:             REMOVE: N  RUBY: N                       DATE INSERTED:              REMOVE: Y/N  A-LINE: N                       DATE INSERTED:              REMOVE: Y/N      GLOBAL ISSUE/BEST PRACTICE  Analgesia: Y  Sedation: N  HOB elevation: yes  Stress ulcer prophylaxis: N  VTE prophylaxis: Y  Glycemic control: N  Nutrition: Y    CODE STATUS: Full Code

## 2023-01-14 NOTE — PROGRESS NOTE ADULT - ASSESSMENT
83 y/o m w/ PMH od CAD s/p stent, s/p CABG, HTN, HLD, CHF, admitted for acute CHF exacerbation, found to be positive for influenza, and with new onset uncontrolled AFib. Improved with tamiflu, diuresis, and rate control. Had progressive hypoxia, and CT chest showing bilateral tree-in-bud opacities, with more confluent consolidation at bases bilaterally, raising suspicion for atypical PNA. He was transferred to ICU for management of acute hypoxemic respiratory failure, new onset AFib, influenza A viral PNA, and suspected atypical PNA found to be pseudomonas PNA.     Neuro - somnolent, lidocaine patches for back pain, add Tylenol, avoid morphine    CV - hemodynamically stable  -HFpEF:  Echo shows EF 55%, continue Lasix 40mg PO  -New onset AFib: cont rate control with lopressor and diltiazem, goal HR<110  full AC with eliquis   -CAD: cont Imdur, statin and plavix    Pulm -  acute hypoxemic resp failure on HFNC, with Pseudomonas PNA  Currently HFNC on 40 L at 45%, wean as tolerates  continue tessalon perles, hycodan PRN    GI -  DASH diet, continue senna and miralax    Renal - renal function stable, hyponatremia improved, phos repleted    ID -Pt with influenza A viral PNA s/p Tamiflu and Pseudomonas PNA,  leukocytosis improving  Continue Cefepime, f/u ID recs for de-escalation  SCx: Pseudomonas  BCx NGTD  AMOS neg, ANCA neg  fungitell neg  Strep neg  FU galatamannan  FU PJP PCR    Endo - at baseline    Heme -  full AC with eliquis, no signs of active bleeding, H/H stable    Skin: no lines or roth    Dispo: Full code

## 2023-01-14 NOTE — PROGRESS NOTE ADULT - PROBLEM SELECTOR PLAN 3
Continue Eliquis  Rate better controlled; continue with Cardizem and Metoprolol  CAD: cont Imdur, statin and plavix

## 2023-01-14 NOTE — PROGRESS NOTE ADULT - SUBJECTIVE AND OBJECTIVE BOX
Patient is a 82y old  Male who presents with a chief complaint of CHF, A fib, Flu (13 Jan 2023 14:41)    pt seen and examine today alert awake , sob + , no fever , tolerating po.   high flow 93 pulse ox   INTERVAL HPI/OVERNIGHT EVENTS:     T(C): 37.2 (01-14-23 @ 08:00), Max: 37.5 (01-13-23 @ 16:21)  HR: 104 (01-14-23 @ 10:21) (77 - 122)  BP: 127/63 (01-14-23 @ 07:00) (94/56 - 144/64)  RR: 34 (01-14-23 @ 07:00) (20 - 37)  SpO2: 95% (01-14-23 @ 10:21) (92% - 96%)  Wt(kg): --  I&O's Summary    13 Jan 2023 07:01  -  14 Jan 2023 07:00  --------------------------------------------------------  IN: 1400 mL / OUT: 1000 mL / NET: 400 mL        REVIEW OF SYSTEMS:  CONSTITUTIONAL: No fever, weight loss, + fatigue  EYES: No eye pain, visual disturbances, or discharge  ENMT:  No difficulty hearing, tinnitus, vertigo; No sinus or throat pain  NECK: No pain or stiffness  RESPIRATORY: No cough, wheezing, chills + shortness of breath  CARDIOVASCULAR: No chest pain, palpitations, dizziness, or leg swelling  GASTROINTESTINAL: No abdominal or epigastric pain. No nausea, vomiting ,  No diarrhea or constipation.  GENITOURINARY: No dysuria, frequency, hematuria, or incontinence  NEUROLOGICAL: No headaches, memory loss, loss of strength, numbness, or tremors  SKIN: No itching, burning, rashes, or lesions   MUSCULOSKELETAL: No joint pain or swelling; No muscle, back, or extremity pain    PHYSICAL EXAM:  GENERAL: NAD, weak +  HEAD:  Atraumatic, Normocephalic  EYES: EOMI, PERRLA, conjunctiva and sclera clear  ENMT:; Moist mucous membranes  NECK: Supple, No JVD  NERVOUS SYSTEM:  Alert & Oriented X3; Motor Strength 5/5 B/L upper and lower extremities; DTRs 2+ intact and symmetric  CHEST/LUNG:  percussion bilaterally  coarse + ; No rales, rhonchi, wheezing,  HEART: Regular rate and rhythm; No murmurs, no tachy  ABDOMEN: Soft, coarse  +  Nontender, Nondistended; Bowel sounds present , obese   EXTREMITIES:  2+ Peripheral Pulses, No clubbing, cyanosis, or edema  SKIN: No rashes or lesions    MEDICATIONS  (STANDING):  apixaban 5 milliGRAM(s) Oral every 12 hours  atorvastatin 10 milliGRAM(s) Oral at bedtime  benzonatate 100 milliGRAM(s) Oral every 8 hours  cefepime   IVPB 2000 milliGRAM(s) IV Intermittent every 8 hours  clopidogrel Tablet 75 milliGRAM(s) Oral daily  diltiazem    Tablet 60 milliGRAM(s) Oral every 6 hours  furosemide    Tablet 40 milliGRAM(s) Oral daily  gabapentin 100 milliGRAM(s) Oral two times a day  isosorbide   mononitrate ER Tablet (IMDUR) 60 milliGRAM(s) Oral daily  metoprolol tartrate 50 milliGRAM(s) Oral every 6 hours  polyethylene glycol 3350 17 Gram(s) Oral daily  potassium phosphate / sodium phosphate Powder (PHOS-NaK) 1 Packet(s) Oral three times a day  senna 2 Tablet(s) Oral at bedtime    MEDICATIONS  (PRN):  bisacodyl 5 milliGRAM(s) Oral every 12 hours PRN Constipation  guaiFENesin Oral Liquid (Sugar-Free) 200 milliGRAM(s) Oral every 6 hours PRN Cough      LABS:                        13.6   18.42 )-----------( 402      ( 14 Jan 2023 06:11 )             41.1     01-14    135  |  92<L>  |  29<H>  ----------------------------<  139<H>  3.7   |  37<H>  |  0.88    Ca    8.9      14 Jan 2023 06:11  Phos  1.6     01-14  Mg     2.0     01-14    TPro  7.5  /  Alb  1.9<L>  /  TBili  2.0<H>  /  DBili  x   /  AST  24  /  ALT  15  /  AlkPhos  111  01-14    PT/INR - ( 14 Jan 2023 06:11 )   PT: 28.0 sec;   INR: 2.37 ratio         PTT - ( 14 Jan 2023 06:11 )  PTT:37.3 sec    CAPILLARY BLOOD GLUCOSE        ABG - ( 12 Jan 2023 20:55 )  pH, Arterial: 7.57  pH, Blood: x     /  pCO2: 46    /  pO2: 69    / HCO3: 42    / Base Excess: 20.1  /  SaO2: 96.6              01-12 @ 11:00   Normal Respiratory Erin present  --  --  01-11 @ 18:00   Few Pseudomonas aeruginosa  Normal Respiratory Erin present  --  Pseudomonas aeruginosa  01-10 @ 22:05   No growth to date.  --  --  01-10 @ 22:00   No growth to date.  --  --          RADIOLOGY & ADDITIONAL TESTS:    Imaging Personally Reviewed:     no new test   Advance Directives:    full code  Palliative Care:  Appropriate

## 2023-01-14 NOTE — PROGRESS NOTE ADULT - PROBLEM SELECTOR PLAN 1
Acute respiratory failure as a result of Influenza with superimposed bacterial PNA  Continue broad spectrum antibiotics- on Cefepime 2gm q8hr   Sputum culture growing Pseudomonas +  ,MRSA/MSSA nares PCR negative  Legionella negative   Still requiring high Flow in the ICU   Continue bronchodilators   Care per critical care team  ID consult follow up ongoing

## 2023-01-14 NOTE — PROGRESS NOTE ADULT - TIME BILLING
pt seen and examine today see above plan --Acute respiratory failure as a result of Influenza with superimposed bacterial PNA Continue broad spectrum antibiotics- on Cefepime 2gm q8hr  Sputum culture  Pseudomonas +, blood cult neg .

## 2023-01-15 ENCOUNTER — TRANSCRIPTION ENCOUNTER (OUTPATIENT)
Age: 83
End: 2023-01-15

## 2023-01-15 LAB
ALBUMIN SERPL ELPH-MCNC: 1.8 G/DL — LOW (ref 3.3–5)
ALP SERPL-CCNC: 106 U/L — SIGNIFICANT CHANGE UP (ref 40–120)
ALT FLD-CCNC: 17 U/L — SIGNIFICANT CHANGE UP (ref 12–78)
ANION GAP SERPL CALC-SCNC: 4 MMOL/L — LOW (ref 5–17)
AST SERPL-CCNC: 30 U/L — SIGNIFICANT CHANGE UP (ref 15–37)
BASOPHILS # BLD AUTO: 0.11 K/UL — SIGNIFICANT CHANGE UP (ref 0–0.2)
BASOPHILS NFR BLD AUTO: 0.7 % — SIGNIFICANT CHANGE UP (ref 0–2)
BILIRUB SERPL-MCNC: 1.7 MG/DL — HIGH (ref 0.2–1.2)
BUN SERPL-MCNC: 26 MG/DL — HIGH (ref 7–23)
CALCIUM SERPL-MCNC: 8.6 MG/DL — SIGNIFICANT CHANGE UP (ref 8.5–10.1)
CHLORIDE SERPL-SCNC: 92 MMOL/L — LOW (ref 96–108)
CO2 SERPL-SCNC: 37 MMOL/L — HIGH (ref 22–31)
CREAT SERPL-MCNC: 0.98 MG/DL — SIGNIFICANT CHANGE UP (ref 0.5–1.3)
EGFR: 78 ML/MIN/1.73M2 — SIGNIFICANT CHANGE UP
EOSINOPHIL # BLD AUTO: 0.33 K/UL — SIGNIFICANT CHANGE UP (ref 0–0.5)
EOSINOPHIL NFR BLD AUTO: 2.2 % — SIGNIFICANT CHANGE UP (ref 0–6)
GLUCOSE SERPL-MCNC: 161 MG/DL — HIGH (ref 70–99)
HCT VFR BLD CALC: 39.7 % — SIGNIFICANT CHANGE UP (ref 39–50)
HGB BLD-MCNC: 13 G/DL — SIGNIFICANT CHANGE UP (ref 13–17)
IMM GRANULOCYTES NFR BLD AUTO: 5.4 % — HIGH (ref 0–0.9)
LYMPHOCYTES # BLD AUTO: 1.05 K/UL — SIGNIFICANT CHANGE UP (ref 1–3.3)
LYMPHOCYTES # BLD AUTO: 7 % — LOW (ref 13–44)
MAGNESIUM SERPL-MCNC: 2.1 MG/DL — SIGNIFICANT CHANGE UP (ref 1.6–2.6)
MCHC RBC-ENTMCNC: 28.6 PG — SIGNIFICANT CHANGE UP (ref 27–34)
MCHC RBC-ENTMCNC: 32.7 GM/DL — SIGNIFICANT CHANGE UP (ref 32–36)
MCV RBC AUTO: 87.3 FL — SIGNIFICANT CHANGE UP (ref 80–100)
MONOCYTES # BLD AUTO: 0.88 K/UL — SIGNIFICANT CHANGE UP (ref 0–0.9)
MONOCYTES NFR BLD AUTO: 5.9 % — SIGNIFICANT CHANGE UP (ref 2–14)
NEUTROPHILS # BLD AUTO: 11.74 K/UL — HIGH (ref 1.8–7.4)
NEUTROPHILS NFR BLD AUTO: 78.8 % — HIGH (ref 43–77)
NRBC # BLD: 0 /100 WBCS — SIGNIFICANT CHANGE UP (ref 0–0)
PHOSPHATE SERPL-MCNC: 2.2 MG/DL — LOW (ref 2.5–4.5)
PLATELET # BLD AUTO: 406 K/UL — HIGH (ref 150–400)
POTASSIUM SERPL-MCNC: 3.9 MMOL/L — SIGNIFICANT CHANGE UP (ref 3.5–5.3)
POTASSIUM SERPL-SCNC: 3.9 MMOL/L — SIGNIFICANT CHANGE UP (ref 3.5–5.3)
PROT SERPL-MCNC: 7.2 G/DL — SIGNIFICANT CHANGE UP (ref 6–8.3)
RBC # BLD: 4.55 M/UL — SIGNIFICANT CHANGE UP (ref 4.2–5.8)
RBC # FLD: 15.4 % — HIGH (ref 10.3–14.5)
SODIUM SERPL-SCNC: 133 MMOL/L — LOW (ref 135–145)
WBC # BLD: 14.91 K/UL — HIGH (ref 3.8–10.5)
WBC # FLD AUTO: 14.91 K/UL — HIGH (ref 3.8–10.5)

## 2023-01-15 PROCEDURE — 99233 SBSQ HOSP IP/OBS HIGH 50: CPT | Mod: GC

## 2023-01-15 PROCEDURE — 99291 CRITICAL CARE FIRST HOUR: CPT

## 2023-01-15 RX ORDER — BENZOCAINE 10 %
1 GEL (GRAM) MUCOUS MEMBRANE
Refills: 0 | Status: DISCONTINUED | OUTPATIENT
Start: 2023-01-15 | End: 2023-01-24

## 2023-01-15 RX ORDER — SODIUM,POTASSIUM PHOSPHATES 278-250MG
1 POWDER IN PACKET (EA) ORAL ONCE
Refills: 0 | Status: COMPLETED | OUTPATIENT
Start: 2023-01-15 | End: 2023-01-15

## 2023-01-15 RX ORDER — BENZOCAINE AND MENTHOL 5; 1 G/100ML; G/100ML
1 LIQUID ORAL DAILY
Refills: 0 | Status: DISCONTINUED | OUTPATIENT
Start: 2023-01-15 | End: 2023-01-15

## 2023-01-15 RX ADMIN — Medication 50 MILLIGRAM(S): at 23:09

## 2023-01-15 RX ADMIN — FLUCONAZOLE 50 MILLIGRAM(S): 150 TABLET ORAL at 16:56

## 2023-01-15 RX ADMIN — Medication 1 PACKET(S): at 05:42

## 2023-01-15 RX ADMIN — ISOSORBIDE MONONITRATE 60 MILLIGRAM(S): 60 TABLET, EXTENDED RELEASE ORAL at 11:39

## 2023-01-15 RX ADMIN — GABAPENTIN 100 MILLIGRAM(S): 400 CAPSULE ORAL at 05:43

## 2023-01-15 RX ADMIN — CLOPIDOGREL BISULFATE 75 MILLIGRAM(S): 75 TABLET, FILM COATED ORAL at 11:30

## 2023-01-15 RX ADMIN — ATORVASTATIN CALCIUM 10 MILLIGRAM(S): 80 TABLET, FILM COATED ORAL at 22:59

## 2023-01-15 RX ADMIN — CEFEPIME 100 MILLIGRAM(S): 1 INJECTION, POWDER, FOR SOLUTION INTRAMUSCULAR; INTRAVENOUS at 13:11

## 2023-01-15 RX ADMIN — Medication 60 MILLIGRAM(S): at 11:30

## 2023-01-15 RX ADMIN — Medication 1 GRAM(S): at 17:35

## 2023-01-15 RX ADMIN — Medication 50 MILLIGRAM(S): at 17:35

## 2023-01-15 RX ADMIN — APIXABAN 5 MILLIGRAM(S): 2.5 TABLET, FILM COATED ORAL at 17:36

## 2023-01-15 RX ADMIN — Medication 1 PACKET(S): at 10:30

## 2023-01-15 RX ADMIN — Medication 50 MILLIGRAM(S): at 05:42

## 2023-01-15 RX ADMIN — Medication 40 MILLIGRAM(S): at 05:43

## 2023-01-15 RX ADMIN — SENNA PLUS 2 TABLET(S): 8.6 TABLET ORAL at 22:59

## 2023-01-15 RX ADMIN — CEFEPIME 100 MILLIGRAM(S): 1 INJECTION, POWDER, FOR SOLUTION INTRAMUSCULAR; INTRAVENOUS at 05:42

## 2023-01-15 RX ADMIN — Medication 60 MILLIGRAM(S): at 00:09

## 2023-01-15 RX ADMIN — Medication 1 GRAM(S): at 05:42

## 2023-01-15 RX ADMIN — Medication 60 MILLIGRAM(S): at 17:35

## 2023-01-15 RX ADMIN — POLYETHYLENE GLYCOL 3350 17 GRAM(S): 17 POWDER, FOR SOLUTION ORAL at 11:31

## 2023-01-15 RX ADMIN — CEFEPIME 100 MILLIGRAM(S): 1 INJECTION, POWDER, FOR SOLUTION INTRAMUSCULAR; INTRAVENOUS at 22:59

## 2023-01-15 RX ADMIN — Medication 100 MILLIGRAM(S): at 13:10

## 2023-01-15 RX ADMIN — Medication 50 MILLIGRAM(S): at 11:30

## 2023-01-15 RX ADMIN — APIXABAN 5 MILLIGRAM(S): 2.5 TABLET, FILM COATED ORAL at 05:43

## 2023-01-15 RX ADMIN — GABAPENTIN 100 MILLIGRAM(S): 400 CAPSULE ORAL at 17:35

## 2023-01-15 RX ADMIN — Medication 60 MILLIGRAM(S): at 05:42

## 2023-01-15 RX ADMIN — Medication 60 MILLIGRAM(S): at 23:09

## 2023-01-15 RX ADMIN — Medication 100 MILLIGRAM(S): at 05:42

## 2023-01-15 RX ADMIN — Medication 50 MILLIGRAM(S): at 00:09

## 2023-01-15 RX ADMIN — Medication 100 MILLIGRAM(S): at 22:59

## 2023-01-15 NOTE — PROGRESS NOTE ADULT - PROBLEM SELECTOR PLAN 1
Acute respiratory failure as a result of Influenza with superimposed bacterial PNA  Continue broad spectrum antibiotics- on Cefepime 2gm q8hr   Sputum culture growing Pseudomonas +  ,MRSA/MSSA nares PCR negative  Legionella negative   Still requiring high Flow in the ICU   Continue bronchodilators   Care per critical care team  ID consult follow up ongoing Acute respiratory failure as a result of Influenza with superimposed bacterial PNA  Continue broad spectrum antibiotics- on Cefepime 2gm q8hr   Sputum culture growing Pseudomonas +  ,MRSA/MSSA nares PCR negative  Legionella negative   Still requiring high Flow in the ICU wean  slowly  to nc   Continue bronchodilators   ID consult dr boateng

## 2023-01-15 NOTE — PROGRESS NOTE ADULT - TIME BILLING
pt seen and examine today see above plan --Acute respiratory failure as a result of Influenza with superimposed bacterial PNA Continue broad spectrum antibiotics- on Cefepime 2gm q8hr  Sputum culture  Pseudomonas +, blood cult neg . pt seen and examine today see above plan --Acute respiratory failure as a result of Influenza with superimposed bacterial PNA Continue broad spectrum antibiotics-   on Cefepime 2gm q8hr  Sputum culture  Pseudomonas +, blood cult neg  MRSA/MSSA nares PCR negative  leucocytosis resolving   high flow nc 40lit/40 %.

## 2023-01-15 NOTE — PROGRESS NOTE ADULT - SUBJECTIVE AND OBJECTIVE BOX
[facility] - Donna Jay Pannella, Patel, Savella, Goodger  Office Number: [phone]    Follow Up: pna, hypoxic resp failure    Interim history: The patient reports no new symptoms.  Denies chest discomfort.  Persistent shortness of breath, on hfnc.  No abdominal pain.  No new neurologic symptoms.    REVIEW OF SYSTEMS:   All other review of systems are negative unless indicated above      PAST MEDICAL & SURGICAL HISTORY:  HLD (hyperlipidemia)      HTN (hypertension)      CAD (coronary artery disease)      S/P coronary artery stent placement      S/P CABG (coronary artery bypass graft)          SOCIAL HISTORY:  No tobacco, ethanol, or drug abuse.    FAMILY HISTORY:  Family history of cardiovascular disease (Sibling)    Family history of uterine cancer (Mother)      No family history of acute MI or sudden cardiac death.    MEDICATIONS  (STANDING):  apixaban 5 milliGRAM(s) Oral every 12 hours  atorvastatin 10 milliGRAM(s) Oral at bedtime  benzonatate 100 milliGRAM(s) Oral every 8 hours  cefepime   IVPB 2000 milliGRAM(s) IV Intermittent every 8 hours  clopidogrel Tablet 75 milliGRAM(s) Oral daily  diltiazem    Tablet 60 milliGRAM(s) Oral every 6 hours  fluconAZOLE IVPB      fluconAZOLE IVPB 100 milliGRAM(s) IV Intermittent every 24 hours  furosemide    Tablet 40 milliGRAM(s) Oral daily  gabapentin 100 milliGRAM(s) Oral two times a day  isosorbide   mononitrate ER Tablet (IMDUR) 60 milliGRAM(s) Oral daily  metoprolol tartrate 50 milliGRAM(s) Oral every 6 hours  polyethylene glycol 3350 17 Gram(s) Oral daily  potassium phosphate / sodium phosphate Powder (PHOS-NaK) 1 Packet(s) Oral once  senna 2 Tablet(s) Oral at bedtime  sucralfate suspension 1 Gram(s) Oral two times a day    MEDICATIONS  (PRN):  bisacodyl 5 milliGRAM(s) Oral every 12 hours PRN Constipation  guaiFENesin Oral Liquid (Sugar-Free) 200 milliGRAM(s) Oral every 6 hours PRN Cough  ondansetron Injectable 4 milliGRAM(s) IV Push every 8 hours PRN Nausea and/or Vomiting      Allergies    Levaquin (Unknown)  penicillin (Rash)    Intolerances        VITAL SIGNS:   Vital Signs Last 24 Hrs  T(C): 36.7 (15 Matthew 2023 04:43), Max: 36.7 (14 Jan 2023 20:40)  T(F): 98.1 (15 Matthew 2023 04:43), Max: 98.1 (15 Matthew 2023 04:43)  HR: 75 (15 Matthew 2023 10:00) (66 - 144)  BP: 122/59 (15 Matthew 2023 10:00) (94/54 - 139/69)  BP(mean): 85 (15 Matthew 2023 10:00) (68 - 95)  RR: 36 (15 Matthew 2023 10:00) (18 - 54)  SpO2: 87% (15 Matthew 2023 10:00) (87% - 95%)    Parameters below as of 15 Matthew 2023 08:11  Patient On (Oxygen Delivery Method): nasal cannula, high flow,40% @ 40LPM        I&O's Summary    14 Jan 2023 07:01  -  15 Matthew 2023 07:00  --------------------------------------------------------  IN: 1420 mL / OUT: 850 mL / NET: 570 mL        On Exam:  Constitutional: NAD, awake and alert, well-developed  HEENT: Moist Mucous Membranes, Anicteric  Pulmonary: Non-labored, breath sounds are clear bilaterally, No wheezing, rales or rhonchi  Cardiovascular: Regular, S1 and S2, No murmurs, rubs, gallops or clicks  Gastrointestinal: Bowel Sounds present, soft, nontender.   Lymph: No peripheral edema. No lymphadenopathy.  Skin: No visible rashes or ulcers.  Psych:  Mood & affect appropriate    LABS: All Labs Reviewed:                        13.0   14.91 )-----------( 406      ( 15 Matthew 2023 05:40 )             39.7                         13.6   18.42 )-----------( 402      ( 14 Jan 2023 06:11 )             41.1                         14.0   20.14 )-----------( 437      ( 13 Jan 2023 06:15 )             41.8     15 Matthew 2023 05:40    133    |  92     |  26     ----------------------------<  161    3.9     |  37     |  0.98   14 Jan 2023 06:11    135    |  92     |  29     ----------------------------<  139    3.7     |  37     |  0.88   13 Jan 2023 06:15    132    |  88     |  29     ----------------------------<  190    3.3     |  39     |  1.00     Ca    8.6        15 Jan 2023 05:40  Ca    8.9        14 Jan 2023 06:11  Ca    8.9        13 Jan 2023 06:15  Phos  2.2       15 Matthew 2023 05:40  Phos  1.6       14 Jan 2023 06:11  Phos  2.0       13 Jan 2023 06:15  Mg     2.1       15 Matthew 2023 05:40  Mg     2.0       14 Jan 2023 06:11  Mg     2.1       13 Jan 2023 06:15    TPro  7.2    /  Alb  1.8    /  TBili  1.7    /  DBili  x      /  AST  30     /  ALT  17     /  AlkPhos  106    15 Matthew 2023 05:40  TPro  7.5    /  Alb  1.9    /  TBili  2.0    /  DBili  x      /  AST  24     /  ALT  15     /  AlkPhos  111    14 Jan 2023 06:11  TPro  7.6    /  Alb  2.1    /  TBili  2.2    /  DBili  x      /  AST  18     /  ALT  13     /  AlkPhos  105    13 Jan 2023 06:15    PT/INR - ( 14 Jan 2023 06:11 )   PT: 28.0 sec;   INR: 2.37 ratio         PTT - ( 14 Jan 2023 06:11 )  PTT:37.3 sec      Blood Culture: Organism --  Gram Stain Blood -- Gram Stain   Few polymorphonuclear leukocytes per low power field  Rare Squamous epithelial cells per low power field  Few Gram Variable Rods per oil power field  Specimen Source .Sputum Sputum  Culture-Blood --    Organism Pseudomonas aeruginosa  Gram Stain Blood -- Gram Stain   Numerous polymorphonuclear leukocytes per low power field  No Squamous epithelial cells per low power field  Few Gram Negative Rods seen per oil power field  Specimen Source .Sputum Sputum  Culture-Blood --    Organism --  Gram Stain Blood -- Gram Stain --  Specimen Source .Blood Blood-Peripheral  Culture-Blood --    Organism --  Gram Stain Blood -- Gram Stain --  Specimen Source .Blood Blood-Peripheral  Culture-Blood --            RADIOLOGY:    ECG:

## 2023-01-15 NOTE — DISCHARGE NOTE PROVIDER - HOSPITAL COURSE
HPI:  81 y/o m w/ PMH od CAD s/p stent, s/p CABG, HTN, HLD, CHF p/w shortness of breath and cough for the last 5 days, worsening.  Pt had began to feel weak and not well about 5 days ago, and had a mild cough that progressively worsened, He also began to feel sob, and had some sandoval.  He felt pain under his left rib side when he took inspirations or coughed, but did not have any chest pressure or palpitations.  In the last couple of days he began to feel wheezing, and also felt some sense of feverishness at home, but did not take his temperature.  He has no known history of asthma, or copd.  Pt does not recall any sick contacts, or sig lower extremity swelling.    In the ED, he was given lasix, and nebulizer treatments, and tessalon perles, and was evaluated by Dr. Salazar in Cards. (07 Jan 2023 08:53)      ---  HOSPITAL COURSE:   81 y/o m w/ PMH od CAD s/p stent, s/p CABG, HTN, HLD p/w shortness of breath and cough, found to have a fib, concern for acute CHF exacerbation, + influenza.  1/10- patient noted to be significantly hypoxic despite increased supplemental oxygen. He was placed n Vent mask and then NRM with only slight improvement in saturation Lung exam with decreased air entry; CT angio ordered- PE rule out however there was evidence of consolidations concerning for infection. ABG reviewed   Broad spectrum IV antibiotics ordered. The pt had progressive hypoxia despite escalating O2. NC--> NRB, tachypnea, lethargy, afebrile, CTA without PE, bilateral tree bud densities likely infectious. Transferred to ICU for management. Afebrile, NRB transitioned to HFNC 40L and 100%, then weaned to FiO2 60%. He was treated for acute hypoxemic respiratory failure, new onset AFib, influenza A viral PNA, and suspected atypical PNA. His AFIB was treated with rate control with lopressor and diltiazem, goal HR<110, and full AC with eliquis. His influenza A infection was treated with Tamiflu. He was found to have Pseudomonas PNA and was treated with Cefepime. He had thrush which was treated with fluconazole. His breathing improved and he was weaned down to nasal cannula. He was stable for transfer to medicine floor.    ---  CONSULTANTS:     ---  TIME SPENT:  I, the attending physician, was physically present for the key portions of the evaluation and management (E/M) service provided. The total amount of time spent reviewing the hospital notes, laboratory values, imaging findings, assessing/counseling the patient, discussing with consultant physicians, social work, nursing staff was -- minutes    ---  Primary care provider was made aware of plan for discharge:      [  ] NO     [  ] YES   HPI:  81 y/o m w/ PMH od CAD s/p stent, s/p CABG, HTN, HLD, CHF p/w shortness of breath and cough for the last 5 days, worsening.  Pt had began to feel weak and not well about 5 days ago, and had a mild cough that progressively worsened, He also began to feel sob, and had some sandoval.  He felt pain under his left rib side when he took inspirations or coughed, but did not have any chest pressure or palpitations.  In the last couple of days he began to feel wheezing, and also felt some sense of feverishness at home, but did not take his temperature.  He has no known history of asthma, or copd.  Pt does not recall any sick contacts, or sig lower extremity swelling.    In the ED, he was given lasix, and nebulizer treatments, and tessalon perles, and was evaluated by Dr. Salazar in Cards. (07 Jan 2023 08:53)      ---  HOSPITAL COURSE:   81 y/o m w/ PMH od CAD s/p stent, s/p CABG, HTN, HLD p/w shortness of breath and cough, found to have a fib, concern for acute CHF exacerbation, + influenza.  1/10- patient noted to be significantly hypoxic despite increased supplemental oxygen. He was placed n Vent mask and then NRM with only slight improvement in saturation Lung exam with decreased air entry; CT angio ordered- PE rule out however there was evidence of consolidations concerning for infection. ABG reviewed   Broad spectrum IV antibiotics ordered. The pt had progressive hypoxia despite escalating O2. NC--> NRB, tachypnea, lethargy, afebrile, CTA without PE, bilateral tree bud densities likely infectious. Transferred to ICU for management. Afebrile, NRB transitioned to HFNC 40L and 100%, then weaned to FiO2 60%. He was treated for acute hypoxemic respiratory failure, new onset AFib, influenza A viral PNA, and suspected atypical PNA. His AFIB was treated with rate control with lopressor and diltiazem, goal HR<110, and full AC with eliquis. His influenza A infection was treated with Tamiflu. He was found to have Pseudomonas PNA and was treated with Cefepime. He had thrush which was treated with fluconazole. His breathing improved and he was weaned down to nasal cannula. He was stable for transfer to medicine floor. Patient finished course of Cefepime and was reassessed for oral thrush and fluconazole was discontinued and magic mouthwash was given, per infectious disease. PT evaluated the patient and recommended [facility]. Patient was seen on day of discharge and was stable for discharge to [facility].        Vitals:    Physical Exam:        ---  CONSULTANTS:   Cardio: Dr. Ge Louise: Dr. Pugh  ID: Dr. Barrera  Critical Care: Dr. Calderon        ---  TIME SPENT:  I, the attending physician, was physically present for the key portions of the evaluation and management (E/M) service provided. The total amount of time spent reviewing the hospital notes, laboratory values, imaging findings, assessing/counseling the patient, discussing with consultant physicians, social work, nursing staff was -- minutes    ---  Primary care provider was made aware of plan for discharge:      [  ] NO     [  ] YES   HPI:  83 y/o m w/ PMH od CAD s/p stent, s/p CABG, HTN, HLD, CHF p/w shortness of breath and cough for the last 5 days, worsening.  Pt had began to feel weak and not well about 5 days ago, and had a mild cough that progressively worsened, He also began to feel sob, and had some sandoval.  He felt pain under his left rib side when he took inspirations or coughed, but did not have any chest pressure or palpitations.  In the last couple of days he began to feel wheezing, and also felt some sense of feverishness at home, but did not take his temperature.  He has no known history of asthma, or copd.  Pt does not recall any sick contacts, or sig lower extremity swelling.    In the ED, he was given lasix, and nebulizer treatments, and tessalon perles, and was evaluated by Dr. Salazar in Cards. (07 Jan 2023 08:53)      ---  HOSPITAL COURSE:   83 y/o m w/ PMH od CAD s/p stent, s/p CABG, HTN, HLD p/w shortness of breath and cough, found to have a fib, concern for acute CHF exacerbation, + influenza.  1/10- patient noted to be significantly hypoxic despite increased supplemental oxygen. He was placed n Vent mask and then NRM with only slight improvement in saturation Lung exam with decreased air entry; CT angio ordered- PE rule out however there was evidence of consolidations concerning for infection. ABG reviewed   Broad spectrum IV antibiotics ordered. The pt had progressive hypoxia despite escalating O2. NC--> NRB, tachypnea, lethargy, afebrile, CTA without PE, bilateral tree bud densities likely infectious. Transferred to ICU for management. Afebrile, NRB transitioned to HFNC 40L and 100%, then weaned to FiO2 60%. He was treated for acute hypoxemic respiratory failure, new onset AFib, influenza A viral PNA, and suspected atypical PNA. His AFIB was treated with rate control with lopressor and diltiazem, goal HR<110, and full AC with eliquis. His influenza A infection was treated with Tamiflu. He was found to have Pseudomonas PNA and was treated with Cefepime. He had thrush which was treated with fluconazole. His breathing improved and he was weaned down to nasal cannula. He was stable for transfer to medicine floor. Patient finished course of Cefepime and was reassessed for oral thrush and fluconazole was discontinued and magic mouthwash was given, per infectious disease. PT evaluated the patient and recommended [facility]. Patient was seen on day of discharge and was stable for discharge to [facility].        Vitals:  Vital Signs Last 24 Hrs  T(C): 36.7 (20 Jan 2023 11:49), Max: 36.7 (19 Jan 2023 20:21)  T(F): 98.1 (20 Jan 2023 11:49), Max: 98.1 (19 Jan 2023 20:21)  HR: 80 (20 Jan 2023 11:49) (80 - 85)  BP: 101/64 (20 Jan 2023 11:49) (101/64 - 118/67)  BP(mean): --  RR: 18 (20 Jan 2023 11:49) (18 - 18)  SpO2: 93% (20 Jan 2023 11:49) (92% - 96%)    Parameters below as of 20 Jan 2023 11:49  Patient On (Oxygen Delivery Method): room air    Physical Exam:  GENERAL: NAD, sitting up in bed  HEENT:  anicteric, moist mucous membranes  CHEST/LUNG: CTA B/L. No rales, wheezes, or rhonchi  HEART:  irregular rhythm, S1, S2  ABDOMEN:  BS+, soft, nontender, nondistended  EXTREMITIES: no edema, cyanosis.   NERVOUS SYSTEM: answers questions and follows commands appropriately    ---    CONSULTANTS:   Cardio: Dr. Carolina  Pulm: Dr. Pugh  ID: Dr. Barrera  Critical Care: Dr. Calderon      ---  TIME SPENT:  I, the attending physician, was physically present for the key portions of the evaluation and management (E/M) service provided. The total amount of time spent reviewing the hospital notes, laboratory values, imaging findings, assessing/counseling the patient, discussing with consultant physicians, social work, nursing staff was -- minutes    ---  Primary care provider was made aware of plan for discharge:      [  ] NO     [  ] YES   HPI:  81 y/o m w/ PMH od CAD s/p stent, s/p CABG, HTN, HLD, CHF p/w shortness of breath and cough for the last 5 days, worsening.  Pt had began to feel weak and not well about 5 days ago, and had a mild cough that progressively worsened, He also began to feel sob, and had some sandoval.  He felt pain under his left rib side when he took inspirations or coughed, but did not have any chest pressure or palpitations.  In the last couple of days he began to feel wheezing, and also felt some sense of feverishness at home, but did not take his temperature.  He has no known history of asthma, or copd.  Pt does not recall any sick contacts, or sig lower extremity swelling.    In the ED, he was given lasix, and nebulizer treatments, and tessalon perles, and was evaluated by Dr. Salazar in Cards. (07 Jan 2023 08:53)      ---  HOSPITAL COURSE:   81 y/o m w/ PMH od CAD s/p stent, s/p CABG, HTN, HLD p/w shortness of breath and cough, found to have a fib, concern for acute CHF exacerbation, + influenza.  1/10- patient noted to be significantly hypoxic despite increased supplemental oxygen. He was placed n Vent mask and then NRM with only slight improvement in saturation Lung exam with decreased air entry; CT angio ordered- PE rule out however there was evidence of consolidations concerning for infection. ABG reviewed   Broad spectrum IV antibiotics ordered. The pt had progressive hypoxia despite escalating O2. NC--> NRB, tachypnea, lethargy, afebrile, CTA without PE, bilateral tree bud densities likely infectious. Transferred to ICU for management. Afebrile, NRB transitioned to HFNC 40L and 100%, then weaned to FiO2 60%. He was treated for acute hypoxemic respiratory failure, new onset AFib, influenza A viral PNA, and suspected atypical PNA. His AFIB was treated with rate control with lopressor and diltiazem, goal HR<110, and full AC with eliquis. His influenza A infection was treated with Tamiflu. He was found to have Pseudomonas PNA and was treated with Cefepime. He had thrush which was treated with fluconazole. His breathing improved and he was weaned down to nasal cannula. He was stable for transfer to medicine floor. Patient finished course of Cefepime and was reassessed for oral thrush and fluconazole was discontinued and magic mouthwash was given, per infectious disease. PT evaluated the patient and recommended [facility]. Patient was seen on day of discharge and was stable for discharge to [facility].        Vitals:  Vital Signs Last 24 Hrs  T(C): 36.7 (20 Jan 2023 11:49), Max: 36.7 (19 Jan 2023 20:21)  T(F): 98.1 (20 Jan 2023 11:49), Max: 98.1 (19 Jan 2023 20:21)  HR: 80 (20 Jan 2023 11:49) (80 - 85)  BP: 101/64 (20 Jan 2023 11:49) (101/64 - 118/67)  BP(mean): --  RR: 18 (20 Jan 2023 11:49) (18 - 18)  SpO2: 93% (20 Jan 2023 11:49) (92% - 96%)    Parameters below as of 20 Jan 2023 11:49  Patient On (Oxygen Delivery Method): room air    Physical Exam:  GENERAL: NAD, sitting up in bed  HEENT:  anicteric, moist mucous membranes  CHEST/LUNG: CTA B/L. No rales, wheezes, or rhonchi  HEART:  irregular rhythm, S1, S2  ABDOMEN:  BS+, soft, nontender, nondistended  EXTREMITIES: no edema, cyanosis.   NERVOUS SYSTEM: answers questions and follows commands appropriately    ---    CONSULTANTS:   Cardio: Dr. Carolina  Pulm: Dr. Pugh  ID: Dr. Barrera  Critical Care: Dr. Calderon      ---  TIME SPENT:  I, the attending physician, was physically present for the key portions of the evaluation and management (E/M) service provided. The total amount of time spent reviewing the hospital notes, laboratory values, imaging findings, assessing/counseling the patient, discussing with consultant physicians, social work, nursing staff was -- minutes       HPI:  83 y/o m w/ PMH od CAD s/p stent, s/p CABG, HTN, HLD, CHF p/w shortness of breath and cough for the last 5 days, worsening.  Pt had began to feel weak and not well about 5 days ago, and had a mild cough that progressively worsened, He also began to feel sob, and had some sandoval.  He felt pain under his left rib side when he took inspirations or coughed, but did not have any chest pressure or palpitations.  In the last couple of days he began to feel wheezing, and also felt some sense of feverishness at home, but did not take his temperature.  He has no known history of asthma, or copd.  Pt does not recall any sick contacts, or sig lower extremity swelling.    In the ED, he was given lasix, and nebulizer treatments, and tessalon perles, and was evaluated by Dr. Salazar in Cards. (07 Jan 2023 08:53)      ---  HOSPITAL COURSE:   83 y/o m w/ PMH od CAD s/p stent, s/p CABG, HTN, HLD p/w shortness of breath and cough, found to have a fib, concern for acute CHF exacerbation, + influenza.  1/10- patient noted to be significantly hypoxic despite increased supplemental oxygen. He was placed n Vent mask and then NRM with only slight improvement in saturation Lung exam with decreased air entry; CT angio ordered- PE rule out however there was evidence of consolidations concerning for infection. ABG reviewed   Broad spectrum IV antibiotics ordered. The pt had progressive hypoxia despite escalating O2. NC--> NRB, tachypnea, lethargy, afebrile, CTA without PE, bilateral tree bud densities likely infectious. Transferred to ICU for management. Afebrile, NRB transitioned to HFNC 40L and 100%, then weaned to FiO2 60%. He was treated for acute hypoxemic respiratory failure, new onset AFib, influenza A viral PNA, and suspected atypical PNA. His AFIB was treated with rate control with lopressor and diltiazem, goal HR<110, and full AC with eliquis. His influenza A infection was treated with Tamiflu. He was found to have Pseudomonas PNA and was treated with Cefepime. He had thrush which was treated with fluconazole. His breathing improved and he was weaned down to nasal cannula. He was stable for transfer to medicine floor. Patient finished course of Cefepime and was reassessed for oral thrush and fluconazole was discontinued and magic mouthwash was given, per infectious disease. PT evaluated the patient and recommended [facility]. Patient was seen on day of discharge and was stable for discharge to [facility].       Vitals:  Vital Signs Last 24 Hrs  T(C): 36.7 (20 Jan 2023 11:49), Max: 36.7 (19 Jan 2023 20:21)  T(F): 98.1 (20 Jan 2023 11:49), Max: 98.1 (19 Jan 2023 20:21)  HR: 80 (20 Jan 2023 11:49) (80 - 85)  BP: 101/64 (20 Jan 2023 11:49) (101/64 - 118/67)  BP(mean): --  RR: 18 (20 Jan 2023 11:49) (18 - 18)  SpO2: 93% (20 Jan 2023 11:49) (92% - 96%)    Parameters below as of 20 Jan 2023 11:49  Patient On (Oxygen Delivery Method): room air    Physical Exam:  GENERAL: NAD, sitting up in bed  HEENT:  anicteric, moist mucous membranes  CHEST/LUNG: CTA B/L. No rales, wheezes, or rhonchi  HEART:  irregular rhythm, S1, S2  ABDOMEN:  BS+, soft, nontender, nondistended  EXTREMITIES: no edema, cyanosis.   NERVOUS SYSTEM: answers questions and follows commands appropriately    ---    CONSULTANTS:   Cardio: Dr. Carolina  Pulm: Dr. Pugh  ID: Dr. Barrera  Critical Care: Dr. Calderon      ---  TIME SPENT:  I, the attending physician, was physically present for the key portions of the evaluation and management (E/M) service provided. The total amount of time spent reviewing the hospital notes, laboratory values, imaging findings, assessing/counseling the patient, discussing with consultant physicians, social work, nursing staff was -- minutes       HPI:  81 y/o m w/ PMH od CAD s/p stent, s/p CABG, HTN, HLD, CHF p/w shortness of breath and cough for the last 5 days, worsening.  Pt had began to feel weak and not well about 5 days ago, and had a mild cough that progressively worsened, He also began to feel sob, and had some sandoval.  He felt pain under his left rib side when he took inspirations or coughed, but did not have any chest pressure or palpitations.  In the last couple of days he began to feel wheezing, and also felt some sense of feverishness at home, but did not take his temperature.  He has no known history of asthma, or copd.  Pt does not recall any sick contacts, or sig lower extremity swelling.    In the ED, he was given lasix, and nebulizer treatments, and tessalon perles, and was evaluated by Dr. Salazar in Cards. (07 Jan 2023 08:53)      ---  HOSPITAL COURSE:   81 y/o m w/ PMH od CAD s/p stent, s/p CABG, HTN, HLD p/w shortness of breath and cough, found to have a fib, concern for acute CHF exacerbation, + influenza.  1/10- patient noted to be significantly hypoxic despite increased supplemental oxygen. He was placed n Vent mask and then NRM with only slight improvement in saturation Lung exam with decreased air entry; CT angio ordered- PE rule out however there was evidence of consolidations concerning for infection. ABG reviewed   Broad spectrum IV antibiotics ordered. The pt had progressive hypoxia despite escalating O2. NC--> NRB, tachypnea, lethargy, afebrile, CTA without PE, bilateral tree bud densities likely infectious. Transferred to ICU for management. Afebrile, NRB transitioned to HFNC 40L and 100%, then weaned to FiO2 60%. He was treated for acute hypoxemic respiratory failure, new onset AFib, influenza A viral PNA, and suspected atypical PNA. His AFIB was treated with rate control with lopressor and diltiazem, goal HR<110, and full AC with eliquis. His influenza A infection was treated with Tamiflu. He was found to have Pseudomonas PNA and was treated with Cefepime. He had thrush which was treated with fluconazole. His breathing improved and he was weaned down to nasal cannula. He was stable for transfer to medicine floor. Patient finished course of Cefepime and was reassessed for oral thrush and fluconazole was discontinued and magic mouthwash was given, per infectious disease. PT evaluated the patient and recommended [facility]. Patient was seen and examined on day of discharge and was stable for discharge to [facility].       Vitals:  Vital Signs Last 24 Hrs  T(C): 36.8 (24 Jan 2023 11:37), Max: 36.9 (23 Jan 2023 20:07)  T(F): 98.2 (24 Jan 2023 11:37), Max: 98.5 (23 Jan 2023 20:07)  HR: 95 (24 Jan 2023 11:37) (65 - 95)  BP: 123/81 (24 Jan 2023 11:37) (99/62 - 136/75)  BP(mean): 84 (23 Jan 2023 17:52) (84 - 84)  RR: 18 (24 Jan 2023 11:37) (18 - 18)  SpO2: 92% (24 Jan 2023 11:37) (92% - 93%)    Parameters below as of 24 Jan 2023 11:37  Patient On (Oxygen Delivery Method): room air      Physical Exam:  GENERAL: NAD, sitting up in bed  HEENT:  anicteric, moist mucous membranes  CHEST/LUNG: +bibasilar crackles otherwise CTA b/l. No rales, wheezes, or rhonchi  HEART:  irregular rhythm, S1, S2  ABDOMEN:  BS+, soft, nontender, nondistended  EXTREMITIES: no clubbing, edema, cyanosis b/l LE.  NERVOUS SYSTEM: AAOx3, answers questions and follows commands appropriately    ---    CONSULTANTS:   Cardio: Dr. Carolina  Pulm: Dr. Pugh  ID: Dr. Barrera  Critical Care: Dr. Calderon      ---  TIME SPENT:  I, the attending physician, was physically present for the key portions of the evaluation and management (E/M) service provided. The total amount of time spent reviewing the hospital notes, laboratory values, imaging findings, assessing/counseling the patient, discussing with consultant physicians, social work, nursing staff was -- minutes       HPI:  81 y/o m w/ PMH od CAD s/p stent, s/p CABG, HTN, HLD, CHF p/w shortness of breath and cough for the last 5 days, worsening.  Pt had began to feel weak and not well about 5 days ago, and had a mild cough that progressively worsened, He also began to feel sob, and had some sandoval.  He felt pain under his left rib side when he took inspirations or coughed, but did not have any chest pressure or palpitations.  In the last couple of days he began to feel wheezing, and also felt some sense of feverishness at home, but did not take his temperature.  He has no known history of asthma, or copd.  Pt does not recall any sick contacts, or sig lower extremity swelling.    In the ED, he was given lasix, and nebulizer treatments, and tessalon perles, and was evaluated by Dr. Salazar in Cards. (07 Jan 2023 08:53)      ---  HOSPITAL COURSE:   81 y/o m w/ PMH od CAD s/p stent, s/p CABG, HTN, HLD p/w shortness of breath and cough, found to have a fib, concern for acute CHF exacerbation, + influenza.  1/10- patient noted to be significantly hypoxic despite increased supplemental oxygen. He was placed n Vent mask and then NRM with only slight improvement in saturation Lung exam with decreased air entry; CT angio ordered- PE rule out however there was evidence of consolidations concerning for infection. ABG reviewed   Broad spectrum IV antibiotics ordered. The pt had progressive hypoxia despite escalating O2. NC--> NRB, tachypnea, lethargy, afebrile, CTA without PE, bilateral tree bud densities likely infectious. Transferred to ICU for management. Afebrile, NRB transitioned to HFNC 40L and 100%, then weaned to FiO2 60%. He was treated for acute hypoxemic respiratory failure, new onset AFib, influenza A viral PNA, and suspected atypical PNA. His AFIB was treated with rate control with lopressor and diltiazem, goal HR<110, and full AC with eliquis. His influenza A infection was treated with Tamiflu. He was found to have Pseudomonas PNA and was treated with Cefepime. He had thrush which was treated with fluconazole. His breathing improved and he was weaned down to nasal cannula. He was stable for transfer to medicine floor. Patient finished course of Cefepime and was reassessed for oral thrush and fluconazole was discontinued and magic mouthwash was given, per infectious disease. PT evaluated the patient and recommended [facility] [place]. Patient was seen and examined on day of discharge and was stable for discharge to [facility] [place].       Vitals:  Vital Signs Last 24 Hrs  T(C): 36.8 (24 Jan 2023 11:37), Max: 36.9 (23 Jan 2023 20:07)  T(F): 98.2 (24 Jan 2023 11:37), Max: 98.5 (23 Jan 2023 20:07)  HR: 95 (24 Jan 2023 11:37) (65 - 95)  BP: 123/81 (24 Jan 2023 11:37) (99/62 - 136/75)  BP(mean): 84 (23 Jan 2023 17:52) (84 - 84)  RR: 18 (24 Jan 2023 11:37) (18 - 18)  SpO2: 92% (24 Jan 2023 11:37) (92% - 93%)    Parameters below as of 24 Jan 2023 11:37  Patient On (Oxygen Delivery Method): room air      Physical Exam:  GENERAL: NAD, sitting up in bed  HEENT:  anicteric, moist mucous membranes  CHEST/LUNG: +bibasilar crackles otherwise CTA b/l. No rales, wheezes, or rhonchi  HEART:  irregular rhythm, S1, S2  ABDOMEN:  BS+, soft, nontender, nondistended  EXTREMITIES: no clubbing, edema, cyanosis b/l LE.  NERVOUS SYSTEM: AAOx3, answers questions and follows commands appropriately    ---    CONSULTANTS:   Cardio: Dr. Carolina  Pulm: Dr. Pugh  ID: Dr. Barrera  Critical Care: Dr. Calderon      ---  TIME SPENT:  I, the attending physician, was physically present for the key portions of the evaluation and management (E/M) service provided. The total amount of time spent reviewing the hospital notes, laboratory values, imaging findings, assessing/counseling the patient, discussing with consultant physicians, social work, nursing staff was 40 minutes

## 2023-01-15 NOTE — PROGRESS NOTE ADULT - PROBLEM SELECTOR PLAN 3
Continue Eliquis  Rate better controlled; continue with Cardizem and Metoprolol  CAD: cont Imdur, statin and plavix Rate better controlled; continue with Cardizem and Metoprolol  , full ac  Continue Eliquis  CAD: cont Imdur, statin and plavix

## 2023-01-15 NOTE — DISCHARGE NOTE PROVIDER - NSDCMRMEDTOKEN_GEN_ALL_CORE_FT
amLODIPine 5 mg oral tablet: tab(s) orally once a day  Aspir 81 oral delayed release tablet: 1 tab(s) orally once a day  atenolol 50 mg oral tablet: 1 tab(s) orally once a day  atorvastatin 10 mg oral tablet: tab(s) orally once a day  clopidogrel 75 mg oral tablet: 1 tab(s) orally once a day  isosorbide mononitrate 60 mg oral tablet, extended release: 1 tab(s) orally once a day (in the morning)  losartan 100 mg oral tablet: 1 tab(s) orally once a day   amLODIPine 5 mg oral tablet: tab(s) orally once a day  apixaban 5 mg oral tablet: 1 tab(s) orally every 12 hours  Aspir 81 oral delayed release tablet: 1 tab(s) orally once a day  atenolol 50 mg oral tablet: 1 tab(s) orally once a day  atorvastatin 10 mg oral tablet: tab(s) orally once a day  benzocaine 20% topical spray: 1 spray(s) topically , As needed, Sore Throat  benzonatate 100 mg oral capsule: 1 cap(s) orally every 8 hours  bisacodyl 5 mg oral delayed release tablet: 1 tab(s) orally every 12 hours, As needed, Constipation  clopidogrel 75 mg oral tablet: 1 tab(s) orally once a day  dilTIAZem 240 mg/24 hours oral capsule, extended release: 1 cap(s) orally every 24 hours  furosemide 40 mg oral tablet: 1 tab(s) orally once a day  guaiFENesin 100 mg/5 mL oral liquid: 10 milliliter(s) orally every 6 hours, As needed, Cough  isosorbide mononitrate 60 mg oral tablet, extended release: 1 tab(s) orally once a day (in the morning)  losartan 100 mg oral tablet: 1 tab(s) orally once a day  metoprolol tartrate 50 mg oral tablet: 1 tab(s) orally every 6 hours  Neurontin 100 mg oral capsule: 1 cap(s) orally 2 times a day  ondansetron 2 mg/mL injectable solution:  injectable   polyethylene glycol 3350 oral powder for reconstitution: 17 gram(s) orally once a day  senna leaf extract oral tablet: 2 tab(s) orally once a day (at bedtime)  sucralfate 1 g/10 mL oral suspension: 10 milliliter(s) orally 2 times a day   amLODIPine 5 mg oral tablet: tab(s) orally once a day  apixaban 5 mg oral tablet: 1 tab(s) orally every 12 hours  Aspir 81 oral delayed release tablet: 1 tab(s) orally once a day  atenolol 50 mg oral tablet: 1 tab(s) orally once a day  atorvastatin 10 mg oral tablet: tab(s) orally once a day  benzocaine 20% topical spray: 1 spray(s) topically , As needed, Sore Throat  benzonatate 100 mg oral capsule: 1 cap(s) orally every 8 hours  bisacodyl 5 mg oral delayed release tablet: 1 tab(s) orally every 12 hours, As needed, Constipation  clopidogrel 75 mg oral tablet: 1 tab(s) orally once a day  dilTIAZem 240 mg/24 hours oral capsule, extended release: 1 cap(s) orally every 24 hours  diphenhydramine/lidocaine/aluminum hydroxide/magnesium hydroxide/simethicone mucous membrane suspension: 5 milliliter(s) mucous membrane 3 times a day  furosemide 40 mg oral tablet: 1 tab(s) orally once a day  guaiFENesin 100 mg/5 mL oral liquid: 10 milliliter(s) orally every 6 hours, As needed, Cough  isosorbide mononitrate 60 mg oral tablet, extended release: 1 tab(s) orally once a day (in the morning)  losartan 100 mg oral tablet: 1 tab(s) orally once a day  metoprolol tartrate 50 mg oral tablet: 1 tab(s) orally every 6 hours  Neurontin 100 mg oral capsule: 1 cap(s) orally 2 times a day  ondansetron 2 mg/mL injectable solution:  injectable   polyethylene glycol 3350 oral powder for reconstitution: 17 gram(s) orally once a day  senna leaf extract oral tablet: 2 tab(s) orally once a day (at bedtime)  sucralfate 1 g/10 mL oral suspension: 10 milliliter(s) orally 2 times a day   amLODIPine 5 mg oral tablet: tab(s) orally once a day  apixaban 5 mg oral tablet: 1 tab(s) orally every 12 hours  Aspir 81 oral delayed release tablet: 1 tab(s) orally once a day  atenolol 50 mg oral tablet: 1 tab(s) orally once a day  atorvastatin 10 mg oral tablet: tab(s) orally once a day  benzocaine 20% topical spray: Apply topically to affected area once a day, As Needed  benzonatate 100 mg oral capsule: 1 cap(s) orally every 8 hours  bisacodyl 5 mg oral delayed release tablet: 1 tab(s) orally every 12 hours, As needed, Constipation  clopidogrel 75 mg oral tablet: 1 tab(s) orally once a day  dilTIAZem 240 mg/24 hours oral capsule, extended release: 1 cap(s) orally every 24 hours  diphenhydramine/lidocaine/aluminum hydroxide/magnesium hydroxide/simethicone mucous membrane suspension: 5 milliliter(s) mucous membrane 3 times a day  furosemide 40 mg oral tablet: 1 tab(s) orally once a day  guaiFENesin 100 mg/5 mL oral liquid: 10 milliliter(s) orally every 6 hours, As needed, Cough  isosorbide mononitrate 60 mg oral tablet, extended release: 1 tab(s) orally once a day (in the morning)  losartan 100 mg oral tablet: 1 tab(s) orally once a day  metoprolol tartrate 50 mg oral tablet: 1 tab(s) orally every 6 hours  Neurontin 100 mg oral capsule: 1 cap(s) orally 2 times a day  ondansetron 2 mg/mL injectable solution: 4 milligram(s) injectable every 8 hours  polyethylene glycol 3350 oral powder for reconstitution: 17 gram(s) orally once a day  senna leaf extract oral tablet: 2 tab(s) orally once a day (at bedtime)  sucralfate 1 g/10 mL oral suspension: 10 milliliter(s) orally 2 times a day   amLODIPine 5 mg oral tablet: tab(s) orally once a day  apixaban 5 mg oral tablet: 1 tab(s) orally every 12 hours  Aspir 81 oral delayed release tablet: 1 tab(s) orally once a day  atenolol 50 mg oral tablet: 1 tab(s) orally once a day  atorvastatin 10 mg oral tablet: tab(s) orally once a day  benzocaine 20% topical spray: Apply topically to affected area once a day, As Needed  benzonatate 100 mg oral capsule: 1 cap(s) orally every 8 hours  bisacodyl 5 mg oral delayed release tablet: 1 tab(s) orally every 12 hours, As needed, Constipation  clopidogrel 75 mg oral tablet: 1 tab(s) orally once a day  diphenhydramine/lidocaine/aluminum hydroxide/magnesium hydroxide/simethicone mucous membrane suspension: 5 milliliter(s) mucous membrane 3 times a day  furosemide 40 mg oral tablet: 1 tab(s) orally once a day  guaiFENesin 100 mg/5 mL oral liquid: 10 milliliter(s) orally every 6 hours, As needed, Cough  isosorbide mononitrate 60 mg oral tablet, extended release: 1 tab(s) orally once a day (in the morning)  losartan 100 mg oral tablet: 1 tab(s) orally once a day  Neurontin 100 mg oral capsule: 1 cap(s) orally 2 times a day  polyethylene glycol 3350 oral powder for reconstitution: 17 gram(s) orally once a day  senna leaf extract oral tablet: 2 tab(s) orally once a day (at bedtime)  sucralfate 1 g/10 mL oral suspension: 10 milliliter(s) orally 2 times a day   apixaban 5 mg oral tablet: 1 tab(s) orally every 12 hours  Aspir 81 oral delayed release tablet: 1 tab(s) orally once a day  atenolol 50 mg oral tablet: 1 tab(s) orally once a day  atorvastatin 10 mg oral tablet: tab(s) orally once a day  benzocaine 20% topical spray: Apply topically to affected area once a day, As Needed  benzonatate 100 mg oral capsule: 1 cap(s) orally every 8 hours  bisacodyl 5 mg oral delayed release tablet: 1 tab(s) orally every 12 hours, As needed, Constipation  clopidogrel 75 mg oral tablet: 1 tab(s) orally once a day  dilTIAZem 240 mg/24 hours oral capsule, extended release: 1 cap(s) orally every 24 hours  diphenhydramine/lidocaine/aluminum hydroxide/magnesium hydroxide/simethicone mucous membrane suspension: 5 milliliter(s) mucous membrane 3 times a day  furosemide 40 mg oral tablet: 1 tab(s) orally once a day  guaiFENesin 100 mg/5 mL oral liquid: 10 milliliter(s) orally every 6 hours, As needed, Cough  isosorbide mononitrate 60 mg oral tablet, extended release: 1 tab(s) orally once a day (in the morning)  losartan 100 mg oral tablet: 1 tab(s) orally once a day  Neurontin 100 mg oral capsule: 1 cap(s) orally 2 times a day  polyethylene glycol 3350 oral powder for reconstitution: 17 gram(s) orally once a day  senna leaf extract oral tablet: 2 tab(s) orally once a day (at bedtime)  sucralfate 1 g/10 mL oral suspension: 10 milliliter(s) orally 2 times a day   apixaban 5 mg oral tablet: 1 tab(s) orally every 12 hours  atorvastatin 10 mg oral tablet: tab(s) orally once a day  benzocaine 20% topical spray: Apply topically to affected area once a day, As Needed  benzonatate 100 mg oral capsule: 1 cap(s) orally every 8 hours  bisacodyl 5 mg oral delayed release tablet: 1 tab(s) orally every 12 hours, As needed, Constipation  clopidogrel 75 mg oral tablet: 1 tab(s) orally once a day  dilTIAZem 240 mg/24 hours oral capsule, extended release: 1 cap(s) orally every 24 hours  diphenhydramine/lidocaine/aluminum hydroxide/magnesium hydroxide/simethicone mucous membrane suspension: 5 milliliter(s) mucous membrane 3 times a day  furosemide 40 mg oral tablet: 1 tab(s) orally once a day  guaiFENesin 100 mg/5 mL oral liquid: 10 milliliter(s) orally every 6 hours, As needed, Cough  isosorbide mononitrate 60 mg oral tablet, extended release: 1 tab(s) orally once a day (in the morning)  Metoprolol Tartrate 100 mg oral tablet: 1 tab(s) orally every 12 hours  Neurontin 100 mg oral capsule: 1 cap(s) orally 2 times a day  polyethylene glycol 3350 oral powder for reconstitution: 17 gram(s) orally once a day  senna leaf extract oral tablet: 2 tab(s) orally once a day (at bedtime)  sucralfate 1 g/10 mL oral suspension: 10 milliliter(s) orally 2 times a day

## 2023-01-15 NOTE — PROGRESS NOTE ADULT - PROBLEM SELECTOR PLAN 4
s/p acute diastolic chf   Echo - showed EF of 55%. No pericardial effusion  Off IV Lasix- monitor electrolytes s/p acute diastolic chf   Echo - showed EF of 55%. No pericardial effusion  Off IV Lasix-   lasix 40 mg po  daily , monitor electrolytes

## 2023-01-15 NOTE — PROGRESS NOTE ADULT - ASSESSMENT
82 year old male PMH of CAD s/p stent, s/p CABG, HTN, HLD, CHF p/w shortness of breath and cough for the last 5 days found to be in af with rvr, congestive hf, and influenza positive.    CAD s/p CABG, stent, Afib with RVR, HFpEF  - No anginal symptoms, no sign of acute ischemia, and troponin are negative  - Continue ASA, BB, and statin  - Continue Imdur    - Afib with rates improved.  Tachycardia  with some reactive component (in the setting of flu)  - c/w BB, will eventually switch to long-acting  - Continue Cardizem 30mg q6hrs.  Would switch to long-acting once HR is controlled  - Continue Eliquis    - HFpEF  - proBNP 2418-->959  - Still with severe hypoxemic resp failure req supplemental 02 via HFNC  - c/w po lasix  - TTE with EF 55%, LAE, with no significant valvular pathology  etiology of hypoxemia is not felt to be primarily hf but felt more likely to be primarily pulmonary, with flu and superimposed bact pna  - Strict i/o's and daily weights     - Monitor and replete lytes, keep K>4, Mg>2.  - Will continue to follow.  - very high risk of decompensation, with hypoxic resp failure, hf requiring hf02, and af with rvr

## 2023-01-15 NOTE — PROGRESS NOTE ADULT - PROBLEM SELECTOR PLAN 5
avoid nephrotoxins,   monitor renal functions  Stable creatinine- .88     Hypokalemia- replace potassium - k wnl avoid nephrotoxins,   monitor renal functions  Stable creatinine- .98    Hypokalemia- replace potassium - k wnl

## 2023-01-15 NOTE — DISCHARGE NOTE PROVIDER - PROVIDER TOKENS
PROVIDER:[TOKEN:[[id number]],FOLLOWUP:[1 week]] PROVIDER:[TOKEN:[[id number]],FOLLOWUP:[1 week]],PROVIDER:[TOKEN:[[id number]]]

## 2023-01-15 NOTE — DISCHARGE NOTE PROVIDER - CARE PROVIDER_API CALL
Irina Benavidez)  Internal Medicine  [address] [place] [address]  Phone: [phone]  Fax: [phone]  Follow Up Time: 1 week   Irina Benavidez)  Internal Medicine  [address] [place] [address]  Phone: [phone]  Fax: [phone]  Follow Up Time: 1 week    Yahir Salazar)  Cardiovascular Disease; Internal Medicine  [address], [place] [id number]  Phone: [phone]  Fax: [phone]  Follow Up Time:

## 2023-01-15 NOTE — DISCHARGE NOTE PROVIDER - PROVIDER RX CONTACT NUMBER
[phone] pregnant & post-partum women during each pregancy irregardless of the patient's prior history of receiving Tdap to maximize the maternal antibody response and passive antibody transfer to the infant

## 2023-01-15 NOTE — PROGRESS NOTE ADULT - ASSESSMENT
81 y/o m w/ PMH od CAD s/p stent, s/p CABG, HTN, HLD, CHF, admitted for acute CHF exacerbation, found to be positive for influenza, and with new onset uncontrolled AFib. Improved with tamiflu, diuresis, and rate control. Had progressive hypoxia, and CT chest showing bilateral tree-in-bud opacities, with more confluent consolidation at bases bilaterally, raising suspicion for atypical PNA. He was transferred to ICU for management of acute hypoxemic respiratory failure, new onset AFib, influenza A viral PNA, and suspected atypical PNA found to be pseudomonas PNA.     Neuro - awake and alert     CV - hemodynamically stable  -HFpEF:  Echo shows EF 55%, continue Lasix 40mg PO  -New onset AFib: cont rate control with lopressor and diltiazem, goal HR<110  full AC with eliquis   -CAD: cont Imdur, statin and plavix    Pulm -  acute hypoxemic resp failure on HFNC, with Pseudomonas PNA  Currently HFNC on 40 L at 40%, wean as tolerates  continue tessalon perles, guaifenesin PRN    GI -  DASH diet, sucralfate for nausea  continue senna and miralax    Renal - renal function stable, hyponatremia improved, phos repleted    ID -Pt with influenza A viral PNA s/p Tamiflu and Pseudomonas PNA, leukocytosis improving  Continue Cefepime, f/u ID recs for de-escalation  Oral Thrush: treat with fluconazole  SCx: Pseudomonas  BCx NGTD  FU galatamannan  FU PJP PCR    Endo - at baseline    Heme -  full AC with eliquis, no signs of active bleeding, H/H stable    Skin: no lines or roth    Dispo: Full code  - transfer to medicine floor  - family updated at bedside

## 2023-01-15 NOTE — DISCHARGE NOTE PROVIDER - CARE PROVIDERS DIRECT ADDRESSES
[email or web address]

## 2023-01-15 NOTE — PROGRESS NOTE ADULT - SUBJECTIVE AND OBJECTIVE BOX
INTERVAL HPI/OVERNIGHT EVENTS: No acute events overnight.    SUBJECTIVE: Patient seen and examined at bedside. He is sitting up in the chair. He states he feels better.    ROS:  Resp: + cough      OBJECTIVE:    VITAL SIGNS:  ICU Vital Signs Last 24 Hrs  T(C): 36.7 (15 Matthew 2023 13:00), Max: 36.7 (14 Jan 2023 20:40)  T(F): 98 (15 Matthew 2023 13:00), Max: 98.1 (15 Matthew 2023 04:43)  HR: 74 (15 Matthew 2023 13:00) (66 - 135)  BP: 111/69 (15 Matthew 2023 13:00) (94/54 - 139/69)  BP(mean): 73 (15 Matthew 2023 12:00) (68 - 93)  ABP: --  ABP(mean): --  RR: 24 (15 Matthew 2023 13:00) (18 - 45)  SpO2: 94% (15 Matthew 2023 13:00) (87% - 96%)    O2 Parameters below as of 15 Matthew 2023 13:00  Patient On (Oxygen Delivery Method): nasal cannula  O2 Flow (L/min): 94            01-14 @ 07:01  -  01-15 @ 07:00  --------------------------------------------------------  IN: 1420 mL / OUT: 850 mL / NET: 570 mL      CAPILLARY BLOOD GLUCOSE          PHYSICAL EXAM:  General: elderly gentleman, awake and alert  HEENT: NCAT, nasal cannula in place  Respiratory: diffuse crackles bilat  Cardiovascular: irregular rate  Abdomen: soft, NT/ND  Extremities: no LE edema or calf tenderness  Neuro: awake and alert    MEDICATIONS:  MEDICATIONS  (STANDING):  apixaban 5 milliGRAM(s) Oral every 12 hours  atorvastatin 10 milliGRAM(s) Oral at bedtime  benzonatate 100 milliGRAM(s) Oral every 8 hours  cefepime   IVPB 2000 milliGRAM(s) IV Intermittent every 8 hours  clopidogrel Tablet 75 milliGRAM(s) Oral daily  diltiazem    Tablet 60 milliGRAM(s) Oral every 6 hours  fluconAZOLE IVPB      fluconAZOLE IVPB 100 milliGRAM(s) IV Intermittent every 24 hours  furosemide    Tablet 40 milliGRAM(s) Oral daily  gabapentin 100 milliGRAM(s) Oral two times a day  isosorbide   mononitrate ER Tablet (IMDUR) 60 milliGRAM(s) Oral daily  metoprolol tartrate 50 milliGRAM(s) Oral every 6 hours  polyethylene glycol 3350 17 Gram(s) Oral daily  senna 2 Tablet(s) Oral at bedtime  sucralfate suspension 1 Gram(s) Oral two times a day    MEDICATIONS  (PRN):  benzocaine 20% Spray 1 Spray(s) Topical <User Schedule> PRN Sore Throat  bisacodyl 5 milliGRAM(s) Oral every 12 hours PRN Constipation  guaiFENesin Oral Liquid (Sugar-Free) 200 milliGRAM(s) Oral every 6 hours PRN Cough  ondansetron Injectable 4 milliGRAM(s) IV Push every 8 hours PRN Nausea and/or Vomiting      ALLERGIES:  Allergies    Levaquin (Unknown)  penicillin (Rash)    Intolerances        LABS:                        13.0   14.91 )-----------( 406      ( 15 Matthew 2023 05:40 )             39.7     01-15    133<L>  |  92<L>  |  26<H>  ----------------------------<  161<H>  3.9   |  37<H>  |  0.98    Ca    8.6      15 Matthew 2023 05:40  Phos  2.2     01-15  Mg     2.1     01-15    TPro  7.2  /  Alb  1.8<L>  /  TBili  1.7<H>  /  DBili  x   /  AST  30  /  ALT  17  /  AlkPhos  106  01-15    PT/INR - ( 14 Jan 2023 06:11 )   PT: 28.0 sec;   INR: 2.37 ratio         PTT - ( 14 Jan 2023 06:11 )  PTT:37.3 sec      CENTRAL LINE: N        DATE INSERTED:             REMOVE: N  RUBY: N                       DATE INSERTED:              REMOVE: Y/N  A-LINE: N                       DATE INSERTED:              REMOVE: Y/N      GLOBAL ISSUE/BEST PRACTICE  Analgesia: Y  Sedation: N  HOB elevation: yes  Stress ulcer prophylaxis: N  VTE prophylaxis: Y  Glycemic control: N  Nutrition: Y    CODE STATUS: Full Code   INTERVAL HPI/OVERNIGHT EVENTS: No acute events overnight.    SUBJECTIVE: Patient seen and examined at bedside. He is sitting up in the chair. He states he feels better.  On 40L and 40% FiO2 this am    ROS:  Resp: + cough      OBJECTIVE:    VITAL SIGNS:  ICU Vital Signs Last 24 Hrs  T(C): 36.7 (15 Matthew 2023 13:00), Max: 36.7 (14 Jan 2023 20:40)  T(F): 98 (15 Matthew 2023 13:00), Max: 98.1 (15 Matthew 2023 04:43)  HR: 74 (15 Matthew 2023 13:00) (66 - 135)  BP: 111/69 (15 Matthew 2023 13:00) (94/54 - 139/69)  BP(mean): 73 (15 Matthew 2023 12:00) (68 - 93)  ABP: --  ABP(mean): --  RR: 24 (15 Matthew 2023 13:00) (18 - 45)  SpO2: 94% (15 Matthew 2023 13:00) (87% - 96%)    O2 Parameters below as of 15 Matthew 2023 13:00  Patient On (Oxygen Delivery Method): nasal cannula  O2 Flow (L/min): 94            01-14 @ 07:01  -  01-15 @ 07:00  --------------------------------------------------------  IN: 1420 mL / OUT: 850 mL / NET: 570 mL      CAPILLARY BLOOD GLUCOSE          PHYSICAL EXAM:  General: elderly gentleman, awake and alert, sitting in chair  HEENT: NCAT, nasal cannula in place  Respiratory: diffuse crackles bilat  Cardiovascular: irregular rate  Abdomen: soft, NT/ND  Extremities: no LE edema or calf tenderness  Neuro: awake and alert    MEDICATIONS:  MEDICATIONS  (STANDING):  apixaban 5 milliGRAM(s) Oral every 12 hours  atorvastatin 10 milliGRAM(s) Oral at bedtime  benzonatate 100 milliGRAM(s) Oral every 8 hours  cefepime   IVPB 2000 milliGRAM(s) IV Intermittent every 8 hours  clopidogrel Tablet 75 milliGRAM(s) Oral daily  diltiazem    Tablet 60 milliGRAM(s) Oral every 6 hours  fluconAZOLE IVPB      fluconAZOLE IVPB 100 milliGRAM(s) IV Intermittent every 24 hours  furosemide    Tablet 40 milliGRAM(s) Oral daily  gabapentin 100 milliGRAM(s) Oral two times a day  isosorbide   mononitrate ER Tablet (IMDUR) 60 milliGRAM(s) Oral daily  metoprolol tartrate 50 milliGRAM(s) Oral every 6 hours  polyethylene glycol 3350 17 Gram(s) Oral daily  senna 2 Tablet(s) Oral at bedtime  sucralfate suspension 1 Gram(s) Oral two times a day    MEDICATIONS  (PRN):  benzocaine 20% Spray 1 Spray(s) Topical <User Schedule> PRN Sore Throat  bisacodyl 5 milliGRAM(s) Oral every 12 hours PRN Constipation  guaiFENesin Oral Liquid (Sugar-Free) 200 milliGRAM(s) Oral every 6 hours PRN Cough  ondansetron Injectable 4 milliGRAM(s) IV Push every 8 hours PRN Nausea and/or Vomiting      ALLERGIES:  Allergies    Levaquin (Unknown)  penicillin (Rash)    Intolerances        LABS:                        13.0   14.91 )-----------( 406      ( 15 Matthew 2023 05:40 )             39.7     01-15    133<L>  |  92<L>  |  26<H>  ----------------------------<  161<H>  3.9   |  37<H>  |  0.98    Ca    8.6      15 Matthew 2023 05:40  Phos  2.2     01-15  Mg     2.1     01-15    TPro  7.2  /  Alb  1.8<L>  /  TBili  1.7<H>  /  DBili  x   /  AST  30  /  ALT  17  /  AlkPhos  106  01-15    PT/INR - ( 14 Jan 2023 06:11 )   PT: 28.0 sec;   INR: 2.37 ratio         PTT - ( 14 Jan 2023 06:11 )  PTT:37.3 sec      CENTRAL LINE: N        DATE INSERTED:             REMOVE: N  RUBY: N                       DATE INSERTED:              REMOVE: Y/N  A-LINE: N                       DATE INSERTED:              REMOVE: Y/N      GLOBAL ISSUE/BEST PRACTICE  Analgesia: Y  Sedation: N  HOB elevation: yes  Stress ulcer prophylaxis: N  VTE prophylaxis: Y  Glycemic control: N  Nutrition: Y    CODE STATUS: Full Code

## 2023-01-15 NOTE — PROGRESS NOTE ADULT - SUBJECTIVE AND OBJECTIVE BOX
Patient is a 82y old  Male who presents with a chief complaint of CHF, A fib, Flu (15 Matthew 2023 10:21)      INTERVAL HPI/OVERNIGHT EVENTS:     T(C): 36.7 (01-15-23 @ 04:43), Max: 36.7 (01-14-23 @ 20:40)  HR: 75 (01-15-23 @ 10:00) (66 - 144)  BP: 122/59 (01-15-23 @ 10:00) (94/54 - 139/69)  RR: 36 (01-15-23 @ 10:00) (18 - 44)  SpO2: 87% (01-15-23 @ 10:00) (87% - 95%)  Wt(kg): --  I&O's Summary    14 Jan 2023 07:01  -  15 Matthew 2023 07:00  --------------------------------------------------------  IN: 1420 mL / OUT: 850 mL / NET: 570 mL        REVIEW OF SYSTEMS:  CONSTITUTIONAL: No fever, weight loss, or fatigue  EYES: No eye pain, visual disturbances, or discharge  ENMT:  No difficulty hearing, tinnitus, vertigo; No sinus or throat pain  NECK: No pain or stiffness  BREASTS: No pain, no masses  RESPIRATORY: No cough, wheezing, chills or hemoptysis; No shortness of breath  CARDIOVASCULAR: No chest pain, palpitations, dizziness, or leg swelling  GASTROINTESTINAL: No abdominal or epigastric pain. No nausea, vomiting, or hematemesis; No diarrhea or constipation. No melena or hematochezia.  GENITOURINARY: No dysuria, frequency, hematuria, or incontinence  NEUROLOGICAL: No headaches, memory loss, loss of strength, numbness, or tremors  SKIN: No itching, burning, rashes, or lesions   MUSCULOSKELETAL: No joint pain or swelling; No muscle, back, or extremity pain    PHYSICAL EXAM:  GENERAL: NAD, well-groomed, well-developed  HEAD:  Atraumatic, Normocephalic  EYES: EOMI, PERRLA, conjunctiva and sclera clear  ENMT: No tonsillar erythema, exudates, or enlargement; Moist mucous membranes  NECK: Supple, No JVD  NERVOUS SYSTEM:  Alert & Oriented X3; Motor Strength 5/5 B/L upper and lower extremities; DTRs 2+ intact and symmetric  CHEST/LUNG: Clear to percussion bilaterally; No rales, rhonchi, wheezing, or rubs  HEART: Regular rate and rhythm; No murmurs, rubs, or gallops  ABDOMEN: Soft, Nontender, Nondistended; Bowel sounds present  EXTREMITIES:  2+ Peripheral Pulses, No clubbing, cyanosis, or edema  SKIN: No rashes or lesions    MEDICATIONS  (STANDING):  apixaban 5 milliGRAM(s) Oral every 12 hours  atorvastatin 10 milliGRAM(s) Oral at bedtime  benzonatate 100 milliGRAM(s) Oral every 8 hours  cefepime   IVPB 2000 milliGRAM(s) IV Intermittent every 8 hours  clopidogrel Tablet 75 milliGRAM(s) Oral daily  diltiazem    Tablet 60 milliGRAM(s) Oral every 6 hours  fluconAZOLE IVPB      fluconAZOLE IVPB 100 milliGRAM(s) IV Intermittent every 24 hours  furosemide    Tablet 40 milliGRAM(s) Oral daily  gabapentin 100 milliGRAM(s) Oral two times a day  isosorbide   mononitrate ER Tablet (IMDUR) 60 milliGRAM(s) Oral daily  metoprolol tartrate 50 milliGRAM(s) Oral every 6 hours  polyethylene glycol 3350 17 Gram(s) Oral daily  senna 2 Tablet(s) Oral at bedtime  sucralfate suspension 1 Gram(s) Oral two times a day    MEDICATIONS  (PRN):  bisacodyl 5 milliGRAM(s) Oral every 12 hours PRN Constipation  guaiFENesin Oral Liquid (Sugar-Free) 200 milliGRAM(s) Oral every 6 hours PRN Cough  ondansetron Injectable 4 milliGRAM(s) IV Push every 8 hours PRN Nausea and/or Vomiting      LABS:                        13.0   14.91 )-----------( 406      ( 15 Matthew 2023 05:40 )             39.7     01-15    133<L>  |  92<L>  |  26<H>  ----------------------------<  161<H>  3.9   |  37<H>  |  0.98    Ca    8.6      15 Matthew 2023 05:40  Phos  2.2     01-15  Mg     2.1     01-15    TPro  7.2  /  Alb  1.8<L>  /  TBili  1.7<H>  /  DBili  x   /  AST  30  /  ALT  17  /  AlkPhos  106  01-15    PT/INR - ( 14 Jan 2023 06:11 )   PT: 28.0 sec;   INR: 2.37 ratio         PTT - ( 14 Jan 2023 06:11 )  PTT:37.3 sec    CAPILLARY BLOOD GLUCOSE          01-12 @ 11:00   Normal Respiratory Erin present  --  --  01-11 @ 18:00   Few Pseudomonas aeruginosa  Normal Respiratory Erin present  --  Pseudomonas aeruginosa          RADIOLOGY & ADDITIONAL TESTS:    Imaging Personally Reviewed:       Advance Directives:      Palliative Care:  Appropriate     Patient is a 82y old  Male who presents with a chief complaint of CHF, A fib, Flu (15 Matthew 2023 10:21)    pt seen and examine today in icu sitting / high flow sob liittle better , no fever , tolerating po.   INTERVAL HPI/OVERNIGHT EVENTS:     T(C): 36.7 (01-15-23 @ 04:43), Max: 36.7 (01-14-23 @ 20:40)  HR: 75 (01-15-23 @ 10:00) (66 - 144)  BP: 122/59 (01-15-23 @ 10:00) (94/54 - 139/69)  RR: 36 (01-15-23 @ 10:00) (18 - 44)  SpO2: 87% (01-15-23 @ 10:00) (87% - 95%)  Wt(kg): --  I&O's Summary    14 Jan 2023 07:01  -  15 Matthew 2023 07:00  --------------------------------------------------------  IN: 1420 mL / OUT: 850 mL / NET: 570 mL        REVIEW OF SYSTEMS:  CONSTITUTIONAL: No fever, weight loss, + fatigue  EYES: No eye pain, visual disturbances, or discharge  ENMT:  ; No sinus or throat pain  NECK: No pain or stiffness  RESPIRATORY: No cough, wheezing, chills ; + shortness of breath  CARDIOVASCULAR: No chest pain, palpitations, dizziness, or leg swelling  GASTROINTESTINAL: No abdominal or epigastric pain. No nausea, vomiting,    No diarrhea or constipation. No melena or hematochezia.  GENITOURINARY: No dysuria, frequency, hematuria, or incontinence  NEUROLOGICAL: No headaches, memory loss, loss of strength, numbness, or tremors  SKIN: No itching, burning, rashes, or lesions   MUSCULOSKELETAL: No joint pain or swelling; No muscle, back, or extremity pain    PHYSICAL EXAM:  GENERAL: NAD, obese   HEAD:  Atraumatic, Normocephalic  EYES: EOMI, PERRLA, conjunctiva and sclera clear  ENMT:  Moist mucous membranes  NECK: Supple, No JVD  NERVOUS SYSTEM:  Alert & Oriented X3; Motor Strength 5/5 B/L upper and lower extremities; DTRs 2+ intact and symmetric  CHEST/LUNG:  percussion bilaterally; mild coarse  No rales, rhonchi, wheezing,   HEART: irRegular rate and rhythm; No murmurs, no tachy   ABDOMEN: Soft, Nontender, Nondistended; Bowel sounds present  EXTREMITIES:  2+ Peripheral Pulses, No clubbing, cyanosis, or edema  SKIN: No rashes or lesions    MEDICATIONS  (STANDING):  apixaban 5 milliGRAM(s) Oral every 12 hours  atorvastatin 10 milliGRAM(s) Oral at bedtime  benzonatate 100 milliGRAM(s) Oral every 8 hours  cefepime   IVPB 2000 milliGRAM(s) IV Intermittent every 8 hours  clopidogrel Tablet 75 milliGRAM(s) Oral daily  diltiazem    Tablet 60 milliGRAM(s) Oral every 6 hours  fluconAZOLE IVPB      fluconAZOLE IVPB 100 milliGRAM(s) IV Intermittent every 24 hours  furosemide    Tablet 40 milliGRAM(s) Oral daily  gabapentin 100 milliGRAM(s) Oral two times a day  isosorbide   mononitrate ER Tablet (IMDUR) 60 milliGRAM(s) Oral daily  metoprolol tartrate 50 milliGRAM(s) Oral every 6 hours  polyethylene glycol 3350 17 Gram(s) Oral daily  senna 2 Tablet(s) Oral at bedtime  sucralfate suspension 1 Gram(s) Oral two times a day    MEDICATIONS  (PRN):  bisacodyl 5 milliGRAM(s) Oral every 12 hours PRN Constipation  guaiFENesin Oral Liquid (Sugar-Free) 200 milliGRAM(s) Oral every 6 hours PRN Cough  ondansetron Injectable 4 milliGRAM(s) IV Push every 8 hours PRN Nausea and/or Vomiting      LABS:                        13.0   14.91 )-----------( 406      ( 15 Matthew 2023 05:40 )             39.7     01-15    133<L>  |  92<L>  |  26<H>  ----------------------------<  161<H>  3.9   |  37<H>  |  0.98    Ca    8.6      15 Matthew 2023 05:40  Phos  2.2     01-15  Mg     2.1     01-15    TPro  7.2  /  Alb  1.8<L>  /  TBili  1.7<H>  /  DBili  x   /  AST  30  /  ALT  17  /  AlkPhos  106  01-15    PT/INR - ( 14 Jan 2023 06:11 )   PT: 28.0 sec;   INR: 2.37 ratio         PTT - ( 14 Jan 2023 06:11 )  PTT:37.3 sec    CAPILLARY BLOOD GLUCOSE          01-12 @ 11:00   Normal Respiratory Erin present  --  --  01-11 @ 18:00   Few Pseudomonas aeruginosa  Normal Respiratory Erin present  --  Pseudomonas aeruginosa          RADIOLOGY & ADDITIONAL TESTS:    Imaging Personally Reviewed:     no new test   Advance Directives:    full code  Palliative Care:  Appropriate

## 2023-01-15 NOTE — DISCHARGE NOTE PROVIDER - NSDCCPCAREPLAN_GEN_ALL_CORE_FT
PRINCIPAL DISCHARGE DIAGNOSIS  Diagnosis: Acute respiratory failure with hypoxia  Assessment and Plan of Treatment: You had respiratory failure from Influenza with bacterial PNA as well as CHF exacerbation.  You were given IV antibiotics and finished the course of medication.  Please follow up with your PCP within 1 week of discharge.      SECONDARY DISCHARGE DIAGNOSES  Diagnosis: Acute CHF  Assessment and Plan of Treatment: You had an episode of acute CHF.   You were given IV Lasix (diuretic) and transitioned to Lasix by mouth.    Diagnosis: Atrial fibrillation  Assessment and Plan of Treatment: You have atrial fibrillation.  You were given short acting cardizem and were transitioned to long active cardizem.  Continue to take your cardizem as well as metoprolol.  Continue to take your blood thinner medication Eliquis.     PRINCIPAL DISCHARGE DIAGNOSIS  Diagnosis: Acute respiratory failure with hypoxia  Assessment and Plan of Treatment: You had respiratory failure from Influenza with bacterial PNA as well as CHF exacerbation.  You were given IV antibiotics and finished the course of medication.  Please follow up with your PCP within 1 week of discharge.      SECONDARY DISCHARGE DIAGNOSES  Diagnosis: Acute CHF  Assessment and Plan of Treatment: You had an episode of acute CHF.   You were given IV Lasix (diuretic) and transitioned to Lasix by mouth.    Diagnosis: Atrial fibrillation  Assessment and Plan of Treatment: You have atrial fibrillation.  You were given short acting cardizem and were transitioned to long active cardizem.  Continue to take your cardizem as well as metoprolol.  Continue to take your blood thinner medication Eliquis.    Diagnosis: History of CAD (coronary artery disease)  Assessment and Plan of Treatment: You have a history of CAD s/p stent, CABG.  Your echo showed EF 55%, LAE with no significant valvular pathology  Continue Lasix 40mg PO.     PRINCIPAL DISCHARGE DIAGNOSIS  Diagnosis: Acute respiratory failure with hypoxia  Assessment and Plan of Treatment: You had respiratory failure from Influenza with bacterial PNA as well as CHF exacerbation.  You were given IV antibiotics and finished the course of medication.  You were given oxygen supplementation and weaned off to room air.   Please follow up with your PCP within 1 week of discharge.      SECONDARY DISCHARGE DIAGNOSES  Diagnosis: Acute CHF  Assessment and Plan of Treatment: You had an episode of acute CHF.   You were given IV Lasix (diuretic) and transitioned to Lasix by mouth.  Continue Lasix 40mg PO.    Diagnosis: Atrial fibrillation  Assessment and Plan of Treatment: You have atrial fibrillation.  You were given short acting cardizem and were transitioned to long active cardizem.  Continue to take your cardizem as well as metoprolol.  Continue to take your blood thinner medication Eliquis.    Diagnosis: History of CAD (coronary artery disease)  Assessment and Plan of Treatment: You have a history of CAD s/p stent, CABG.  Your echo showed EF 55%, LAE with no significant valvular pathology  Continue Lasix 40mg PO.

## 2023-01-16 LAB
ANION GAP SERPL CALC-SCNC: 5 MMOL/L — SIGNIFICANT CHANGE UP (ref 5–17)
BASOPHILS # BLD AUTO: 0 K/UL — SIGNIFICANT CHANGE UP (ref 0–0.2)
BASOPHILS NFR BLD AUTO: 0 % — SIGNIFICANT CHANGE UP (ref 0–2)
BUN SERPL-MCNC: 23 MG/DL — SIGNIFICANT CHANGE UP (ref 7–23)
CALCIUM SERPL-MCNC: 8.2 MG/DL — LOW (ref 8.5–10.1)
CHLORIDE SERPL-SCNC: 87 MMOL/L — LOW (ref 96–108)
CO2 SERPL-SCNC: 36 MMOL/L — HIGH (ref 22–31)
CREAT SERPL-MCNC: 1 MG/DL — SIGNIFICANT CHANGE UP (ref 0.5–1.3)
CULTURE RESULTS: SIGNIFICANT CHANGE UP
CULTURE RESULTS: SIGNIFICANT CHANGE UP
EGFR: 76 ML/MIN/1.73M2 — SIGNIFICANT CHANGE UP
EOSINOPHIL # BLD AUTO: 0.16 K/UL — SIGNIFICANT CHANGE UP (ref 0–0.5)
EOSINOPHIL NFR BLD AUTO: 1 % — SIGNIFICANT CHANGE UP (ref 0–6)
GLUCOSE SERPL-MCNC: 216 MG/DL — HIGH (ref 70–99)
HCT VFR BLD CALC: 36.8 % — LOW (ref 39–50)
HGB BLD-MCNC: 12.2 G/DL — LOW (ref 13–17)
LYMPHOCYTES # BLD AUTO: 13 % — SIGNIFICANT CHANGE UP (ref 13–44)
LYMPHOCYTES # BLD AUTO: 2.09 K/UL — SIGNIFICANT CHANGE UP (ref 1–3.3)
MANUAL SMEAR VERIFICATION: SIGNIFICANT CHANGE UP
MCHC RBC-ENTMCNC: 28.4 PG — SIGNIFICANT CHANGE UP (ref 27–34)
MCHC RBC-ENTMCNC: 33.2 GM/DL — SIGNIFICANT CHANGE UP (ref 32–36)
MCV RBC AUTO: 85.8 FL — SIGNIFICANT CHANGE UP (ref 80–100)
MONOCYTES # BLD AUTO: 0.8 K/UL — SIGNIFICANT CHANGE UP (ref 0–0.9)
MONOCYTES NFR BLD AUTO: 5 % — SIGNIFICANT CHANGE UP (ref 2–14)
MYELOCYTES NFR BLD: 1 % — HIGH (ref 0–0)
NEUTROPHILS # BLD AUTO: 12.71 K/UL — HIGH (ref 1.8–7.4)
NEUTROPHILS NFR BLD AUTO: 79 % — HIGH (ref 43–77)
NRBC # BLD: 0 — SIGNIFICANT CHANGE UP
NRBC # BLD: SIGNIFICANT CHANGE UP /100 WBCS (ref 0–0)
PLAT MORPH BLD: NORMAL — SIGNIFICANT CHANGE UP
PLATELET # BLD AUTO: 357 K/UL — SIGNIFICANT CHANGE UP (ref 150–400)
POTASSIUM SERPL-MCNC: 3.7 MMOL/L — SIGNIFICANT CHANGE UP (ref 3.5–5.3)
POTASSIUM SERPL-SCNC: 3.7 MMOL/L — SIGNIFICANT CHANGE UP (ref 3.5–5.3)
RBC # BLD: 4.29 M/UL — SIGNIFICANT CHANGE UP (ref 4.2–5.8)
RBC # FLD: 15.5 % — HIGH (ref 10.3–14.5)
RBC BLD AUTO: SIGNIFICANT CHANGE UP
SODIUM SERPL-SCNC: 128 MMOL/L — LOW (ref 135–145)
SPECIMEN SOURCE: SIGNIFICANT CHANGE UP
SPECIMEN SOURCE: SIGNIFICANT CHANGE UP
VARIANT LYMPHS # BLD: 1 % — SIGNIFICANT CHANGE UP (ref 0–6)
WBC # BLD: 16.09 K/UL — HIGH (ref 3.8–10.5)
WBC # FLD AUTO: 16.09 K/UL — HIGH (ref 3.8–10.5)

## 2023-01-16 PROCEDURE — 99233 SBSQ HOSP IP/OBS HIGH 50: CPT

## 2023-01-16 PROCEDURE — 99233 SBSQ HOSP IP/OBS HIGH 50: CPT | Mod: GC

## 2023-01-16 RX ORDER — ACETYLCYSTEINE 200 MG/ML
4 VIAL (ML) MISCELLANEOUS ONCE
Refills: 0 | Status: DISCONTINUED | OUTPATIENT
Start: 2023-01-16 | End: 2023-01-17

## 2023-01-16 RX ORDER — IPRATROPIUM/ALBUTEROL SULFATE 18-103MCG
3 AEROSOL WITH ADAPTER (GRAM) INHALATION ONCE
Refills: 0 | Status: COMPLETED | OUTPATIENT
Start: 2023-01-16 | End: 2023-01-16

## 2023-01-16 RX ADMIN — Medication 200 MILLIGRAM(S): at 05:11

## 2023-01-16 RX ADMIN — Medication 50 MILLIGRAM(S): at 05:11

## 2023-01-16 RX ADMIN — CEFEPIME 100 MILLIGRAM(S): 1 INJECTION, POWDER, FOR SOLUTION INTRAMUSCULAR; INTRAVENOUS at 05:10

## 2023-01-16 RX ADMIN — ATORVASTATIN CALCIUM 10 MILLIGRAM(S): 80 TABLET, FILM COATED ORAL at 21:14

## 2023-01-16 RX ADMIN — Medication 60 MILLIGRAM(S): at 05:11

## 2023-01-16 RX ADMIN — Medication 50 MILLIGRAM(S): at 12:02

## 2023-01-16 RX ADMIN — Medication 60 MILLIGRAM(S): at 18:30

## 2023-01-16 RX ADMIN — CEFEPIME 100 MILLIGRAM(S): 1 INJECTION, POWDER, FOR SOLUTION INTRAMUSCULAR; INTRAVENOUS at 13:20

## 2023-01-16 RX ADMIN — Medication 100 MILLIGRAM(S): at 21:14

## 2023-01-16 RX ADMIN — ONDANSETRON 4 MILLIGRAM(S): 8 TABLET, FILM COATED ORAL at 21:14

## 2023-01-16 RX ADMIN — Medication 1 GRAM(S): at 05:11

## 2023-01-16 RX ADMIN — GABAPENTIN 100 MILLIGRAM(S): 400 CAPSULE ORAL at 05:10

## 2023-01-16 RX ADMIN — Medication 50 MILLIGRAM(S): at 18:31

## 2023-01-16 RX ADMIN — GABAPENTIN 100 MILLIGRAM(S): 400 CAPSULE ORAL at 18:30

## 2023-01-16 RX ADMIN — CEFEPIME 100 MILLIGRAM(S): 1 INJECTION, POWDER, FOR SOLUTION INTRAMUSCULAR; INTRAVENOUS at 21:14

## 2023-01-16 RX ADMIN — Medication 60 MILLIGRAM(S): at 12:02

## 2023-01-16 RX ADMIN — ISOSORBIDE MONONITRATE 60 MILLIGRAM(S): 60 TABLET, EXTENDED RELEASE ORAL at 12:01

## 2023-01-16 RX ADMIN — CLOPIDOGREL BISULFATE 75 MILLIGRAM(S): 75 TABLET, FILM COATED ORAL at 12:02

## 2023-01-16 RX ADMIN — Medication 40 MILLIGRAM(S): at 05:11

## 2023-01-16 RX ADMIN — Medication 1 GRAM(S): at 18:29

## 2023-01-16 RX ADMIN — Medication 3 MILLILITER(S): at 16:13

## 2023-01-16 RX ADMIN — APIXABAN 5 MILLIGRAM(S): 2.5 TABLET, FILM COATED ORAL at 05:10

## 2023-01-16 RX ADMIN — SENNA PLUS 2 TABLET(S): 8.6 TABLET ORAL at 21:14

## 2023-01-16 RX ADMIN — FLUCONAZOLE 50 MILLIGRAM(S): 150 TABLET ORAL at 18:30

## 2023-01-16 RX ADMIN — Medication 100 MILLIGRAM(S): at 13:21

## 2023-01-16 RX ADMIN — APIXABAN 5 MILLIGRAM(S): 2.5 TABLET, FILM COATED ORAL at 18:30

## 2023-01-16 RX ADMIN — Medication 100 MILLIGRAM(S): at 05:10

## 2023-01-16 NOTE — PROGRESS NOTE ADULT - TIME BILLING
pt seen and examine today see above plan --Acute respiratory failure as a result of Influenza with superimposed bacterial PNA Continue broad spectrum antibiotics-   on Cefepime 2gm q8hr  total 7 days   , fluconazole  100 mg 24 hr   , Sputum culture  Pseudomonas +, blood cult neg  MRSA/MSSA nares PCR negative  leucocytosis resolving  s/p   high flow now 4lit  96 pulse ox   . daughter bedside aware with plan .

## 2023-01-16 NOTE — PROGRESS NOTE ADULT - SUBJECTIVE AND OBJECTIVE BOX
Patient is a 82y old  Male who presents with a chief complaint of CHF, A fib, Flu (16 Jan 2023 13:04)    pt seen and examine today alert awake  c/o dryness throat , no  difficulti swallowing , no fever , oob.   pulse ox 96 4lit  INTERVAL HPI/OVERNIGHT EVENTS:     T(C): 36.8 (01-16-23 @ 11:52), Max: 37.1 (01-15-23 @ 17:32)  HR: 98 (01-16-23 @ 11:52) (85 - 98)  BP: 125/67 (01-16-23 @ 11:52) (116/69 - 135/71)  RR: 17 (01-16-23 @ 11:52) (17 - 24)  SpO2: 96% (01-16-23 @ 11:52) (92% - 96%)  Wt(kg): --  I&O's Summary    15 Matthew 2023 07:01  -  16 Jan 2023 07:00  --------------------------------------------------------  IN: 100 mL / OUT: 0 mL / NET: 100 mL    REVIEW OF SYSTEMS:  CONSTITUTIONAL: No fever, weight loss, + fatigue  EYES: No eye pain, visual disturbances, or discharge  ENMT:  ; No sinus or throat pain  NECK: No pain or stiffness  RESPIRATORY: No cough, wheezing, chills ; + shortness of breath  CARDIOVASCULAR: No chest pain, palpitations, dizziness, or leg swelling  GASTROINTESTINAL: No abdominal or epigastric pain. No nausea, vomiting,    No diarrhea or constipation. No melena or hematochezia.  GENITOURINARY: No dysuria, frequency, hematuria, or incontinence  NEUROLOGICAL: No headaches, memory loss, loss of strength, numbness, or tremors  SKIN: No itching, burning, rashes, or lesions   MUSCULOSKELETAL: No joint pain or swelling; No muscle, back, or extremity pain    PHYSICAL EXAM:  GENERAL: NAD, obese   HEAD:  Atraumatic, Normocephalic  EYES: EOMI, PERRLA, conjunctiva and sclera clear  ENMT:  Moist mucous membranes  NECK: Supple, No JVD  NERVOUS SYSTEM:  Alert & Oriented X3; Motor Strength 5/5 B/L upper and lower extremities; DTRs 2+ intact and symmetric  CHEST/LUNG:  percussion bilaterally; rt  side mild coarse  No rales, rhonchi, wheezing,   HEART: irRegular rate and rhythm; No murmurs, no tachy   ABDOMEN: Soft, Nontender, Nondistended; Bowel sounds present  EXTREMITIES:  2+ Peripheral Pulses, No clubbing, cyanosis, or edema  SKIN: No rashes or lesions        MEDICATIONS  (STANDING):  apixaban 5 milliGRAM(s) Oral every 12 hours  atorvastatin 10 milliGRAM(s) Oral at bedtime  benzonatate 100 milliGRAM(s) Oral every 8 hours  cefepime   IVPB 2000 milliGRAM(s) IV Intermittent every 8 hours  clopidogrel Tablet 75 milliGRAM(s) Oral daily  diltiazem    Tablet 60 milliGRAM(s) Oral every 6 hours  fluconAZOLE IVPB      fluconAZOLE IVPB 100 milliGRAM(s) IV Intermittent every 24 hours  furosemide    Tablet 40 milliGRAM(s) Oral daily  gabapentin 100 milliGRAM(s) Oral two times a day  isosorbide   mononitrate ER Tablet (IMDUR) 60 milliGRAM(s) Oral daily  metoprolol tartrate 50 milliGRAM(s) Oral every 6 hours  polyethylene glycol 3350 17 Gram(s) Oral daily  senna 2 Tablet(s) Oral at bedtime  sucralfate suspension 1 Gram(s) Oral two times a day    MEDICATIONS  (PRN):  benzocaine 20% Spray 1 Spray(s) Topical <User Schedule> PRN Sore Throat  bisacodyl 5 milliGRAM(s) Oral every 12 hours PRN Constipation  guaiFENesin Oral Liquid (Sugar-Free) 200 milliGRAM(s) Oral every 6 hours PRN Cough  ondansetron Injectable 4 milliGRAM(s) IV Push every 8 hours PRN Nausea and/or Vomiting      LABS:                        13.0   14.91 )-----------( 406      ( 15 Matthew 2023 05:40 )             39.7     01-15    133<L>  |  92<L>  |  26<H>  ----------------------------<  161<H>  3.9   |  37<H>  |  0.98    Ca    8.6      15 Matthew 2023 05:40  Phos  2.2     01-15  Mg     2.1     01-15    TPro  7.2  /  Alb  1.8<L>  /  TBili  1.7<H>  /  DBili  x   /  AST  30  /  ALT  17  /  AlkPhos  106  01-15                        RADIOLOGY & ADDITIONAL TESTS:    Imaging Personally Reviewed:     no new test   Advance Directives:    dash/tlc   Palliative Care:  Appropriate

## 2023-01-16 NOTE — PROGRESS NOTE ADULT - PROBLEM SELECTOR PLAN 5
avoid nephrotoxins,   monitor renal functions  Stable creatinine- .98  Hypokalemia- replace potassium - k wnl

## 2023-01-16 NOTE — PROGRESS NOTE ADULT - ASSESSMENT
82 year old male PMH of CAD s/p stent, s/p CABG, HTN, HLD, CHF p/w shortness of breath and cough for the last 5 days found to be in af with rvr, congestive hf, and influenza positive.    CAD s/p CABG, stent, Afib with RVR, HFpEF, HTN, HLD   - proBNP 2418-->959  - Still with severe hypoxemic resp failure req supplemental 02 via NC   - TTE with EF 55%, LAE, with no significant valvular pathology  - Etiology of hypoxemia is not felt to be primarily hf but felt more likely to be primarily pulmonary, with flu and superimposed pneumonia   - Continue Lasix 40 mg PO daily   - Strict i/o's and daily weights    - No anginal symptoms, no sign of acute ischemia, and troponin are negative  - Continue ASA, Plavix and statin  - Continue Imdur    - Afib with rates improved.  Tachycardia  with some reactive component (in the setting of flu)  - Tele with A fib 60-80s.   - Continue BB will eventually switch to long-acting  - Continue Cardizem 60mg q6hrs.  Would switch to long-acting once HR is controlled  - Continue Eliquis     - Monitor and replete lytes, keep K>4, Mg>2.  - Will continue to follow.    Lyly Arceo, MS FNP, [facility]  Nurse Practitioner- Cardiology   Spectra #[phone] /[phone]

## 2023-01-16 NOTE — PROGRESS NOTE ADULT - ASSESSMENT
81 y/o m w/ PMH od CAD s/p stent, s/p CABG, HTN, HLD, CHF p/w shortness of breath and cough    s/p tamiflu  on diflucan  on emp ABX for pna  vs noted - labs reviewed - imaging reviewed -   [facility] rx regimen - diuresis   I and O  s/p ICU care and course  Cardio and ID follow up  cough rx regimen  nutrition  GOC discussion  replete lytes  on AC for AF

## 2023-01-16 NOTE — PROGRESS NOTE ADULT - PROBLEM SELECTOR PLAN 3
Rate better controlled; continue with Cardizem 60 mg po q6hr  and Metoprolol  50 mg po q6hr  will switch long acting if hr remain control  , full ac  Continue Eliquis  CAD: cont Imdur, statin and plavix

## 2023-01-16 NOTE — PROGRESS NOTE ADULT - SUBJECTIVE AND OBJECTIVE BOX
[facility]  INFECTIOUS DISEASES   [address] [place] [address]  Tel: [phone]     Fax: [phone]  ========================================================  MD Radha Hutchison Kaushal, MD Cho, Michelle, MD Sunjit, Jaspal, MD  ========================================================    [id number]  OPAL NUGENT     Follow up: Pneumonia?, Respiratory failure, Influenza     Has been transferred out of ICU, doing better , not on HFNC any more.  With good sat. Comfortable.   No fever.    PAST MEDICAL & SURGICAL HISTORY:  HLD (hyperlipidemia)  HTN (hypertension)  CAD (coronary artery disease)  S/P coronary artery stent placement  S/P CABG (coronary artery bypass graft)    Social Hx: Former, smoked more 50years ago, no ETOH or drugs     FAMILY HISTORY:  Family history of cardiovascular disease (Sibling)    Family history of uterine cancer (Mother)    Allergies  Levaquin (Unknown)  penicillin (Rash)    Antibiotics:  MEDICATIONS  (STANDING):  albuterol/ipratropium for Nebulization 3 milliLiter(s) Nebulizer every 6 hours  apixaban 5 milliGRAM(s) Oral every 12 hours  atorvastatin 10 milliGRAM(s) Oral at bedtime  azithromycin  IVPB      azithromycin  IVPB 500 milliGRAM(s) IV Intermittent every 24 hours  benzonatate 100 milliGRAM(s) Oral every 8 hours  cefepime   IVPB      cefepime   IVPB 1000 milliGRAM(s) IV Intermittent every 8 hours  clopidogrel Tablet 75 milliGRAM(s) Oral daily  diltiazem    Tablet 60 milliGRAM(s) Oral every 6 hours  furosemide   Injectable 40 milliGRAM(s) IV Push two times a day  gabapentin 100 milliGRAM(s) Oral two times a day  hydrocodone/homatropine Syrup 5 milliLiter(s) Oral every 8 hours  isosorbide   mononitrate ER Tablet (IMDUR) 60 milliGRAM(s) Oral daily  metoprolol tartrate 50 milliGRAM(s) Oral every 6 hours  oseltamivir 30 milliGRAM(s) Oral two times a day    MEDICATIONS  (PRN):  bisacodyl 5 milliGRAM(s) Oral every 12 hours PRN Constipation  guaiFENesin Oral Liquid (Sugar-Free) 200 milliGRAM(s) Oral every 6 hours PRN Cough  metoclopramide Injectable 5 milliGRAM(s) IV Push every 8 hours PRN nausea/vomiting     REVIEW OF SYSTEMS:  CONSTITUTIONAL:  No Fever or chills  HEENT:  No diplopia or blurred vision.  No sore throat or runny nose.  CARDIOVASCULAR:  No chest pain   RESPIRATORY:  +cough, + shortness of breath, + sputum  GASTROINTESTINAL:  No nausea, vomiting or diarrhea.  GENITOURINARY:  No dysuria, frequency or urgency. No Blood in urine  MUSCULOSKELETAL:  no joint aches, no muscle pain  SKIN:  No change in skin, hair or nails.  NEUROLOGIC:  No paresthesias or weakness.  PSYCHIATRIC:  No disorder of thought or mood.  ENDOCRINE:  No heat or cold intolerance, polyuria or polydipsia.  HEMATOLOGICAL:  No easy bruising or bleeding.     Physical Exam:  Vital Signs Last 24 Hrs  T(C): 36.8 (16 Jan 2023 11:52), Max: 37.1 (15 Matthew 2023 17:32)  T(F): 98.2 (16 Jan 2023 11:52), Max: 98.7 (15 Matthew 2023 17:32)  HR: 98 (16 Jan 2023 11:52) (85 - 98)  BP: 125/67 (16 Jan 2023 11:52) (116/69 - 135/71)  BP(mean): --  RR: 17 (16 Jan 2023 11:52) (17 - 24)  SpO2: 96% (16 Jan 2023 11:52) (92% - 96%)  Parameters below as of 16 Jan 2023 11:52  Patient On (Oxygen Delivery Method): nasal cannula  O2 Flow (L/min): 4  GEN: NAD, NC in place   HEENT: normocephalic and atraumatic. EOMI. PERRL.    NECK: Supple.  No lymphadenopathy   LUNGS: Rales diffuse bilateral lung fields, no wheezing  HEART: Regular rate and rhythm without murmur.  ABDOMEN: Soft, nontender, and nondistended.  Positive bowel sounds.    : No CVA tenderness  EXTREMITIES: Without edema.  NEUROLOGIC: grossly intact.  PSYCHIATRIC: Appropriate affect .  SKIN: No rash     Labs:                        13.0   14.91 )-----------( 406      ( 15 Matthew 2023 05:40 )             39.7     01-15    133<L>  |  92<L>  |  26<H>  ----------------------------<  161<H>  3.9   |  37<H>  |  0.98    Ca    8.6      15 Matthew 2023 05:40  Phos  2.2     01-15  Mg     2.1     01-15    TPro  7.2  /  Alb  1.8<L>  /  TBili  1.7<H>  /  DBili  x   /  AST  30  /  ALT  17  /  AlkPhos  106  01-15    Culture - Sputum (collected 01-12-23 @ 11:00)  Source: .Sputum Sputum  Gram Stain (01-12-23 @ 23:47):    Few polymorphonuclear leukocytes per low power field    Rare Squamous epithelial cells per low power field    Few Gram Variable Rods per oil power field  Final Report (01-14-23 @ 19:03):    Normal Respiratory Erin present    Culture - Sputum (collected 01-11-23 @ 18:00)  Source: .Sputum Sputum  Gram Stain (01-12-23 @ 10:14):    Numerous polymorphonuclear leukocytes per low power field    No Squamous epithelial cells per low power field    Few Gram Negative Rods seen per oil power field  Final Report (01-14-23 @ 10:03):    Few Pseudomonas aeruginosa    Normal Respiratory Erin present  Organism: Pseudomonas aeruginosa (01-14-23 @ 10:03)  Organism: Pseudomonas aeruginosa (01-14-23 @ 10:03)    Sensitivities:      -  Amikacin: S <=16      -  Aztreonam: S <=4      -  Cefepime: S <=2      -  Ceftazidime: S <=1      -  Ciprofloxacin: S <=0.25      -  Gentamicin: S <=2      -  Imipenem: S 2      -  Levofloxacin: S <=0.5      -  Meropenem: S <=1      -  Piperacillin/Tazobactam: S <=8      -  Tobramycin: I 8      Method Type: STEVE    Culture - Blood (collected 01-10-23 @ 22:05)  Source: .Blood Blood-Peripheral  Final Report (01-16-23 @ 01:00):    No Growth Final    Culture - Blood (collected 01-10-23 @ 22:00)  Source: .Blood Blood-Peripheral  Final Report (01-16-23 @ 01:00):    No Growth Final    WBC Count: 14.91 K/uL (01-15-23 @ 05:40)  WBC Count: 18.42 K/uL (01-14-23 @ 06:11)  WBC Count: 20.14 K/uL (01-13-23 @ 06:15)  WBC Count: 20.16 K/uL (01-12-23 @ 05:40)    Creatinine, Serum: 0.98 mg/dL (01-15-23 @ 05:40)  Creatinine, Serum: 0.88 mg/dL (01-14-23 @ 06:11)  Creatinine, Serum: 1.00 mg/dL (01-13-23 @ 06:15)  Creatinine, Serum: 1.10 mg/dL (01-12-23 @ 05:40)    Sedimentation Rate, Erythrocyte: 74 mm/hr (01-11-23 @ 05:30)    Procalcitonin, Serum: 0.98 ng/mL (01-10-23 @ 22:00)     COVID-19 PCR: NotDetec (01-10-23 @ 21:30)  SARS-CoV-2: NotDetec (01-10-23 @ 20:25)  SARS-CoV-2: NotDetec (01-07-23 @ 13:50)    All imaging and other data have been reviewed.  < from: CT Angio Chest PE Protocol w/ IV Cont (01.10.23 @ 15:47) >  IMPRESSION:.  No pulmonary embolism.  Diffuse tree-in-bud nodular opacities and bibasilar consolidation   compatible with infection.  Mediastinal and bilateral hilar lymphadenopathy, likely reactive.    Assessment and Plan:    83 y/o m w/ PMH od CAD s/p stent, s/p CABG, HTN, HLD, CHF presented to \[facility]"" ED 1/7/23 for shortness of breath and wheezing. He was found to be influenza positive and with CHF exacerbation and new onset afib. Started on tamiflu and IV lasix, eliquis and metoprolol for afib. Now transferred to ICU for worsening acute hypoxic respiratory failure. CTA negative for PE but showing likely superimposed bacterial PNA. ID consultation requested.   MRSA/MSSA nares PCR negative  Legionella negative   Acute CHF exacerbation, found to be influenza A positive and now with worsening hypoxia. Procal 0.98 Likely due to superimposed bacterial pna    #Acute hypoxic respiratory failure  #influenza A  #Superimposed bacterial pna    - Sputum culture with a pansensitive Pseudomonas   - Continue cefepime (if ready for discharge before completion of ABx can switch to oral cipro)  - Can treat for 7days if continues to improve  - WBC 14k trending down    Will follow.    Murphy Barrera MD  Division of Infectious Diseases   Please call ID service at [phone] with any question.      35 minutes spent on total encounter assessing patient, examination, chart review, counseling and coordinating care by the attending physician/nurse/care manager.

## 2023-01-16 NOTE — PROGRESS NOTE ADULT - PROBLEM SELECTOR PLAN 1
Acute respiratory failure as a result of Influenza with superimposed bacterial PNA  Continue broad spectrum antibiotics- on Cefepime 2gm q8hr   Sputum culture growing Pseudomonas +  ,MRSA/MSSA nares PCR negative  Legionella negative   s/p  high Flow   4lit nc 96   Continue bronchodilators   ID consult dr boateng

## 2023-01-16 NOTE — PROGRESS NOTE ADULT - SUBJECTIVE AND OBJECTIVE BOX
PULMONARY CONSULT NOTE      OPAL NUGENT  MRN-[id number]    Patient is a 82y old  Male who presents with a chief complaint of CHF, A fib, Flu (15 Matthew 2023 18:02)      HISTORY OF PRESENT ILLNESS:    MEDICATIONS  (STANDING):  apixaban 5 milliGRAM(s) Oral every 12 hours  atorvastatin 10 milliGRAM(s) Oral at bedtime  benzonatate 100 milliGRAM(s) Oral every 8 hours  cefepime   IVPB 2000 milliGRAM(s) IV Intermittent every 8 hours  clopidogrel Tablet 75 milliGRAM(s) Oral daily  diltiazem    Tablet 60 milliGRAM(s) Oral every 6 hours  fluconAZOLE IVPB      fluconAZOLE IVPB 100 milliGRAM(s) IV Intermittent every 24 hours  furosemide    Tablet 40 milliGRAM(s) Oral daily  gabapentin 100 milliGRAM(s) Oral two times a day  isosorbide   mononitrate ER Tablet (IMDUR) 60 milliGRAM(s) Oral daily  metoprolol tartrate 50 milliGRAM(s) Oral every 6 hours  polyethylene glycol 3350 17 Gram(s) Oral daily  senna 2 Tablet(s) Oral at bedtime  sucralfate suspension 1 Gram(s) Oral two times a day      MEDICATIONS  (PRN):  benzocaine 20% Spray 1 Spray(s) Topical <User Schedule> PRN Sore Throat  bisacodyl 5 milliGRAM(s) Oral every 12 hours PRN Constipation  guaiFENesin Oral Liquid (Sugar-Free) 200 milliGRAM(s) Oral every 6 hours PRN Cough  ondansetron Injectable 4 milliGRAM(s) IV Push every 8 hours PRN Nausea and/or Vomiting      Allergies    Levaquin (Unknown)  penicillin (Rash)    Intolerances        PAST MEDICAL & SURGICAL HISTORY:  HLD (hyperlipidemia)      HTN (hypertension)      CAD (coronary artery disease)      S/P coronary artery stent placement      S/P CABG (coronary artery bypass graft)          FAMILY HISTORY:  Family history of cardiovascular disease (Sibling)    Family history of uterine cancer (Mother)        SOCIAL HISTORY  Smoking History:     REVIEW OF SYSTEMS:    REVIEW OF SYSTEMS      General:	    Skin/Breast:  	  Ophthalmologic:  	  ENMT:	    Respiratory and Thorax:  	  Cardiovascular:	    Gastrointestinal:	    Genitourinary:	    Musculoskeletal:	    Neurological:	    Psychiatric:	    Hematology/Lymphatics:	    Endocrine:	    Allergic/Immunologic:	    Vital Signs Last 24 Hrs  T(C): 36.8 (16 Jan 2023 05:00), Max: 37.1 (15 Matthew 2023 17:32)  T(F): 98.3 (16 Jan 2023 05:00), Max: 98.7 (15 Matthew 2023 17:32)  HR: 92 (16 Jan 2023 05:00) (66 - 92)  BP: 135/71 (16 Jan 2023 05:00) (105/55 - 135/71)  BP(mean): 73 (15 Matthew 2023 12:00) (73 - 90)  RR: 20 (16 Jan 2023 05:00) (19 - 45)  SpO2: 93% (16 Jan 2023 05:00) (87% - 96%)    Parameters below as of 16 Jan 2023 05:00  Patient On (Oxygen Delivery Method): nasal cannula  O2 Flow (L/min): 4      PHYSICAL EXAMINATION:  PHYSICAL EXAM:      Constitutional:    Eyes:    ENMT:    Neck:    Breasts:    Back:    Respiratory:    Cardiovascular:    Gastrointestinal:    Genitourinary:    Rectal:    Extremities:    Vascular:    Neurological:    Skin:    Lymph Nodes:    Musculoskeletal:    Psychiatric:  heart s1s2  lung dc bS  head nc  cough          LABS:                        13.0   14.91 )-----------( 406      ( 15 Matthew 2023 05:40 )             39.7     01-15    133<L>  |  92<L>  |  26<H>  ----------------------------<  161<H>  3.9   |  37<H>  |  0.98    Ca    8.6      15 Matthew 2023 05:40  Phos  2.2     01-15  Mg     2.1     01-15    TPro  7.2  /  Alb  1.8<L>  /  TBili  1.7<H>  /  DBili  x   /  AST  30  /  ALT  17  /  AlkPhos  106  01-15    PT/INR - ( 14 Jan 2023 06:11 )   PT: 28.0 sec;   INR: 2.37 ratio         PTT - ( 14 Jan 2023 06:11 )  PTT:37.3 sec                    MICROBIOLOGY:    RADIOLOGY & ADDITIONAL STUDIES:

## 2023-01-16 NOTE — PROGRESS NOTE ADULT - PROBLEM SELECTOR PLAN 4
s/p acute diastolic chf   Echo - showed EF of 55%. No pericardial effusion  Off IV Lasix-   lasix 40 mg po  daily , monitor electrolytes

## 2023-01-17 LAB
ALBUMIN SERPL ELPH-MCNC: 1.9 G/DL — LOW (ref 3.3–5)
ALP SERPL-CCNC: 111 U/L — SIGNIFICANT CHANGE UP (ref 40–120)
ALT FLD-CCNC: 21 U/L — SIGNIFICANT CHANGE UP (ref 12–78)
ANION GAP SERPL CALC-SCNC: 7 MMOL/L — SIGNIFICANT CHANGE UP (ref 5–17)
AST SERPL-CCNC: 29 U/L — SIGNIFICANT CHANGE UP (ref 15–37)
BASOPHILS # BLD AUTO: 0.11 K/UL — SIGNIFICANT CHANGE UP (ref 0–0.2)
BASOPHILS NFR BLD AUTO: 0.8 % — SIGNIFICANT CHANGE UP (ref 0–2)
BILIRUB SERPL-MCNC: 1.4 MG/DL — HIGH (ref 0.2–1.2)
BUN SERPL-MCNC: 18 MG/DL — SIGNIFICANT CHANGE UP (ref 7–23)
CALCIUM SERPL-MCNC: 8.6 MG/DL — SIGNIFICANT CHANGE UP (ref 8.5–10.1)
CHLORIDE SERPL-SCNC: 89 MMOL/L — LOW (ref 96–108)
CO2 SERPL-SCNC: 35 MMOL/L — HIGH (ref 22–31)
CREAT SERPL-MCNC: 0.84 MG/DL — SIGNIFICANT CHANGE UP (ref 0.5–1.3)
EGFR: 88 ML/MIN/1.73M2 — SIGNIFICANT CHANGE UP
EOSINOPHIL # BLD AUTO: 0.3 K/UL — SIGNIFICANT CHANGE UP (ref 0–0.5)
EOSINOPHIL NFR BLD AUTO: 2.1 % — SIGNIFICANT CHANGE UP (ref 0–6)
GALACTOMANNAN AG SERPL-ACNC: 0.06 INDEX — SIGNIFICANT CHANGE UP (ref 0–0.49)
GLUCOSE SERPL-MCNC: 152 MG/DL — HIGH (ref 70–99)
HCT VFR BLD CALC: 38.6 % — LOW (ref 39–50)
HGB BLD-MCNC: 13.1 G/DL — SIGNIFICANT CHANGE UP (ref 13–17)
IMM GRANULOCYTES NFR BLD AUTO: 3.7 % — HIGH (ref 0–0.9)
LYMPHOCYTES # BLD AUTO: 1.49 K/UL — SIGNIFICANT CHANGE UP (ref 1–3.3)
LYMPHOCYTES # BLD AUTO: 10.3 % — LOW (ref 13–44)
MAGNESIUM SERPL-MCNC: 1.9 MG/DL — SIGNIFICANT CHANGE UP (ref 1.6–2.6)
MCHC RBC-ENTMCNC: 28.9 PG — SIGNIFICANT CHANGE UP (ref 27–34)
MCHC RBC-ENTMCNC: 33.9 GM/DL — SIGNIFICANT CHANGE UP (ref 32–36)
MCV RBC AUTO: 85.2 FL — SIGNIFICANT CHANGE UP (ref 80–100)
MONOCYTES # BLD AUTO: 0.96 K/UL — HIGH (ref 0–0.9)
MONOCYTES NFR BLD AUTO: 6.7 % — SIGNIFICANT CHANGE UP (ref 2–14)
NEUTROPHILS # BLD AUTO: 11.02 K/UL — HIGH (ref 1.8–7.4)
NEUTROPHILS NFR BLD AUTO: 76.4 % — SIGNIFICANT CHANGE UP (ref 43–77)
NRBC # BLD: 0 /100 WBCS — SIGNIFICANT CHANGE UP (ref 0–0)
PHOSPHATE SERPL-MCNC: 1.9 MG/DL — LOW (ref 2.5–4.5)
PLATELET # BLD AUTO: 376 K/UL — SIGNIFICANT CHANGE UP (ref 150–400)
POTASSIUM SERPL-MCNC: 3.2 MMOL/L — LOW (ref 3.5–5.3)
POTASSIUM SERPL-SCNC: 3.2 MMOL/L — LOW (ref 3.5–5.3)
PROT SERPL-MCNC: 7.7 G/DL — SIGNIFICANT CHANGE UP (ref 6–8.3)
RBC # BLD: 4.53 M/UL — SIGNIFICANT CHANGE UP (ref 4.2–5.8)
RBC # FLD: 15.3 % — HIGH (ref 10.3–14.5)
SODIUM SERPL-SCNC: 131 MMOL/L — LOW (ref 135–145)
WBC # BLD: 14.42 K/UL — HIGH (ref 3.8–10.5)
WBC # FLD AUTO: 14.42 K/UL — HIGH (ref 3.8–10.5)

## 2023-01-17 PROCEDURE — 99233 SBSQ HOSP IP/OBS HIGH 50: CPT

## 2023-01-17 PROCEDURE — 99233 SBSQ HOSP IP/OBS HIGH 50: CPT | Mod: GC

## 2023-01-17 RX ORDER — POTASSIUM PHOSPHATE, MONOBASIC POTASSIUM PHOSPHATE, DIBASIC 236; 224 MG/ML; MG/ML
15 INJECTION, SOLUTION INTRAVENOUS ONCE
Refills: 0 | Status: COMPLETED | OUTPATIENT
Start: 2023-01-17 | End: 2023-01-17

## 2023-01-17 RX ORDER — FLUCONAZOLE 150 MG/1
100 TABLET ORAL EVERY 24 HOURS
Refills: 0 | Status: DISCONTINUED | OUTPATIENT
Start: 2023-01-17 | End: 2023-01-18

## 2023-01-17 RX ORDER — POTASSIUM CHLORIDE 20 MEQ
40 PACKET (EA) ORAL ONCE
Refills: 0 | Status: COMPLETED | OUTPATIENT
Start: 2023-01-17 | End: 2023-01-17

## 2023-01-17 RX ORDER — SODIUM CHLORIDE 9 MG/ML
2 INJECTION INTRAMUSCULAR; INTRAVENOUS; SUBCUTANEOUS ONCE
Refills: 0 | Status: COMPLETED | OUTPATIENT
Start: 2023-01-17 | End: 2023-01-17

## 2023-01-17 RX ORDER — DIPHENHYDRAMINE HYDROCHLORIDE AND LIDOCAINE HYDROCHLORIDE AND ALUMINUM HYDROXIDE AND MAGNESIUM HYDRO
5 KIT THREE TIMES A DAY
Refills: 0 | Status: DISCONTINUED | OUTPATIENT
Start: 2023-01-17 | End: 2023-01-24

## 2023-01-17 RX ADMIN — Medication 50 MILLIGRAM(S): at 00:45

## 2023-01-17 RX ADMIN — GABAPENTIN 100 MILLIGRAM(S): 400 CAPSULE ORAL at 17:51

## 2023-01-17 RX ADMIN — CEFEPIME 100 MILLIGRAM(S): 1 INJECTION, POWDER, FOR SOLUTION INTRAMUSCULAR; INTRAVENOUS at 13:01

## 2023-01-17 RX ADMIN — SODIUM CHLORIDE 2 GRAM(S): 9 INJECTION INTRAMUSCULAR; INTRAVENOUS; SUBCUTANEOUS at 17:51

## 2023-01-17 RX ADMIN — Medication 100 MILLIGRAM(S): at 13:02

## 2023-01-17 RX ADMIN — Medication 100 MILLIGRAM(S): at 22:32

## 2023-01-17 RX ADMIN — Medication 60 MILLIGRAM(S): at 11:53

## 2023-01-17 RX ADMIN — Medication 60 MILLIGRAM(S): at 17:51

## 2023-01-17 RX ADMIN — GABAPENTIN 100 MILLIGRAM(S): 400 CAPSULE ORAL at 05:56

## 2023-01-17 RX ADMIN — Medication 50 MILLIGRAM(S): at 17:51

## 2023-01-17 RX ADMIN — Medication 60 MILLIGRAM(S): at 05:56

## 2023-01-17 RX ADMIN — Medication 60 MILLIGRAM(S): at 00:45

## 2023-01-17 RX ADMIN — Medication 1 GRAM(S): at 17:50

## 2023-01-17 RX ADMIN — CEFEPIME 100 MILLIGRAM(S): 1 INJECTION, POWDER, FOR SOLUTION INTRAMUSCULAR; INTRAVENOUS at 22:33

## 2023-01-17 RX ADMIN — FLUCONAZOLE 100 MILLIGRAM(S): 150 TABLET ORAL at 18:05

## 2023-01-17 RX ADMIN — ATORVASTATIN CALCIUM 10 MILLIGRAM(S): 80 TABLET, FILM COATED ORAL at 22:33

## 2023-01-17 RX ADMIN — Medication 40 MILLIGRAM(S): at 05:56

## 2023-01-17 RX ADMIN — Medication 40 MILLIEQUIVALENT(S): at 17:50

## 2023-01-17 RX ADMIN — SENNA PLUS 2 TABLET(S): 8.6 TABLET ORAL at 22:32

## 2023-01-17 RX ADMIN — ISOSORBIDE MONONITRATE 60 MILLIGRAM(S): 60 TABLET, EXTENDED RELEASE ORAL at 11:53

## 2023-01-17 RX ADMIN — Medication 50 MILLIGRAM(S): at 05:56

## 2023-01-17 RX ADMIN — Medication 100 MILLIGRAM(S): at 05:56

## 2023-01-17 RX ADMIN — APIXABAN 5 MILLIGRAM(S): 2.5 TABLET, FILM COATED ORAL at 05:56

## 2023-01-17 RX ADMIN — APIXABAN 5 MILLIGRAM(S): 2.5 TABLET, FILM COATED ORAL at 17:51

## 2023-01-17 RX ADMIN — CLOPIDOGREL BISULFATE 75 MILLIGRAM(S): 75 TABLET, FILM COATED ORAL at 11:53

## 2023-01-17 RX ADMIN — POTASSIUM PHOSPHATE, MONOBASIC POTASSIUM PHOSPHATE, DIBASIC 62.5 MILLIMOLE(S): 236; 224 INJECTION, SOLUTION INTRAVENOUS at 17:50

## 2023-01-17 RX ADMIN — CEFEPIME 100 MILLIGRAM(S): 1 INJECTION, POWDER, FOR SOLUTION INTRAMUSCULAR; INTRAVENOUS at 05:57

## 2023-01-17 RX ADMIN — Medication 1 GRAM(S): at 05:56

## 2023-01-17 RX ADMIN — Medication 50 MILLIGRAM(S): at 11:53

## 2023-01-17 RX ADMIN — Medication 85 MILLIMOLE(S): at 14:14

## 2023-01-17 NOTE — PROGRESS NOTE ADULT - SUBJECTIVE AND OBJECTIVE BOX
Date/Time Patient Seen:  		  Referring MD:   Data Reviewed	       Patient is a 82y old  Male who presents with a chief complaint of CHF, A fib, Flu (16 Jan 2023 15:20)      Subjective/HPI     PAST MEDICAL & SURGICAL HISTORY:  Congestive heart failure (CHF)    Atrial fibrillation    HLD (hyperlipidemia)    HTN (hypertension)    CAD (coronary artery disease)    S/P coronary artery stent placement    S/P CABG (coronary artery bypass graft)          Medication list         MEDICATIONS  (STANDING):  apixaban 5 milliGRAM(s) Oral every 12 hours  atorvastatin 10 milliGRAM(s) Oral at bedtime  benzonatate 100 milliGRAM(s) Oral every 8 hours  cefepime   IVPB 2000 milliGRAM(s) IV Intermittent every 8 hours  clopidogrel Tablet 75 milliGRAM(s) Oral daily  diltiazem    Tablet 60 milliGRAM(s) Oral every 6 hours  fluconAZOLE IVPB      fluconAZOLE IVPB 100 milliGRAM(s) IV Intermittent every 24 hours  furosemide    Tablet 40 milliGRAM(s) Oral daily  gabapentin 100 milliGRAM(s) Oral two times a day  isosorbide   mononitrate ER Tablet (IMDUR) 60 milliGRAM(s) Oral daily  metoprolol tartrate 50 milliGRAM(s) Oral every 6 hours  polyethylene glycol 3350 17 Gram(s) Oral daily  senna 2 Tablet(s) Oral at bedtime  sucralfate suspension 1 Gram(s) Oral two times a day    MEDICATIONS  (PRN):  acetylcysteine 10%  Inhalation 4 milliLiter(s) Inhalation once PRN bronchospasm  benzocaine 20% Spray 1 Spray(s) Topical <User Schedule> PRN Sore Throat  bisacodyl 5 milliGRAM(s) Oral every 12 hours PRN Constipation  guaiFENesin Oral Liquid (Sugar-Free) 200 milliGRAM(s) Oral every 6 hours PRN Cough  ondansetron Injectable 4 milliGRAM(s) IV Push every 8 hours PRN Nausea and/or Vomiting         Vitals log        ICU Vital Signs Last 24 Hrs  T(C): 36.8 (17 Jan 2023 00:00), Max: 36.8 (16 Jan 2023 11:52)  T(F): 98.3 (17 Jan 2023 00:00), Max: 98.3 (17 Jan 2023 00:00)  HR: 80 (17 Jan 2023 00:00) (80 - 102)  BP: 124/65 (17 Jan 2023 00:00) (124/65 - 135/85)  BP(mean): --  ABP: --  ABP(mean): --  RR: 18 (17 Jan 2023 00:00) (17 - 18)  SpO2: 95% (16 Jan 2023 19:33) (95% - 97%)    O2 Parameters below as of 17 Jan 2023 00:00  Patient On (Oxygen Delivery Method): nasal cannula  O2 Flow (L/min): 4               Input and Output:  I&O's Detail    15 Matthew 2023 07:01  -  16 Jan 2023 07:00  --------------------------------------------------------  IN:    IV PiggyBack: 50 mL    IV PiggyBack: 50 mL  Total IN: 100 mL    OUT:  Total OUT: 0 mL    Total NET: 100 mL          Lab Data                        12.2   16.09 )-----------( 357      ( 16 Jan 2023 18:50 )             36.8     01-16    128<L>  |  87<L>  |  23  ----------------------------<  216<H>  3.7   |  36<H>  |  1.00    Ca    8.2<L>      16 Jan 2023 18:50              Review of Systems	      Objective     Physical Examination    heart s1s2  lung dec BS  head nc      Pertinent Lab findings & Imaging      Christiano:  NO   Adequate UO     I&O's Detail    15 Matthew 2023 07:01  -  16 Jan 2023 07:00  --------------------------------------------------------  IN:    IV PiggyBack: 50 mL    IV PiggyBack: 50 mL  Total IN: 100 mL    OUT:  Total OUT: 0 mL    Total NET: 100 mL               Discussed with:     Cultures:	        Radiology

## 2023-01-17 NOTE — PROGRESS NOTE ADULT - SUBJECTIVE AND OBJECTIVE BOX
[facility]  INFECTIOUS DISEASES   [address] [place] [address]  Tel: [phone]     Fax: [phone]  ========================================================  MD Radha Hutchison Kaushal, MD Cho, Michelle, MD Sunjit, Jaspal, MD  ========================================================    [facility]-[id number]  OPAL NUGENT     Follow up: Pneumonia?, Respiratory failure, Influenza     oing better , not on HFNC any more on NC 4L.   Has some cough.   No fever.    PAST MEDICAL & SURGICAL HISTORY:  HLD (hyperlipidemia)  HTN (hypertension)  CAD (coronary artery disease)  S/P coronary artery stent placement  S/P CABG (coronary artery bypass graft)    Social Hx: Former, smoked more 50years ago, no ETOH or drugs     FAMILY HISTORY:  Family history of cardiovascular disease (Sibling)    Family history of uterine cancer (Mother)    Allergies  Levaquin (Unknown)  penicillin (Rash)    Antibiotics:  MEDICATIONS  (STANDING):  albuterol/ipratropium for Nebulization 3 milliLiter(s) Nebulizer every 6 hours  apixaban 5 milliGRAM(s) Oral every 12 hours  atorvastatin 10 milliGRAM(s) Oral at bedtime  azithromycin  IVPB      azithromycin  IVPB 500 milliGRAM(s) IV Intermittent every 24 hours  benzonatate 100 milliGRAM(s) Oral every 8 hours  cefepime   IVPB      cefepime   IVPB 1000 milliGRAM(s) IV Intermittent every 8 hours  clopidogrel Tablet 75 milliGRAM(s) Oral daily  diltiazem    Tablet 60 milliGRAM(s) Oral every 6 hours  furosemide   Injectable 40 milliGRAM(s) IV Push two times a day  gabapentin 100 milliGRAM(s) Oral two times a day  hydrocodone/homatropine Syrup 5 milliLiter(s) Oral every 8 hours  isosorbide   mononitrate ER Tablet (IMDUR) 60 milliGRAM(s) Oral daily  metoprolol tartrate 50 milliGRAM(s) Oral every 6 hours  oseltamivir 30 milliGRAM(s) Oral two times a day    MEDICATIONS  (PRN):  bisacodyl 5 milliGRAM(s) Oral every 12 hours PRN Constipation  guaiFENesin Oral Liquid (Sugar-Free) 200 milliGRAM(s) Oral every 6 hours PRN Cough  metoclopramide Injectable 5 milliGRAM(s) IV Push every 8 hours PRN nausea/vomiting     REVIEW OF SYSTEMS:  CONSTITUTIONAL:  No Fever or chills  HEENT:  No diplopia or blurred vision.  No sore throat or runny nose.  CARDIOVASCULAR:  No chest pain   RESPIRATORY:  +cough, + shortness of breath, + sputum  GASTROINTESTINAL:  No nausea, vomiting or diarrhea.  GENITOURINARY:  No dysuria, frequency or urgency. No Blood in urine  MUSCULOSKELETAL:  no joint aches, no muscle pain  SKIN:  No change in skin, hair or nails.  NEUROLOGIC:  No paresthesias or weakness.  PSYCHIATRIC:  No disorder of thought or mood.  ENDOCRINE:  No heat or cold intolerance, polyuria or polydipsia.  HEMATOLOGICAL:  No easy bruising or bleeding.     Physical Exam:  Vital Signs Last 24 Hrs  T(C): 37.1 (17 Jan 2023 11:36), Max: 37.1 (17 Jan 2023 11:36)  T(F): 98.7 (17 Jan 2023 11:36), Max: 98.7 (17 Jan 2023 11:36)  HR: 90 (17 Jan 2023 11:36) (80 - 102)  BP: 127/71 (17 Jan 2023 11:36) (124/65 - 146/78)  BP(mean): --  RR: 18 (17 Jan 2023 11:36) (18 - 19)  SpO2: 95% (17 Jan 2023 11:36) (95% - 97%)  Parameters below as of 17 Jan 2023 11:36  Patient On (Oxygen Delivery Method): nasal cannula  O2 Flow (L/min): 4  GEN: NAD, NC in place   HEENT: normocephalic and atraumatic. EOMI. PERRL.    NECK: Supple.  No lymphadenopathy   LUNGS: Rales diffuse bilateral lung fields, no wheezing  HEART: Regular rate and rhythm without murmur.  ABDOMEN: Soft, nontender, and nondistended.  Positive bowel sounds.    : No CVA tenderness  EXTREMITIES: Without edema.  NEUROLOGIC: grossly intact.  PSYCHIATRIC: Appropriate affect .  SKIN: No rash       Labs:                        13.1   14.42 )-----------( 376      ( 17 Jan 2023 08:20 )             38.6     01-17    131<L>  |  89<L>  |  18  ----------------------------<  152<H>  3.2<L>   |  35<H>  |  0.84    Ca    8.6      17 Jan 2023 08:20  Phos  1.9     01-17  Mg     1.9     01-17    TPro  7.7  /  Alb  1.9<L>  /  TBili  1.4<H>  /  DBili  x   /  AST  29  /  ALT  21  /  AlkPhos  111  01-17    Culture - Sputum (collected 01-12-23 @ 11:00)  Source: .Sputum Sputum  Gram Stain (01-12-23 @ 23:47):    Few polymorphonuclear leukocytes per low power field    Rare Squamous epithelial cells per low power field    Few Gram Variable Rods per oil power field  Final Report (01-14-23 @ 19:03):    Normal Respiratory Erin present    Culture - Sputum (collected 01-11-23 @ 18:00)  Source: .Sputum Sputum  Gram Stain (01-12-23 @ 10:14):    Numerous polymorphonuclear leukocytes per low power field    No Squamous epithelial cells per low power field    Few Gram Negative Rods seen per oil power field  Final Report (01-14-23 @ 10:03):    Few Pseudomonas aeruginosa    Normal Respiratory Erin present  Organism: Pseudomonas aeruginosa (01-14-23 @ 10:03)  Organism: Pseudomonas aeruginosa (01-14-23 @ 10:03)    Sensitivities:      -  Amikacin: S <=16      -  Aztreonam: S <=4      -  Cefepime: S <=2      -  Ceftazidime: S <=1      -  Ciprofloxacin: S <=0.25      -  Gentamicin: S <=2      -  Imipenem: S 2      -  Levofloxacin: S <=0.5      -  Meropenem: S <=1      -  Piperacillin/Tazobactam: S <=8      -  Tobramycin: I 8      Method Type: STEVE    Culture - Blood (collected 01-10-23 @ 22:05)  Source: .Blood Blood-Peripheral  Final Report (01-16-23 @ 01:00):    No Growth Final    Culture - Blood (collected 01-10-23 @ 22:00)  Source: .Blood Blood-Peripheral  Final Report (01-16-23 @ 01:00):    No Growth Final    WBC Count: 14.42 K/uL (01-17-23 @ 08:20)  WBC Count: 16.09 K/uL (01-16-23 @ 18:50)  WBC Count: 14.91 K/uL (01-15-23 @ 05:40)  WBC Count: 18.42 K/uL (01-14-23 @ 06:11)  WBC Count: 20.14 K/uL (01-13-23 @ 06:15)    Creatinine, Serum: 0.84 mg/dL (01-17-23 @ 08:20)  Creatinine, Serum: 1.00 mg/dL (01-16-23 @ 18:50)  Creatinine, Serum: 0.98 mg/dL (01-15-23 @ 05:40)  Creatinine, Serum: 0.88 mg/dL (01-14-23 @ 06:11)  Creatinine, Serum: 1.00 mg/dL (01-13-23 @ 06:15)    Sedimentation Rate, Erythrocyte: 74 mm/hr (01-11-23 @ 05:30)    Procalcitonin, Serum: 0.98 ng/mL (01-10-23 @ 22:00)     COVID-19 PCR: NotDetec (01-10-23 @ 21:30)  SARS-CoV-2: NotDetec (01-10-23 @ 20:25)  SARS-CoV-2: NotDetec (01-07-23 @ 13:50)    All imaging and other data have been reviewed.  < from: CT Angio Chest PE Protocol w/ IV Cont (01.10.23 @ 15:47) >  IMPRESSION:.  No pulmonary embolism.  Diffuse tree-in-bud nodular opacities and bibasilar consolidation   compatible with infection.  Mediastinal and bilateral hilar lymphadenopathy, likely reactive.    Assessment and Plan:    83 y/o m w/ PMH od CAD s/p stent, s/p CABG, HTN, HLD, CHF presented to [facility] ED 1/7/23 for shortness of breath and wheezing. He was found to be influenza positive and with CHF exacerbation and new onset afib. Started on tamiflu and IV lasix, eliquis and metoprolol for afib. Now transferred to ICU for worsening acute hypoxic respiratory failure. CTA negative for PE but showing likely superimposed bacterial PNA. ID consultation requested.   MRSA/MSSA nares PCR negative  Legionella negative   Acute CHF exacerbation, found to be influenza A positive and now with worsening hypoxia. Procal 0.98 Likely due to superimposed pseudomonas pna    #Acute hypoxic respiratory failure  #influenza A  #Superimposed bacterial pna    - Sputum culture with a pansensitive Pseudomonas   - Continue cefepime  - Can treat for 7days if continues to improve (tomorrow last day)  - WBC 14k trending down  - Fluconazole for oral thrush, last day 1/20 (questionable?)   - Will add magic mouthwash     Will follow.    Murhpy Barrera MD  Division of Infectious Diseases   Please call ID service at [phone] with any question.      35 minutes spent on total encounter assessing patient, examination, chart review, counseling and coordinating care by the attending physician/nurse/care manager.

## 2023-01-17 NOTE — PHARMACOTHERAPY INTERVENTION NOTE - COMMENTS
Patient is on cefepime 1g q8hrs for PNA. Discussed w/ ID Dr. Barrera that patient's renal function has improved (CrCl ~71 mL/min) and recommended increasing dose to 2g q8hrs. MD accepted and order was entered. 
Patient is an 83 y/o F on Eliquis 2.5 mg every 12 hrs for new onset afib. Discussed with Cardio NP Ana Luisa that patient qualifies for 5 mg every 12 hrs since they do not meet 2/3 criteria for lower dosing (age >80, wt <60kg, SCr >1.5). NP agreed and order was entered.   
Pt is on Fluconazole 100mg q24h IV for oral thrush. Discussed w/ ID Dr. Barrera and recommended PO since WBC down trending and pt tolerating PO. MD accepted and order was entered.

## 2023-01-17 NOTE — PROGRESS NOTE ADULT - ASSESSMENT
83 y/o m w/ PMH od CAD s/p stent, s/p CABG, HTN, HLD, CHF p/w shortness of breath and cough    remains with pulm congestion - enc cough, expectorant - may need Bronchodilator - mucolytic - Albuterol - Hypertonic Saline Nebs -     s/p tamiflu  on diflucan  on emp ABX for pna  vs noted - labs reviewed - imaging reviewed -   cvs rx regimen - diuresis   I and O  s/p ICU care and course  Cardio and ID follow up  cough rx regimen  nutrition  GOC discussion  replete surya  on AC for AF

## 2023-01-17 NOTE — PROGRESS NOTE ADULT - PROBLEM SELECTOR PLAN 5
avoid nephrotoxins,   monitor renal functions  Stable creatinine- .98  Hypokalemia- replace potassium -in am

## 2023-01-17 NOTE — PROGRESS NOTE ADULT - ASSESSMENT
* Prerenal azotemia, CKD  * Acute HF  * influenza PNA  * New a.fib with RVR  * Hypokalemia  * Hyponatremia      Stable renal indices. PO fluid restriction. Potassium supplementation. To continue current meds.  Will follow electrolytes and renal function trend. D/w pt's son at bedside

## 2023-01-17 NOTE — PROGRESS NOTE ADULT - SUBJECTIVE AND OBJECTIVE BOX
[facility] -- Pierre Mcfarlane,  Donna, Ge Morejon Savella, Goodger  Office # [phone]    Follow Up:  Hypoxic resp failure    Subjective/Observations: Patient seen and examined, awake, alert, eating breakfast in bed. denies chest pain, dyspnea, palpitations or dizziness, c/o of difficulty taking a deep breath, on O2 via nasal cannula.     REVIEW OF SYSTEMS: All other review of systems is negative unless indicated above  PAST MEDICAL & SURGICAL HISTORY:  HLD (hyperlipidemia)      HTN (hypertension)      CAD (coronary artery disease)      S/P coronary artery stent placement      S/P CABG (coronary artery bypass graft)        MEDICATIONS  (STANDING):  apixaban 5 milliGRAM(s) Oral every 12 hours  atorvastatin 10 milliGRAM(s) Oral at bedtime  benzonatate 100 milliGRAM(s) Oral every 8 hours  cefepime   IVPB 2000 milliGRAM(s) IV Intermittent every 8 hours  clopidogrel Tablet 75 milliGRAM(s) Oral daily  diltiazem    Tablet 60 milliGRAM(s) Oral every 6 hours  fluconAZOLE IVPB      fluconAZOLE IVPB 100 milliGRAM(s) IV Intermittent every 24 hours  furosemide    Tablet 40 milliGRAM(s) Oral daily  gabapentin 100 milliGRAM(s) Oral two times a day  isosorbide   mononitrate ER Tablet (IMDUR) 60 milliGRAM(s) Oral daily  metoprolol tartrate 50 milliGRAM(s) Oral every 6 hours  polyethylene glycol 3350 17 Gram(s) Oral daily  senna 2 Tablet(s) Oral at bedtime  sodium phosphate 15 milliMole(s)/250 mL IVPB 15 milliMole(s) IV Intermittent once  sucralfate suspension 1 Gram(s) Oral two times a day    MEDICATIONS  (PRN):  benzocaine 20% Spray 1 Spray(s) Topical <User Schedule> PRN Sore Throat  bisacodyl 5 milliGRAM(s) Oral every 12 hours PRN Constipation  guaiFENesin Oral Liquid (Sugar-Free) 200 milliGRAM(s) Oral every 6 hours PRN Cough  ondansetron Injectable 4 milliGRAM(s) IV Push every 8 hours PRN Nausea and/or Vomiting    Allergies    Levaquin (Unknown)  penicillin (Rash)    Intolerances      Vital Signs Last 24 Hrs  T(C): 36.8 (17 Jan 2023 05:53), Max: 36.8 (16 Jan 2023 11:52)  T(F): 98.2 (17 Jan 2023 05:53), Max: 98.3 (17 Jan 2023 00:00)  HR: 87 (17 Jan 2023 05:53) (80 - 102)  BP: 146/78 (17 Jan 2023 05:53) (124/65 - 146/78)  BP(mean): --  RR: 19 (17 Jan 2023 05:53) (17 - 19)  SpO2: 95% (17 Jan 2023 05:53) (95% - 97%)    Parameters below as of 17 Jan 2023 05:53  Patient On (Oxygen Delivery Method): nasal cannula      I&O's Summary    16 Jan 2023 07:01  -  17 Jan 2023 07:00  --------------------------------------------------------  IN: 0 mL / OUT: 700 mL / NET: -700 mL        TELE: Afib 70-90's   PHYSICAL EXAM:  Constitutional: NAD, awake and alert, well-developed  HEENT: Moist Mucous Membranes, Anicteric  Pulmonary: Non-labored, breath sounds diminished bilaterally, No wheezing, rales or rhonchi  Cardiovascular: Regular, S1 and S2, No murmurs, rubs, gallops or clicks  Gastrointestinal: Bowel Sounds present, soft, nontender.   Lymph: No peripheral edema. No lymphadenopathy.  Skin: No visible rashes or ulcers.  Psych:  Mood & affect appropriate  LABS: All Labs Reviewed:                        13.1   14.42 )-----------( 376      ( 17 Jan 2023 08:20 )             38.6                         12.2   16.09 )-----------( 357      ( 16 Jan 2023 18:50 )             36.8                         13.0   14.91 )-----------( 406      ( 15 Matthew 2023 05:40 )             39.7     17 Jan 2023 08:20    131    |  89     |  18     ----------------------------<  152    3.2     |  35     |  0.84   16 Jan 2023 18:50    128    |  87     |  23     ----------------------------<  216    3.7     |  36     |  1.00   15 Jan 2023 05:40    133    |  92     |  26     ----------------------------<  161    3.9     |  37     |  0.98     Ca    8.6        17 Jan 2023 08:20  Ca    8.2        16 Jan 2023 18:50  Ca    8.6        15 Jan 2023 05:40  Phos  1.9       17 Jan 2023 08:20  Phos  2.2       15 Jan 2023 05:40  Mg     1.9       17 Jan 2023 08:20  Mg     2.1       15 Matthew 2023 05:40    TPro  7.7    /  Alb  1.9    /  TBili  1.4    /  DBili  x      /  AST  29     /  ALT  21     /  AlkPhos  111    17 Jan 2023 08:20  TPro  7.2    /  Alb  1.8    /  TBili  1.7    /  DBili  x      /  AST  30     /  ALT  17     /  AlkPhos  106    15 Jan 2023 05:40          12 Lead ECG:   Ventricular Rate 106 BPM    QRS Duration 90 ms    Q-T Interval 328 ms    QTC Calculation(Bazett) 435 ms    R Axis -26 degrees    T Axis 59 degrees    Diagnosis Line Atrial fibrillation with rapid ventricular response with premature ventricular or aberrantly conducted complexes  Nonspecific ST abnormality  Abnormal ECG  No previous ECGs available  Confirmed by munir Salazar ([id number]) on 1/7/2023 12:56:36 PM (01-06-23 @ 15:58)      ACC: [id number] EXAM:  ECHO TTE WO CON COMP W DOPP                          PROCEDURE DATE:  01/07/2023          INTERPRETATION:  INDICATION: Atrial fibrillation  Sonographer KL    Blood Pressure 130/77    Height 175 cm     Weight 99.8 kg       BSA 2.13   sq m    Dimensions:  LA 4.5       Normal Values: 2.0 - 4.0 cm  Ao 3.4        Normal Values: 2.0 - 3.8 cm  SEPTUM 0.9       Normal Values: 0.6 - 1.2 cm  PWT 0.8       Normal Values: 0.6 - 1.1 cm  LVIDd 5.3         Normal Values: 3.0 - 5.6 cm  LVIDs 3.3         Normal Values: 1.8 - 4.0 cm      OBSERVATIONS:  Technically difficult study  Mitral Valve: normal, trace physiologic MR.  Aortic Valve/Aorta: Sclerotic trileaflet aortic valve with grossly normal   opening.  Tricuspid Valve: normal withtrace TR.  Pulmonic Valve: Trace PI  Left Atrium: Enlarged  Right Atrium: Not well-visualized  Left Ventricle: The left ventricular endocardium is not well-visualized.   Overall, grossly normal LV size and systolic function, estimated LVEF of   55%. Cannot rule out segmental abnormalities  Right Ventricle: Not well-visualized  Pericardium: no significant pericardial effusion.        IMPRESSION:  Technically difficult study  The left ventricular endocardium is not well-visualized. Overall, grossly  normal LV size and systolic function, estimated LVEF of 55%.  The right ventricle is not well-visualized  Left atrial enlargement  Sclerotic trileaflet aortic valve with grossly normal opening, without AI.  Trace physiologic MR and TR.  No significant pericardial effusion.    --- End of Report ---            LIANNA CHAIREZ MD; Attending Cardiologist  This document has been electronically signed. Jan 7 2023  2:15PM

## 2023-01-17 NOTE — CASE MANAGEMENT PROGRESS NOTE - NSCMPROGRESSNOTE_GEN_ALL_CORE
CM met with patient at bedside, educated patient on role of CM and transition to home, he verbalized understanding.  Pt remains acute,  receiving IV antibiotics, on 4L n/C. CM discussed home care visiting nurse, pending insurance authorization, Pt requested that CM call his daughter Paula. CM contacted Paula, she stated that she will be visiting her father later today and will review home care choices at that time, and notify CM.

## 2023-01-17 NOTE — PROGRESS NOTE ADULT - SUBJECTIVE AND OBJECTIVE BOX
Patient is a 82y old  Male who presents with a chief complaint of CHF, A fib, Flu (09 Jan 2023 08:23)  Patient seen in follow up for prerenal azotemia.        PAST MEDICAL HISTORY:  Congestive heart failure (CHF)  Atrial fibrillation  HLD (hyperlipidemia)  HTN (hypertension)  CAD (coronary artery disease)      MEDICATIONS  (STANDING):  apixaban 5 milliGRAM(s) Oral every 12 hours  atorvastatin 10 milliGRAM(s) Oral at bedtime  benzonatate 100 milliGRAM(s) Oral every 8 hours  cefepime   IVPB 2000 milliGRAM(s) IV Intermittent every 8 hours  clopidogrel Tablet 75 milliGRAM(s) Oral daily  diltiazem    Tablet 60 milliGRAM(s) Oral every 6 hours  FIRST- Mouthwash  BLM 5 milliLiter(s) Swish and Spit three times a day  fluconAZOLE IVPB      fluconAZOLE IVPB 100 milliGRAM(s) IV Intermittent every 24 hours  gabapentin 100 milliGRAM(s) Oral two times a day  isosorbide   mononitrate ER Tablet (IMDUR) 60 milliGRAM(s) Oral daily  metoprolol tartrate 50 milliGRAM(s) Oral every 6 hours  polyethylene glycol 3350 17 Gram(s) Oral daily  potassium chloride    Tablet ER 40 milliEquivalent(s) Oral once  potassium phosphate IVPB 15 milliMole(s) IV Intermittent once  senna 2 Tablet(s) Oral at bedtime  sucralfate suspension 1 Gram(s) Oral two times a day    MEDICATIONS  (PRN):  benzocaine 20% Spray 1 Spray(s) Topical <User Schedule> PRN Sore Throat  bisacodyl 5 milliGRAM(s) Oral every 12 hours PRN Constipation  guaiFENesin Oral Liquid (Sugar-Free) 200 milliGRAM(s) Oral every 6 hours PRN Cough  ondansetron Injectable 4 milliGRAM(s) IV Push every 8 hours PRN Nausea and/or Vomiting    T(C): 37.1 (01-17-23 @ 11:36), Max: 37.1 (01-15-23 @ 17:32)  HR: 90 (01-17-23 @ 11:36) (80 - 102)  BP: 127/71 (01-17-23 @ 11:36) (116/69 - 146/78)  RR: 18 (01-17-23 @ 11:36)  SpO2: 95% (01-17-23 @ 11:36)  Wt(kg): --  I&O's Detail    16 Jan 2023 07:01  -  17 Jan 2023 07:00  --------------------------------------------------------  IN:  Total IN: 0 mL    OUT:    Voided (mL): 700 mL  Total OUT: 700 mL    Total NET: -700 mL            PHYSICAL EXAM:  General: No distress  Respiratory: b/l air entry  Cardiovascular: S1 S2  Gastrointestinal: soft  Extremities:  edema                           LABORATORY:                        13.1   14.42 )-----------( 376      ( 17 Jan 2023 08:20 )             38.6     01-17    131<L>  |  89<L>  |  18  ----------------------------<  152<H>  3.2<L>   |  35<H>  |  0.84    Ca    8.6      17 Jan 2023 08:20  Phos  1.9     01-17  Mg     1.9     01-17    T Pro  7.7  /  Alb  1.9<L>  /  T Bili  1.4<H>  /  D Bili  x   /  AST  29  /  ALT  21  /  Alk Phos  111  01-17    Sodium, Serum: 131 mmol/L (01-17 @ 08:20)  Sodium, Serum: 128 mmol/L (01-16 @ 18:50)    Potassium, Serum: 3.2 mmol/L (01-17 @ 08:20)  Potassium, Serum: 3.7 mmol/L (01-16 @ 18:50)    Hemoglobin: 13.1 g/dL (01-17 @ 08:20)  Hemoglobin: 12.2 g/dL (01-16 @ 18:50)  Hemoglobin: 13.0 g/dL (01-15 @ 05:40)    Creatinine, Serum 0.84 (01-17 @ 08:20)  Creatinine, Serum 1.00 (01-16 @ 18:50)  Creatinine, Serum 0.98 (01-15 @ 05:40)        LIVER FUNCTIONS - ( 17 Jan 2023 08:20 )  Alb: 1.9 g/dL / Pro: 7.7 g/dL / ALK PHOS: 111 U/L / ALT: 21 U/L / AST: 29 U/L / GGT: x

## 2023-01-17 NOTE — PROGRESS NOTE ADULT - PROBLEM SELECTOR PLAN 4
s/p acute diastolic chf   Echo - showed EF of 55%. No pericardial effusion  Off IV Lasix-   lasix 40 mg po  daily hold  sec to hyponatremia

## 2023-01-17 NOTE — PROGRESS NOTE ADULT - ASSESSMENT
82 year old male PMH of CAD s/p stent, s/p CABG, HTN, HLD, CHF p/w shortness of breath and cough for the last 5 days found to be in af with rvr, congestive hf, and influenza positive.    CAD s/p CABG, stent, Afib with RVR, HFpEF, HTN, HLD   - proBNP 2418-->959  - remains on supplemental 02 via NC 4L   - TTE with EF 55%, LAE, with no significant valvular pathology  - Etiology of hypoxemia is not felt to be primarily HF but felt more likely to be primarily pulmonary, with flu and superimposed pneumonia   - Na noted, net neg 700 cc x 24 hours   - would give lasix holiday, will reassess in AM   - encourage incentive spirometry   - pulm toileting   - Strict i/o's and daily weights    - No anginal symptoms, no sign of acute ischemia, and troponin are negative  - Continue ASA, Plavix and statin  - Continue Imdur    - Afib with rates improved and controlled, 70-90's ovenright. Tachycardia  with some reactive component (in the setting of flu)  - Continue BB with hold parameters, will eventually switch to long-acting  - Continue Cardizem 60mg q6hrs, will switch to long-acting now with HR is controlled  - Continue Eliquis  - Monitor and replete Lytes. Keep K > 4 and Mg > 2    - Will continue to follow.    Concha Frey, [facility]  Nurse Practitioner - Cardiology   Spectra #[phone]

## 2023-01-17 NOTE — PROGRESS NOTE ADULT - PROBLEM SELECTOR PLAN 1
Acute respiratory failure as a result of Influenza with superimposed bacterial PNA  Continue broad spectrum antibiotics- on Cefepime 2gm q8hr total 7 days -last dose today   Sputum culture growing Pseudomonas +  ,MRSA/MSSA nares PCR negative  Legionella negative   s/p  high Flow   4lit nc 95  Continue bronchodilators   ID consult dr boateng

## 2023-01-17 NOTE — PROGRESS NOTE ADULT - TIME BILLING
pt seen and examine today see above plan --Acute respiratory failure as a result of Influenza with superimposed bacterial PNA Continue broad spectrum antibiotics-   on  ivbx  Cefepime 2gm q8hr total 7 day - today last dose     , fluconazole  100 mg ivpb 24 hr   , Sputum culture  Pseudomonas +, blood cult neg  MRSA/MSSA nares PCR negative  leucocytosis resolving  s/p   high flow now 4lit  96 pulse ox  repeat  xray chest to check pvc  +   in am . pt evaluation    daughter bedside aware tt /plan.

## 2023-01-17 NOTE — PATIENT CHOICE NOTE. - NSPTCHOICESTATE_GEN_ALL_CORE

## 2023-01-17 NOTE — PROGRESS NOTE ADULT - SUBJECTIVE AND OBJECTIVE BOX
Patient is a 82y old  Male who presents with a chief complaint of CHF, A fib, Flu (17 Jan 2023 11:02)    pt seen and examine today cough +   no  difficulty swallowing , no fever , oob.   pulse ox 96 4lit  INTERVAL HPI/OVERNIGHT EVENTS:     T(C): 37.1 (01-17-23 @ 11:36), Max: 37.1 (01-17-23 @ 11:36)  HR: 90 (01-17-23 @ 11:36) (80 - 102)  BP: 127/71 (01-17-23 @ 11:36) (124/65 - 146/78)  RR: 18 (01-17-23 @ 11:36) (18 - 19)  SpO2: 95% (01-17-23 @ 11:36) (95% - 97%)  Wt(kg): --  I&O's Summary    16 Jan 2023 07:01  -  17 Jan 2023 07:00  --------------------------------------------------------  IN: 0 mL / OUT: 700 mL / NET: -700 mL    REVIEW OF SYSTEMS:  CONSTITUTIONAL: No fever, weight loss, + fatigue  EYES: No eye pain, visual disturbances, or discharge  ENMT:  ; No sinus or throat pain  NECK: No pain or stiffness  RESPIRATORY: No cough, wheezing, chills ; + shortness of breath  CARDIOVASCULAR: No chest pain, palpitations, dizziness, or leg swelling  GASTROINTESTINAL: No abdominal or epigastric pain. No nausea, vomiting,    No diarrhea or constipation. No melena or hematochezia.  GENITOURINARY: No dysuria, frequency, hematuria, or incontinence  NEUROLOGICAL: No headaches, memory loss, loss of strength, numbness, or tremors  SKIN: No itching, burning, rashes, or lesions   MUSCULOSKELETAL: No joint pain or swelling; No muscle, back, or extremity pain    PHYSICAL EXAM:  GENERAL: NAD, obese   HEAD:  Atraumatic, Normocephalic  EYES: EOMI, PERRLA, conjunctiva and sclera clear  ENMT:  Moist mucous membranes  NECK: Supple, No JVD  NERVOUS SYSTEM:  Alert & Oriented X3; Motor Strength 5/5 B/L upper and lower extremities; DTRs 2+ intact and symmetric  CHEST/LUNG:  percussion bilaterally; rt  side coarse +  No rales, rhonchi, wheezing,   HEART: irRegular rate and rhythm; No murmurs, no tachy   ABDOMEN: Soft, Nontender, Nondistended; Bowel sounds present  EXTREMITIES:  2+ Peripheral Pulses, No clubbing, cyanosis, or edema  SKIN: No rashes or lesions        MEDICATIONS  (STANDING):  apixaban 5 milliGRAM(s) Oral every 12 hours  atorvastatin 10 milliGRAM(s) Oral at bedtime  benzonatate 100 milliGRAM(s) Oral every 8 hours  cefepime   IVPB 2000 milliGRAM(s) IV Intermittent every 8 hours  clopidogrel Tablet 75 milliGRAM(s) Oral daily  diltiazem    Tablet 60 milliGRAM(s) Oral every 6 hours  fluconAZOLE IVPB      fluconAZOLE IVPB 100 milliGRAM(s) IV Intermittent every 24 hours  furosemide    Tablet 40 milliGRAM(s) Oral daily  gabapentin 100 milliGRAM(s) Oral two times a day  isosorbide   mononitrate ER Tablet (IMDUR) 60 milliGRAM(s) Oral daily  metoprolol tartrate 50 milliGRAM(s) Oral every 6 hours  polyethylene glycol 3350 17 Gram(s) Oral daily  senna 2 Tablet(s) Oral at bedtime  sodium phosphate 15 milliMole(s)/250 mL IVPB 15 milliMole(s) IV Intermittent once  sucralfate suspension 1 Gram(s) Oral two times a day    MEDICATIONS  (PRN):  benzocaine 20% Spray 1 Spray(s) Topical <User Schedule> PRN Sore Throat  bisacodyl 5 milliGRAM(s) Oral every 12 hours PRN Constipation  guaiFENesin Oral Liquid (Sugar-Free) 200 milliGRAM(s) Oral every 6 hours PRN Cough  ondansetron Injectable 4 milliGRAM(s) IV Push every 8 hours PRN Nausea and/or Vomiting      LABS:                        13.1   14.42 )-----------( 376      ( 17 Jan 2023 08:20 )             38.6     01-17    131<L>  |  89<L>  |  18  ----------------------------<  152<H>  3.2<L>   |  35<H>  |  0.84    Ca    8.6      17 Jan 2023 08:20  Phos  1.9     01-17  Mg     1.9     01-17    TPro  7.7  /  Alb  1.9<L>  /  TBili  1.4<H>  /  DBili  x   /  AST  29  /  ALT  21  /  AlkPhos  111  01-17                        RADIOLOGY & ADDITIONAL TESTS:    Imaging Personally Reviewed:     no new test   Advance Directives:    full code  Palliative Care:  Appropriate

## 2023-01-18 DIAGNOSIS — Z86.79 PERSONAL HISTORY OF OTHER DISEASES OF THE CIRCULATORY SYSTEM: ICD-10-CM

## 2023-01-18 LAB
ANION GAP SERPL CALC-SCNC: 5 MMOL/L — SIGNIFICANT CHANGE UP (ref 5–17)
BASOPHILS # BLD AUTO: 0.1 K/UL — SIGNIFICANT CHANGE UP (ref 0–0.2)
BASOPHILS NFR BLD AUTO: 0.7 % — SIGNIFICANT CHANGE UP (ref 0–2)
BUN SERPL-MCNC: 15 MG/DL — SIGNIFICANT CHANGE UP (ref 7–23)
CALCIUM SERPL-MCNC: 8.4 MG/DL — LOW (ref 8.5–10.1)
CHLORIDE SERPL-SCNC: 92 MMOL/L — LOW (ref 96–108)
CO2 SERPL-SCNC: 35 MMOL/L — HIGH (ref 22–31)
CREAT SERPL-MCNC: 0.73 MG/DL — SIGNIFICANT CHANGE UP (ref 0.5–1.3)
EGFR: 92 ML/MIN/1.73M2 — SIGNIFICANT CHANGE UP
EOSINOPHIL # BLD AUTO: 0.26 K/UL — SIGNIFICANT CHANGE UP (ref 0–0.5)
EOSINOPHIL NFR BLD AUTO: 1.8 % — SIGNIFICANT CHANGE UP (ref 0–6)
GLUCOSE SERPL-MCNC: 124 MG/DL — HIGH (ref 70–99)
HCT VFR BLD CALC: 37.1 % — LOW (ref 39–50)
HGB BLD-MCNC: 12.3 G/DL — LOW (ref 13–17)
IMM GRANULOCYTES NFR BLD AUTO: 2.3 % — HIGH (ref 0–0.9)
LYMPHOCYTES # BLD AUTO: 1.79 K/UL — SIGNIFICANT CHANGE UP (ref 1–3.3)
LYMPHOCYTES # BLD AUTO: 12.5 % — LOW (ref 13–44)
MCHC RBC-ENTMCNC: 28.5 PG — SIGNIFICANT CHANGE UP (ref 27–34)
MCHC RBC-ENTMCNC: 33.2 GM/DL — SIGNIFICANT CHANGE UP (ref 32–36)
MCV RBC AUTO: 86.1 FL — SIGNIFICANT CHANGE UP (ref 80–100)
MONOCYTES # BLD AUTO: 1.02 K/UL — HIGH (ref 0–0.9)
MONOCYTES NFR BLD AUTO: 7.1 % — SIGNIFICANT CHANGE UP (ref 2–14)
NEUTROPHILS # BLD AUTO: 10.77 K/UL — HIGH (ref 1.8–7.4)
NEUTROPHILS NFR BLD AUTO: 75.6 % — SIGNIFICANT CHANGE UP (ref 43–77)
NRBC # BLD: 0 /100 WBCS — SIGNIFICANT CHANGE UP (ref 0–0)
PLATELET # BLD AUTO: 332 K/UL — SIGNIFICANT CHANGE UP (ref 150–400)
POTASSIUM SERPL-MCNC: 3.8 MMOL/L — SIGNIFICANT CHANGE UP (ref 3.5–5.3)
POTASSIUM SERPL-SCNC: 3.8 MMOL/L — SIGNIFICANT CHANGE UP (ref 3.5–5.3)
RBC # BLD: 4.31 M/UL — SIGNIFICANT CHANGE UP (ref 4.2–5.8)
RBC # FLD: 15.4 % — HIGH (ref 10.3–14.5)
SODIUM SERPL-SCNC: 132 MMOL/L — LOW (ref 135–145)
WBC # BLD: 14.27 K/UL — HIGH (ref 3.8–10.5)
WBC # FLD AUTO: 14.27 K/UL — HIGH (ref 3.8–10.5)

## 2023-01-18 PROCEDURE — 99232 SBSQ HOSP IP/OBS MODERATE 35: CPT

## 2023-01-18 PROCEDURE — 99233 SBSQ HOSP IP/OBS HIGH 50: CPT | Mod: GC

## 2023-01-18 PROCEDURE — 71046 X-RAY EXAM CHEST 2 VIEWS: CPT | Mod: 26

## 2023-01-18 RX ORDER — FUROSEMIDE 40 MG
40 TABLET ORAL DAILY
Refills: 0 | Status: DISCONTINUED | OUTPATIENT
Start: 2023-01-19 | End: 2023-01-24

## 2023-01-18 RX ORDER — CHLORPROMAZINE HCL 10 MG
25 TABLET ORAL EVERY 8 HOURS
Refills: 0 | Status: DISCONTINUED | OUTPATIENT
Start: 2023-01-18 | End: 2023-01-20

## 2023-01-18 RX ORDER — DILTIAZEM HCL 120 MG
240 CAPSULE, EXT RELEASE 24 HR ORAL EVERY 24 HOURS
Refills: 0 | Status: DISCONTINUED | OUTPATIENT
Start: 2023-01-18 | End: 2023-01-24

## 2023-01-18 RX ORDER — SODIUM CHLORIDE 9 MG/ML
1 INJECTION INTRAMUSCULAR; INTRAVENOUS; SUBCUTANEOUS EVERY 6 HOURS
Refills: 0 | Status: COMPLETED | OUTPATIENT
Start: 2023-01-18 | End: 2023-01-18

## 2023-01-18 RX ADMIN — Medication 60 MILLIGRAM(S): at 07:33

## 2023-01-18 RX ADMIN — ATORVASTATIN CALCIUM 10 MILLIGRAM(S): 80 TABLET, FILM COATED ORAL at 22:38

## 2023-01-18 RX ADMIN — Medication 1 GRAM(S): at 07:34

## 2023-01-18 RX ADMIN — Medication 100 MILLIGRAM(S): at 13:05

## 2023-01-18 RX ADMIN — Medication 50 MILLIGRAM(S): at 00:45

## 2023-01-18 RX ADMIN — GABAPENTIN 100 MILLIGRAM(S): 400 CAPSULE ORAL at 07:35

## 2023-01-18 RX ADMIN — Medication 50 MILLIGRAM(S): at 18:00

## 2023-01-18 RX ADMIN — GABAPENTIN 100 MILLIGRAM(S): 400 CAPSULE ORAL at 18:00

## 2023-01-18 RX ADMIN — Medication 100 MILLIGRAM(S): at 22:36

## 2023-01-18 RX ADMIN — Medication 60 MILLIGRAM(S): at 00:45

## 2023-01-18 RX ADMIN — Medication 50 MILLIGRAM(S): at 13:16

## 2023-01-18 RX ADMIN — Medication 1 GRAM(S): at 17:59

## 2023-01-18 RX ADMIN — Medication 25 MILLIGRAM(S): at 22:35

## 2023-01-18 RX ADMIN — CEFEPIME 100 MILLIGRAM(S): 1 INJECTION, POWDER, FOR SOLUTION INTRAMUSCULAR; INTRAVENOUS at 05:33

## 2023-01-18 RX ADMIN — Medication 100 MILLIGRAM(S): at 07:32

## 2023-01-18 RX ADMIN — Medication 50 MILLIGRAM(S): at 07:33

## 2023-01-18 RX ADMIN — ISOSORBIDE MONONITRATE 60 MILLIGRAM(S): 60 TABLET, EXTENDED RELEASE ORAL at 13:05

## 2023-01-18 RX ADMIN — SENNA PLUS 2 TABLET(S): 8.6 TABLET ORAL at 22:37

## 2023-01-18 RX ADMIN — DIPHENHYDRAMINE HYDROCHLORIDE AND LIDOCAINE HYDROCHLORIDE AND ALUMINUM HYDROXIDE AND MAGNESIUM HYDRO 5 MILLILITER(S): KIT at 00:45

## 2023-01-18 RX ADMIN — ONDANSETRON 4 MILLIGRAM(S): 8 TABLET, FILM COATED ORAL at 03:37

## 2023-01-18 RX ADMIN — DIPHENHYDRAMINE HYDROCHLORIDE AND LIDOCAINE HYDROCHLORIDE AND ALUMINUM HYDROXIDE AND MAGNESIUM HYDRO 5 MILLILITER(S): KIT at 22:36

## 2023-01-18 RX ADMIN — CLOPIDOGREL BISULFATE 75 MILLIGRAM(S): 75 TABLET, FILM COATED ORAL at 13:05

## 2023-01-18 RX ADMIN — SODIUM CHLORIDE 1 GRAM(S): 9 INJECTION INTRAMUSCULAR; INTRAVENOUS; SUBCUTANEOUS at 13:05

## 2023-01-18 RX ADMIN — DIPHENHYDRAMINE HYDROCHLORIDE AND LIDOCAINE HYDROCHLORIDE AND ALUMINUM HYDROXIDE AND MAGNESIUM HYDRO 5 MILLILITER(S): KIT at 07:33

## 2023-01-18 RX ADMIN — DIPHENHYDRAMINE HYDROCHLORIDE AND LIDOCAINE HYDROCHLORIDE AND ALUMINUM HYDROXIDE AND MAGNESIUM HYDRO 5 MILLILITER(S): KIT at 13:06

## 2023-01-18 RX ADMIN — APIXABAN 5 MILLIGRAM(S): 2.5 TABLET, FILM COATED ORAL at 17:59

## 2023-01-18 RX ADMIN — SODIUM CHLORIDE 1 GRAM(S): 9 INJECTION INTRAMUSCULAR; INTRAVENOUS; SUBCUTANEOUS at 18:00

## 2023-01-18 RX ADMIN — POLYETHYLENE GLYCOL 3350 17 GRAM(S): 17 POWDER, FOR SOLUTION ORAL at 13:12

## 2023-01-18 RX ADMIN — APIXABAN 5 MILLIGRAM(S): 2.5 TABLET, FILM COATED ORAL at 07:32

## 2023-01-18 RX ADMIN — Medication 240 MILLIGRAM(S): at 13:04

## 2023-01-18 NOTE — PROGRESS NOTE ADULT - PROBLEM SELECTOR PLAN 6
Therapeutically anticoagulated with Eliquis Patient has history of CAD s/p stent, CABG  - TTE with EF 55%, LAE, with no significant valvular pathology  - pro BNP 2418-->959-->768  - remains on supplemental 02  - Per Cardio, will resume Lasix in am   - Cardio following, recs appreciated

## 2023-01-18 NOTE — PROGRESS NOTE ADULT - PROBLEM SELECTOR PLAN 7
Afib on Eliquis for DVT ppx Afib on Eliquis for DVT ppx    #DISPO Updated daughter Xochilt via phone

## 2023-01-18 NOTE — PROGRESS NOTE ADULT - ASSESSMENT
81 y/o m w/ PMH od CAD s/p stent, s/p CABG, HTN, HLD, CHF p/w shortness of breath and cough    remains with pulm congestion - enc cough, expectorant - may need Bronchodilator - mucolytic - Albuterol - Hypertonic Saline Nebs -     s/p tamiflu  on diflucan  on emp ABX for pna  vs noted - labs reviewed - imaging reviewed -   cvs rx regimen - diuresis   I and O  s/p ICU care and course  Cardio and ID follow up  cough rx regimen  nutrition  GOC discussion  replete surya  on AC for AF

## 2023-01-18 NOTE — PROGRESS NOTE ADULT - ASSESSMENT
* Prerenal azotemia, CKD  * Acute HF  * influenza PNA  * New a.fib with RVR  * Hypokalemia  * Hyponatremia      Improving sodium levels. Stable renal indices. PO fluid restriction. PRN Potassium supplementation. To continue current meds.  Will follow electrolytes and renal function trend. D/w pt's son at bedside

## 2023-01-18 NOTE — PROGRESS NOTE ADULT - SUBJECTIVE AND OBJECTIVE BOX
Patient is a 82y old  Male who presents with a chief complaint of CHF, A fib, Flu (09 Jan 2023 08:23)  Patient seen in follow up for prerenal azotemia.        PAST MEDICAL HISTORY:  Congestive heart failure (CHF)  Atrial fibrillation  HLD (hyperlipidemia)  HTN (hypertension)  CAD (coronary artery disease)      MEDICATIONS  (STANDING):  apixaban 5 milliGRAM(s) Oral every 12 hours  atorvastatin 10 milliGRAM(s) Oral at bedtime  benzonatate 100 milliGRAM(s) Oral every 8 hours  cefepime   IVPB 2000 milliGRAM(s) IV Intermittent every 8 hours  clopidogrel Tablet 75 milliGRAM(s) Oral daily  diltiazem    milliGRAM(s) Oral every 24 hours  FIRST- Mouthwash  BLM 5 milliLiter(s) Swish and Spit three times a day  fluconAZOLE   Tablet 100 milliGRAM(s) Oral every 24 hours  gabapentin 100 milliGRAM(s) Oral two times a day  isosorbide   mononitrate ER Tablet (IMDUR) 60 milliGRAM(s) Oral daily  metoprolol tartrate 50 milliGRAM(s) Oral every 6 hours  polyethylene glycol 3350 17 Gram(s) Oral daily  senna 2 Tablet(s) Oral at bedtime  sucralfate suspension 1 Gram(s) Oral two times a day    MEDICATIONS  (PRN):  benzocaine 20% Spray 1 Spray(s) Topical <User Schedule> PRN Sore Throat  bisacodyl 5 milliGRAM(s) Oral every 12 hours PRN Constipation  guaiFENesin Oral Liquid (Sugar-Free) 200 milliGRAM(s) Oral every 6 hours PRN Cough  ondansetron Injectable 4 milliGRAM(s) IV Push every 8 hours PRN Nausea and/or Vomiting    T(C): 36.7 (01-18-23 @ 04:28), Max: 37.1 (01-17-23 @ 11:36)  HR: 80 (01-18-23 @ 04:28) (80 - 106)  BP: 123/68 (01-18-23 @ 04:28) (114/72 - 146/78)  RR: 19 (01-18-23 @ 04:28)  SpO2: 92% (01-18-23 @ 04:28)  Wt(kg): --  I&O's Detail    17 Jan 2023 07:01  -  18 Jan 2023 07:00  --------------------------------------------------------  IN:  Total IN: 0 mL    OUT:    Voided (mL): 600 mL  Total OUT: 600 mL    Total NET: -600 mL                PHYSICAL EXAM:  General: No distress  Respiratory: b/l air entry  Cardiovascular: S1 S2  Gastrointestinal: soft  Extremities:  edema                           LABORATORY:                        12.3   14.27 )-----------( 332      ( 18 Jan 2023 06:53 )             37.1     01-18    132<L>  |  92<L>  |  15  ----------------------------<  124<H>  3.8   |  35<H>  |  0.73    Ca    8.4<L>      18 Jan 2023 06:53  Phos  1.9     01-17  Mg     1.9     01-17    TPro  7.7  /  Alb  1.9<L>  /  TBili  1.4<H>  /  DBili  x   /  AST  29  /  ALT  21  /  AlkPhos  111  01-17    Sodium, Serum: 132 mmol/L (01-18 @ 06:53)  Sodium, Serum: 131 mmol/L (01-17 @ 08:20)  Sodium, Serum: 128 mmol/L (01-16 @ 18:50)    Potassium, Serum: 3.8 mmol/L (01-18 @ 06:53)  Potassium, Serum: 3.2 mmol/L (01-17 @ 08:20)  Potassium, Serum: 3.7 mmol/L (01-16 @ 18:50)    Hemoglobin: 12.3 g/dL (01-18 @ 06:53)  Hemoglobin: 13.1 g/dL (01-17 @ 08:20)  Hemoglobin: 12.2 g/dL (01-16 @ 18:50)    Creatinine, Serum 0.73 (01-18 @ 06:53)  Creatinine, Serum 0.84 (01-17 @ 08:20)  Creatinine, Serum 1.00 (01-16 @ 18:50)        LIVER FUNCTIONS - ( 17 Jan 2023 08:20 )  Alb: 1.9 g/dL / Pro: 7.7 g/dL / ALK PHOS: 111 U/L / ALT: 21 U/L / AST: 29 U/L / GGT: x

## 2023-01-18 NOTE — PROGRESS NOTE ADULT - SUBJECTIVE AND OBJECTIVE BOX
-------------------------------NOTE IN PROGRESS----------------------------  Patient is a 82y old  Male who presents with a chief complaint of CHF, A fib, Flu (18 Jan 2023 11:37)      INTERVAL HPI/OVERNIGHT EVENTS: Patient seen and examined at bedside. No overnight events occurred. Patient has no complaints at this time. Denies fevers, chills, headache, lightheadedness, chest pain, cough, dyspnea, abdominal pain, n/v/d/c. Currently on 2L O2 NC.     MEDICATIONS  (STANDING):  apixaban 5 milliGRAM(s) Oral every 12 hours  atorvastatin 10 milliGRAM(s) Oral at bedtime  benzonatate 100 milliGRAM(s) Oral every 8 hours  clopidogrel Tablet 75 milliGRAM(s) Oral daily  diltiazem    milliGRAM(s) Oral every 24 hours  FIRST- Mouthwash  BLM 5 milliLiter(s) Swish and Spit three times a day  gabapentin 100 milliGRAM(s) Oral two times a day  isosorbide   mononitrate ER Tablet (IMDUR) 60 milliGRAM(s) Oral daily  metoprolol tartrate 50 milliGRAM(s) Oral every 6 hours  polyethylene glycol 3350 17 Gram(s) Oral daily  senna 2 Tablet(s) Oral at bedtime  sodium chloride 1 Gram(s) Oral every 6 hours  sucralfate suspension 1 Gram(s) Oral two times a day    MEDICATIONS  (PRN):  benzocaine 20% Spray 1 Spray(s) Topical <User Schedule> PRN Sore Throat  bisacodyl 5 milliGRAM(s) Oral every 12 hours PRN Constipation  guaiFENesin Oral Liquid (Sugar-Free) 200 milliGRAM(s) Oral every 6 hours PRN Cough  ondansetron Injectable 4 milliGRAM(s) IV Push every 8 hours PRN Nausea and/or Vomiting      Allergies    Levaquin (Unknown)  penicillin (Rash)    Intolerances        REVIEW OF SYSTEMS:  CONSTITUTIONAL: No fever or chills  HEENT:  No headache, no sore throat  RESPIRATORY: No cough, wheezing, or shortness of breath  CARDIOVASCULAR: No chest pain, palpitations  GASTROINTESTINAL: No abd pain, nausea, vomiting, or diarrhea  GENITOURINARY: No dysuria, frequency, or hematuria  NEUROLOGICAL: no focal weakness or dizziness  MUSCULOSKELETAL: no myalgias     Vital Signs Last 24 Hrs  T(C): 36.9 (18 Jan 2023 12:28), Max: 37.1 (17 Jan 2023 20:11)  T(F): 98.4 (18 Jan 2023 12:28), Max: 98.8 (17 Jan 2023 20:11)  HR: 106 (18 Jan 2023 12:28) (80 - 106)  BP: 116/64 (18 Jan 2023 12:28) (114/72 - 131/70)  BP(mean): --  RR: 20 (18 Jan 2023 12:28) (19 - 20)  SpO2: 96% (18 Jan 2023 12:28) (92% - 96%)    Parameters below as of 18 Jan 2023 12:28  Patient On (Oxygen Delivery Method): nasal cannula  O2 Flow (L/min): 3      PHYSICAL EXAM:  GENERAL: NAD  HEENT:  anicteric, moist mucous membranes  CHEST/LUNG:  CTA b/l, no rales, wheezes, or rhonchi  HEART:  RRR, S1, S2  ABDOMEN:  BS+, soft, nontender, nondistended  EXTREMITIES: no edema, cyanosis, or calf tenderness  NERVOUS SYSTEM: answers questions and follows commands appropriately    LABS:                        12.3   14.27 )-----------( 332      ( 18 Jan 2023 06:53 )             37.1     CBC Full  -  ( 18 Jan 2023 06:53 )  WBC Count : 14.27 K/uL  Hemoglobin : 12.3 g/dL  Hematocrit : 37.1 %  Platelet Count - Automated : 332 K/uL  Mean Cell Volume : 86.1 fl  Mean Cell Hemoglobin : 28.5 pg  Mean Cell Hemoglobin Concentration : 33.2 gm/dL  Auto Neutrophil # : 10.77 K/uL  Auto Lymphocyte # : 1.79 K/uL  Auto Monocyte # : 1.02 K/uL  Auto Eosinophil # : 0.26 K/uL  Auto Basophil # : 0.10 K/uL  Auto Neutrophil % : 75.6 %  Auto Lymphocyte % : 12.5 %  Auto Monocyte % : 7.1 %  Auto Eosinophil % : 1.8 %  Auto Basophil % : 0.7 %    18 Jan 2023 06:53    132    |  92     |  15     ----------------------------<  124    3.8     |  35     |  0.73     Ca    8.4        18 Jan 2023 06:53          CAPILLARY BLOOD GLUCOSE            Culture - Sputum (collected 01-12-23 @ 11:00)  Source: .Sputum Sputum  Gram Stain (01-12-23 @ 23:47):    Few polymorphonuclear leukocytes per low power field    Rare Squamous epithelial cells per low power field    Few Gram Variable Rods per oil power field  Final Report (01-14-23 @ 19:03):    Normal Respiratory Erin present    Culture - Sputum (collected 01-11-23 @ 18:00)  Source: .Sputum Sputum  Gram Stain (01-12-23 @ 10:14):    Numerous polymorphonuclear leukocytes per low power field    No Squamous epithelial cells per low power field    Few Gram Negative Rods seen per oil power field  Final Report (01-14-23 @ 10:03):    Few Pseudomonas aeruginosa    Normal Respiratory Erin present  Organism: Pseudomonas aeruginosa (01-14-23 @ 10:03)  Organism: Pseudomonas aeruginosa (01-14-23 @ 10:03)      -  Amikacin: S <=16      -  Aztreonam: S <=4      -  Cefepime: S <=2      -  Ceftazidime: S <=1      -  Ciprofloxacin: S <=0.25      -  Gentamicin: S <=2      -  Imipenem: S 2      -  Levofloxacin: S <=0.5      -  Meropenem: S <=1      -  Piperacillin/Tazobactam: S <=8      -  Tobramycin: I 8      Method Type: STEVE        RADIOLOGY & ADDITIONAL TESTS:    Personally reviewed.     Consultant(s) Notes Reviewed:  [x] YES  [ ] NO     Patient is a 82y old  Male who presents with a chief complaint of CHF, A fib, Flu (18 Jan 2023 11:37)      INTERVAL HPI/OVERNIGHT EVENTS: Patient seen and examined at bedside. No overnight events occurred. Patient has no complaints at this time. Denies fevers, chills, headache, lightheadedness, chest pain, cough, dyspnea, abdominal pain, n/v/d/c. Currently on 3L O2 NC.     MEDICATIONS  (STANDING):  apixaban 5 milliGRAM(s) Oral every 12 hours  atorvastatin 10 milliGRAM(s) Oral at bedtime  benzonatate 100 milliGRAM(s) Oral every 8 hours  clopidogrel Tablet 75 milliGRAM(s) Oral daily  diltiazem    milliGRAM(s) Oral every 24 hours  FIRST- Mouthwash  BLM 5 milliLiter(s) Swish and Spit three times a day  gabapentin 100 milliGRAM(s) Oral two times a day  isosorbide   mononitrate ER Tablet (IMDUR) 60 milliGRAM(s) Oral daily  metoprolol tartrate 50 milliGRAM(s) Oral every 6 hours  polyethylene glycol 3350 17 Gram(s) Oral daily  senna 2 Tablet(s) Oral at bedtime  sodium chloride 1 Gram(s) Oral every 6 hours  sucralfate suspension 1 Gram(s) Oral two times a day    MEDICATIONS  (PRN):  benzocaine 20% Spray 1 Spray(s) Topical <User Schedule> PRN Sore Throat  bisacodyl 5 milliGRAM(s) Oral every 12 hours PRN Constipation  guaiFENesin Oral Liquid (Sugar-Free) 200 milliGRAM(s) Oral every 6 hours PRN Cough  ondansetron Injectable 4 milliGRAM(s) IV Push every 8 hours PRN Nausea and/or Vomiting      Allergies    Levaquin (Unknown)  penicillin (Rash)    Intolerances        REVIEW OF SYSTEMS:  CONSTITUTIONAL: No fever or chills  HEENT:  No headache, no sore throat  RESPIRATORY: No cough, wheezing, or shortness of breath  CARDIOVASCULAR: No chest pain, palpitations  GASTROINTESTINAL: No abd pain, nausea, vomiting, or diarrhea  GENITOURINARY: No dysuria, frequency, or hematuria  NEUROLOGICAL: no focal weakness or dizziness  MUSCULOSKELETAL: no myalgias     Vital Signs Last 24 Hrs  T(C): 36.9 (18 Jan 2023 12:28), Max: 37.1 (17 Jan 2023 20:11)  T(F): 98.4 (18 Jan 2023 12:28), Max: 98.8 (17 Jan 2023 20:11)  HR: 106 (18 Jan 2023 12:28) (80 - 106)  BP: 116/64 (18 Jan 2023 12:28) (114/72 - 131/70)  BP(mean): --  RR: 20 (18 Jan 2023 12:28) (19 - 20)  SpO2: 96% (18 Jan 2023 12:28) (92% - 96%)    Parameters below as of 18 Jan 2023 12:28  Patient On (Oxygen Delivery Method): nasal cannula  O2 Flow (L/min): 3      PHYSICAL EXAM:  GENERAL: NAD  HEENT:  anicteric, moist mucous membranes  CHEST/LUNG:  CTA b/l, no rales, wheezes, or rhonchi  HEART:  RRR, S1, S2  ABDOMEN:  BS+, soft, nontender, nondistended  EXTREMITIES: no edema, cyanosis, or calf tenderness  NERVOUS SYSTEM: answers questions and follows commands appropriately    LABS:                        12.3   14.27 )-----------( 332      ( 18 Jan 2023 06:53 )             37.1     CBC Full  -  ( 18 Jan 2023 06:53 )  WBC Count : 14.27 K/uL  Hemoglobin : 12.3 g/dL  Hematocrit : 37.1 %  Platelet Count - Automated : 332 K/uL  Mean Cell Volume : 86.1 fl  Mean Cell Hemoglobin : 28.5 pg  Mean Cell Hemoglobin Concentration : 33.2 gm/dL  Auto Neutrophil # : 10.77 K/uL  Auto Lymphocyte # : 1.79 K/uL  Auto Monocyte # : 1.02 K/uL  Auto Eosinophil # : 0.26 K/uL  Auto Basophil # : 0.10 K/uL  Auto Neutrophil % : 75.6 %  Auto Lymphocyte % : 12.5 %  Auto Monocyte % : 7.1 %  Auto Eosinophil % : 1.8 %  Auto Basophil % : 0.7 %    18 Jan 2023 06:53    132    |  92     |  15     ----------------------------<  124    3.8     |  35     |  0.73     Ca    8.4        18 Jan 2023 06:53          CAPILLARY BLOOD GLUCOSE            Culture - Sputum (collected 01-12-23 @ 11:00)  Source: .Sputum Sputum  Gram Stain (01-12-23 @ 23:47):    Few polymorphonuclear leukocytes per low power field    Rare Squamous epithelial cells per low power field    Few Gram Variable Rods per oil power field  Final Report (01-14-23 @ 19:03):    Normal Respiratory Erin present    Culture - Sputum (collected 01-11-23 @ 18:00)  Source: .Sputum Sputum  Gram Stain (01-12-23 @ 10:14):    Numerous polymorphonuclear leukocytes per low power field    No Squamous epithelial cells per low power field    Few Gram Negative Rods seen per oil power field  Final Report (01-14-23 @ 10:03):    Few Pseudomonas aeruginosa    Normal Respiratory Erin present  Organism: Pseudomonas aeruginosa (01-14-23 @ 10:03)  Organism: Pseudomonas aeruginosa (01-14-23 @ 10:03)      -  Amikacin: S <=16      -  Aztreonam: S <=4      -  Cefepime: S <=2      -  Ceftazidime: S <=1      -  Ciprofloxacin: S <=0.25      -  Gentamicin: S <=2      -  Imipenem: S 2      -  Levofloxacin: S <=0.5      -  Meropenem: S <=1      -  Piperacillin/Tazobactam: S <=8      -  Tobramycin: I 8      Method Type: STEVE        RADIOLOGY & ADDITIONAL TESTS:    Personally reviewed.     Consultant(s) Notes Reviewed:  [x] YES  [ ] NO

## 2023-01-18 NOTE — PROGRESS NOTE ADULT - ASSESSMENT
82 year old male PMH of CAD s/p stent, s/p CABG, HTN, HLD, CHF p/w shortness of breath and cough for the last 5 days found to be in A fib with RVR, congestive HF, and influenza positive.    CAD s/p CABG, stent, Afib with RVR, HFpEF, HTN, HLD   - Etiology of hypoxemia is not felt to be primarily HF but felt more likely to be primarily pulmonary, with flu and superimposed pneumonia   - TTE with EF 55%, LAE, with no significant valvular pathology  - pro BNP 2418-->959  - remains on supplemental 02  - would give Lasix holiday, will resume in am   - encourage incentive spirometry   - pulm toileting   - Strict i/o's and daily weights    - No anginal symptoms, no sign of acute ischemia, and troponin are negative  - Continue ASA, Plavix and statin  - Continue Imdur    - Afib with rates improved and controlled, 70-90's. Tachycardia  with some reactive component (in the setting of flu)  - Continue BB with hold parameters, will eventually switch to long-acting  - Continue Cardizem 60mg q6hrs, will switch to long-acting now with HR is controlled  - Continue Eliquis    - BP stable and controlled     - Monitor and replete lytes, keep K>4, Mg>2.  - Will continue to follow.    Lyly Arceo, MS FNP, [place]  Nurse Practitioner- Cardiology   Spectra #[id number] /[phone]

## 2023-01-18 NOTE — PROGRESS NOTE ADULT - PROBLEM SELECTOR PLAN 5
avoid nephrotoxins,   monitor renal functions  Stable creatinine- .98  Hypokalemia- replace potassium -in am Avoid nephrotoxins.   Monitor renal function  Stable Cr 0.73

## 2023-01-18 NOTE — PROGRESS NOTE ADULT - SUBJECTIVE AND OBJECTIVE BOX
Date/Time Patient Seen:  		  Referring MD:   Data Reviewed	       Patient is a 82y old  Male who presents with a chief complaint of CHF, A fib, Flu (17 Jan 2023 14:39)      Subjective/HPI     PAST MEDICAL & SURGICAL HISTORY:  Congestive heart failure (CHF)    Atrial fibrillation    HLD (hyperlipidemia)    HTN (hypertension)    CAD (coronary artery disease)    S/P coronary artery stent placement    S/P CABG (coronary artery bypass graft)          Medication list         MEDICATIONS  (STANDING):  apixaban 5 milliGRAM(s) Oral every 12 hours  atorvastatin 10 milliGRAM(s) Oral at bedtime  benzonatate 100 milliGRAM(s) Oral every 8 hours  cefepime   IVPB 2000 milliGRAM(s) IV Intermittent every 8 hours  clopidogrel Tablet 75 milliGRAM(s) Oral daily  diltiazem    Tablet 60 milliGRAM(s) Oral every 6 hours  FIRST- Mouthwash  BLM 5 milliLiter(s) Swish and Spit three times a day  fluconAZOLE   Tablet 100 milliGRAM(s) Oral every 24 hours  gabapentin 100 milliGRAM(s) Oral two times a day  isosorbide   mononitrate ER Tablet (IMDUR) 60 milliGRAM(s) Oral daily  metoprolol tartrate 50 milliGRAM(s) Oral every 6 hours  polyethylene glycol 3350 17 Gram(s) Oral daily  senna 2 Tablet(s) Oral at bedtime  sucralfate suspension 1 Gram(s) Oral two times a day    MEDICATIONS  (PRN):  benzocaine 20% Spray 1 Spray(s) Topical <User Schedule> PRN Sore Throat  bisacodyl 5 milliGRAM(s) Oral every 12 hours PRN Constipation  guaiFENesin Oral Liquid (Sugar-Free) 200 milliGRAM(s) Oral every 6 hours PRN Cough  ondansetron Injectable 4 milliGRAM(s) IV Push every 8 hours PRN Nausea and/or Vomiting         Vitals log        ICU Vital Signs Last 24 Hrs  T(C): 36.7 (18 Jan 2023 04:28), Max: 37.1 (17 Jan 2023 11:36)  T(F): 98.1 (18 Jan 2023 04:28), Max: 98.8 (17 Jan 2023 20:11)  HR: 80 (18 Jan 2023 04:28) (80 - 106)  BP: 123/68 (18 Jan 2023 04:28) (114/72 - 131/70)  BP(mean): --  ABP: --  ABP(mean): --  RR: 19 (18 Jan 2023 04:28) (18 - 19)  SpO2: 92% (18 Jan 2023 04:28) (92% - 95%)    O2 Parameters below as of 18 Jan 2023 04:28  Patient On (Oxygen Delivery Method): nasal cannula  O2 Flow (L/min): 2               Input and Output:  I&O's Detail    17 Jan 2023 07:01  -  18 Jan 2023 07:00  --------------------------------------------------------  IN:  Total IN: 0 mL    OUT:    Voided (mL): 600 mL  Total OUT: 600 mL    Total NET: -600 mL          Lab Data                        13.1   14.42 )-----------( 376      ( 17 Jan 2023 08:20 )             38.6     01-17    131<L>  |  89<L>  |  18  ----------------------------<  152<H>  3.2<L>   |  35<H>  |  0.84    Ca    8.6      17 Jan 2023 08:20  Phos  1.9     01-17  Mg     1.9     01-17    TPro  7.7  /  Alb  1.9<L>  /  TBili  1.4<H>  /  DBili  x   /  AST  29  /  ALT  21  /  AlkPhos  111  01-17            Review of Systems	      Objective     Physical Examination    heart s1s2  lung dec BS  head nc      Pertinent Lab findings & Imaging      Christiano:  NO   Adequate UO     I&O's Detail    17 Jan 2023 07:01  -  18 Jan 2023 07:00  --------------------------------------------------------  IN:  Total IN: 0 mL    OUT:    Voided (mL): 600 mL  Total OUT: 600 mL    Total NET: -600 mL               Discussed with:     Cultures:	        Radiology

## 2023-01-18 NOTE — PROGRESS NOTE ADULT - SUBJECTIVE AND OBJECTIVE BOX
[facility] -- Donna Jay Pannella, Patel, Savella, Goodger: Office # [phone]    Follow Up:  Hypoxic resp failure    Subjective/Observations: Patient seen and examined, awake, alert, resting in bed. Denies chest pain, dyspnea, palpitations or dizziness, c/o of difficulty taking a deep breath, although better than before, Tolerating O2 via nasal cannula.  Reports fatigue and cough. + SIDHU    REVIEW OF SYSTEMS: All other review of systems are negative unless indicated above    PAST MEDICAL & SURGICAL HISTORY:  HLD (hyperlipidemia)      HTN (hypertension)      CAD (coronary artery disease)      S/P coronary artery stent placement      S/P CABG (coronary artery bypass graft)    MEDICATIONS  (STANDING):  apixaban 5 milliGRAM(s) Oral every 12 hours  atorvastatin 10 milliGRAM(s) Oral at bedtime  benzonatate 100 milliGRAM(s) Oral every 8 hours  cefepime   IVPB 2000 milliGRAM(s) IV Intermittent every 8 hours  clopidogrel Tablet 75 milliGRAM(s) Oral daily  diltiazem    Tablet 60 milliGRAM(s) Oral every 6 hours  FIRST- Mouthwash  BLM 5 milliLiter(s) Swish and Spit three times a day  fluconAZOLE   Tablet 100 milliGRAM(s) Oral every 24 hours  gabapentin 100 milliGRAM(s) Oral two times a day  isosorbide   mononitrate ER Tablet (IMDUR) 60 milliGRAM(s) Oral daily  metoprolol tartrate 50 milliGRAM(s) Oral every 6 hours  polyethylene glycol 3350 17 Gram(s) Oral daily  senna 2 Tablet(s) Oral at bedtime  sucralfate suspension 1 Gram(s) Oral two times a day    MEDICATIONS  (PRN):  benzocaine 20% Spray 1 Spray(s) Topical <User Schedule> PRN Sore Throat  bisacodyl 5 milliGRAM(s) Oral every 12 hours PRN Constipation  guaiFENesin Oral Liquid (Sugar-Free) 200 milliGRAM(s) Oral every 6 hours PRN Cough  ondansetron Injectable 4 milliGRAM(s) IV Push every 8 hours PRN Nausea and/or Vomiting    Allergies    Levaquin (Unknown)  penicillin (Rash)    Intolerances      Vital Signs Last 24 Hrs  T(C): 36.7 (18 Jan 2023 04:28), Max: 37.1 (17 Jan 2023 11:36)  T(F): 98.1 (18 Jan 2023 04:28), Max: 98.8 (17 Jan 2023 20:11)  HR: 80 (18 Jan 2023 04:28) (80 - 106)  BP: 123/68 (18 Jan 2023 04:28) (114/72 - 131/70)  BP(mean): --  RR: 19 (18 Jan 2023 04:28) (18 - 19)  SpO2: 92% (18 Jan 2023 04:28) (92% - 95%)    Parameters below as of 18 Jan 2023 04:28  Patient On (Oxygen Delivery Method): nasal cannula  O2 Flow (L/min): 2    I&O's Summary    17 Jan 2023 07:01  -  18 Jan 2023 07:00  --------------------------------------------------------  IN: 0 mL / OUT: 600 mL / NET: -600 mL          TELE: A fib 70-90s   PHYSICAL EXAM:  Constitutional: NAD, awake and alert, Well developed   HEENT: Moist Mucous Membranes, Anicteric  Pulmonary: Non-labored, breath sounds are clear bilaterally, No wheezing, rales + rhonchi  Cardiovascular: Regular, S1 and S2, No murmurs, No rubs, gallops or clicks  Gastrointestinal:  soft, nontender, nondistended   Lymph: No peripheral edema. No lymphadenopathy.   Skin: No visible rashes or ulcers.  Psych:  Mood & affect appropriate      LABS: All Labs Reviewed:                        12.3   14.27 )-----------( 332      ( 18 Jan 2023 06:53 )             37.1                         13.1   14.42 )-----------( 376      ( 17 Jan 2023 08:20 )             38.6                         12.2   16.09 )-----------( 357      ( 16 Jan 2023 18:50 )             36.8     18 Jan 2023 06:53    132    |  92     |  15     ----------------------------<  124    3.8     |  35     |  0.73   17 Jan 2023 08:20    131    |  89     |  18     ----------------------------<  152    3.2     |  35     |  0.84   16 Jan 2023 18:50    128    |  87     |  23     ----------------------------<  216    3.7     |  36     |  1.00     Ca    8.4        18 Jan 2023 06:53  Ca    8.6        17 Jan 2023 08:20  Ca    8.2        16 Jan 2023 18:50  Phos  1.9       17 Jan 2023 08:20  Mg     1.9       17 Jan 2023 08:20    TPro  7.7    /  Alb  1.9    /  TBili  1.4    /  DBili  x      /  AST  29     /  ALT  21     /  AlkPhos  111    17 Jan 2023 08:20   LIVER FUNCTIONS - ( 17 Jan 2023 08:20 )  Alb: 1.9 g/dL / Pro: 7.7 g/dL / ALK PHOS: 111 U/L / ALT: 21 U/L / AST: 29 U/L / GGT: x           Cholesterol, Serum: 62 mg/dL (01-08-23 @ 06:00)  HDL Cholesterol, Serum: 26 mg/dL (01-08-23 @ 06:00)  Triglycerides, Serum: 66 mg/dL (01-08-23 @ 06:00)    12 Lead ECG:   Ventricular Rate 106 BPM    QRS Duration 90 ms    Q-T Interval 328 ms    QTC Calculation(Bazett) 435 ms    R Axis -26 degrees    T Axis 59 degrees    Diagnosis Line Atrial fibrillation with rapid ventricular response with premature ventricular or aberrantly conducted complexes  Nonspecific ST abnormality  Abnormal ECG  No previous ECGs available  Confirmed by munir Salazar ([id number]) on 1/7/2023 12:56:36 PM (01-06-23 @ 15:58)      ACC: [id number] EXAM:  ECHO TTE WO CON COMP W DOPP                          PROCEDURE DATE:  01/07/2023          INTERPRETATION:  INDICATION: Atrial fibrillation  Sonographer KL    Blood Pressure 130/77    Height 175 cm     Weight 99.8 kg       BSA 2.13   sq m    Dimensions:  LA 4.5       Normal Values: 2.0 - 4.0 cm  Ao 3.4        Normal Values: 2.0 - 3.8 cm  SEPTUM 0.9       Normal Values: 0.6 - 1.2 cm  PWT 0.8       Normal Values: 0.6 - 1.1 cm  LVIDd 5.3         Normal Values: 3.0 - 5.6 cm  LVIDs 3.3         Normal Values: 1.8 - 4.0 cm      OBSERVATIONS:  Technically difficult study  Mitral Valve: normal, trace physiologic MR.  Aortic Valve/Aorta: Sclerotic trileaflet aortic valve with grossly normal   opening.  Tricuspid Valve: normal withtrace TR.  Pulmonic Valve: Trace PI  Left Atrium: Enlarged  Right Atrium: Not well-visualized  Left Ventricle: The left ventricular endocardium is not well-visualized.   Overall, grossly normal LV size and systolic function, estimated LVEF of   55%. Cannot rule out segmental abnormalities  Right Ventricle: Not well-visualized  Pericardium: no significant pericardial effusion.        IMPRESSION:  Technically difficult study  The left ventricular endocardium is not well-visualized. Overall, grossly  normal LV size and systolic function, estimated LVEF of 55%.  The right ventricle is not well-visualized  Left atrial enlargement  Sclerotic trileaflet aortic valve with grossly normal opening, without AI.  Trace physiologic MR and TR.  No significant pericardial effusion.    --- End of Report ---            LIANNA CHAIREZ MD; Attending Cardiologist  This document has been electronically signed. Jan 7 2023  2:15PM

## 2023-01-18 NOTE — PROGRESS NOTE ADULT - SUBJECTIVE AND OBJECTIVE BOX
[facility]  INFECTIOUS DISEASES   [address] [place] [address]  Tel: [phone]     Fax: [phone]  ========================================================  MD Radha Hutchison Kaushal, MD Cho, Michelle, MD Sunjit, Jaspal, MD  ========================================================    [facility]-[id number]  OPAL NUGENT     Follow up: Pneumonia?, Respiratory failure, Influenza     Feels better, on o2 with a NC 2L.   Has some cough.   No fever.    PAST MEDICAL & SURGICAL HISTORY:  HLD (hyperlipidemia)  HTN (hypertension)  CAD (coronary artery disease)  S/P coronary artery stent placement  S/P CABG (coronary artery bypass graft)    Social Hx: Former, smoked more 50years ago, no ETOH or drugs     FAMILY HISTORY:  Family history of cardiovascular disease (Sibling)    Family history of uterine cancer (Mother)    Allergies  Levaquin (Unknown)  penicillin (Rash)    Antibiotics:  MEDICATIONS  (STANDING):  albuterol/ipratropium for Nebulization 3 milliLiter(s) Nebulizer every 6 hours  apixaban 5 milliGRAM(s) Oral every 12 hours  atorvastatin 10 milliGRAM(s) Oral at bedtime  azithromycin  IVPB      azithromycin  IVPB 500 milliGRAM(s) IV Intermittent every 24 hours  benzonatate 100 milliGRAM(s) Oral every 8 hours  cefepime   IVPB      cefepime   IVPB 1000 milliGRAM(s) IV Intermittent every 8 hours  clopidogrel Tablet 75 milliGRAM(s) Oral daily  diltiazem    Tablet 60 milliGRAM(s) Oral every 6 hours  furosemide   Injectable 40 milliGRAM(s) IV Push two times a day  gabapentin 100 milliGRAM(s) Oral two times a day  hydrocodone/homatropine Syrup 5 milliLiter(s) Oral every 8 hours  isosorbide   mononitrate ER Tablet (IMDUR) 60 milliGRAM(s) Oral daily  metoprolol tartrate 50 milliGRAM(s) Oral every 6 hours  oseltamivir 30 milliGRAM(s) Oral two times a day    MEDICATIONS  (PRN):  bisacodyl 5 milliGRAM(s) Oral every 12 hours PRN Constipation  guaiFENesin Oral Liquid (Sugar-Free) 200 milliGRAM(s) Oral every 6 hours PRN Cough  metoclopramide Injectable 5 milliGRAM(s) IV Push every 8 hours PRN nausea/vomiting     REVIEW OF SYSTEMS:  CONSTITUTIONAL:  No Fever or chills  HEENT:  No diplopia or blurred vision.  No sore throat or runny nose.  CARDIOVASCULAR:  No chest pain   RESPIRATORY:  +cough, + shortness of breath, + sputum  GASTROINTESTINAL:  No nausea, vomiting or diarrhea.  GENITOURINARY:  No dysuria, frequency or urgency. No Blood in urine  MUSCULOSKELETAL:  no joint aches, no muscle pain  SKIN:  No change in skin, hair or nails.  NEUROLOGIC:  No paresthesias or weakness.  PSYCHIATRIC:  No disorder of thought or mood.  ENDOCRINE:  No heat or cold intolerance, polyuria or polydipsia.  HEMATOLOGICAL:  No easy bruising or bleeding.     Physical Exam:  Vital Signs Last 24 Hrs  T(C): 36.7 (18 Jan 2023 04:28), Max: 37.1 (17 Jan 2023 20:11)  T(F): 98.1 (18 Jan 2023 04:28), Max: 98.8 (17 Jan 2023 20:11)  HR: 80 (18 Jan 2023 04:28) (80 - 106)  BP: 123/68 (18 Jan 2023 04:28) (114/72 - 131/70)  BP(mean): --  RR: 19 (18 Jan 2023 04:28) (19 - 19)  SpO2: 92% (18 Jan 2023 04:28) (92% - 95%)  Parameters below as of 18 Jan 2023 04:28  Patient On (Oxygen Delivery Method): nasal cannula  O2 Flow (L/min): 2  GEN: NAD, NC in place   HEENT: normocephalic and atraumatic. EOMI. PERRL.    NECK: Supple.  No lymphadenopathy   LUNGS: Rales diffuse bilateral lung fields, no wheezing  HEART: Regular rate and rhythm without murmur.  ABDOMEN: Soft, nontender, and nondistended.  Positive bowel sounds.    : No CVA tenderness  EXTREMITIES: Without edema.  NEUROLOGIC: grossly intact.  PSYCHIATRIC: Appropriate affect .  SKIN: No rash     Labs:                        12.3   14.27 )-----------( 332      ( 18 Jan 2023 06:53 )             37.1     01-18    132<L>  |  92<L>  |  15  ----------------------------<  124<H>  3.8   |  35<H>  |  0.73    Ca    8.4<L>      18 Jan 2023 06:53  Phos  1.9     01-17  Mg     1.9     01-17    TPro  7.7  /  Alb  1.9<L>  /  TBili  1.4<H>  /  DBili  x   /  AST  29  /  ALT  21  /  AlkPhos  111  01-17    Culture - Sputum (collected 01-12-23 @ 11:00)  Source: .Sputum Sputum  Gram Stain (01-12-23 @ 23:47):    Few polymorphonuclear leukocytes per low power field    Rare Squamous epithelial cells per low power field    Few Gram Variable Rods per oil power field  Final Report (01-14-23 @ 19:03):    Normal Respiratory Erin present    Culture - Sputum (collected 01-11-23 @ 18:00)  Source: .Sputum Sputum  Gram Stain (01-12-23 @ 10:14):    Numerous polymorphonuclear leukocytes per low power field    No Squamous epithelial cells per low power field    Few Gram Negative Rods seen per oil power field  Final Report (01-14-23 @ 10:03):    Few Pseudomonas aeruginosa    Normal Respiratory Erin present  Organism: Pseudomonas aeruginosa (01-14-23 @ 10:03)  Organism: Pseudomonas aeruginosa (01-14-23 @ 10:03)    Sensitivities:      -  Amikacin: S <=16      -  Aztreonam: S <=4      -  Cefepime: S <=2      -  Ceftazidime: S <=1      -  Ciprofloxacin: S <=0.25      -  Gentamicin: S <=2      -  Imipenem: S 2      -  Levofloxacin: S <=0.5      -  Meropenem: S <=1      -  Piperacillin/Tazobactam: S <=8      -  Tobramycin: I 8      Method Type: STEVE    Culture - Blood (collected 01-10-23 @ 22:05)  Source: .Blood Blood-Peripheral  Final Report (01-16-23 @ 01:00):    No Growth Final    Culture - Blood (collected 01-10-23 @ 22:00)  Source: .Blood Blood-Peripheral  Final Report (01-16-23 @ 01:00):    No Growth Final    WBC Count: 14.27 K/uL (01-18-23 @ 06:53)  WBC Count: 14.42 K/uL (01-17-23 @ 08:20)  WBC Count: 16.09 K/uL (01-16-23 @ 18:50)  WBC Count: 14.91 K/uL (01-15-23 @ 05:40)  WBC Count: 18.42 K/uL (01-14-23 @ 06:11)    Creatinine, Serum: 0.73 mg/dL (01-18-23 @ 06:53)  Creatinine, Serum: 0.84 mg/dL (01-17-23 @ 08:20)  Creatinine, Serum: 1.00 mg/dL (01-16-23 @ 18:50)  Creatinine, Serum: 0.98 mg/dL (01-15-23 @ 05:40)  Creatinine, Serum: 0.88 mg/dL (01-14-23 @ 06:11)    Sedimentation Rate, Erythrocyte: 74 mm/hr (01-11-23 @ 05:30)    Procalcitonin, Serum: 0.98 ng/mL (01-10-23 @ 22:00)     COVID-19 PCR: NotDetec (01-10-23 @ 21:30)  SARS-CoV-2: NotDetec (01-10-23 @ 20:25)  SARS-CoV-2: NotDetec (01-07-23 @ 13:50)    All imaging and other data have been reviewed.  < from: CT Angio Chest PE Protocol w/ IV Cont (01.10.23 @ 15:47) >  IMPRESSION:.  No pulmonary embolism.  Diffuse tree-in-bud nodular opacities and bibasilar consolidation   compatible with infection.  Mediastinal and bilateral hilar lymphadenopathy, likely reactive.    Assessment and Plan:    81 y/o m w/ PMH od CAD s/p stent, s/p CABG, HTN, HLD, CHF presented to [facility] ED 1/7/23 for shortness of breath and wheezing. He was found to be influenza positive and with CHF exacerbation and new onset afib. Started on tamiflu and IV lasix, eliquis and metoprolol for afib. Now transferred to ICU for worsening acute hypoxic respiratory failure. CTA negative for PE but showing likely superimposed bacterial PNA. ID consultation requested.   MRSA/MSSA nares PCR negative  Legionella negative   Acute CHF exacerbation, found to be influenza A positive and now with worsening hypoxia. Procal 0.98 Likely due to superimposed pseudomonas pna    #Acute hypoxic respiratory failure  #influenza A  #Superimposed bacterial pna    - Sputum culture with a pansensitive Pseudomonas   - Completed Cefepime for 8days can stop it today.   - WBC 14k trending down  - Fluconazole for 5days, can stop it, no sign of thrush   - Continue magic mouthwash     Will follow PRN.    Murphy Barrera MD  Division of Infectious Diseases   Please call ID service at [phone] with any question.      35 minutes spent on total encounter assessing patient, examination, chart review, counseling and coordinating care by the attending physician/nurse/care manager.

## 2023-01-18 NOTE — PROGRESS NOTE ADULT - ASSESSMENT
83 y/o m w/ PMH od CAD s/p stent, s/p CABG, HTN, HLD p/w shortness of breath and cough, found to have a fib, concern for acute CHF exacerbation, + influenza.    1/10- patient noted to be significantly hypoxic despite increased supplemental oxygen. He was placed n Vent mask and then NRM with only slight improvement in saturation Lung exam with decreased air entry; CT angio ordered- PE rule out however there was evidence of consolidations concerning for infection. ABG reviewed   Broad spectrum IV antibiotics ordered.   Due to progressive hypoxia and concern for risk of decompensation, ICU consulted.       1. Hypoxic respiratory failure  2. Influenza A infection with suspected superimposed Bacterial infection  3. Congestive heart failure  4, AFIB - with RVR  5. JANES  6. CAD- s/p coronary stent, s/p CABG; - continue asa, Plavix statin  7. Essential HTN- cont pt on atenolol, Imdur Norvasc - holding losartan in setting of JANES  8. Hyperlipidemia- continue statins 83 y/o m w/ PMH od CAD s/p stent, s/p CABG, HTN, HLD p/w shortness of breath and cough, found to have a fib. Admitted for acute CHF exacerbation, + influenza.

## 2023-01-18 NOTE — PROGRESS NOTE ADULT - PROBLEM SELECTOR PLAN 4
s/p acute diastolic chf   Echo - showed EF of 55%. No pericardial effusion  Off IV Lasix-   lasix 40 mg po  daily hold  sec to hyponatremia s/p acute Diastolic CHF  - TTE: EF of 55%. LAE, with no significant valvular pathology. No pericardial effusion  - Off IV Lasix  - Lasix held in setting of hyponatremia   - Resume Lasix 40 mg PO in AM

## 2023-01-19 LAB
ALBUMIN SERPL ELPH-MCNC: 1.9 G/DL — LOW (ref 3.3–5)
ALP SERPL-CCNC: 102 U/L — SIGNIFICANT CHANGE UP (ref 40–120)
ALT FLD-CCNC: 20 U/L — SIGNIFICANT CHANGE UP (ref 12–78)
ANION GAP SERPL CALC-SCNC: 3 MMOL/L — LOW (ref 5–17)
AST SERPL-CCNC: 26 U/L — SIGNIFICANT CHANGE UP (ref 15–37)
BASOPHILS # BLD AUTO: 0.08 K/UL — SIGNIFICANT CHANGE UP (ref 0–0.2)
BASOPHILS NFR BLD AUTO: 0.7 % — SIGNIFICANT CHANGE UP (ref 0–2)
BILIRUB SERPL-MCNC: 1.2 MG/DL — SIGNIFICANT CHANGE UP (ref 0.2–1.2)
BUN SERPL-MCNC: 13 MG/DL — SIGNIFICANT CHANGE UP (ref 7–23)
CALCIUM SERPL-MCNC: 8.5 MG/DL — SIGNIFICANT CHANGE UP (ref 8.5–10.1)
CHLORIDE SERPL-SCNC: 93 MMOL/L — LOW (ref 96–108)
CO2 SERPL-SCNC: 37 MMOL/L — HIGH (ref 22–31)
CREAT SERPL-MCNC: 0.82 MG/DL — SIGNIFICANT CHANGE UP (ref 0.5–1.3)
EGFR: 89 ML/MIN/1.73M2 — SIGNIFICANT CHANGE UP
EOSINOPHIL # BLD AUTO: 0.2 K/UL — SIGNIFICANT CHANGE UP (ref 0–0.5)
EOSINOPHIL NFR BLD AUTO: 1.8 % — SIGNIFICANT CHANGE UP (ref 0–6)
GLUCOSE SERPL-MCNC: 115 MG/DL — HIGH (ref 70–99)
HCT VFR BLD CALC: 35.8 % — LOW (ref 39–50)
HGB BLD-MCNC: 11.8 G/DL — LOW (ref 13–17)
IMM GRANULOCYTES NFR BLD AUTO: 2.6 % — HIGH (ref 0–0.9)
LYMPHOCYTES # BLD AUTO: 18.6 % — SIGNIFICANT CHANGE UP (ref 13–44)
LYMPHOCYTES # BLD AUTO: 2.11 K/UL — SIGNIFICANT CHANGE UP (ref 1–3.3)
MAGNESIUM SERPL-MCNC: 1.9 MG/DL — SIGNIFICANT CHANGE UP (ref 1.6–2.6)
MCHC RBC-ENTMCNC: 28.8 PG — SIGNIFICANT CHANGE UP (ref 27–34)
MCHC RBC-ENTMCNC: 33 GM/DL — SIGNIFICANT CHANGE UP (ref 32–36)
MCV RBC AUTO: 87.3 FL — SIGNIFICANT CHANGE UP (ref 80–100)
MONOCYTES # BLD AUTO: 0.76 K/UL — SIGNIFICANT CHANGE UP (ref 0–0.9)
MONOCYTES NFR BLD AUTO: 6.7 % — SIGNIFICANT CHANGE UP (ref 2–14)
NEUTROPHILS # BLD AUTO: 7.88 K/UL — HIGH (ref 1.8–7.4)
NEUTROPHILS NFR BLD AUTO: 69.6 % — SIGNIFICANT CHANGE UP (ref 43–77)
NRBC # BLD: 0 /100 WBCS — SIGNIFICANT CHANGE UP (ref 0–0)
PHOSPHATE SERPL-MCNC: 1.6 MG/DL — LOW (ref 2.5–4.5)
PLATELET # BLD AUTO: 329 K/UL — SIGNIFICANT CHANGE UP (ref 150–400)
POTASSIUM SERPL-MCNC: 4.1 MMOL/L — SIGNIFICANT CHANGE UP (ref 3.5–5.3)
POTASSIUM SERPL-SCNC: 4.1 MMOL/L — SIGNIFICANT CHANGE UP (ref 3.5–5.3)
PROT SERPL-MCNC: 7.3 G/DL — SIGNIFICANT CHANGE UP (ref 6–8.3)
RBC # BLD: 4.1 M/UL — LOW (ref 4.2–5.8)
RBC # FLD: 15.6 % — HIGH (ref 10.3–14.5)
SARS-COV-2 RNA SPEC QL NAA+PROBE: SIGNIFICANT CHANGE UP
SODIUM SERPL-SCNC: 133 MMOL/L — LOW (ref 135–145)
WBC # BLD: 11.32 K/UL — HIGH (ref 3.8–10.5)
WBC # FLD AUTO: 11.32 K/UL — HIGH (ref 3.8–10.5)

## 2023-01-19 PROCEDURE — 99232 SBSQ HOSP IP/OBS MODERATE 35: CPT

## 2023-01-19 PROCEDURE — 99232 SBSQ HOSP IP/OBS MODERATE 35: CPT | Mod: GC

## 2023-01-19 RX ORDER — SODIUM,POTASSIUM PHOSPHATES 278-250MG
1 POWDER IN PACKET (EA) ORAL ONCE
Refills: 0 | Status: COMPLETED | OUTPATIENT
Start: 2023-01-19 | End: 2023-01-19

## 2023-01-19 RX ADMIN — Medication 25 MILLIGRAM(S): at 21:55

## 2023-01-19 RX ADMIN — Medication 100 MILLIGRAM(S): at 13:05

## 2023-01-19 RX ADMIN — Medication 25 MILLIGRAM(S): at 13:05

## 2023-01-19 RX ADMIN — Medication 50 MILLIGRAM(S): at 22:00

## 2023-01-19 RX ADMIN — Medication 40 MILLIGRAM(S): at 06:34

## 2023-01-19 RX ADMIN — DIPHENHYDRAMINE HYDROCHLORIDE AND LIDOCAINE HYDROCHLORIDE AND ALUMINUM HYDROXIDE AND MAGNESIUM HYDRO 5 MILLILITER(S): KIT at 06:38

## 2023-01-19 RX ADMIN — ATORVASTATIN CALCIUM 10 MILLIGRAM(S): 80 TABLET, FILM COATED ORAL at 21:54

## 2023-01-19 RX ADMIN — Medication 85 MILLIMOLE(S): at 11:51

## 2023-01-19 RX ADMIN — Medication 1 GRAM(S): at 17:08

## 2023-01-19 RX ADMIN — POLYETHYLENE GLYCOL 3350 17 GRAM(S): 17 POWDER, FOR SOLUTION ORAL at 11:48

## 2023-01-19 RX ADMIN — Medication 240 MILLIGRAM(S): at 11:48

## 2023-01-19 RX ADMIN — CLOPIDOGREL BISULFATE 75 MILLIGRAM(S): 75 TABLET, FILM COATED ORAL at 11:51

## 2023-01-19 RX ADMIN — SENNA PLUS 2 TABLET(S): 8.6 TABLET ORAL at 21:55

## 2023-01-19 RX ADMIN — Medication 25 MILLIGRAM(S): at 06:37

## 2023-01-19 RX ADMIN — GABAPENTIN 100 MILLIGRAM(S): 400 CAPSULE ORAL at 06:34

## 2023-01-19 RX ADMIN — DIPHENHYDRAMINE HYDROCHLORIDE AND LIDOCAINE HYDROCHLORIDE AND ALUMINUM HYDROXIDE AND MAGNESIUM HYDRO 5 MILLILITER(S): KIT at 21:55

## 2023-01-19 RX ADMIN — Medication 1 PACKET(S): at 08:29

## 2023-01-19 RX ADMIN — Medication 50 MILLIGRAM(S): at 17:07

## 2023-01-19 RX ADMIN — Medication 100 MILLIGRAM(S): at 06:36

## 2023-01-19 RX ADMIN — Medication 50 MILLIGRAM(S): at 06:35

## 2023-01-19 RX ADMIN — Medication 50 MILLIGRAM(S): at 11:49

## 2023-01-19 RX ADMIN — APIXABAN 5 MILLIGRAM(S): 2.5 TABLET, FILM COATED ORAL at 06:35

## 2023-01-19 RX ADMIN — DIPHENHYDRAMINE HYDROCHLORIDE AND LIDOCAINE HYDROCHLORIDE AND ALUMINUM HYDROXIDE AND MAGNESIUM HYDRO 5 MILLILITER(S): KIT at 13:06

## 2023-01-19 RX ADMIN — Medication 50 MILLIGRAM(S): at 00:26

## 2023-01-19 RX ADMIN — ISOSORBIDE MONONITRATE 60 MILLIGRAM(S): 60 TABLET, EXTENDED RELEASE ORAL at 11:48

## 2023-01-19 RX ADMIN — GABAPENTIN 100 MILLIGRAM(S): 400 CAPSULE ORAL at 17:08

## 2023-01-19 RX ADMIN — Medication 1 GRAM(S): at 06:36

## 2023-01-19 RX ADMIN — APIXABAN 5 MILLIGRAM(S): 2.5 TABLET, FILM COATED ORAL at 17:07

## 2023-01-19 RX ADMIN — Medication 100 MILLIGRAM(S): at 21:55

## 2023-01-19 RX ADMIN — Medication 85 MILLIMOLE(S): at 21:54

## 2023-01-19 NOTE — PROGRESS NOTE ADULT - ASSESSMENT
* Prerenal azotemia, CKD  * Acute HF  * influenza PNA  * New a.fib with RVR  * Hypokalemia  * Hyponatremia      Improving sodium levels. Phosphorous supplementation. Stable renal indices. PO fluid restriction. PRN Potassium supplementation. To continue current meds. Will follow electrolytes and renal function trend.

## 2023-01-19 NOTE — PROGRESS NOTE ADULT - SUBJECTIVE AND OBJECTIVE BOX
Patient is a 82y old  Male who presents with a chief complaint of CHF, A fib, Flu (19 Jan 2023 13:40)      INTERVAL HPI/OVERNIGHT EVENTS: Patient seen and examined at bedside. No overnight events occurred. Patient has no complaints at this time. Feeling better and feels ready to go to rehab. Denies fevers, chills, headache, lightheadedness, chest pain, dyspnea, abdominal pain, n/v/d/c. Saturating on 2L O2 NC.     MEDICATIONS  (STANDING):  apixaban 5 milliGRAM(s) Oral every 12 hours  atorvastatin 10 milliGRAM(s) Oral at bedtime  benzonatate 100 milliGRAM(s) Oral every 8 hours  chlorproMAZINE    Tablet 25 milliGRAM(s) Oral every 8 hours  clopidogrel Tablet 75 milliGRAM(s) Oral daily  diltiazem    milliGRAM(s) Oral every 24 hours  FIRST- Mouthwash  BLM 5 milliLiter(s) Swish and Spit three times a day  furosemide    Tablet 40 milliGRAM(s) Oral daily  gabapentin 100 milliGRAM(s) Oral two times a day  isosorbide   mononitrate ER Tablet (IMDUR) 60 milliGRAM(s) Oral daily  metoprolol tartrate 50 milliGRAM(s) Oral every 6 hours  polyethylene glycol 3350 17 Gram(s) Oral daily  senna 2 Tablet(s) Oral at bedtime  sodium phosphate 15 milliMole(s)/250 mL IVPB 15 milliMole(s) IV Intermittent once  sucralfate suspension 1 Gram(s) Oral two times a day    MEDICATIONS  (PRN):  benzocaine 20% Spray 1 Spray(s) Topical <User Schedule> PRN Sore Throat  bisacodyl 5 milliGRAM(s) Oral every 12 hours PRN Constipation  guaiFENesin Oral Liquid (Sugar-Free) 200 milliGRAM(s) Oral every 6 hours PRN Cough  ondansetron Injectable 4 milliGRAM(s) IV Push every 8 hours PRN Nausea and/or Vomiting      Allergies    Levaquin (Unknown)  penicillin (Rash)    Intolerances        REVIEW OF SYSTEMS:  CONSTITUTIONAL: No fever or chills  HEENT:  No headache, no sore throat  RESPIRATORY: No cough, wheezing, or shortness of breath  CARDIOVASCULAR: No chest pain, palpitations  GASTROINTESTINAL: No abd pain, nausea, vomiting, or diarrhea  GENITOURINARY: No dysuria, frequency, or hematuria  NEUROLOGICAL: no focal weakness or dizziness  MUSCULOSKELETAL: no myalgias     Vital Signs Last 24 Hrs  T(C): 37.3 (19 Jan 2023 11:50), Max: 37.3 (19 Jan 2023 11:50)  T(F): 99.1 (19 Jan 2023 11:50), Max: 99.1 (19 Jan 2023 11:50)  HR: 100 (19 Jan 2023 11:50) (91 - 100)  BP: 119/67 (19 Jan 2023 11:50) (102/62 - 124/68)  BP(mean): --  RR: 16 (19 Jan 2023 11:50) (16 - 19)  SpO2: 95% (19 Jan 2023 11:50) (92% - 95%)    Parameters below as of 19 Jan 2023 11:50  Patient On (Oxygen Delivery Method): nasal cannula  O2 Flow (L/min): 2      PHYSICAL EXAM:  GENERAL: NAD  HEENT:  anicteric, moist mucous membranes  CHEST/LUNG:  CTA b/l, no rales, wheezes, or rhonchi  HEART:  RRR, S1, S2  ABDOMEN:  BS+, soft, nontender, nondistended  EXTREMITIES: no edema, cyanosis, or calf tenderness  NERVOUS SYSTEM: answers questions and follows commands appropriately    LABS:                        11.8   11.32 )-----------( 329      ( 19 Jan 2023 06:35 )             35.8     CBC Full  -  ( 19 Jan 2023 06:35 )  WBC Count : 11.32 K/uL  Hemoglobin : 11.8 g/dL  Hematocrit : 35.8 %  Platelet Count - Automated : 329 K/uL  Mean Cell Volume : 87.3 fl  Mean Cell Hemoglobin : 28.8 pg  Mean Cell Hemoglobin Concentration : 33.0 gm/dL  Auto Neutrophil # : 7.88 K/uL  Auto Lymphocyte # : 2.11 K/uL  Auto Monocyte # : 0.76 K/uL  Auto Eosinophil # : 0.20 K/uL  Auto Basophil # : 0.08 K/uL  Auto Neutrophil % : 69.6 %  Auto Lymphocyte % : 18.6 %  Auto Monocyte % : 6.7 %  Auto Eosinophil % : 1.8 %  Auto Basophil % : 0.7 %    19 Jan 2023 06:35    133    |  93     |  13     ----------------------------<  115    4.1     |  37     |  0.82     Ca    8.5        19 Jan 2023 06:35  Phos  1.6       19 Jan 2023 06:35  Mg     1.9       19 Jan 2023 06:35    TPro  7.3    /  Alb  1.9    /  TBili  1.2    /  DBili  x      /  AST  26     /  ALT  20     /  AlkPhos  102    19 Jan 2023 06:35        CAPILLARY BLOOD GLUCOSE              RADIOLOGY & ADDITIONAL TESTS:    Personally reviewed.     Consultant(s) Notes Reviewed:  [x] YES  [ ] NO     Patient is a 82y old  Male who presents with a chief complaint of CHF, A fib, Flu (19 Jan 2023 13:40)      INTERVAL HPI/OVERNIGHT EVENTS: Patient seen and examined at bedside. No overnight events occurred. Patient has no complaints at this time. Feeling better and feels ready to go to rehab. Denies fevers, chills, headache, lightheadedness, chest pain, dyspnea, abdominal pain, n/v/d/c. Saturating on 2L O2 NC.     MEDICATIONS  (STANDING):  apixaban 5 milliGRAM(s) Oral every 12 hours  atorvastatin 10 milliGRAM(s) Oral at bedtime  benzonatate 100 milliGRAM(s) Oral every 8 hours  chlorproMAZINE    Tablet 25 milliGRAM(s) Oral every 8 hours  clopidogrel Tablet 75 milliGRAM(s) Oral daily  diltiazem    milliGRAM(s) Oral every 24 hours  FIRST- Mouthwash  BLM 5 milliLiter(s) Swish and Spit three times a day  furosemide    Tablet 40 milliGRAM(s) Oral daily  gabapentin 100 milliGRAM(s) Oral two times a day  isosorbide   mononitrate ER Tablet (IMDUR) 60 milliGRAM(s) Oral daily  metoprolol tartrate 50 milliGRAM(s) Oral every 6 hours  polyethylene glycol 3350 17 Gram(s) Oral daily  senna 2 Tablet(s) Oral at bedtime  sodium phosphate 15 milliMole(s)/250 mL IVPB 15 milliMole(s) IV Intermittent once  sucralfate suspension 1 Gram(s) Oral two times a day    MEDICATIONS  (PRN):  benzocaine 20% Spray 1 Spray(s) Topical <User Schedule> PRN Sore Throat  bisacodyl 5 milliGRAM(s) Oral every 12 hours PRN Constipation  guaiFENesin Oral Liquid (Sugar-Free) 200 milliGRAM(s) Oral every 6 hours PRN Cough  ondansetron Injectable 4 milliGRAM(s) IV Push every 8 hours PRN Nausea and/or Vomiting      Allergies    Levaquin (Unknown)  penicillin (Rash)    Intolerances        REVIEW OF SYSTEMS:  CONSTITUTIONAL: No fever or chills  HEENT:  No headache, no sore throat  RESPIRATORY: No cough, wheezing, or shortness of breath  CARDIOVASCULAR: No chest pain, palpitations  GASTROINTESTINAL: No abd pain, nausea, vomiting, or diarrhea  GENITOURINARY: No dysuria, frequency, or hematuria  NEUROLOGICAL: no focal weakness or dizziness  MUSCULOSKELETAL: no myalgias     Vital Signs Last 24 Hrs  T(C): 37.3 (19 Jan 2023 11:50), Max: 37.3 (19 Jan 2023 11:50)  T(F): 99.1 (19 Jan 2023 11:50), Max: 99.1 (19 Jan 2023 11:50)  HR: 100 (19 Jan 2023 11:50) (91 - 100)  BP: 119/67 (19 Jan 2023 11:50) (102/62 - 124/68)  BP(mean): --  RR: 16 (19 Jan 2023 11:50) (16 - 19)  SpO2: 95% (19 Jan 2023 11:50) (92% - 95%)    Parameters below as of 19 Jan 2023 11:50  Patient On (Oxygen Delivery Method): nasal cannula  O2 Flow (L/min): 2      PHYSICAL EXAM:  GENERAL: NAD  HEENT:  anicteric, moist mucous membranes  CHEST/LUNG:  CTA b/l, no rales, wheezes, or rhonchi  HEART:  irregular rhythm, S1, S2  ABDOMEN:  BS+, soft, nontender, nondistended  EXTREMITIES: no edema, cyanosis, or calf tenderness  NERVOUS SYSTEM: answers questions and follows commands appropriately    LABS:                        11.8   11.32 )-----------( 329      ( 19 Jan 2023 06:35 )             35.8     CBC Full  -  ( 19 Jan 2023 06:35 )  WBC Count : 11.32 K/uL  Hemoglobin : 11.8 g/dL  Hematocrit : 35.8 %  Platelet Count - Automated : 329 K/uL  Mean Cell Volume : 87.3 fl  Mean Cell Hemoglobin : 28.8 pg  Mean Cell Hemoglobin Concentration : 33.0 gm/dL  Auto Neutrophil # : 7.88 K/uL  Auto Lymphocyte # : 2.11 K/uL  Auto Monocyte # : 0.76 K/uL  Auto Eosinophil # : 0.20 K/uL  Auto Basophil # : 0.08 K/uL  Auto Neutrophil % : 69.6 %  Auto Lymphocyte % : 18.6 %  Auto Monocyte % : 6.7 %  Auto Eosinophil % : 1.8 %  Auto Basophil % : 0.7 %    19 Jan 2023 06:35    133    |  93     |  13     ----------------------------<  115    4.1     |  37     |  0.82     Ca    8.5        19 Jan 2023 06:35  Phos  1.6       19 Jan 2023 06:35  Mg     1.9       19 Jan 2023 06:35    TPro  7.3    /  Alb  1.9    /  TBili  1.2    /  DBili  x      /  AST  26     /  ALT  20     /  AlkPhos  102    19 Jan 2023 06:35        CAPILLARY BLOOD GLUCOSE              RADIOLOGY & ADDITIONAL TESTS:    Personally reviewed.     Consultant(s) Notes Reviewed:  [x] YES  [ ] NO

## 2023-01-19 NOTE — PROGRESS NOTE ADULT - SUBJECTIVE AND OBJECTIVE BOX
Patient is a 82y old  Male who presents with a chief complaint of CHF, A fib, Flu (09 Jan 2023 08:23)  Patient seen in follow up for prerenal azotemia.        PAST MEDICAL HISTORY:  Congestive heart failure (CHF)  Atrial fibrillation  HLD (hyperlipidemia)  HTN (hypertension)  CAD (coronary artery disease)      MEDICATIONS  (STANDING):  apixaban 5 milliGRAM(s) Oral every 12 hours  atorvastatin 10 milliGRAM(s) Oral at bedtime  benzonatate 100 milliGRAM(s) Oral every 8 hours  chlorproMAZINE    Tablet 25 milliGRAM(s) Oral every 8 hours  clopidogrel Tablet 75 milliGRAM(s) Oral daily  diltiazem    milliGRAM(s) Oral every 24 hours  FIRST- Mouthwash  BLM 5 milliLiter(s) Swish and Spit three times a day  furosemide    Tablet 40 milliGRAM(s) Oral daily  gabapentin 100 milliGRAM(s) Oral two times a day  isosorbide   mononitrate ER Tablet (IMDUR) 60 milliGRAM(s) Oral daily  metoprolol tartrate 50 milliGRAM(s) Oral every 6 hours  polyethylene glycol 3350 17 Gram(s) Oral daily  senna 2 Tablet(s) Oral at bedtime  sodium phosphate 15 milliMole(s)/250 mL IVPB 15 milliMole(s) IV Intermittent once  sucralfate suspension 1 Gram(s) Oral two times a day    MEDICATIONS  (PRN):  benzocaine 20% Spray 1 Spray(s) Topical <User Schedule> PRN Sore Throat  bisacodyl 5 milliGRAM(s) Oral every 12 hours PRN Constipation  guaiFENesin Oral Liquid (Sugar-Free) 200 milliGRAM(s) Oral every 6 hours PRN Cough  ondansetron Injectable 4 milliGRAM(s) IV Push every 8 hours PRN Nausea and/or Vomiting    T(C): 37.3 (01-19-23 @ 11:50), Max: 37.3 (01-19-23 @ 11:50)  HR: 100 (01-19-23 @ 11:50) (80 - 106)  BP: 119/67 (01-19-23 @ 11:50) (102/62 - 131/70)  RR: 16 (01-19-23 @ 11:50)  SpO2: 95% (01-19-23 @ 11:50)  Wt(kg): --  I&O's Detail    18 Jan 2023 07:01  -  19 Jan 2023 07:00  --------------------------------------------------------  IN:    Oral Fluid: 540 mL  Total IN: 540 mL    OUT:  Total OUT: 0 mL    Total NET: 540 mL          PHYSICAL EXAM:  General: No distress  Respiratory: b/l air entry  Cardiovascular: S1 S2  Gastrointestinal: soft  Extremities:  edema                           LABORATORY:                        11.8   11.32 )-----------( 329      ( 19 Jan 2023 06:35 )             35.8     01-19    133<L>  |  93<L>  |  13  ----------------------------<  115<H>  4.1   |  37<H>  |  0.82    Ca    8.5      19 Jan 2023 06:35  Phos  1.6     01-19  Mg     1.9     01-19    TPro  7.3  /  Alb  1.9<L>  /  TBili  1.2  /  DBili  x   /  AST  26  /  ALT  20  /  AlkPhos  102  01-19    Sodium, Serum: 133 mmol/L (01-19 @ 06:35)  Sodium, Serum: 132 mmol/L (01-18 @ 06:53)    Potassium, Serum: 4.1 mmol/L (01-19 @ 06:35)  Potassium, Serum: 3.8 mmol/L (01-18 @ 06:53)    Hemoglobin: 11.8 g/dL (01-19 @ 06:35)  Hemoglobin: 12.3 g/dL (01-18 @ 06:53)  Hemoglobin: 13.1 g/dL (01-17 @ 08:20)  Hemoglobin: 12.2 g/dL (01-16 @ 18:50)    Creatinine, Serum 0.82 (01-19 @ 06:35)  Creatinine, Serum 0.73 (01-18 @ 06:53)  Creatinine, Serum 0.84 (01-17 @ 08:20)  Creatinine, Serum 1.00 (01-16 @ 18:50)        LIVER FUNCTIONS - ( 19 Jan 2023 06:35 )  Alb: 1.9 g/dL / Pro: 7.3 g/dL / ALK PHOS: 102 U/L / ALT: 20 U/L / AST: 26 U/L / GGT: x

## 2023-01-19 NOTE — PROGRESS NOTE ADULT - PROBLEM SELECTOR PLAN 1
Acute respiratory failure as a result of Influenza with superimposed bacterial PNA  - Sputum culture growing Pseudomonas +  - MRSA/MSSA nares PCR negative  - Legionella negative   - On 2L O2 NC  - Cefepime 2gm q8hr - last dose today 1/18  - ID Dr. Barrera following, recs appreciated

## 2023-01-19 NOTE — PROGRESS NOTE ADULT - PROBLEM SELECTOR PLAN 6
Patient has history of CAD s/p stent, CABG  - TTE with EF 55%, LAE, with no significant valvular pathology  - pro BNP 2418-->959-->768  - remains on supplemental 02  - Per Cardio, will resume Lasix in am   - Cardio following, recs appreciated

## 2023-01-19 NOTE — PROGRESS NOTE ADULT - ASSESSMENT
83 y/o m w/ PMH od CAD s/p stent, s/p CABG, HTN, HLD p/w shortness of breath and cough, found to have a fib. Admitted for acute CHF exacerbation, + influenza.

## 2023-01-19 NOTE — PROGRESS NOTE ADULT - SUBJECTIVE AND OBJECTIVE BOX
[facility]  INFECTIOUS DISEASES   [address] [place] [address]  Tel: [phone]     Fax: [phone]  ========================================================  MD Radha Hutchison Kaushal, MD Cho, Michelle, MD Sunjit, Jaspal, MD  ========================================================    [facility]-[id number]  OPAL NUGENT     Follow up: Pneumonia?, Respiratory failure, Influenza     Feels better, on o2 with a NC 2L.   Has some cough.   No fever.    PAST MEDICAL & SURGICAL HISTORY:  HLD (hyperlipidemia)  HTN (hypertension)  CAD (coronary artery disease)  S/P coronary artery stent placement  S/P CABG (coronary artery bypass graft)    Social Hx: Former, smoked more 50years ago, no ETOH or drugs     FAMILY HISTORY:  Family history of cardiovascular disease (Sibling)    Family history of uterine cancer (Mother)    Allergies  Levaquin (Unknown)  penicillin (Rash)    Antibiotics:  MEDICATIONS  (STANDING):  albuterol/ipratropium for Nebulization 3 milliLiter(s) Nebulizer every 6 hours  apixaban 5 milliGRAM(s) Oral every 12 hours  atorvastatin 10 milliGRAM(s) Oral at bedtime  azithromycin  IVPB      azithromycin  IVPB 500 milliGRAM(s) IV Intermittent every 24 hours  benzonatate 100 milliGRAM(s) Oral every 8 hours  cefepime   IVPB      cefepime   IVPB 1000 milliGRAM(s) IV Intermittent every 8 hours  clopidogrel Tablet 75 milliGRAM(s) Oral daily  diltiazem    Tablet 60 milliGRAM(s) Oral every 6 hours  furosemide   Injectable 40 milliGRAM(s) IV Push two times a day  gabapentin 100 milliGRAM(s) Oral two times a day  hydrocodone/homatropine Syrup 5 milliLiter(s) Oral every 8 hours  isosorbide   mononitrate ER Tablet (IMDUR) 60 milliGRAM(s) Oral daily  metoprolol tartrate 50 milliGRAM(s) Oral every 6 hours  oseltamivir 30 milliGRAM(s) Oral two times a day    MEDICATIONS  (PRN):  bisacodyl 5 milliGRAM(s) Oral every 12 hours PRN Constipation  guaiFENesin Oral Liquid (Sugar-Free) 200 milliGRAM(s) Oral every 6 hours PRN Cough  metoclopramide Injectable 5 milliGRAM(s) IV Push every 8 hours PRN nausea/vomiting     REVIEW OF SYSTEMS:  CONSTITUTIONAL:  No Fever or chills  HEENT:  No diplopia or blurred vision.  No sore throat or runny nose.  CARDIOVASCULAR:  No chest pain   RESPIRATORY:  +cough, no shortness of breath, + sputum  GASTROINTESTINAL:  No nausea, vomiting or diarrhea.  GENITOURINARY:  No dysuria, frequency or urgency. No Blood in urine  MUSCULOSKELETAL:  no joint aches, no muscle pain  SKIN:  No change in skin, hair or nails.  NEUROLOGIC:  No paresthesias or weakness.  PSYCHIATRIC:  No disorder of thought or mood.  ENDOCRINE:  No heat or cold intolerance, polyuria or polydipsia.  HEMATOLOGICAL:  No easy bruising or bleeding.     Physical Exam:  Vital Signs Last 24 Hrs  T(C): 37.3 (19 Jan 2023 11:50), Max: 37.3 (19 Jan 2023 11:50)  T(F): 99.1 (19 Jan 2023 11:50), Max: 99.1 (19 Jan 2023 11:50)  HR: 100 (19 Jan 2023 11:50) (91 - 100)  BP: 119/67 (19 Jan 2023 11:50) (102/62 - 124/68)  BP(mean): --  RR: 16 (19 Jan 2023 11:50) (16 - 19)  SpO2: 95% (19 Jan 2023 11:50) (92% - 95%)  Parameters below as of 19 Jan 2023 11:50  Patient On (Oxygen Delivery Method): nasal cannula  O2 Flow (L/min): 2  GEN: NAD, NC in place   HEENT: normocephalic and atraumatic. EOMI. PERRL.    NECK: Supple.  No lymphadenopathy   LUNGS: Rales diffuse bilateral lung fields, no wheezing  HEART: Regular rate and rhythm without murmur.  ABDOMEN: Soft, nontender, and nondistended.  Positive bowel sounds.    : No CVA tenderness  EXTREMITIES: Without edema.  NEUROLOGIC: grossly intact.  PSYCHIATRIC: Appropriate affect .  SKIN: No rash     Labs:                        11.8   11.32 )-----------( 329      ( 19 Jan 2023 06:35 )             35.8     01-19    133<L>  |  93<L>  |  13  ----------------------------<  115<H>  4.1   |  37<H>  |  0.82    Ca    8.5      19 Jan 2023 06:35  Phos  1.6     01-19  Mg     1.9     01-19    TPro  7.3  /  Alb  1.9<L>  /  TBili  1.2  /  DBili  x   /  AST  26  /  ALT  20  /  AlkPhos  102  01-19    Culture - Sputum (collected 01-12-23 @ 11:00)  Source: .Sputum Sputum  Gram Stain (01-12-23 @ 23:47):    Few polymorphonuclear leukocytes per low power field    Rare Squamous epithelial cells per low power field    Few Gram Variable Rods per oil power field  Final Report (01-14-23 @ 19:03):    Normal Respiratory Erin present    Culture - Sputum (collected 01-11-23 @ 18:00)  Source: .Sputum Sputum  Gram Stain (01-12-23 @ 10:14):    Numerous polymorphonuclear leukocytes per low power field    No Squamous epithelial cells per low power field    Few Gram Negative Rods seen per oil power field  Final Report (01-14-23 @ 10:03):    Few Pseudomonas aeruginosa    Normal Respiratory Erin present  Organism: Pseudomonas aeruginosa (01-14-23 @ 10:03)  Organism: Pseudomonas aeruginosa (01-14-23 @ 10:03)    Sensitivities:      -  Amikacin: S <=16      -  Aztreonam: S <=4      -  Cefepime: S <=2      -  Ceftazidime: S <=1      -  Ciprofloxacin: S <=0.25      -  Gentamicin: S <=2      -  Imipenem: S 2      -  Levofloxacin: S <=0.5      -  Meropenem: S <=1      -  Piperacillin/Tazobactam: S <=8      -  Tobramycin: I 8      Method Type: STEVE    Culture - Blood (collected 01-10-23 @ 22:05)  Source: .Blood Blood-Peripheral  Final Report (01-16-23 @ 01:00):    No Growth Final    Culture - Blood (collected 01-10-23 @ 22:00)  Source: .Blood Blood-Peripheral  Final Report (01-16-23 @ 01:00):    No Growth Final    WBC Count: 11.32 K/uL (01-19-23 @ 06:35)  WBC Count: 14.27 K/uL (01-18-23 @ 06:53)  WBC Count: 14.42 K/uL (01-17-23 @ 08:20)  WBC Count: 16.09 K/uL (01-16-23 @ 18:50)  WBC Count: 14.91 K/uL (01-15-23 @ 05:40)    Creatinine, Serum: 0.82 mg/dL (01-19-23 @ 06:35)  Creatinine, Serum: 0.73 mg/dL (01-18-23 @ 06:53)  Creatinine, Serum: 0.84 mg/dL (01-17-23 @ 08:20)  Creatinine, Serum: 1.00 mg/dL (01-16-23 @ 18:50)  Creatinine, Serum: 0.98 mg/dL (01-15-23 @ 05:40)    Sedimentation Rate, Erythrocyte: 74 mm/hr (01-11-23 @ 05:30)    Procalcitonin, Serum: 0.98 ng/mL (01-10-23 @ 22:00)     COVID-19 PCR: NotDetec (01-10-23 @ 21:30)  SARS-CoV-2: NotDetec (01-10-23 @ 20:25)  SARS-CoV-2: NotDetec (01-07-23 @ 13:50)    All imaging and other data have been reviewed.  < from: CT Angio Chest PE Protocol w/ IV Cont (01.10.23 @ 15:47) >  IMPRESSION:.  No pulmonary embolism.  Diffuse tree-in-bud nodular opacities and bibasilar consolidation   compatible with infection.  Mediastinal and bilateral hilar lymphadenopathy, likely reactive.    Assessment and Plan:    83 y/o m w/ PMH od CAD s/p stent, s/p CABG, HTN, HLD, CHF presented to [facility] ED 1/7/23 for shortness of breath and wheezing. He was found to be influenza positive and with CHF exacerbation and new onset afib. Started on tamiflu and IV lasix, eliquis and metoprolol for afib. Now transferred to ICU for worsening acute hypoxic respiratory failure. CTA negative for PE but showing likely superimposed bacterial PNA. ID consultation requested.   MRSA/MSSA nares PCR negative  Legionella negative   Acute CHF exacerbation, found to be influenza A positive and now with worsening hypoxia. Procal 0.98 Likely due to superimposed pseudomonas pna    #Acute hypoxic respiratory failure  #influenza A  #Superimposed bacterial pna    - Sputum culture with a pansensitive Pseudomonas   - Completed Cefepime for 8days on 1/18  - WBC 11k trending down  - s/p Fluconazole for 5days, no sign of thrush   - Continue magic mouthwash     Will sign off please call with any question.     Murphy Barrera MD  Division of Infectious Diseases   Please call ID service at [phone] with any question.      35 minutes spent on total encounter assessing patient, examination, chart review, counseling and coordinating care by the attending physician/nurse/care manager.

## 2023-01-19 NOTE — PROGRESS NOTE ADULT - SUBJECTIVE AND OBJECTIVE BOX
[facility] -- Donna Jay Pannella, Patel, Savella, Goodger: Office # [phone]    Follow Up:  Hypoxic resp failure    Subjective/Observations: Patient seen and examined, awake, alert, resting in bed. Denies chest pain, dyspnea, palpitations or dizziness. Tolerating O2 via nasal cannula. No signs of orthopnea or PND.  Improved breathing + cough     REVIEW OF SYSTEMS: All other review of systems are negative unless indicated above    PAST MEDICAL & SURGICAL HISTORY:  HLD (hyperlipidemia)      HTN (hypertension)      CAD (coronary artery disease)      S/P coronary artery stent placement      S/P CABG (coronary artery bypass graft)    MEDICATIONS  (STANDING):  apixaban 5 milliGRAM(s) Oral every 12 hours  atorvastatin 10 milliGRAM(s) Oral at bedtime  benzonatate 100 milliGRAM(s) Oral every 8 hours  chlorproMAZINE    Tablet 25 milliGRAM(s) Oral every 8 hours  clopidogrel Tablet 75 milliGRAM(s) Oral daily  diltiazem    milliGRAM(s) Oral every 24 hours  FIRST- Mouthwash  BLM 5 milliLiter(s) Swish and Spit three times a day  furosemide    Tablet 40 milliGRAM(s) Oral daily  gabapentin 100 milliGRAM(s) Oral two times a day  isosorbide   mononitrate ER Tablet (IMDUR) 60 milliGRAM(s) Oral daily  metoprolol tartrate 50 milliGRAM(s) Oral every 6 hours  polyethylene glycol 3350 17 Gram(s) Oral daily  senna 2 Tablet(s) Oral at bedtime  sucralfate suspension 1 Gram(s) Oral two times a day    MEDICATIONS  (PRN):  benzocaine 20% Spray 1 Spray(s) Topical <User Schedule> PRN Sore Throat  bisacodyl 5 milliGRAM(s) Oral every 12 hours PRN Constipation  guaiFENesin Oral Liquid (Sugar-Free) 200 milliGRAM(s) Oral every 6 hours PRN Cough  ondansetron Injectable 4 milliGRAM(s) IV Push every 8 hours PRN Nausea and/or Vomiting    Allergies    Levaquin (Unknown)  penicillin (Rash)    Intolerances      Vital Signs Last 24 Hrs  T(C): 36.3 (19 Jan 2023 05:22), Max: 36.9 (18 Jan 2023 12:28)  T(F): 97.4 (19 Jan 2023 05:22), Max: 98.4 (18 Jan 2023 12:28)  HR: 93 (19 Jan 2023 05:22) (91 - 106)  BP: 112/71 (19 Jan 2023 05:22) (102/62 - 124/68)  BP(mean): --  RR: 19 (19 Jan 2023 05:22) (19 - 20)  SpO2: 93% (19 Jan 2023 05:22) (92% - 96%)    Parameters below as of 19 Jan 2023 05:22  Patient On (Oxygen Delivery Method): nasal cannula  O2 Flow (L/min): 2    I&O's Summary    18 Jan 2023 07:01  -  19 Jan 2023 07:00  --------------------------------------------------------  IN: 540 mL / OUT: 0 mL / NET: 540 mL          TELE: A fib 80-90s nonsustained 100s   PHYSICAL EXAM:  Constitutional: NAD, awake and alert, Well developed   HEENT: Moist Mucous Membranes, Anicteric  Pulmonary: Non-labored, breath sounds are clear bilaterally, No wheezing, rales rhonchi  Cardiovascular: Regular, S1 and S2, No murmurs, No rubs, gallops or clicks  Gastrointestinal:  soft, nontender, nondistended   Lymph: No peripheral edema. No lymphadenopathy.   Skin: No visible rashes or ulcers.  Psych:  Mood & affect appropriate    LABS: All Labs Reviewed:                        11.8   11.32 )-----------( 329      ( 19 Jan 2023 06:35 )             35.8                         12.3   14.27 )-----------( 332      ( 18 Jan 2023 06:53 )             37.1                         13.1   14.42 )-----------( 376      ( 17 Jan 2023 08:20 )             38.6     19 Jan 2023 06:35    133    |  93     |  13     ----------------------------<  115    4.1     |  37     |  0.82   18 Jan 2023 06:53    132    |  92     |  15     ----------------------------<  124    3.8     |  35     |  0.73   17 Jan 2023 08:20    131    |  89     |  18     ----------------------------<  152    3.2     |  35     |  0.84     Ca    8.5        19 Jan 2023 06:35  Ca    8.4        18 Jan 2023 06:53  Ca    8.6        17 Jan 2023 08:20  Phos  1.6       19 Jan 2023 06:35  Phos  1.9       17 Jan 2023 08:20  Mg     1.9       19 Jan 2023 06:35  Mg     1.9       17 Jan 2023 08:20    TPro  7.3    /  Alb  1.9    /  TBili  1.2    /  DBili  x      /  AST  26     /  ALT  20     /  AlkPhos  102    19 Jan 2023 06:35  TPro  7.7    /  Alb  1.9    /  TBili  1.4    /  DBili  x      /  AST  29     /  ALT  21     /  AlkPhos  111    17 Jan 2023 08:20   LIVER FUNCTIONS - ( 19 Jan 2023 06:35 )  Alb: 1.9 g/dL / Pro: 7.3 g/dL / ALK PHOS: 102 U/L / ALT: 20 U/L / AST: 26 U/L / GGT: x           Cholesterol, Serum: 62 mg/dL (01-08-23 @ 06:00)  HDL Cholesterol, Serum: 26 mg/dL (01-08-23 @ 06:00)  Triglycerides, Serum: 66 mg/dL (01-08-23 @ 06:00)    12 Lead ECG:   Ventricular Rate 106 BPM    QRS Duration 90 ms    Q-T Interval 328 ms    QTC Calculation(Bazett) 435 ms    R Axis -26 degrees    T Axis 59 degrees    Diagnosis Line Atrial fibrillation with rapid ventricular response with premature ventricular or aberrantly conducted complexes  Nonspecific ST abnormality  Abnormal ECG  No previous ECGs available  Confirmed by munir Salazar ([id number]) on 1/7/2023 12:56:36 PM (01-06-23 @ 15:58)      ACC: [id number] EXAM:  ECHO TTE WO CON COMP W DOPP                          PROCEDURE DATE:  01/07/2023          INTERPRETATION:  INDICATION: Atrial fibrillation  Sonographer KL    Blood Pressure 130/77    Height 175 cm     Weight 99.8 kg       BSA 2.13   sq m    Dimensions:  LA 4.5       Normal Values: 2.0 - 4.0 cm  Ao 3.4        Normal Values: 2.0 - 3.8 cm  SEPTUM 0.9       Normal Values: 0.6 - 1.2 cm  PWT 0.8       Normal Values: 0.6 - 1.1 cm  LVIDd 5.3         Normal Values: 3.0 - 5.6 cm  LVIDs 3.3         Normal Values: 1.8 - 4.0 cm      OBSERVATIONS:  Technically difficult study  Mitral Valve: normal, trace physiologic MR.  Aortic Valve/Aorta: Sclerotic trileaflet aortic valve with grossly normal   opening.  Tricuspid Valve: normal withtrace TR.  Pulmonic Valve: Trace PI  Left Atrium: Enlarged  Right Atrium: Not well-visualized  Left Ventricle: The left ventricular endocardium is not well-visualized.   Overall, grossly normal LV size and systolic function, estimated LVEF of   55%. Cannot rule out segmental abnormalities  Right Ventricle: Not well-visualized  Pericardium: no significant pericardial effusion.        IMPRESSION:  Technically difficult study  The left ventricular endocardium is not well-visualized. Overall, grossly  normal LV size and systolic function, estimated LVEF of 55%.  The right ventricle is not well-visualized  Left atrial enlargement  Sclerotic trileaflet aortic valve with grossly normal opening, without AI.  Trace physiologic MR and TR.  No significant pericardial effusion.    --- End of Report ---  LIANNA CHAIREZ MD; Attending Cardiologist  This document has been electronically signed. Jan 7 2023  2:15PM

## 2023-01-19 NOTE — PROGRESS NOTE ADULT - PROBLEM SELECTOR PLAN 3
Rate controlled  - s/p Cardizem 60 mg po q6hr  - Switch to LA Cardizem 240mg qd  - Continue Metoprolol 50 mg PO q6hr   - Continue Eliquis

## 2023-01-19 NOTE — PROGRESS NOTE ADULT - ASSESSMENT
83 y/o m w/ PMH od CAD s/p stent, s/p CABG, HTN, HLD, CHF p/w shortness of breath and cough      s/p tamiflu  s/p diflucan  s/p emp ABX for pna  vs noted - labs reviewed - imaging reviewed -   [facility] rx regimen - diuresis   I and O  s/p ICU care and course  Cardio and ID follow up  cough rx regimen  nutrition  GOC discussion  replete lytes  on AC for AF

## 2023-01-19 NOTE — PROGRESS NOTE ADULT - ASSESSMENT
82 year old male PMH of CAD s/p stent, s/p CABG, HTN, HLD, CHF p/w shortness of breath and cough for the last 5 days found to be in A fib with RVR, congestive HF, and influenza positive.    CAD s/p CABG, stent, Afib with RVR, HFpEF, HTN, HLD   - Etiology of hypoxemia is not felt to be primarily HF but felt more likely to be primarily pulmonary, with flu and superimposed pneumonia   - TTE with EF 55%, LAE, with no significant valvular pathology  - pro BNP 2418-->959  - remains on supplemental 02  - Continue Lasix 40 mg Po daily   - encourage incentive spirometry   - pulm toileting   - Strict i/o's and daily weights    - No anginal symptoms, no sign of acute ischemia, and troponin are negative  - Continue ASA, Plavix and statin  - Continue Imdur    - Afib with rates improved and controlled, tachycardia  with some reactive component (in the setting of flu)  - Continue BB and Cardizem   - Continue Eliquis    - BP stable and controlled     - Monitor and replete lytes, keep K>4, Mg>2.  - Will continue to follow.    Lyly Arceo, MS FNP, AGAP  Nurse Practitioner- Cardiology   Spectra #[id number] /[phone]

## 2023-01-19 NOTE — CHART NOTE - NSCHARTNOTEFT_GEN_A_CORE
Nutrition follow up ( chart reviewed, events noted) Brief hx :    Factors impacting intake: [ ] none [ ] nausea  [ ] vomiting [ ] diarrhea [ ] constipation  [ ]chewing problems [ ] swallowing issues  [ ] other:     Diet Presciption: Diet, Regular:   DASH/TLC {Sodium & Cholesterol Restricted} (01-12-23 @ 12:46)    Intake:     Current Weight:   % Weight Change    Pertinent Medications: MEDICATIONS  (STANDING):  apixaban 5 milliGRAM(s) Oral every 12 hours  atorvastatin 10 milliGRAM(s) Oral at bedtime  benzonatate 100 milliGRAM(s) Oral every 8 hours  chlorproMAZINE    Tablet 25 milliGRAM(s) Oral every 8 hours  clopidogrel Tablet 75 milliGRAM(s) Oral daily  diltiazem    milliGRAM(s) Oral every 24 hours  FIRST- Mouthwash  BLM 5 milliLiter(s) Swish and Spit three times a day  furosemide    Tablet 40 milliGRAM(s) Oral daily  gabapentin 100 milliGRAM(s) Oral two times a day  isosorbide   mononitrate ER Tablet (IMDUR) 60 milliGRAM(s) Oral daily  metoprolol tartrate 50 milliGRAM(s) Oral every 6 hours  polyethylene glycol 3350 17 Gram(s) Oral daily  senna 2 Tablet(s) Oral at bedtime  sucralfate suspension 1 Gram(s) Oral two times a day    MEDICATIONS  (PRN):  benzocaine 20% Spray 1 Spray(s) Topical <User Schedule> PRN Sore Throat  bisacodyl 5 milliGRAM(s) Oral every 12 hours PRN Constipation  guaiFENesin Oral Liquid (Sugar-Free) 200 milliGRAM(s) Oral every 6 hours PRN Cough  ondansetron Injectable 4 milliGRAM(s) IV Push every 8 hours PRN Nausea and/or Vomiting    Pertinent Labs: 01-19 Na133 mmol/L<L> Glu 115 mg/dL<H> K+ 4.1 mmol/L Cr  0.82 mg/dL BUN 13 mg/dL 01-19 Phos 1.6 mg/dL<L> 01-19 Alb 1.9 g/dL<L> 01-08 Chol 62 mg/dL LDL --    HDL 26 mg/dL<L> Trig 66 mg/dL     CAPILLARY BLOOD GLUCOSE        Skin:     Estimated Needs:   [ ] no change since previous assessment  [ ] recalculated:     Previous Nutrition Diagnosis:   [ ] Inadequate Energy Intake [ ]Inadequate Oral Intake [ ] Excessive Energy Intake   [ ] Underweight [ ] Increased Nutrient Needs [ ] Overweight/Obesity   [ ] Altered GI Function [ ] Unintended Weight Loss [ ] Food & Nutrition Related Knowledge Deficit [ ] Malnutrition     Nutrition Diagnosis is [ ] ongoing  [ ] resolved [ ] not applicable     New Nutrition Diagnosis: [ ] not applicable       Interventions:   Recommend  [ ] Change Diet To:  [ ] Nutrition Supplement  [ ] Nutrition Support  [ ] Other:     Monitoring and Evaluation:   [ ] PO intake [ x ] Tolerance to diet prescription [ x ] weights [ x ] labs[ x ] follow up per protocol  [ ] other: Nutrition follow up ( chart reviewed, events noted) Brief hx :   81 y/o m w/ PMH od CAD s/p stent, s/p CABG, HTN, HLD p/w shortness of breath and cough, found to have a fib. Admitted for acute CHF exacerbation, + influenza.              Problem/Plan - 1:  ·  Problem: Acute respiratory failure with hypoxia.   ·  Plan: Acute respiratory failure as a result of Influenza with superimposed bacterial PNA  - Sputum culture growing Pseudomonas +  - MRSA/MSSA nares PCR negative  - Legionella negative   - Now on 3L O2 NC  - Cefepime 2gm q8hr - last dose today 1/18  - ID Dr. Bruce herrera, recs appreciated.     Problem/Plan - 2:  ·  Problem: Influenza.   ·  Plan: Completed course of Tamiflu.     Problem/Plan - 3:  ·  Problem: Atrial fibrillation.   ·  Plan: Rate controlled  - s/p Cardizem 60 mg po q6hr  - Switch to LA Cardizem 240mg qd  - Continue Metoprolol 50 mg PO q6hr   - Continue Eliquis.     Problem/Plan - 4:  ·  Problem: Acute CHF.   ·  Plan: s/p acute Diastolic CHF  - TTE: EF of 55%. LAE, with no significant valvular pathology. No pericardial effusion  - Off IV Lasix  - Lasix held in setting of hyponatremia   - Resume Lasix 40 mg PO in AM.     Problem/Plan - 5:  ·  Problem: JANES (acute kidney injury).   ·  Plan: Avoid nephrotoxins.   Monitor renal function  Stable Cr 0.73.     Problem/Plan - 6:  ·  Problem: History of CAD (coronary artery disease).   ·  Plan: Patient has history of CAD s/p stent, CABG  - TTE with EF 55%, LAE, with no significant valvular pathology  - pro BNP 2418-->959-->768  - remains on supplemental 02  - Per Cardio, will resume Lasix in am   - Cardio following, recs appreciated.     Problem/Plan - 7:    Factors impacting intake: [ ] none [ ] nausea  [ ] vomiting [ ] diarrhea [ ] constipation  [ ]chewing problems [ ] swallowing issues  [ ] other:     Diet Presciption: Diet, Regular:   DASH/TLC {Sodium & Cholesterol Restricted} (01-12-23 @ 12:46)    Intake:     Current Weight:   % Weight Change    Pertinent Medications: MEDICATIONS  (STANDING):  apixaban 5 milliGRAM(s) Oral every 12 hours  atorvastatin 10 milliGRAM(s) Oral at bedtime  benzonatate 100 milliGRAM(s) Oral every 8 hours  chlorproMAZINE    Tablet 25 milliGRAM(s) Oral every 8 hours  clopidogrel Tablet 75 milliGRAM(s) Oral daily  diltiazem    milliGRAM(s) Oral every 24 hours  FIRST- Mouthwash  BLM 5 milliLiter(s) Swish and Spit three times a day  furosemide    Tablet 40 milliGRAM(s) Oral daily  gabapentin 100 milliGRAM(s) Oral two times a day  isosorbide   mononitrate ER Tablet (IMDUR) 60 milliGRAM(s) Oral daily  metoprolol tartrate 50 milliGRAM(s) Oral every 6 hours  polyethylene glycol 3350 17 Gram(s) Oral daily  senna 2 Tablet(s) Oral at bedtime  sucralfate suspension 1 Gram(s) Oral two times a day    MEDICATIONS  (PRN):  benzocaine 20% Spray 1 Spray(s) Topical <User Schedule> PRN Sore Throat  bisacodyl 5 milliGRAM(s) Oral every 12 hours PRN Constipation  guaiFENesin Oral Liquid (Sugar-Free) 200 milliGRAM(s) Oral every 6 hours PRN Cough  ondansetron Injectable 4 milliGRAM(s) IV Push every 8 hours PRN Nausea and/or Vomiting    Pertinent Labs: 01-19 Na133 mmol/L<L> Glu 115 mg/dL<H> K+ 4.1 mmol/L Cr  0.82 mg/dL BUN 13 mg/dL 01-19 Phos 1.6 mg/dL<L> 01-19 Alb 1.9 g/dL<L> 01-08 Chol 62 mg/dL LDL --    HDL 26 mg/dL<L> Trig 66 mg/dL     CAPILLARY BLOOD GLUCOSE        Skin:     Estimated Needs:   [ ] no change since previous assessment  [ ] recalculated:     Previous Nutrition Diagnosis:   [ ] Inadequate Energy Intake [ ]Inadequate Oral Intake [ ] Excessive Energy Intake   [ ] Underweight [ ] Increased Nutrient Needs [ ] Overweight/Obesity   [ ] Altered GI Function [ ] Unintended Weight Loss [ ] Food & Nutrition Related Knowledge Deficit [ ] Malnutrition     Nutrition Diagnosis is [ ] ongoing  [ ] resolved [ ] not applicable     New Nutrition Diagnosis: [ ] not applicable       Interventions:   Recommend  [ ] Change Diet To:  [ ] Nutrition Supplement  [ ] Nutrition Support  [ ] Other:     Monitoring and Evaluation:   [ ] PO intake [ x ] Tolerance to diet prescription [ x ] weights [ x ] labs[ x ] follow up per protocol  [ ] other: Nutrition follow up ( chart reviewed, events noted) Brief hx :  83 y/o m w/ PMH od CAD s/p stent, s/p CABG, HTN, HLD p/w shortness of breath and cough, found to have a fib. Admitted for acute CHF exacerbation, + influenza with superimposed bacterial PNA . Lasix was held in setting of hyponatremia and to resume in the am     At time of undersigned RD visit to pts room, pt sleeping; son-in-law in pts room and states pt is eating fair to good; offers no nutrition related complaints    Factors impacting intake: [ X] none [ ] nausea  [ ] vomiting [ ] diarrhea [ ] constipation  [ ]chewing problems [ ] swallowing issues  [ ] other:     Diet Presciption: Diet, Regular:   DASH/TLC {Sodium & Cholesterol Restricted} (01-12-23 @ 12:46)    Intake: >/=65%    Current Weight: 89.6 kg ( 1/19/23) 84.5 kg ( 1/10/23)   wt discrepancy likely r/t scale accuracy , pt has no edema documented at present    Pertinent Medications: MEDICATIONS  (STANDING):  apixaban 5 milliGRAM(s) Oral every 12 hours  atorvastatin 10 milliGRAM(s) Oral at bedtime  benzonatate 100 milliGRAM(s) Oral every 8 hours  chlorproMAZINE    Tablet 25 milliGRAM(s) Oral every 8 hours  clopidogrel Tablet 75 milliGRAM(s) Oral daily  diltiazem    milliGRAM(s) Oral every 24 hours  FIRST- Mouthwash  BLM 5 milliLiter(s) Swish and Spit three times a day  furosemide    Tablet 40 milliGRAM(s) Oral daily  gabapentin 100 milliGRAM(s) Oral two times a day  isosorbide   mononitrate ER Tablet (IMDUR) 60 milliGRAM(s) Oral daily  metoprolol tartrate 50 milliGRAM(s) Oral every 6 hours  polyethylene glycol 3350 17 Gram(s) Oral daily  senna 2 Tablet(s) Oral at bedtime  sucralfate suspension 1 Gram(s) Oral two times a day    MEDICATIONS  (PRN):  benzocaine 20% Spray 1 Spray(s) Topical <User Schedule> PRN Sore Throat  bisacodyl 5 milliGRAM(s) Oral every 12 hours PRN Constipation  guaiFENesin Oral Liquid (Sugar-Free) 200 milliGRAM(s) Oral every 6 hours PRN Cough  ondansetron Injectable 4 milliGRAM(s) IV Push every 8 hours PRN Nausea and/or Vomiting    Pertinent Labs: 01-19 Na133 mmol/L<L> Glu 115 mg/dL<H> K+ 4.1 mmol/L Cr  0.82 mg/dL BUN 13 mg/dL 01-19 Phos 1.6 mg/dL<L> 01-19 Alb 1.9 g/dL<L> 01-08 Chol 62 mg/dL LDL --    HDL 26 mg/dL<L> Trig 66 mg/dL     CAPILLARY BLOOD GLUCOSE    Skin: intact  Bm: ? date last    Estimated Needs:   [X ] no change since previous assessment, based on IBW of 69.8 kg ( 25-30 kcals/kg) 0964-4342 kcals and ( 1-1.2 gm pro/kg) 70-85 gm pro  [ ] recalculated:     Previous Nutrition Diagnosis:   [X ] Inadequate Energy Intake [ ]Inadequate Oral Intake [ ] Excessive Energy Intake   [ ] Underweight [X ] Decreased Nutrient Needs [ ] Overweight/Obesity   [ ] Altered GI Function [ ] Unintended Weight Loss [ ] Food & Nutrition Related Knowledge Deficit [ ] Malnutrition     Nutrition Diagnosis is [X ] ongoing, decreased nutrient needs  [ X] resolved, inadequate energy intake  [ ] not applicable     New Nutrition Diagnosis: [ ] not applicable     Interventions:   Recommend  [ ] Change Diet To:  [ ] Nutrition Supplement  [ ] Nutrition Support  [X ] Other: cont POC    Monitoring and Evaluation:   [X ] PO intake [ x ] Tolerance to diet prescription [ x ] weights [ x ] labs[ x ] follow up per protocol  [ X] other:skin integrity

## 2023-01-19 NOTE — PROGRESS NOTE ADULT - PROBLEM SELECTOR PLAN 4
s/p acute Diastolic CHF  - TTE: EF of 55%. LAE, with no significant valvular pathology. No pericardial effusion  - Off IV Lasix  - Resumed Lasix 40 PO

## 2023-01-20 DIAGNOSIS — B37.0 CANDIDAL STOMATITIS: ICD-10-CM

## 2023-01-20 LAB
ANION GAP SERPL CALC-SCNC: 4 MMOL/L — LOW (ref 5–17)
BASOPHILS # BLD AUTO: 0.06 K/UL — SIGNIFICANT CHANGE UP (ref 0–0.2)
BASOPHILS NFR BLD AUTO: 0.6 % — SIGNIFICANT CHANGE UP (ref 0–2)
BUN SERPL-MCNC: 12 MG/DL — SIGNIFICANT CHANGE UP (ref 7–23)
CALCIUM SERPL-MCNC: 8.4 MG/DL — LOW (ref 8.5–10.1)
CHLORIDE SERPL-SCNC: 94 MMOL/L — LOW (ref 96–108)
CO2 SERPL-SCNC: 36 MMOL/L — HIGH (ref 22–31)
CREAT SERPL-MCNC: 0.7 MG/DL — SIGNIFICANT CHANGE UP (ref 0.5–1.3)
EGFR: 93 ML/MIN/1.73M2 — SIGNIFICANT CHANGE UP
EOSINOPHIL # BLD AUTO: 0.16 K/UL — SIGNIFICANT CHANGE UP (ref 0–0.5)
EOSINOPHIL NFR BLD AUTO: 1.7 % — SIGNIFICANT CHANGE UP (ref 0–6)
GLUCOSE SERPL-MCNC: 130 MG/DL — HIGH (ref 70–99)
HCT VFR BLD CALC: 33.5 % — LOW (ref 39–50)
HGB BLD-MCNC: 11.1 G/DL — LOW (ref 13–17)
IMM GRANULOCYTES NFR BLD AUTO: 1.8 % — HIGH (ref 0–0.9)
LYMPHOCYTES # BLD AUTO: 2.15 K/UL — SIGNIFICANT CHANGE UP (ref 1–3.3)
LYMPHOCYTES # BLD AUTO: 22.6 % — SIGNIFICANT CHANGE UP (ref 13–44)
MAGNESIUM SERPL-MCNC: 1.8 MG/DL — SIGNIFICANT CHANGE UP (ref 1.6–2.6)
MCHC RBC-ENTMCNC: 28.8 PG — SIGNIFICANT CHANGE UP (ref 27–34)
MCHC RBC-ENTMCNC: 33.1 GM/DL — SIGNIFICANT CHANGE UP (ref 32–36)
MCV RBC AUTO: 86.8 FL — SIGNIFICANT CHANGE UP (ref 80–100)
MONOCYTES # BLD AUTO: 0.63 K/UL — SIGNIFICANT CHANGE UP (ref 0–0.9)
MONOCYTES NFR BLD AUTO: 6.6 % — SIGNIFICANT CHANGE UP (ref 2–14)
NEUTROPHILS # BLD AUTO: 6.34 K/UL — SIGNIFICANT CHANGE UP (ref 1.8–7.4)
NEUTROPHILS NFR BLD AUTO: 66.7 % — SIGNIFICANT CHANGE UP (ref 43–77)
NRBC # BLD: 0 /100 WBCS — SIGNIFICANT CHANGE UP (ref 0–0)
PHOSPHATE SERPL-MCNC: 2.6 MG/DL — SIGNIFICANT CHANGE UP (ref 2.5–4.5)
PLATELET # BLD AUTO: 345 K/UL — SIGNIFICANT CHANGE UP (ref 150–400)
POTASSIUM SERPL-MCNC: 3.4 MMOL/L — LOW (ref 3.5–5.3)
POTASSIUM SERPL-SCNC: 3.4 MMOL/L — LOW (ref 3.5–5.3)
RBC # BLD: 3.86 M/UL — LOW (ref 4.2–5.8)
RBC # FLD: 15.9 % — HIGH (ref 10.3–14.5)
SODIUM SERPL-SCNC: 134 MMOL/L — LOW (ref 135–145)
WBC # BLD: 9.51 K/UL — SIGNIFICANT CHANGE UP (ref 3.8–10.5)
WBC # FLD AUTO: 9.51 K/UL — SIGNIFICANT CHANGE UP (ref 3.8–10.5)

## 2023-01-20 PROCEDURE — 99233 SBSQ HOSP IP/OBS HIGH 50: CPT

## 2023-01-20 PROCEDURE — 99232 SBSQ HOSP IP/OBS MODERATE 35: CPT | Mod: GC

## 2023-01-20 RX ORDER — POLYETHYLENE GLYCOL 3350 17 G/17G
17 POWDER, FOR SOLUTION ORAL
Qty: 0 | Refills: 0 | DISCHARGE
Start: 2023-01-20

## 2023-01-20 RX ORDER — DILTIAZEM HCL 120 MG
1 CAPSULE, EXT RELEASE 24 HR ORAL
Qty: 14 | Refills: 0
Start: 2023-01-20 | End: 2023-02-02

## 2023-01-20 RX ORDER — AMLODIPINE BESYLATE 2.5 MG/1
0 TABLET ORAL
Qty: 0 | Refills: 0 | DISCHARGE

## 2023-01-20 RX ORDER — BENZOCAINE 10 %
1 GEL (GRAM) MUCOUS MEMBRANE
Qty: 0 | Refills: 0 | DISCHARGE
Start: 2023-01-20

## 2023-01-20 RX ORDER — DILTIAZEM HCL 120 MG
1 CAPSULE, EXT RELEASE 24 HR ORAL
Qty: 0 | Refills: 0 | DISCHARGE
Start: 2023-01-20

## 2023-01-20 RX ORDER — APIXABAN 2.5 MG/1
1 TABLET, FILM COATED ORAL
Qty: 0 | Refills: 0 | DISCHARGE
Start: 2023-01-20

## 2023-01-20 RX ORDER — DIPHENHYDRAMINE HYDROCHLORIDE AND LIDOCAINE HYDROCHLORIDE AND ALUMINUM HYDROXIDE AND MAGNESIUM HYDRO
5 KIT
Qty: 0 | Refills: 0 | DISCHARGE
Start: 2023-01-20

## 2023-01-20 RX ORDER — ONDANSETRON 8 MG/1
0 TABLET, FILM COATED ORAL
Qty: 0 | Refills: 0 | DISCHARGE
Start: 2023-01-20

## 2023-01-20 RX ORDER — METOPROLOL TARTRATE 50 MG
1 TABLET ORAL
Qty: 0 | Refills: 0 | DISCHARGE
Start: 2023-01-20

## 2023-01-20 RX ORDER — GABAPENTIN 400 MG/1
1 CAPSULE ORAL
Qty: 0 | Refills: 0 | DISCHARGE
Start: 2023-01-20

## 2023-01-20 RX ORDER — SUCRALFATE 1 G
10 TABLET ORAL
Qty: 0 | Refills: 0 | DISCHARGE
Start: 2023-01-20

## 2023-01-20 RX ORDER — FUROSEMIDE 40 MG
1 TABLET ORAL
Qty: 0 | Refills: 0 | DISCHARGE
Start: 2023-01-20

## 2023-01-20 RX ORDER — ONDANSETRON 8 MG/1
4 TABLET, FILM COATED ORAL
Qty: 0 | Refills: 0 | DISCHARGE
Start: 2023-01-20

## 2023-01-20 RX ORDER — SENNA PLUS 8.6 MG/1
2 TABLET ORAL
Qty: 0 | Refills: 0 | DISCHARGE
Start: 2023-01-20

## 2023-01-20 RX ORDER — POTASSIUM CHLORIDE 20 MEQ
40 PACKET (EA) ORAL ONCE
Refills: 0 | Status: COMPLETED | OUTPATIENT
Start: 2023-01-20 | End: 2023-01-20

## 2023-01-20 RX ADMIN — Medication 25 MILLIGRAM(S): at 05:19

## 2023-01-20 RX ADMIN — Medication 50 MILLIGRAM(S): at 05:19

## 2023-01-20 RX ADMIN — Medication 1 GRAM(S): at 17:25

## 2023-01-20 RX ADMIN — Medication 50 MILLIGRAM(S): at 21:09

## 2023-01-20 RX ADMIN — Medication 40 MILLIGRAM(S): at 05:18

## 2023-01-20 RX ADMIN — ISOSORBIDE MONONITRATE 60 MILLIGRAM(S): 60 TABLET, EXTENDED RELEASE ORAL at 11:58

## 2023-01-20 RX ADMIN — ATORVASTATIN CALCIUM 10 MILLIGRAM(S): 80 TABLET, FILM COATED ORAL at 21:09

## 2023-01-20 RX ADMIN — CLOPIDOGREL BISULFATE 75 MILLIGRAM(S): 75 TABLET, FILM COATED ORAL at 11:58

## 2023-01-20 RX ADMIN — GABAPENTIN 100 MILLIGRAM(S): 400 CAPSULE ORAL at 17:25

## 2023-01-20 RX ADMIN — Medication 100 MILLIGRAM(S): at 05:18

## 2023-01-20 RX ADMIN — GABAPENTIN 100 MILLIGRAM(S): 400 CAPSULE ORAL at 05:19

## 2023-01-20 RX ADMIN — SENNA PLUS 2 TABLET(S): 8.6 TABLET ORAL at 21:09

## 2023-01-20 RX ADMIN — DIPHENHYDRAMINE HYDROCHLORIDE AND LIDOCAINE HYDROCHLORIDE AND ALUMINUM HYDROXIDE AND MAGNESIUM HYDRO 5 MILLILITER(S): KIT at 21:10

## 2023-01-20 RX ADMIN — DIPHENHYDRAMINE HYDROCHLORIDE AND LIDOCAINE HYDROCHLORIDE AND ALUMINUM HYDROXIDE AND MAGNESIUM HYDRO 5 MILLILITER(S): KIT at 13:59

## 2023-01-20 RX ADMIN — DIPHENHYDRAMINE HYDROCHLORIDE AND LIDOCAINE HYDROCHLORIDE AND ALUMINUM HYDROXIDE AND MAGNESIUM HYDRO 5 MILLILITER(S): KIT at 05:18

## 2023-01-20 RX ADMIN — APIXABAN 5 MILLIGRAM(S): 2.5 TABLET, FILM COATED ORAL at 17:25

## 2023-01-20 RX ADMIN — Medication 40 MILLIEQUIVALENT(S): at 11:57

## 2023-01-20 RX ADMIN — Medication 1 GRAM(S): at 05:18

## 2023-01-20 RX ADMIN — APIXABAN 5 MILLIGRAM(S): 2.5 TABLET, FILM COATED ORAL at 05:19

## 2023-01-20 RX ADMIN — Medication 100 MILLIGRAM(S): at 13:58

## 2023-01-20 RX ADMIN — POLYETHYLENE GLYCOL 3350 17 GRAM(S): 17 POWDER, FOR SOLUTION ORAL at 11:58

## 2023-01-20 RX ADMIN — Medication 50 MILLIGRAM(S): at 11:58

## 2023-01-20 RX ADMIN — Medication 100 MILLIGRAM(S): at 21:10

## 2023-01-20 RX ADMIN — Medication 50 MILLIGRAM(S): at 17:25

## 2023-01-20 NOTE — PROGRESS NOTE ADULT - PROBLEM SELECTOR PLAN 1
Acute respiratory failure as a result of Influenza with superimposed bacterial PNA  - Sputum culture growing Pseudomonas +  - MRSA/MSSA nares PCR negative  - Legionella negative   - On 2L O2 NC  - Completed Cefepime 2gm q8hr - last dose 1/18  - ID Dr. Barrera following, recs appreciated

## 2023-01-20 NOTE — PROGRESS NOTE ADULT - PROBLEM SELECTOR PLAN 4
s/p acute Diastolic CHF  - TTE: EF of 55%. LAE, with no significant valvular pathology. No pericardial effusion  - Off IV Lasix  - Lasix 40 PO

## 2023-01-20 NOTE — PROGRESS NOTE ADULT - ASSESSMENT
81 y/o m w/ PMH od CAD s/p stent, s/p CABG, HTN, HLD p/w shortness of breath and cough, found to have a fib. Admitted for acute CHF exacerbation, + influenza.

## 2023-01-20 NOTE — SWALLOW BEDSIDE ASSESSMENT ADULT - COMMENTS
Pt was alert, cooperative and positioned upright in bed for a clinical assessment of swallow function this AM. Per charting, pt is an "81 y/o m w/ PMH od CAD s/p stent, s/p CABG, HTN, HLD, CHF p/w shortness of breath and cough".     Recent chest CT 1/10/23 revealed "No pulmonary embolism. Diffuse tree-in-bud nodular opacities and bibasilar consolidation compatible with infection. Mediastinal and bilateral hilar lymphadenopathy, likely reactive."     Recent CXR 1/18/23 revealed "Mild atelectasis."    At the time of today's assessment, pt presents with supplemental oxygen in place via nasal cannula. He denies dysphagia prior to or throughout admission.

## 2023-01-20 NOTE — PROGRESS NOTE ADULT - SUBJECTIVE AND OBJECTIVE BOX
Patient is a 82y old  Male who presents with a chief complaint of CHF, A fib, Flu (20 Jan 2023 12:09)      INTERVAL HPI/OVERNIGHT EVENTS: Patient seen and examined at bedside. No overnight events occurred. Patient has no complaints at this time. Denies fevers, chills, headache, lightheadedness, chest pain, dyspnea, abdominal pain, n/v/d/c. Patient stable for DC to [facility], awaiting bed.     MEDICATIONS  (STANDING):  apixaban 5 milliGRAM(s) Oral every 12 hours  atorvastatin 10 milliGRAM(s) Oral at bedtime  benzonatate 100 milliGRAM(s) Oral every 8 hours  clopidogrel Tablet 75 milliGRAM(s) Oral daily  diltiazem    milliGRAM(s) Oral every 24 hours  FIRST- Mouthwash  BLM 5 milliLiter(s) Swish and Spit three times a day  furosemide    Tablet 40 milliGRAM(s) Oral daily  gabapentin 100 milliGRAM(s) Oral two times a day  isosorbide   mononitrate ER Tablet (IMDUR) 60 milliGRAM(s) Oral daily  metoprolol tartrate 50 milliGRAM(s) Oral every 6 hours  polyethylene glycol 3350 17 Gram(s) Oral daily  senna 2 Tablet(s) Oral at bedtime  sucralfate suspension 1 Gram(s) Oral two times a day    MEDICATIONS  (PRN):  benzocaine 20% Spray 1 Spray(s) Topical <User Schedule> PRN Sore Throat  bisacodyl 5 milliGRAM(s) Oral every 12 hours PRN Constipation  guaiFENesin Oral Liquid (Sugar-Free) 200 milliGRAM(s) Oral every 6 hours PRN Cough  ondansetron Injectable 4 milliGRAM(s) IV Push every 8 hours PRN Nausea and/or Vomiting      Allergies    Levaquin (Unknown)  penicillin (Rash)    Intolerances        REVIEW OF SYSTEMS:  CONSTITUTIONAL: No fever or chills  HEENT:  No headache, no sore throat  RESPIRATORY: No cough, wheezing, or shortness of breath  CARDIOVASCULAR: No chest pain, palpitations  GASTROINTESTINAL: No abd pain, nausea, vomiting, or diarrhea  GENITOURINARY: No dysuria, frequency, or hematuria  NEUROLOGICAL: no focal weakness or dizziness  MUSCULOSKELETAL: no myalgias     Vital Signs Last 24 Hrs  T(C): 36.7 (20 Jan 2023 11:49), Max: 36.7 (19 Jan 2023 20:21)  T(F): 98.1 (20 Jan 2023 11:49), Max: 98.1 (19 Jan 2023 20:21)  HR: 80 (20 Jan 2023 11:49) (80 - 85)  BP: 101/64 (20 Jan 2023 11:49) (101/64 - 118/67)  BP(mean): --  RR: 18 (20 Jan 2023 11:49) (18 - 18)  SpO2: 93% (20 Jan 2023 11:49) (92% - 96%)    Parameters below as of 20 Jan 2023 11:49  Patient On (Oxygen Delivery Method): room air        PHYSICAL EXAM:  GENERAL: NAD  HEENT:  anicteric, moist mucous membranes  CHEST/LUNG:  CTA b/l, no rales, wheezes, or rhonchi  HEART:  RRR, S1, S2  ABDOMEN:  BS+, soft, nontender, nondistended  EXTREMITIES: no edema, cyanosis, or calf tenderness  NERVOUS SYSTEM: answers questions and follows commands appropriately    LABS:                        11.1   9.51  )-----------( 345      ( 20 Jan 2023 06:47 )             33.5     CBC Full  -  ( 20 Jan 2023 06:47 )  WBC Count : 9.51 K/uL  Hemoglobin : 11.1 g/dL  Hematocrit : 33.5 %  Platelet Count - Automated : 345 K/uL  Mean Cell Volume : 86.8 fl  Mean Cell Hemoglobin : 28.8 pg  Mean Cell Hemoglobin Concentration : 33.1 gm/dL  Auto Neutrophil # : 6.34 K/uL  Auto Lymphocyte # : 2.15 K/uL  Auto Monocyte # : 0.63 K/uL  Auto Eosinophil # : 0.16 K/uL  Auto Basophil # : 0.06 K/uL  Auto Neutrophil % : 66.7 %  Auto Lymphocyte % : 22.6 %  Auto Monocyte % : 6.6 %  Auto Eosinophil % : 1.7 %  Auto Basophil % : 0.6 %    20 Jan 2023 06:47    134    |  94     |  12     ----------------------------<  130    3.4     |  36     |  0.70     Ca    8.4        20 Jan 2023 06:47  Phos  2.6       20 Jan 2023 06:47  Mg     1.8       20 Jan 2023 06:47          CAPILLARY BLOOD GLUCOSE              RADIOLOGY & ADDITIONAL TESTS:    Personally reviewed.     Consultant(s) Notes Reviewed:  [x] YES  [ ] NO     Patient is a 82y old  Male who presents with a chief complaint of CHF, A fib, Flu (20 Jan 2023 12:09)      INTERVAL HPI/OVERNIGHT EVENTS: Patient seen and examined at bedside. No overnight events occurred. Patient has no complaints at this time. Denies fevers, chills, headache, lightheadedness, chest pain, dyspnea, abdominal pain, n/v/d/c. Patient stable for DC to [facility], awaiting insurance auth.     MEDICATIONS  (STANDING):  apixaban 5 milliGRAM(s) Oral every 12 hours  atorvastatin 10 milliGRAM(s) Oral at bedtime  benzonatate 100 milliGRAM(s) Oral every 8 hours  clopidogrel Tablet 75 milliGRAM(s) Oral daily  diltiazem    milliGRAM(s) Oral every 24 hours  FIRST- Mouthwash  BLM 5 milliLiter(s) Swish and Spit three times a day  furosemide    Tablet 40 milliGRAM(s) Oral daily  gabapentin 100 milliGRAM(s) Oral two times a day  isosorbide   mononitrate ER Tablet (IMDUR) 60 milliGRAM(s) Oral daily  metoprolol tartrate 50 milliGRAM(s) Oral every 6 hours  polyethylene glycol 3350 17 Gram(s) Oral daily  senna 2 Tablet(s) Oral at bedtime  sucralfate suspension 1 Gram(s) Oral two times a day    MEDICATIONS  (PRN):  benzocaine 20% Spray 1 Spray(s) Topical <User Schedule> PRN Sore Throat  bisacodyl 5 milliGRAM(s) Oral every 12 hours PRN Constipation  guaiFENesin Oral Liquid (Sugar-Free) 200 milliGRAM(s) Oral every 6 hours PRN Cough  ondansetron Injectable 4 milliGRAM(s) IV Push every 8 hours PRN Nausea and/or Vomiting      Allergies    Levaquin (Unknown)  penicillin (Rash)    Intolerances        REVIEW OF SYSTEMS:  CONSTITUTIONAL: No fever or chills  HEENT:  No headache, no sore throat  RESPIRATORY: No cough, wheezing, or shortness of breath  CARDIOVASCULAR: No chest pain, palpitations  GASTROINTESTINAL: No abd pain, nausea, vomiting, or diarrhea  GENITOURINARY: No dysuria, frequency, or hematuria  NEUROLOGICAL: no focal weakness or dizziness  MUSCULOSKELETAL: no myalgias     Vital Signs Last 24 Hrs  T(C): 36.7 (20 Jan 2023 11:49), Max: 36.7 (19 Jan 2023 20:21)  T(F): 98.1 (20 Jan 2023 11:49), Max: 98.1 (19 Jan 2023 20:21)  HR: 80 (20 Jan 2023 11:49) (80 - 85)  BP: 101/64 (20 Jan 2023 11:49) (101/64 - 118/67)  BP(mean): --  RR: 18 (20 Jan 2023 11:49) (18 - 18)  SpO2: 93% (20 Jan 2023 11:49) (92% - 96%)    Parameters below as of 20 Jan 2023 11:49  Patient On (Oxygen Delivery Method): room air        PHYSICAL EXAM:  GENERAL: NAD  HEENT:  anicteric, moist mucous membranes  CHEST/LUNG:  CTA b/l, no rales, wheezes, or rhonchi  HEART:  RRR, S1, S2  ABDOMEN:  BS+, soft, nontender, nondistended  EXTREMITIES: no edema, cyanosis, or calf tenderness  NERVOUS SYSTEM: answers questions and follows commands appropriately    LABS:                        11.1   9.51  )-----------( 345      ( 20 Jan 2023 06:47 )             33.5     CBC Full  -  ( 20 Jan 2023 06:47 )  WBC Count : 9.51 K/uL  Hemoglobin : 11.1 g/dL  Hematocrit : 33.5 %  Platelet Count - Automated : 345 K/uL  Mean Cell Volume : 86.8 fl  Mean Cell Hemoglobin : 28.8 pg  Mean Cell Hemoglobin Concentration : 33.1 gm/dL  Auto Neutrophil # : 6.34 K/uL  Auto Lymphocyte # : 2.15 K/uL  Auto Monocyte # : 0.63 K/uL  Auto Eosinophil # : 0.16 K/uL  Auto Basophil # : 0.06 K/uL  Auto Neutrophil % : 66.7 %  Auto Lymphocyte % : 22.6 %  Auto Monocyte % : 6.6 %  Auto Eosinophil % : 1.7 %  Auto Basophil % : 0.6 %    20 Jan 2023 06:47    134    |  94     |  12     ----------------------------<  130    3.4     |  36     |  0.70     Ca    8.4        20 Jan 2023 06:47  Phos  2.6       20 Jan 2023 06:47  Mg     1.8       20 Jan 2023 06:47          CAPILLARY BLOOD GLUCOSE              RADIOLOGY & ADDITIONAL TESTS:    Personally reviewed.     Consultant(s) Notes Reviewed:  [x] YES  [ ] NO

## 2023-01-20 NOTE — PROGRESS NOTE ADULT - PROBLEM SELECTOR PLAN 7
Afib on Eliquis for DVT ppx    #DISPO DC to [facility], pending SW/CM. Awaiting bed. s/p Fluconazole x5days  - Continue magic mouthwash as per ID  - ID following, recs appreciated

## 2023-01-20 NOTE — PROGRESS NOTE ADULT - SUBJECTIVE AND OBJECTIVE BOX
[facility] -- Darleen Thompson Patel, Savella, Goodger  Office # [phone]    Follow Up:  Hx CAD s/p CABG, Afib with RVR    Subjective/Observations: Awake and alert, NC at 3L/min in use but admits to be doing well.  Denies cough.  Denies SOB, DIE or orthopnea.  Denies CP or palpitations    REVIEW OF SYSTEMS: All other review of systems is negative unless indicated above  PAST MEDICAL & SURGICAL HISTORY:  HLD (hyperlipidemia)  HTN (hypertension)  CAD (coronary artery disease)  S/P coronary artery stent placement  S/P CABG (coronary artery bypass graft)    MEDICATIONS  (STANDING):  apixaban 5 milliGRAM(s) Oral every 12 hours  atorvastatin 10 milliGRAM(s) Oral at bedtime  benzonatate 100 milliGRAM(s) Oral every 8 hours  chlorproMAZINE    Tablet 25 milliGRAM(s) Oral every 8 hours  clopidogrel Tablet 75 milliGRAM(s) Oral daily  diltiazem    milliGRAM(s) Oral every 24 hours  FIRST- Mouthwash  BLM 5 milliLiter(s) Swish and Spit three times a day  furosemide    Tablet 40 milliGRAM(s) Oral daily  gabapentin 100 milliGRAM(s) Oral two times a day  isosorbide   mononitrate ER Tablet (IMDUR) 60 milliGRAM(s) Oral daily  metoprolol tartrate 50 milliGRAM(s) Oral every 6 hours  polyethylene glycol 3350 17 Gram(s) Oral daily  senna 2 Tablet(s) Oral at bedtime  sucralfate suspension 1 Gram(s) Oral two times a day    MEDICATIONS  (PRN):  benzocaine 20% Spray 1 Spray(s) Topical <User Schedule> PRN Sore Throat  bisacodyl 5 milliGRAM(s) Oral every 12 hours PRN Constipation  guaiFENesin Oral Liquid (Sugar-Free) 200 milliGRAM(s) Oral every 6 hours PRN Cough  ondansetron Injectable 4 milliGRAM(s) IV Push every 8 hours PRN Nausea and/or Vomiting    Allergies    Levaquin (Unknown)  penicillin (Rash)    Intolerances    Vital Signs Last 24 Hrs  T(C): 36.7 (20 Jan 2023 05:04), Max: 37.3 (19 Jan 2023 11:50)  T(F): 98.1 (20 Jan 2023 05:04), Max: 99.1 (19 Jan 2023 11:50)  HR: 81 (20 Jan 2023 08:15) (81 - 100)  BP: 118/67 (20 Jan 2023 05:04) (102/57 - 119/67)  BP(mean): --  RR: 18 (20 Jan 2023 05:04) (16 - 18)  SpO2: 95% (20 Jan 2023 08:15) (92% - 96%)    Parameters below as of 20 Jan 2023 08:15  Patient On (Oxygen Delivery Method): nasal cannula,2 L    I&O's Summary    19 Jan 2023 07:01  -  20 Jan 2023 07:00  --------------------------------------------------------  IN: 1470 mL / OUT: 0 mL / NET: 1470 mL     PHYSICAL EXAM:  TELE: Afib  Constitutional: NAD, awake and alert, obese  HEENT: Moist Mucous Membranes, Anicteric  Pulmonary: Non-labored, breath sounds are diminished bilaterally, No wheezing, rales or rhonchi  Cardiovascular: IRRR, S1 and S2, No murmurs, rubs, gallops or clicks  Gastrointestinal: Bowel Sounds present, soft, nontender.   Lymph: No peripheral edema. No lymphadenopathy.  Skin: No visible rashes or ulcers.  Psych:  Mood & affect appropriate  LABS: All Labs Reviewed:                        11.1   9.51  )-----------( 345      ( 20 Jan 2023 06:47 )             33.5                         11.8   11.32 )-----------( 329      ( 19 Jan 2023 06:35 )             35.8                         12.3   14.27 )-----------( 332      ( 18 Jan 2023 06:53 )             37.1     20 Jan 2023 06:47    134    |  94     |  12     ----------------------------<  130    3.4     |  36     |  0.70   19 Jan 2023 06:35    133    |  93     |  13     ----------------------------<  115    4.1     |  37     |  0.82   18 Jan 2023 06:53    132    |  92     |  15     ----------------------------<  124    3.8     |  35     |  0.73     Ca    8.4        20 Jan 2023 06:47  Ca    8.5        19 Jan 2023 06:35  Ca    8.4        18 Jan 2023 06:53  Phos  2.6       20 Jan 2023 06:47  Phos  1.6       19 Jan 2023 06:35  Mg     1.8       20 Jan 2023 06:47  Mg     1.9       19 Jan 2023 06:35    TPro  7.3    /  Alb  1.9    /  TBili  1.2    /  DBili  x      /  AST  26     /  ALT  20     /  AlkPhos  102    19 Jan 2023 06:35      ACC: [id number] EXAM:  ECHO TTE WO CON COMP W DOPP                          PROCEDURE DATE:  01/07/2023          INTERPRETATION:  INDICATION: Atrial fibrillation  Sonographer KL    Blood Pressure 130/77    Height 175 cm     Weight 99.8 kg       BSA 2.13   sq m    Dimensions:  LA 4.5       Normal Values: 2.0 - 4.0 cm  Ao 3.4        Normal Values: 2.0 - 3.8 cm  SEPTUM 0.9       Normal Values: 0.6 - 1.2 cm  PWT 0.8       Normal Values: 0.6 - 1.1 cm  LVIDd 5.3         Normal Values: 3.0 - 5.6 cm  LVIDs 3.3         Normal Values: 1.8 - 4.0 cm      OBSERVATIONS:  Technically difficult study  Mitral Valve: normal, trace physiologic MR.  Aortic Valve/Aorta: Sclerotic trileaflet aortic valve with grossly normal   opening.  Tricuspid Valve: normal withtrace TR.  Pulmonic Valve: Trace PI  Left Atrium: Enlarged  Right Atrium: Not well-visualized  Left Ventricle: The left ventricular endocardium is not well-visualized.   Overall, grossly normal LV size and systolic function, estimated LVEF of   55%. Cannot rule out segmental abnormalities  Right Ventricle: Not well-visualized  Pericardium: no significant pericardial effusion.    IMPRESSION:  Technically difficult study  The left ventricular endocardium is not well-visualized. Overall, grossly  normal LV size and systolic function, estimated LVEF of 55%.  The right ventricle is not well-visualized  Left atrial enlargement  Sclerotic trileaflet aortic valve with grossly normal opening, without AI.  Trace physiologic MR and TR.  No significant pericardial effusion.    --- End of Report ---    LIANNA CHAIREZ MD; Attending Cardiologist  This document has been electronically signed. Jan 7 2023  2:15PM      ACC: [id number] EXAM:  XR CHEST PA LAT 2V   ORDERED BY: JAMAL GRAYSON     PROCEDURE DATE:  01/18/2023          INTERPRETATION:  Chest one view    HISTORY: Hypoxia    COMPARISON STUDY: 1/6/2023    Frontal expiratory view of the chest shows the heart to be similar in   size. Sternal wires are again noted.    The lungs show mild basilar atelectasis with small pleural effusions and   there is no evidence of pneumothorax.    IMPRESSION:  Mild atelectasis.    Thank you for the courtesy of this referral.    --- End of Report ---    TRAVIS YANES MD; Attending Interventional Radiologist  This document has been electronically signed. Jan 19 2023  4:04PM    Ventricular Rate 106 BPM    QRS Duration 90 ms    Q-T Interval 328 ms    QTC Calculation(Bazett) 435 ms    R Axis -26 degrees    T Axis 59 degrees    Diagnosis Line Atrial fibrillation with rapid ventricular response with premature ventricular or aberrantly conducted complexes  Nonspecific ST abnormality  Abnormal ECG  No previous ECGs available  Confirmed by munir Salazar ([id number]) on 1/7/2023 12:56:36 PM

## 2023-01-20 NOTE — SWALLOW BEDSIDE ASSESSMENT ADULT - SWALLOW EVAL: RECOMMENDED FEEDING/EATING TECHNIQUES
allow for swallow between intakes/alternate food with liquid/maintain upright posture during/after eating for 30 mins/no straws/oral hygiene/position upright (90 degrees)/small sips/bites

## 2023-01-20 NOTE — SWALLOW BEDSIDE ASSESSMENT ADULT - ADDITIONAL RECOMMENDATIONS
Pt would benefit from swallow therapy. This dept to continue to f/u as schedule permits while pt is admitted to [facility]. Consider outpatient swallow therapy upon discharge which can be scheduled at the [facility] [phone].

## 2023-01-20 NOTE — PROGRESS NOTE ADULT - ASSESSMENT
82 year old male PMH of CAD s/p stent, s/p CABG, HTN, HLD, CHF p/w shortness of breath and cough for the last 5 days found to be in A fib with RVR, congestive HF, and influenza positive.    CAD s/p CABG, stent, Afib with RVR, HFpEF, HTN  - Hypoxemia more of pulmonary in etiology (+Flu)  - Remains in NC but no evidence of left HF.  Downtitrate and D/C as tolerated  - TTE with EF 55%, LAE, with no significant valvular pathology  - pro BNP 2418-->959  - Continue to encourage incentive spirometer  - Not on home Lasix but can continue Lasix PO 40 mg daily  - Strict i/o's and daily weights    - No anginal symptoms, no sign of acute ischemia, and troponin are negative  - Continue ASA, Plavix and statin  - Continue Imdur    - Afib with rates improved and controlled, tachycardia  with some reactive component (in the setting of flu)  - Continue BB and Cardizem   - Continue Eliquis    - BP stable and controlled     - Monitor and replete lytes, keep K>4, Mg>2.  - Will continue to follow.    Ana Luisa Holcomb DNP, NP-C  Cardiology   Spectra #[phone]

## 2023-01-20 NOTE — SWALLOW BEDSIDE ASSESSMENT ADULT - SWALLOW EVAL: DIAGNOSIS
1- functional oral management given regular/solid, puree, mildly thick liquid and thin liquid textures marked by adequate bolus collection, transfer and transport. 2- mild pharyngeal dysphagia given regular/solid, puree, mildly thick liquid and thin liquid textures marked by mildly delayed pharyngeal swallow trigger and mildly reduced hyolaryngeal excursion. no overt s/s of penetration/aspiration observed.

## 2023-01-20 NOTE — PROGRESS NOTE ADULT - ASSESSMENT
* Prerenal azotemia, CKD  * Acute HF  * influenza PNA  * New a.fib with RVR  * Hypokalemia  * Hyponatremia      Improving sodium levels. Potassium supplementation. Stable renal indices. PO fluid restriction.   To continue current meds. Will follow electrolytes and renal function trend.

## 2023-01-20 NOTE — PROGRESS NOTE ADULT - PROBLEM SELECTOR PLAN 8
Afib on Eliquis for DVT ppx    #DISPO DC to [facility], pending SW/CM. Awaiting bed.  Updated daughter Benjy via phone

## 2023-01-20 NOTE — PROGRESS NOTE ADULT - ASSESSMENT
83 y/o m w/ PMH od CAD s/p stent, s/p CABG, HTN, HLD, CHF p/w shortness of breath and cough    on lasix  cm follow up noted  poss [facility] on DC    s/p tamiflu  s/p diflucan  s/p emp ABX for pna  vs noted - labs reviewed - imaging reviewed -   [facility] rx regimen - diuresis   I and O  s/p ICU care and course  Cardio and ID follow up  cough rx regimen  nutrition  GOC discussion  replete lytes  on AC for AF Bilateral Helical Rim Advancement Flap Text: The defect edges were debeveled with a #15 blade scalpel.  Given the location of the defect and the proximity to free margins (helical rim) a bilateral helical rim advancement flap was deemed most appropriate.  Using a sterile surgical marker, the appropriate advancement flaps were drawn incorporating the defect and placing the expected incisions between the helical rim and antihelix where possible.  The area thus outlined was incised through and through with a #15 scalpel blade.  With a skin hook and iris scissors, the flaps were gently and sharply undermined and freed up.

## 2023-01-20 NOTE — PROGRESS NOTE ADULT - SUBJECTIVE AND OBJECTIVE BOX
Date/Time Patient Seen:  		  Referring MD:   Data Reviewed	       Patient is a 82y old  Male who presents with a chief complaint of CHF, A fib, Flu (19 Jan 2023 13:52)      Subjective/HPI     PAST MEDICAL & SURGICAL HISTORY:  Congestive heart failure (CHF)    Atrial fibrillation    HLD (hyperlipidemia)    HTN (hypertension)    CAD (coronary artery disease)    S/P coronary artery stent placement    S/P CABG (coronary artery bypass graft)          Medication list         MEDICATIONS  (STANDING):  apixaban 5 milliGRAM(s) Oral every 12 hours  atorvastatin 10 milliGRAM(s) Oral at bedtime  benzonatate 100 milliGRAM(s) Oral every 8 hours  chlorproMAZINE    Tablet 25 milliGRAM(s) Oral every 8 hours  clopidogrel Tablet 75 milliGRAM(s) Oral daily  diltiazem    milliGRAM(s) Oral every 24 hours  FIRST- Mouthwash  BLM 5 milliLiter(s) Swish and Spit three times a day  furosemide    Tablet 40 milliGRAM(s) Oral daily  gabapentin 100 milliGRAM(s) Oral two times a day  isosorbide   mononitrate ER Tablet (IMDUR) 60 milliGRAM(s) Oral daily  metoprolol tartrate 50 milliGRAM(s) Oral every 6 hours  polyethylene glycol 3350 17 Gram(s) Oral daily  senna 2 Tablet(s) Oral at bedtime  sucralfate suspension 1 Gram(s) Oral two times a day    MEDICATIONS  (PRN):  benzocaine 20% Spray 1 Spray(s) Topical <User Schedule> PRN Sore Throat  bisacodyl 5 milliGRAM(s) Oral every 12 hours PRN Constipation  guaiFENesin Oral Liquid (Sugar-Free) 200 milliGRAM(s) Oral every 6 hours PRN Cough  ondansetron Injectable 4 milliGRAM(s) IV Push every 8 hours PRN Nausea and/or Vomiting         Vitals log        ICU Vital Signs Last 24 Hrs  T(C): 36.7 (20 Jan 2023 05:04), Max: 37.3 (19 Jan 2023 11:50)  T(F): 98.1 (20 Jan 2023 05:04), Max: 99.1 (19 Jan 2023 11:50)  HR: 85 (20 Jan 2023 05:04) (84 - 100)  BP: 118/67 (20 Jan 2023 05:04) (102/57 - 119/67)  BP(mean): --  ABP: --  ABP(mean): --  RR: 18 (20 Jan 2023 05:04) (16 - 18)  SpO2: 92% (20 Jan 2023 05:04) (92% - 96%)    O2 Parameters below as of 20 Jan 2023 05:04  Patient On (Oxygen Delivery Method): room air                 Input and Output:  I&O's Detail    18 Jan 2023 07:01  -  19 Jan 2023 07:00  --------------------------------------------------------  IN:    Oral Fluid: 540 mL  Total IN: 540 mL    OUT:  Total OUT: 0 mL    Total NET: 540 mL      19 Jan 2023 07:01  -  20 Jan 2023 06:06  --------------------------------------------------------  IN:    IV PiggyBack: 750 mL    Oral Fluid: 720 mL  Total IN: 1470 mL    OUT:  Total OUT: 0 mL    Total NET: 1470 mL          Lab Data                        11.8   11.32 )-----------( 329      ( 19 Jan 2023 06:35 )             35.8     01-19    133<L>  |  93<L>  |  13  ----------------------------<  115<H>  4.1   |  37<H>  |  0.82    Ca    8.5      19 Jan 2023 06:35  Phos  1.6     01-19  Mg     1.9     01-19    TPro  7.3  /  Alb  1.9<L>  /  TBili  1.2  /  DBili  x   /  AST  26  /  ALT  20  /  AlkPhos  102  01-19            Review of Systems	      Objective     Physical Examination    heart s1s2  lung dc bS      Pertinent Lab findings & Imaging      Christiano:  NO   Adequate UO     I&O's Detail    18 Jan 2023 07:01  -  19 Jan 2023 07:00  --------------------------------------------------------  IN:    Oral Fluid: 540 mL  Total IN: 540 mL    OUT:  Total OUT: 0 mL    Total NET: 540 mL      19 Jan 2023 07:01  -  20 Jan 2023 06:06  --------------------------------------------------------  IN:    IV PiggyBack: 750 mL    Oral Fluid: 720 mL  Total IN: 1470 mL    OUT:  Total OUT: 0 mL    Total NET: 1470 mL               Discussed with:     Cultures:	        Radiology

## 2023-01-20 NOTE — PROGRESS NOTE ADULT - PROBLEM SELECTOR PLAN 6
Patient has history of CAD s/p stent, CABG  - TTE with EF 55%, LAE, with no significant valvular pathology  - pro BNP 2418-->959-->768  - remains on supplemental 02  - Per Cardio, PO Lasix 40  - Cardio following, recs appreciated

## 2023-01-20 NOTE — PROGRESS NOTE ADULT - SUBJECTIVE AND OBJECTIVE BOX
Patient is a 82y old  Male who presents with a chief complaint of CHF, A fib, Flu (09 Jan 2023 08:23)  Patient seen in follow up for prerenal azotemia.        PAST MEDICAL HISTORY:  Congestive heart failure (CHF)  Atrial fibrillation  HLD (hyperlipidemia)  HTN (hypertension)  CAD (coronary artery disease)      MEDICATIONS  (STANDING):  apixaban 5 milliGRAM(s) Oral every 12 hours  atorvastatin 10 milliGRAM(s) Oral at bedtime  benzonatate 100 milliGRAM(s) Oral every 8 hours  clopidogrel Tablet 75 milliGRAM(s) Oral daily  diltiazem    milliGRAM(s) Oral every 24 hours  FIRST- Mouthwash  BLM 5 milliLiter(s) Swish and Spit three times a day  furosemide    Tablet 40 milliGRAM(s) Oral daily  gabapentin 100 milliGRAM(s) Oral two times a day  isosorbide   mononitrate ER Tablet (IMDUR) 60 milliGRAM(s) Oral daily  metoprolol tartrate 50 milliGRAM(s) Oral every 6 hours  polyethylene glycol 3350 17 Gram(s) Oral daily  senna 2 Tablet(s) Oral at bedtime  sucralfate suspension 1 Gram(s) Oral two times a day    MEDICATIONS  (PRN):  benzocaine 20% Spray 1 Spray(s) Topical <User Schedule> PRN Sore Throat  bisacodyl 5 milliGRAM(s) Oral every 12 hours PRN Constipation  guaiFENesin Oral Liquid (Sugar-Free) 200 milliGRAM(s) Oral every 6 hours PRN Cough  ondansetron Injectable 4 milliGRAM(s) IV Push every 8 hours PRN Nausea and/or Vomiting    T(C): 36.7 (01-20-23 @ 11:49), Max: 37.3 (01-19-23 @ 11:50)  HR: 80 (01-20-23 @ 11:49) (80 - 106)  BP: 101/64 (01-20-23 @ 11:49) (101/64 - 124/68)  RR: 18 (01-20-23 @ 11:49)  SpO2: 93% (01-20-23 @ 11:49)  Wt(kg): --  I&O's Detail    19 Jan 2023 07:01  -  20 Jan 2023 07:00  --------------------------------------------------------  IN:    IV PiggyBack: 750 mL    Oral Fluid: 720 mL  Total IN: 1470 mL    OUT:  Total OUT: 0 mL    Total NET: 1470 mL            PHYSICAL EXAM:  General: No distress  Respiratory: b/l air entry  Cardiovascular: S1 S2  Gastrointestinal: soft  Extremities:  edema                           LABORATORY:                        11.1   9.51  )-----------( 345      ( 20 Jan 2023 06:47 )             33.5     01-20    134<L>  |  94<L>  |  12  ----------------------------<  130<H>  3.4<L>   |  36<H>  |  0.70    Ca    8.4<L>      20 Jan 2023 06:47  Phos  2.6     01-20  Mg     1.8     01-20    TPro  7.3  /  Alb  1.9<L>  /  TBili  1.2  /  DBili  x   /  AST  26  /  ALT  20  /  AlkPhos  102  01-19    Sodium, Serum: 134 mmol/L (01-20 @ 06:47)  Sodium, Serum: 133 mmol/L (01-19 @ 06:35)    Potassium, Serum: 3.4 mmol/L (01-20 @ 06:47)  Potassium, Serum: 4.1 mmol/L (01-19 @ 06:35)    Hemoglobin: 11.1 g/dL (01-20 @ 06:47)  Hemoglobin: 11.8 g/dL (01-19 @ 06:35)  Hemoglobin: 12.3 g/dL (01-18 @ 06:53)    Creatinine, Serum 0.70 (01-20 @ 06:47)  Creatinine, Serum 0.82 (01-19 @ 06:35)  Creatinine, Serum 0.73 (01-18 @ 06:53)        LIVER FUNCTIONS - ( 19 Jan 2023 06:35 )  Alb: 1.9 g/dL / Pro: 7.3 g/dL / ALK PHOS: 102 U/L / ALT: 20 U/L / AST: 26 U/L / GGT: x

## 2023-01-21 LAB
ALBUMIN SERPL ELPH-MCNC: 2.1 G/DL — LOW (ref 3.3–5)
ALP SERPL-CCNC: 102 U/L — SIGNIFICANT CHANGE UP (ref 40–120)
ALT FLD-CCNC: 22 U/L — SIGNIFICANT CHANGE UP (ref 12–78)
ANION GAP SERPL CALC-SCNC: 4 MMOL/L — LOW (ref 5–17)
AST SERPL-CCNC: 29 U/L — SIGNIFICANT CHANGE UP (ref 15–37)
BASOPHILS # BLD AUTO: 0.06 K/UL — SIGNIFICANT CHANGE UP (ref 0–0.2)
BASOPHILS NFR BLD AUTO: 0.6 % — SIGNIFICANT CHANGE UP (ref 0–2)
BILIRUB SERPL-MCNC: 0.9 MG/DL — SIGNIFICANT CHANGE UP (ref 0.2–1.2)
BUN SERPL-MCNC: 10 MG/DL — SIGNIFICANT CHANGE UP (ref 7–23)
CALCIUM SERPL-MCNC: 8.8 MG/DL — SIGNIFICANT CHANGE UP (ref 8.5–10.1)
CHLORIDE SERPL-SCNC: 96 MMOL/L — SIGNIFICANT CHANGE UP (ref 96–108)
CO2 SERPL-SCNC: 36 MMOL/L — HIGH (ref 22–31)
CREAT SERPL-MCNC: 0.84 MG/DL — SIGNIFICANT CHANGE UP (ref 0.5–1.3)
EGFR: 88 ML/MIN/1.73M2 — SIGNIFICANT CHANGE UP
EOSINOPHIL # BLD AUTO: 0.14 K/UL — SIGNIFICANT CHANGE UP (ref 0–0.5)
EOSINOPHIL NFR BLD AUTO: 1.5 % — SIGNIFICANT CHANGE UP (ref 0–6)
GLUCOSE SERPL-MCNC: 132 MG/DL — HIGH (ref 70–99)
HCT VFR BLD CALC: 37 % — LOW (ref 39–50)
HGB BLD-MCNC: 12.1 G/DL — LOW (ref 13–17)
IMM GRANULOCYTES NFR BLD AUTO: 1.8 % — HIGH (ref 0–0.9)
LYMPHOCYTES # BLD AUTO: 2.27 K/UL — SIGNIFICANT CHANGE UP (ref 1–3.3)
LYMPHOCYTES # BLD AUTO: 24.5 % — SIGNIFICANT CHANGE UP (ref 13–44)
MAGNESIUM SERPL-MCNC: 1.9 MG/DL — SIGNIFICANT CHANGE UP (ref 1.6–2.6)
MCHC RBC-ENTMCNC: 28.7 PG — SIGNIFICANT CHANGE UP (ref 27–34)
MCHC RBC-ENTMCNC: 32.7 GM/DL — SIGNIFICANT CHANGE UP (ref 32–36)
MCV RBC AUTO: 87.9 FL — SIGNIFICANT CHANGE UP (ref 80–100)
MONOCYTES # BLD AUTO: 0.64 K/UL — SIGNIFICANT CHANGE UP (ref 0–0.9)
MONOCYTES NFR BLD AUTO: 6.9 % — SIGNIFICANT CHANGE UP (ref 2–14)
NEUTROPHILS # BLD AUTO: 5.97 K/UL — SIGNIFICANT CHANGE UP (ref 1.8–7.4)
NEUTROPHILS NFR BLD AUTO: 64.7 % — SIGNIFICANT CHANGE UP (ref 43–77)
NRBC # BLD: 0 /100 WBCS — SIGNIFICANT CHANGE UP (ref 0–0)
PHOSPHATE SERPL-MCNC: 1.8 MG/DL — LOW (ref 2.5–4.5)
PLATELET # BLD AUTO: 402 K/UL — HIGH (ref 150–400)
POTASSIUM SERPL-MCNC: 3.9 MMOL/L — SIGNIFICANT CHANGE UP (ref 3.5–5.3)
POTASSIUM SERPL-SCNC: 3.9 MMOL/L — SIGNIFICANT CHANGE UP (ref 3.5–5.3)
PROT SERPL-MCNC: 7.7 G/DL — SIGNIFICANT CHANGE UP (ref 6–8.3)
RBC # BLD: 4.21 M/UL — SIGNIFICANT CHANGE UP (ref 4.2–5.8)
RBC # FLD: 16 % — HIGH (ref 10.3–14.5)
SODIUM SERPL-SCNC: 136 MMOL/L — SIGNIFICANT CHANGE UP (ref 135–145)
WBC # BLD: 9.25 K/UL — SIGNIFICANT CHANGE UP (ref 3.8–10.5)
WBC # FLD AUTO: 9.25 K/UL — SIGNIFICANT CHANGE UP (ref 3.8–10.5)

## 2023-01-21 PROCEDURE — 99232 SBSQ HOSP IP/OBS MODERATE 35: CPT | Mod: GC

## 2023-01-21 PROCEDURE — 99232 SBSQ HOSP IP/OBS MODERATE 35: CPT

## 2023-01-21 RX ORDER — SODIUM,POTASSIUM PHOSPHATES 278-250MG
1 POWDER IN PACKET (EA) ORAL
Refills: 0 | Status: COMPLETED | OUTPATIENT
Start: 2023-01-21 | End: 2023-01-22

## 2023-01-21 RX ADMIN — APIXABAN 5 MILLIGRAM(S): 2.5 TABLET, FILM COATED ORAL at 05:26

## 2023-01-21 RX ADMIN — Medication 40 MILLIGRAM(S): at 05:26

## 2023-01-21 RX ADMIN — Medication 1 GRAM(S): at 17:50

## 2023-01-21 RX ADMIN — GABAPENTIN 100 MILLIGRAM(S): 400 CAPSULE ORAL at 17:50

## 2023-01-21 RX ADMIN — Medication 100 MILLIGRAM(S): at 05:27

## 2023-01-21 RX ADMIN — Medication 1 GRAM(S): at 05:27

## 2023-01-21 RX ADMIN — ISOSORBIDE MONONITRATE 60 MILLIGRAM(S): 60 TABLET, EXTENDED RELEASE ORAL at 13:29

## 2023-01-21 RX ADMIN — Medication 240 MILLIGRAM(S): at 13:28

## 2023-01-21 RX ADMIN — Medication 1 PACKET(S): at 21:58

## 2023-01-21 RX ADMIN — CLOPIDOGREL BISULFATE 75 MILLIGRAM(S): 75 TABLET, FILM COATED ORAL at 13:29

## 2023-01-21 RX ADMIN — Medication 100 MILLIGRAM(S): at 13:32

## 2023-01-21 RX ADMIN — Medication 1 PACKET(S): at 17:50

## 2023-01-21 RX ADMIN — DIPHENHYDRAMINE HYDROCHLORIDE AND LIDOCAINE HYDROCHLORIDE AND ALUMINUM HYDROXIDE AND MAGNESIUM HYDRO 5 MILLILITER(S): KIT at 21:59

## 2023-01-21 RX ADMIN — Medication 50 MILLIGRAM(S): at 17:50

## 2023-01-21 RX ADMIN — ATORVASTATIN CALCIUM 10 MILLIGRAM(S): 80 TABLET, FILM COATED ORAL at 21:58

## 2023-01-21 RX ADMIN — DIPHENHYDRAMINE HYDROCHLORIDE AND LIDOCAINE HYDROCHLORIDE AND ALUMINUM HYDROXIDE AND MAGNESIUM HYDRO 5 MILLILITER(S): KIT at 05:27

## 2023-01-21 RX ADMIN — SENNA PLUS 2 TABLET(S): 8.6 TABLET ORAL at 21:58

## 2023-01-21 RX ADMIN — GABAPENTIN 100 MILLIGRAM(S): 400 CAPSULE ORAL at 05:26

## 2023-01-21 RX ADMIN — APIXABAN 5 MILLIGRAM(S): 2.5 TABLET, FILM COATED ORAL at 17:50

## 2023-01-21 RX ADMIN — Medication 1 PACKET(S): at 13:30

## 2023-01-21 RX ADMIN — DIPHENHYDRAMINE HYDROCHLORIDE AND LIDOCAINE HYDROCHLORIDE AND ALUMINUM HYDROXIDE AND MAGNESIUM HYDRO 5 MILLILITER(S): KIT at 13:34

## 2023-01-21 RX ADMIN — POLYETHYLENE GLYCOL 3350 17 GRAM(S): 17 POWDER, FOR SOLUTION ORAL at 13:30

## 2023-01-21 RX ADMIN — Medication 100 MILLIGRAM(S): at 21:58

## 2023-01-21 RX ADMIN — Medication 50 MILLIGRAM(S): at 05:26

## 2023-01-21 RX ADMIN — Medication 50 MILLIGRAM(S): at 13:29

## 2023-01-21 NOTE — PROGRESS NOTE ADULT - PROBLEM SELECTOR PLAN 8
Afib on Eliquis for DVT ppx    #DISPO DC to [facility], pending SW/CM. Awaiting bed.  Updated daughter Benjy via phone Afib on Eliquis for DVT ppx    #DISPO DC to [facility], pending insurance authorization. SW/CM following. Awaiting bed. Afib on Eliquis for DVT ppx    #DISPO DC to [facility], pending insurance authorization. SW/CM following. Awaiting bed.  Updated daughter Benjy via phone  Updated daughter Xochilt at bedside

## 2023-01-21 NOTE — PROGRESS NOTE ADULT - PROBLEM SELECTOR PLAN 5
Avoid nephrotoxins.   Monitor renal function  Stable Cr 0.7 Avoid nephrotoxins.   Monitor renal function  Stable Cr 0.84

## 2023-01-21 NOTE — PROGRESS NOTE ADULT - SUBJECTIVE AND OBJECTIVE BOX
[facility] -- Darleen Thompson Patel, Savella, Goodger  Office # [phone]    Follow Up:  Hx CAD s/p CABG, Afib with RVR    Subjective/Observations: Tolerating RA.     REVIEW OF SYSTEMS: All other review of systems is negative unless indicated above  PAST MEDICAL & SURGICAL HISTORY:  HLD (hyperlipidemia)  HTN (hypertension)  CAD (coronary artery disease)  S/P coronary artery stent placement  S/P CABG (coronary artery bypass graft)    MEDICATIONS  (STANDING):  apixaban 5 milliGRAM(s) Oral every 12 hours  atorvastatin 10 milliGRAM(s) Oral at bedtime  benzonatate 100 milliGRAM(s) Oral every 8 hours  clopidogrel Tablet 75 milliGRAM(s) Oral daily  diltiazem    milliGRAM(s) Oral every 24 hours  FIRST- Mouthwash  BLM 5 milliLiter(s) Swish and Spit three times a day  furosemide    Tablet 40 milliGRAM(s) Oral daily  gabapentin 100 milliGRAM(s) Oral two times a day  isosorbide   mononitrate ER Tablet (IMDUR) 60 milliGRAM(s) Oral daily  metoprolol tartrate 50 milliGRAM(s) Oral every 6 hours  polyethylene glycol 3350 17 Gram(s) Oral daily  senna 2 Tablet(s) Oral at bedtime  sucralfate suspension 1 Gram(s) Oral two times a day    MEDICATIONS  (PRN):  benzocaine 20% Spray 1 Spray(s) Topical <User Schedule> PRN Sore Throat  bisacodyl 5 milliGRAM(s) Oral every 12 hours PRN Constipation  guaiFENesin Oral Liquid (Sugar-Free) 200 milliGRAM(s) Oral every 6 hours PRN Cough  ondansetron Injectable 4 milliGRAM(s) IV Push every 8 hours PRN Nausea and/or Vomiting    Allergies    Levaquin (Unknown)  penicillin (Rash)    Intolerances    Vital Signs Last 24 Hrs  T(C): 36.9 (21 Jan 2023 05:16), Max: 37 (20 Jan 2023 17:21)  T(F): 98.4 (21 Jan 2023 05:16), Max: 98.6 (20 Jan 2023 17:21)  HR: 78 (21 Jan 2023 05:16) (78 - 91)  BP: 120/69 (21 Jan 2023 05:16) (101/64 - 120/69)  BP(mean): --  RR: 17 (21 Jan 2023 05:16) (17 - 18)  SpO2: 92% (21 Jan 2023 05:16) (92% - 95%)    Parameters below as of 21 Jan 2023 05:16  Patient On (Oxygen Delivery Method): room air    I&O's Summary    20 Jan 2023 07:01  -  21 Jan 2023 07:00  --------------------------------------------------------  IN: 540 mL / OUT: 500 mL / NET: 40 mL    PHYSICAL EXAM:  TELE: Afib  Constitutional: NAD, awake and alert, obese  HEENT: Moist Mucous Membranes, Anicteric  Pulmonary: Non-labored, breath sounds are diminished bilaterally, No wheezing, rales or rhonchi  Cardiovascular: IRRR, S1 and S2, No murmurs, rubs, gallops or clicks  Gastrointestinal: Bowel Sounds present, soft, nontender.   Lymph: No peripheral edema. No lymphadenopathy.  Skin: No visible rashes or ulcers.  Psych:  Mood & affect appropriate    LABS: All Labs Reviewed:                        12.1   9.25  )-----------( 402      ( 21 Jan 2023 07:36 )             37.0                         11.1   9.51  )-----------( 345      ( 20 Jan 2023 06:47 )             33.5                         11.8   11.32 )-----------( 329      ( 19 Jan 2023 06:35 )             35.8     21 Jan 2023 07:36    136    |  96     |  10     ----------------------------<  132    3.9     |  36     |  0.84   20 Jan 2023 06:47    134    |  94     |  12     ----------------------------<  130    3.4     |  36     |  0.70   19 Jan 2023 06:35    133    |  93     |  13     ----------------------------<  115    4.1     |  37     |  0.82     Ca    8.8        21 Jan 2023 07:36  Ca    8.4        20 Jan 2023 06:47  Ca    8.5        19 Jan 2023 06:35  Phos  1.8       21 Jan 2023 07:36  Phos  2.6       20 Jan 2023 06:47  Phos  1.6       19 Jan 2023 06:35  Mg     1.9       21 Jan 2023 07:36  Mg     1.8       20 Jan 2023 06:47  Mg     1.9       19 Jan 2023 06:35    TPro  7.7    /  Alb  2.1    /  TBili  0.9    /  DBili  x      /  AST  29     /  ALT  22     /  AlkPhos  102    21 Jan 2023 07:36  TPro  7.3    /  Alb  1.9    /  TBili  1.2    /  DBili  x      /  AST  26     /  ALT  20     /  AlkPhos  102    19 Jan 2023 06:35    ACC: [id number] EXAM:  ECHO TTE WO CON COMP W DOPP                          PROCEDURE DATE:  01/07/2023          INTERPRETATION:  INDICATION: Atrial fibrillation  Sonographer KL    Blood Pressure 130/77    Height 175 cm     Weight 99.8 kg       BSA 2.13   sq m    Dimensions:  LA 4.5       Normal Values: 2.0 - 4.0 cm  Ao 3.4        Normal Values: 2.0 - 3.8 cm  SEPTUM 0.9       Normal Values: 0.6 - 1.2 cm  PWT 0.8       Normal Values: 0.6 - 1.1 cm  LVIDd 5.3         Normal Values: 3.0 - 5.6 cm  LVIDs 3.3         Normal Values: 1.8 - 4.0 cm      OBSERVATIONS:  Technically difficult study  Mitral Valve: normal, trace physiologic MR.  Aortic Valve/Aorta: Sclerotic trileaflet aortic valve with grossly normal   opening.  Tricuspid Valve: normal withtrace TR.  Pulmonic Valve: Trace PI  Left Atrium: Enlarged  Right Atrium: Not well-visualized  Left Ventricle: The left ventricular endocardium is not well-visualized.   Overall, grossly normal LV size and systolic function, estimated LVEF of   55%. Cannot rule out segmental abnormalities  Right Ventricle: Not well-visualized  Pericardium: no significant pericardial effusion.    IMPRESSION:  Technically difficult study  The left ventricular endocardium is not well-visualized. Overall, grossly  normal LV size and systolic function, estimated LVEF of 55%.  The right ventricle is not well-visualized  Left atrial enlargement  Sclerotic trileaflet aortic valve with grossly normal opening, without AI.  Trace physiologic MR and TR.  No significant pericardial effusion.    --- End of Report ---    LIANNA CHAIREZ MD; Attending Cardiologist  This document has been electronically signed. Jan 7 2023  2:15PM      ACC: [id number] EXAM:  XR CHEST PA LAT 2V   ORDERED BY: JAMAL GRYASON     PROCEDURE DATE:  01/18/2023          INTERPRETATION:  Chest one view    HISTORY: Hypoxia    COMPARISON STUDY: 1/6/2023    Frontal expiratory view of the chest shows the heart to be similar in   size. Sternal wires are again noted.    The lungs show mild basilar atelectasis with small pleural effusions and   there is no evidence of pneumothorax.    IMPRESSION:  Mild atelectasis.    Thank you for the courtesy of this referral.    --- End of Report ---    TRAVIS YANES MD; Attending Interventional Radiologist  This document has been electronically signed. Jan 19 2023  4:04PM    Ventricular Rate 106 BPM    QRS Duration 90 ms    Q-T Interval 328 ms    QTC Calculation(Bazett) 435 ms    R Axis -26 degrees    T Axis 59 degrees    Diagnosis Line Atrial fibrillation with rapid ventricular response with premature ventricular or aberrantly conducted complexes  Nonspecific ST abnormality  Abnormal ECG  No previous ECGs available  Confirmed by munir Salazar ([id number]) on 1/7/2023 12:56:36 PM              [facility] -- Darleen Thompson Patel, Savella, Goodger  Office # [phone]    Follow Up:  Hx CAD s/p CABG, Afib with RVR    Subjective/Observations: Tolerating RA. States, he is ready to be discharged.  Denies any form of respiratory or cardiac discomfort    REVIEW OF SYSTEMS: All other review of systems is negative unless indicated above  PAST MEDICAL & SURGICAL HISTORY:  HLD (hyperlipidemia)  HTN (hypertension)  CAD (coronary artery disease)  S/P coronary artery stent placement  S/P CABG (coronary artery bypass graft)    MEDICATIONS  (STANDING):  apixaban 5 milliGRAM(s) Oral every 12 hours  atorvastatin 10 milliGRAM(s) Oral at bedtime  benzonatate 100 milliGRAM(s) Oral every 8 hours  clopidogrel Tablet 75 milliGRAM(s) Oral daily  diltiazem    milliGRAM(s) Oral every 24 hours  FIRST- Mouthwash  BLM 5 milliLiter(s) Swish and Spit three times a day  furosemide    Tablet 40 milliGRAM(s) Oral daily  gabapentin 100 milliGRAM(s) Oral two times a day  isosorbide   mononitrate ER Tablet (IMDUR) 60 milliGRAM(s) Oral daily  metoprolol tartrate 50 milliGRAM(s) Oral every 6 hours  polyethylene glycol 3350 17 Gram(s) Oral daily  senna 2 Tablet(s) Oral at bedtime  sucralfate suspension 1 Gram(s) Oral two times a day    MEDICATIONS  (PRN):  benzocaine 20% Spray 1 Spray(s) Topical <User Schedule> PRN Sore Throat  bisacodyl 5 milliGRAM(s) Oral every 12 hours PRN Constipation  guaiFENesin Oral Liquid (Sugar-Free) 200 milliGRAM(s) Oral every 6 hours PRN Cough  ondansetron Injectable 4 milliGRAM(s) IV Push every 8 hours PRN Nausea and/or Vomiting    Allergies    Levaquin (Unknown)  penicillin (Rash)    Intolerances    Vital Signs Last 24 Hrs  T(C): 36.9 (21 Jan 2023 05:16), Max: 37 (20 Jan 2023 17:21)  T(F): 98.4 (21 Jan 2023 05:16), Max: 98.6 (20 Jan 2023 17:21)  HR: 78 (21 Jan 2023 05:16) (78 - 91)  BP: 120/69 (21 Jan 2023 05:16) (101/64 - 120/69)  BP(mean): --  RR: 17 (21 Jan 2023 05:16) (17 - 18)  SpO2: 92% (21 Jan 2023 05:16) (92% - 95%)    Parameters below as of 21 Jan 2023 05:16  Patient On (Oxygen Delivery Method): room air    I&O's Summary    20 Jan 2023 07:01  -  21 Jan 2023 07:00  --------------------------------------------------------  IN: 540 mL / OUT: 500 mL / NET: 40 mL    PHYSICAL EXAM:  TELE: Not on tele  Constitutional: NAD, awake and alert, obese  HEENT: Moist Mucous Membranes, Anicteric  Pulmonary: Non-labored, breath sounds are diminished bilaterally, No wheezing, rales or rhonchi  Cardiovascular: IRRR, S1 and S2, No murmurs, rubs, gallops or clicks  Gastrointestinal: Bowel Sounds present, soft, nontender.   Lymph: No peripheral edema. No lymphadenopathy.  Skin: No visible rashes or ulcers.  Psych:  Mood & affect appropriate    LABS: All Labs Reviewed:                        12.1   9.25  )-----------( 402      ( 21 Jan 2023 07:36 )             37.0                         11.1   9.51  )-----------( 345      ( 20 Jan 2023 06:47 )             33.5                         11.8   11.32 )-----------( 329      ( 19 Jan 2023 06:35 )             35.8     21 Jan 2023 07:36    136    |  96     |  10     ----------------------------<  132    3.9     |  36     |  0.84   20 Jan 2023 06:47    134    |  94     |  12     ----------------------------<  130    3.4     |  36     |  0.70   19 Jan 2023 06:35    133    |  93     |  13     ----------------------------<  115    4.1     |  37     |  0.82     Ca    8.8        21 Jan 2023 07:36  Ca    8.4        20 Jan 2023 06:47  Ca    8.5        19 Jan 2023 06:35  Phos  1.8       21 Jan 2023 07:36  Phos  2.6       20 Jan 2023 06:47  Phos  1.6       19 Jan 2023 06:35  Mg     1.9       21 Jan 2023 07:36  Mg     1.8       20 Jan 2023 06:47  Mg     1.9       19 Jan 2023 06:35    TPro  7.7    /  Alb  2.1    /  TBili  0.9    /  DBili  x      /  AST  29     /  ALT  22     /  AlkPhos  102    21 Jan 2023 07:36  TPro  7.3    /  Alb  1.9    /  TBili  1.2    /  DBili  x      /  AST  26     /  ALT  20     /  AlkPhos  102    19 Jan 2023 06:35    ACC: [id number] EXAM:  ECHO TTE WO CON COMP W DOPP                          PROCEDURE DATE:  01/07/2023          INTERPRETATION:  INDICATION: Atrial fibrillation  Sonographer KL    Blood Pressure 130/77    Height 175 cm     Weight 99.8 kg       BSA 2.13   sq m    Dimensions:  LA 4.5       Normal Values: 2.0 - 4.0 cm  Ao 3.4        Normal Values: 2.0 - 3.8 cm  SEPTUM 0.9       Normal Values: 0.6 - 1.2 cm  PWT 0.8       Normal Values: 0.6 - 1.1 cm  LVIDd 5.3         Normal Values: 3.0 - 5.6 cm  LVIDs 3.3         Normal Values: 1.8 - 4.0 cm      OBSERVATIONS:  Technically difficult study  Mitral Valve: normal, trace physiologic MR.  Aortic Valve/Aorta: Sclerotic trileaflet aortic valve with grossly normal   opening.  Tricuspid Valve: normal withtrace TR.  Pulmonic Valve: Trace PI  Left Atrium: Enlarged  Right Atrium: Not well-visualized  Left Ventricle: The left ventricular endocardium is not well-visualized.   Overall, grossly normal LV size and systolic function, estimated LVEF of   55%. Cannot rule out segmental abnormalities  Right Ventricle: Not well-visualized  Pericardium: no significant pericardial effusion.    IMPRESSION:  Technically difficult study  The left ventricular endocardium is not well-visualized. Overall, grossly  normal LV size and systolic function, estimated LVEF of 55%.  The right ventricle is not well-visualized  Left atrial enlargement  Sclerotic trileaflet aortic valve with grossly normal opening, without AI.  Trace physiologic MR and TR.  No significant pericardial effusion.    --- End of Report ---    LIANNA CHAIREZ MD; Attending Cardiologist  This document has been electronically signed. Jan 7 2023  2:15PM      ACC: [id number] EXAM:  XR CHEST PA LAT 2V   ORDERED BY: JAMAL GRAYSON     PROCEDURE DATE:  01/18/2023          INTERPRETATION:  Chest one view    HISTORY: Hypoxia    COMPARISON STUDY: 1/6/2023    Frontal expiratory view of the chest shows the heart to be similar in   size. Sternal wires are again noted.    The lungs show mild basilar atelectasis with small pleural effusions and   there is no evidence of pneumothorax.    IMPRESSION:  Mild atelectasis.    Thank you for the courtesy of this referral.    --- End of Report ---    TRAVIS YANES MD; Attending Interventional Radiologist  This document has been electronically signed. Jan 19 2023  4:04PM    Ventricular Rate 106 BPM    QRS Duration 90 ms    Q-T Interval 328 ms    QTC Calculation(Bazett) 435 ms    R Axis -26 degrees    T Axis 59 degrees    Diagnosis Line Atrial fibrillation with rapid ventricular response with premature ventricular or aberrantly conducted complexes  Nonspecific ST abnormality  Abnormal ECG  No previous ECGs available  Confirmed by munir Salazar ([id number]) on 1/7/2023 12:56:36 PM

## 2023-01-21 NOTE — PROGRESS NOTE ADULT - SUBJECTIVE AND OBJECTIVE BOX
Date/Time Patient Seen:  		  Referring MD:   Data Reviewed	       Patient is a 82y old  Male who presents with a chief complaint of CHF, A fib, Flu (20 Jan 2023 12:31)      Subjective/HPI     PAST MEDICAL & SURGICAL HISTORY:  Congestive heart failure (CHF)    Atrial fibrillation    HLD (hyperlipidemia)    HTN (hypertension)    CAD (coronary artery disease)    S/P coronary artery stent placement    S/P CABG (coronary artery bypass graft)          Medication list         MEDICATIONS  (STANDING):  apixaban 5 milliGRAM(s) Oral every 12 hours  atorvastatin 10 milliGRAM(s) Oral at bedtime  benzonatate 100 milliGRAM(s) Oral every 8 hours  clopidogrel Tablet 75 milliGRAM(s) Oral daily  diltiazem    milliGRAM(s) Oral every 24 hours  FIRST- Mouthwash  BLM 5 milliLiter(s) Swish and Spit three times a day  furosemide    Tablet 40 milliGRAM(s) Oral daily  gabapentin 100 milliGRAM(s) Oral two times a day  isosorbide   mononitrate ER Tablet (IMDUR) 60 milliGRAM(s) Oral daily  metoprolol tartrate 50 milliGRAM(s) Oral every 6 hours  polyethylene glycol 3350 17 Gram(s) Oral daily  senna 2 Tablet(s) Oral at bedtime  sucralfate suspension 1 Gram(s) Oral two times a day    MEDICATIONS  (PRN):  benzocaine 20% Spray 1 Spray(s) Topical <User Schedule> PRN Sore Throat  bisacodyl 5 milliGRAM(s) Oral every 12 hours PRN Constipation  guaiFENesin Oral Liquid (Sugar-Free) 200 milliGRAM(s) Oral every 6 hours PRN Cough  ondansetron Injectable 4 milliGRAM(s) IV Push every 8 hours PRN Nausea and/or Vomiting         Vitals log        ICU Vital Signs Last 24 Hrs  T(C): 36.9 (21 Jan 2023 05:16), Max: 37 (20 Jan 2023 17:21)  T(F): 98.4 (21 Jan 2023 05:16), Max: 98.6 (20 Jan 2023 17:21)  HR: 78 (21 Jan 2023 05:16) (78 - 91)  BP: 120/69 (21 Jan 2023 05:16) (101/64 - 120/69)  BP(mean): --  ABP: --  ABP(mean): --  RR: 17 (21 Jan 2023 05:16) (17 - 18)  SpO2: 92% (21 Jan 2023 05:16) (92% - 95%)    O2 Parameters below as of 21 Jan 2023 05:16  Patient On (Oxygen Delivery Method): room air                 Input and Output:  I&O's Detail    19 Jan 2023 07:01  -  20 Jan 2023 07:00  --------------------------------------------------------  IN:    IV PiggyBack: 750 mL    Oral Fluid: 720 mL  Total IN: 1470 mL    OUT:  Total OUT: 0 mL    Total NET: 1470 mL      20 Jan 2023 07:01  -  21 Jan 2023 06:38  --------------------------------------------------------  IN:    Oral Fluid: 540 mL  Total IN: 540 mL    OUT:    Voided (mL): 500 mL  Total OUT: 500 mL    Total NET: 40 mL          Lab Data                        11.1   9.51  )-----------( 345      ( 20 Jan 2023 06:47 )             33.5     01-20    134<L>  |  94<L>  |  12  ----------------------------<  130<H>  3.4<L>   |  36<H>  |  0.70    Ca    8.4<L>      20 Jan 2023 06:47  Phos  2.6     01-20  Mg     1.8     01-20              Review of Systems	      Objective     Physical Examination    heart s1s2  lung dc BS  head nc      Pertinent Lab findings & Imaging      Christiano:  NO   Adequate UO     I&O's Detail    19 Jan 2023 07:01  -  20 Jan 2023 07:00  --------------------------------------------------------  IN:    IV PiggyBack: 750 mL    Oral Fluid: 720 mL  Total IN: 1470 mL    OUT:  Total OUT: 0 mL    Total NET: 1470 mL      20 Jan 2023 07:01  -  21 Jan 2023 06:38  --------------------------------------------------------  IN:    Oral Fluid: 540 mL  Total IN: 540 mL    OUT:    Voided (mL): 500 mL  Total OUT: 500 mL    Total NET: 40 mL               Discussed with:     Cultures:	        Radiology

## 2023-01-21 NOTE — PROGRESS NOTE ADULT - ASSESSMENT
82 year old male PMH of CAD s/p stent, s/p CABG, HTN, HLD, CHF p/w shortness of breath and cough for the last 5 days found to be in A fib with RVR, congestive HF, and influenza positive.    CAD s/p CABG, stent, Afib with RVR, HFpEF, HTN  - Hypoxemia more of pulmonary in etiology (+Flu)  - Remains in NC but no evidence of left HF.  Downtitrate and D/C as tolerated  - TTE with EF 55%, LAE, with no significant valvular pathology  - pro BNP 2418-->959  - Continue to encourage incentive spirometer  - Not on home Lasix but can continue Lasix PO 40 mg daily  - Strict i/o's and daily weights    - No anginal symptoms, no sign of acute ischemia, and troponin are negative  - Continue ASA, Plavix and statin  - Continue Imdur    - Afib with rates improved and controlled, tachycardia  with some reactive component (in the setting of flu)  - Continue BB and Cardizem   - Continue Eliquis    - BP stable and controlled     - Monitor and replete lytes, keep K>4, Mg>2.  - Will continue to follow.    Ana Luisa Holcomb DNP, NP-C  Cardiology   Spectra #[phone]     82 year old male PMH of CAD s/p stent, s/p CABG, HTN, HLD, CHF p/w shortness of breath and cough for the last 5 days found to be in A fib with RVR, congestive HF, and influenza positive.    CAD s/p CABG, stent, Afib with RVR, HFpEF, HTN  - Hypoxemia more of pulmonary in etiology (+Flu/Pna), s/p Tamiflu and Abx  - Now on RA.  Ambulates with no SIDHU  - TTE with EF 55%, LAE, with no significant valvular pathology  - pro BNP 2418-->959 , no clear evidence of volume overload  - Continue to encourage incentive spirometer  - Continue Lasix PO 40 mg daily  - Strict i/o's and daily weights    - No anginal symptoms, no sign of acute ischemia, and troponin are negative  - Continue ASA, Plavix and statin  - Continue Imdur    - Afib with rates improved and controlled, tachycardia  with some reactive component (in the setting of flu)  - Continue BB and Cardizem   - Continue Eliquis    - BP stable and controlled     - Monitor and replete lytes, keep K>4, Mg>2.  - Will continue to follow.    Ana Luisa Holcomb DNP, NP-C  Cardiology   Spectra #[phone]

## 2023-01-21 NOTE — PROGRESS NOTE ADULT - SUBJECTIVE AND OBJECTIVE BOX
-------------------------------NOTE IN PROGRESS----------------------------  Patient is a 82y old  Male who presents with a chief complaint of CHF, A fib, Flu (21 Jan 2023 09:38)      INTERVAL HPI/OVERNIGHT EVENTS: Patient seen and examined at bedside. No overnight events occurred. Patient has no complaints at this time. Denies fevers, chills, headache, lightheadedness, chest pain, dyspnea, abdominal pain, n/v/d/c.    MEDICATIONS  (STANDING):  apixaban 5 milliGRAM(s) Oral every 12 hours  atorvastatin 10 milliGRAM(s) Oral at bedtime  benzonatate 100 milliGRAM(s) Oral every 8 hours  clopidogrel Tablet 75 milliGRAM(s) Oral daily  diltiazem    milliGRAM(s) Oral every 24 hours  FIRST- Mouthwash  BLM 5 milliLiter(s) Swish and Spit three times a day  furosemide    Tablet 40 milliGRAM(s) Oral daily  gabapentin 100 milliGRAM(s) Oral two times a day  isosorbide   mononitrate ER Tablet (IMDUR) 60 milliGRAM(s) Oral daily  metoprolol tartrate 50 milliGRAM(s) Oral every 6 hours  polyethylene glycol 3350 17 Gram(s) Oral daily  potassium phosphate / sodium phosphate Powder (PHOS-NaK) 1 Packet(s) Oral four times a day before meals  senna 2 Tablet(s) Oral at bedtime  sucralfate suspension 1 Gram(s) Oral two times a day    MEDICATIONS  (PRN):  benzocaine 20% Spray 1 Spray(s) Topical <User Schedule> PRN Sore Throat  bisacodyl 5 milliGRAM(s) Oral every 12 hours PRN Constipation  guaiFENesin Oral Liquid (Sugar-Free) 200 milliGRAM(s) Oral every 6 hours PRN Cough  ondansetron Injectable 4 milliGRAM(s) IV Push every 8 hours PRN Nausea and/or Vomiting      Allergies    Levaquin (Unknown)  penicillin (Rash)    Intolerances        REVIEW OF SYSTEMS:  CONSTITUTIONAL: No fever or chills  HEENT:  No headache, no sore throat  RESPIRATORY: No cough, wheezing, or shortness of breath  CARDIOVASCULAR: No chest pain, palpitations  GASTROINTESTINAL: No abd pain, nausea, vomiting, or diarrhea  GENITOURINARY: No dysuria, frequency, or hematuria  NEUROLOGICAL: no focal weakness or dizziness  MUSCULOSKELETAL: no myalgias     Vital Signs Last 24 Hrs  T(C): 36.9 (21 Jan 2023 05:16), Max: 37 (20 Jan 2023 17:21)  T(F): 98.4 (21 Jan 2023 05:16), Max: 98.6 (20 Jan 2023 17:21)  HR: 78 (21 Jan 2023 05:16) (78 - 91)  BP: 120/69 (21 Jan 2023 05:16) (101/64 - 120/69)  BP(mean): --  RR: 17 (21 Jan 2023 05:16) (17 - 18)  SpO2: 92% (21 Jan 2023 05:16) (92% - 95%)    Parameters below as of 21 Jan 2023 05:16  Patient On (Oxygen Delivery Method): room air        PHYSICAL EXAM:  GENERAL: NAD  HEENT:  anicteric, moist mucous membranes  CHEST/LUNG:  CTA b/l, no rales, wheezes, or rhonchi  HEART:  RRR, S1, S2  ABDOMEN:  BS+, soft, nontender, nondistended  EXTREMITIES: no edema, cyanosis, or calf tenderness  NERVOUS SYSTEM: answers questions and follows commands appropriately    LABS:                        12.1   9.25  )-----------( 402      ( 21 Jan 2023 07:36 )             37.0     CBC Full  -  ( 21 Jan 2023 07:36 )  WBC Count : 9.25 K/uL  Hemoglobin : 12.1 g/dL  Hematocrit : 37.0 %  Platelet Count - Automated : 402 K/uL  Mean Cell Volume : 87.9 fl  Mean Cell Hemoglobin : 28.7 pg  Mean Cell Hemoglobin Concentration : 32.7 gm/dL  Auto Neutrophil # : 5.97 K/uL  Auto Lymphocyte # : 2.27 K/uL  Auto Monocyte # : 0.64 K/uL  Auto Eosinophil # : 0.14 K/uL  Auto Basophil # : 0.06 K/uL  Auto Neutrophil % : 64.7 %  Auto Lymphocyte % : 24.5 %  Auto Monocyte % : 6.9 %  Auto Eosinophil % : 1.5 %  Auto Basophil % : 0.6 %    21 Jan 2023 07:36    136    |  96     |  10     ----------------------------<  132    3.9     |  36     |  0.84     Ca    8.8        21 Jan 2023 07:36  Phos  1.8       21 Jan 2023 07:36  Mg     1.9       21 Jan 2023 07:36    TPro  7.7    /  Alb  2.1    /  TBili  0.9    /  DBili  x      /  AST  29     /  ALT  22     /  AlkPhos  102    21 Jan 2023 07:36        CAPILLARY BLOOD GLUCOSE              RADIOLOGY & ADDITIONAL TESTS:    Personally reviewed.     Consultant(s) Notes Reviewed:  [x] YES  [ ] NO     Patient is a 82y old  Male who presents with a chief complaint of CHF, A fib, Flu (21 Jan 2023 09:38)      INTERVAL HPI/OVERNIGHT EVENTS: Patient seen and examined at bedside. No overnight events occurred. Patient has no complaints at this time. Denies fevers, chills, headache, lightheadedness, chest pain, dyspnea, abdominal pain, n/v/d/c. Patient saturating 92% on RA. Son-in-law present at bedside. Patient stable for d/c to [facility] pending authorization. Discharge plan for Monday to [facility].      MEDICATIONS  (STANDING):  apixaban 5 milliGRAM(s) Oral every 12 hours  atorvastatin 10 milliGRAM(s) Oral at bedtime  benzonatate 100 milliGRAM(s) Oral every 8 hours  clopidogrel Tablet 75 milliGRAM(s) Oral daily  diltiazem    milliGRAM(s) Oral every 24 hours  FIRST- Mouthwash  BLM 5 milliLiter(s) Swish and Spit three times a day  furosemide    Tablet 40 milliGRAM(s) Oral daily  gabapentin 100 milliGRAM(s) Oral two times a day  isosorbide   mononitrate ER Tablet (IMDUR) 60 milliGRAM(s) Oral daily  metoprolol tartrate 50 milliGRAM(s) Oral every 6 hours  polyethylene glycol 3350 17 Gram(s) Oral daily  potassium phosphate / sodium phosphate Powder (PHOS-NaK) 1 Packet(s) Oral four times a day before meals  senna 2 Tablet(s) Oral at bedtime  sucralfate suspension 1 Gram(s) Oral two times a day    MEDICATIONS  (PRN):  benzocaine 20% Spray 1 Spray(s) Topical <User Schedule> PRN Sore Throat  bisacodyl 5 milliGRAM(s) Oral every 12 hours PRN Constipation  guaiFENesin Oral Liquid (Sugar-Free) 200 milliGRAM(s) Oral every 6 hours PRN Cough  ondansetron Injectable 4 milliGRAM(s) IV Push every 8 hours PRN Nausea and/or Vomiting      Allergies    Levaquin (Unknown)  penicillin (Rash)    Intolerances        REVIEW OF SYSTEMS:  CONSTITUTIONAL: No fever or chills  HEENT:  No headache, no sore throat  RESPIRATORY: +cough improving,  No wheezing, or shortness of breath  CARDIOVASCULAR: No chest pain, palpitations  GASTROINTESTINAL: No abd pain, nausea, vomiting, or diarrhea  GENITOURINARY: No dysuria, frequency, or hematuria  NEUROLOGICAL: no focal weakness or dizziness  MUSCULOSKELETAL: no myalgias     Vital Signs Last 24 Hrs  T(C): 36.9 (21 Jan 2023 05:16), Max: 37 (20 Jan 2023 17:21)  T(F): 98.4 (21 Jan 2023 05:16), Max: 98.6 (20 Jan 2023 17:21)  HR: 78 (21 Jan 2023 05:16) (78 - 91)  BP: 120/69 (21 Jan 2023 05:16) (101/64 - 120/69)  BP(mean): --  RR: 17 (21 Jan 2023 05:16) (17 - 18)  SpO2: 92% (21 Jan 2023 05:16) (92% - 95%)    Parameters below as of 21 Jan 2023 05:16  Patient On (Oxygen Delivery Method): room air        PHYSICAL EXAM:  GENERAL: NAD, sitting up in bed comfortably  HEENT:  anicteric, moist mucous membranes  CHEST/LUNG:  mild crackles b/l, no rales, wheezes, or rhonchi  HEART:  IRRR, S1, S2  ABDOMEN:  BS+, soft, nontender, nondistended  EXTREMITIES: no edema, cyanosis, or calf tenderness  NERVOUS SYSTEM: AAOx3, answers questions and follows commands appropriately    LABS:                        12.1   9.25  )-----------( 402      ( 21 Jan 2023 07:36 )             37.0     CBC Full  -  ( 21 Jan 2023 07:36 )  WBC Count : 9.25 K/uL  Hemoglobin : 12.1 g/dL  Hematocrit : 37.0 %  Platelet Count - Automated : 402 K/uL  Mean Cell Volume : 87.9 fl  Mean Cell Hemoglobin : 28.7 pg  Mean Cell Hemoglobin Concentration : 32.7 gm/dL  Auto Neutrophil # : 5.97 K/uL  Auto Lymphocyte # : 2.27 K/uL  Auto Monocyte # : 0.64 K/uL  Auto Eosinophil # : 0.14 K/uL  Auto Basophil # : 0.06 K/uL  Auto Neutrophil % : 64.7 %  Auto Lymphocyte % : 24.5 %  Auto Monocyte % : 6.9 %  Auto Eosinophil % : 1.5 %  Auto Basophil % : 0.6 %    21 Jan 2023 07:36    136    |  96     |  10     ----------------------------<  132    3.9     |  36     |  0.84     Ca    8.8        21 Jan 2023 07:36  Phos  1.8       21 Jan 2023 07:36  Mg     1.9       21 Jan 2023 07:36    TPro  7.7    /  Alb  2.1    /  TBili  0.9    /  DBili  x      /  AST  29     /  ALT  22     /  AlkPhos  102    21 Jan 2023 07:36        CAPILLARY BLOOD GLUCOSE              RADIOLOGY & ADDITIONAL TESTS:    Personally reviewed.     Consultant(s) Notes Reviewed:  [x] YES  [ ] NO

## 2023-01-21 NOTE — PROGRESS NOTE ADULT - PROBLEM SELECTOR PLAN 1
Acute respiratory failure as a result of Influenza with superimposed bacterial PNA  - Sputum culture growing Pseudomonas +  - MRSA/MSSA nares PCR negative  - Legionella negative   - On 2L O2 NC  - Completed Cefepime 2gm q8hr - last dose 1/18  - ID Dr. Barrera following, recs appreciated Acute respiratory failure as a result of Influenza with superimposed bacterial PNA  - Sputum culture growing Pseudomonas +  - MRSA/MSSA nares PCR negative  - Legionella negative   - Completed Cefepime 2gm q8hr - last dose 1/18  - Saturating 92% on RA  - ID Dr. Barrera following, recs appreciated

## 2023-01-21 NOTE — PROGRESS NOTE ADULT - ASSESSMENT
81 y/o m w/ PMH od CAD s/p stent, s/p CABG, HTN, HLD, CHF p/w shortness of breath and cough    on lasix  SLP noted    s/p tamiflu  s/p diflucan  s/p emp ABX for pna  vs noted - labs reviewed - imaging reviewed -   [facility] rx regimen - diuresis   I and O  s/p ICU care and course  Cardio and ID follow up  cough rx regimen  nutrition  GOC discussion  replete lytes  on AC for AF Picato Counseling:  I discussed with the patient the risks of Picato including but not limited to erythema, scaling, itching, weeping, crusting, and pain.

## 2023-01-22 LAB
ANION GAP SERPL CALC-SCNC: 3 MMOL/L — LOW (ref 5–17)
BUN SERPL-MCNC: 12 MG/DL — SIGNIFICANT CHANGE UP (ref 7–23)
CALCIUM SERPL-MCNC: 8.4 MG/DL — LOW (ref 8.5–10.1)
CHLORIDE SERPL-SCNC: 98 MMOL/L — SIGNIFICANT CHANGE UP (ref 96–108)
CO2 SERPL-SCNC: 36 MMOL/L — HIGH (ref 22–31)
CREAT SERPL-MCNC: 0.85 MG/DL — SIGNIFICANT CHANGE UP (ref 0.5–1.3)
EGFR: 88 ML/MIN/1.73M2 — SIGNIFICANT CHANGE UP
GLUCOSE SERPL-MCNC: 127 MG/DL — HIGH (ref 70–99)
MAGNESIUM SERPL-MCNC: 1.7 MG/DL — SIGNIFICANT CHANGE UP (ref 1.6–2.6)
PHOSPHATE SERPL-MCNC: 2.3 MG/DL — LOW (ref 2.5–4.5)
POTASSIUM SERPL-MCNC: 3.4 MMOL/L — LOW (ref 3.5–5.3)
POTASSIUM SERPL-SCNC: 3.4 MMOL/L — LOW (ref 3.5–5.3)
SODIUM SERPL-SCNC: 137 MMOL/L — SIGNIFICANT CHANGE UP (ref 135–145)

## 2023-01-22 PROCEDURE — 99232 SBSQ HOSP IP/OBS MODERATE 35: CPT | Mod: GC

## 2023-01-22 PROCEDURE — 99232 SBSQ HOSP IP/OBS MODERATE 35: CPT

## 2023-01-22 RX ORDER — SODIUM,POTASSIUM PHOSPHATES 278-250MG
1 POWDER IN PACKET (EA) ORAL
Refills: 0 | Status: COMPLETED | OUTPATIENT
Start: 2023-01-22 | End: 2023-01-23

## 2023-01-22 RX ORDER — POTASSIUM CHLORIDE 20 MEQ
40 PACKET (EA) ORAL ONCE
Refills: 0 | Status: COMPLETED | OUTPATIENT
Start: 2023-01-22 | End: 2023-01-22

## 2023-01-22 RX ADMIN — DIPHENHYDRAMINE HYDROCHLORIDE AND LIDOCAINE HYDROCHLORIDE AND ALUMINUM HYDROXIDE AND MAGNESIUM HYDRO 5 MILLILITER(S): KIT at 12:50

## 2023-01-22 RX ADMIN — Medication 40 MILLIEQUIVALENT(S): at 10:40

## 2023-01-22 RX ADMIN — Medication 1 PACKET(S): at 08:17

## 2023-01-22 RX ADMIN — Medication 100 MILLIGRAM(S): at 06:01

## 2023-01-22 RX ADMIN — ATORVASTATIN CALCIUM 10 MILLIGRAM(S): 80 TABLET, FILM COATED ORAL at 21:06

## 2023-01-22 RX ADMIN — APIXABAN 5 MILLIGRAM(S): 2.5 TABLET, FILM COATED ORAL at 17:14

## 2023-01-22 RX ADMIN — Medication 1 PACKET(S): at 21:06

## 2023-01-22 RX ADMIN — Medication 1 PACKET(S): at 12:45

## 2023-01-22 RX ADMIN — DIPHENHYDRAMINE HYDROCHLORIDE AND LIDOCAINE HYDROCHLORIDE AND ALUMINUM HYDROXIDE AND MAGNESIUM HYDRO 5 MILLILITER(S): KIT at 06:03

## 2023-01-22 RX ADMIN — Medication 1 GRAM(S): at 17:16

## 2023-01-22 RX ADMIN — Medication 100 MILLIGRAM(S): at 12:46

## 2023-01-22 RX ADMIN — Medication 1 PACKET(S): at 17:14

## 2023-01-22 RX ADMIN — Medication 40 MILLIGRAM(S): at 06:01

## 2023-01-22 RX ADMIN — Medication 50 MILLIGRAM(S): at 12:45

## 2023-01-22 RX ADMIN — CLOPIDOGREL BISULFATE 75 MILLIGRAM(S): 75 TABLET, FILM COATED ORAL at 12:45

## 2023-01-22 RX ADMIN — GABAPENTIN 100 MILLIGRAM(S): 400 CAPSULE ORAL at 17:15

## 2023-01-22 RX ADMIN — APIXABAN 5 MILLIGRAM(S): 2.5 TABLET, FILM COATED ORAL at 06:01

## 2023-01-22 RX ADMIN — Medication 240 MILLIGRAM(S): at 12:45

## 2023-01-22 RX ADMIN — GABAPENTIN 100 MILLIGRAM(S): 400 CAPSULE ORAL at 06:01

## 2023-01-22 RX ADMIN — DIPHENHYDRAMINE HYDROCHLORIDE AND LIDOCAINE HYDROCHLORIDE AND ALUMINUM HYDROXIDE AND MAGNESIUM HYDRO 5 MILLILITER(S): KIT at 21:08

## 2023-01-22 RX ADMIN — SENNA PLUS 2 TABLET(S): 8.6 TABLET ORAL at 21:06

## 2023-01-22 RX ADMIN — Medication 1 GRAM(S): at 06:01

## 2023-01-22 RX ADMIN — Medication 50 MILLIGRAM(S): at 06:01

## 2023-01-22 RX ADMIN — ISOSORBIDE MONONITRATE 60 MILLIGRAM(S): 60 TABLET, EXTENDED RELEASE ORAL at 12:46

## 2023-01-22 RX ADMIN — Medication 50 MILLIGRAM(S): at 00:23

## 2023-01-22 RX ADMIN — Medication 50 MILLIGRAM(S): at 17:14

## 2023-01-22 RX ADMIN — Medication 100 MILLIGRAM(S): at 21:05

## 2023-01-22 RX ADMIN — Medication 50 MILLIGRAM(S): at 23:03

## 2023-01-22 NOTE — PROGRESS NOTE ADULT - SUBJECTIVE AND OBJECTIVE BOX
Date/Time Patient Seen:  		  Referring MD:   Data Reviewed	       Patient is a 82y old  Male who presents with a chief complaint of CHF, A fib, Flu (21 Jan 2023 10:12)      Subjective/HPI     PAST MEDICAL & SURGICAL HISTORY:  Congestive heart failure (CHF)    Atrial fibrillation    HLD (hyperlipidemia)    HTN (hypertension)    CAD (coronary artery disease)    S/P coronary artery stent placement    S/P CABG (coronary artery bypass graft)          Medication list         MEDICATIONS  (STANDING):  apixaban 5 milliGRAM(s) Oral every 12 hours  atorvastatin 10 milliGRAM(s) Oral at bedtime  benzonatate 100 milliGRAM(s) Oral every 8 hours  clopidogrel Tablet 75 milliGRAM(s) Oral daily  diltiazem    milliGRAM(s) Oral every 24 hours  FIRST- Mouthwash  BLM 5 milliLiter(s) Swish and Spit three times a day  furosemide    Tablet 40 milliGRAM(s) Oral daily  gabapentin 100 milliGRAM(s) Oral two times a day  isosorbide   mononitrate ER Tablet (IMDUR) 60 milliGRAM(s) Oral daily  metoprolol tartrate 50 milliGRAM(s) Oral every 6 hours  polyethylene glycol 3350 17 Gram(s) Oral daily  potassium phosphate / sodium phosphate Powder (PHOS-NaK) 1 Packet(s) Oral four times a day before meals  senna 2 Tablet(s) Oral at bedtime  sucralfate suspension 1 Gram(s) Oral two times a day    MEDICATIONS  (PRN):  benzocaine 20% Spray 1 Spray(s) Topical <User Schedule> PRN Sore Throat  bisacodyl 5 milliGRAM(s) Oral every 12 hours PRN Constipation  guaiFENesin Oral Liquid (Sugar-Free) 200 milliGRAM(s) Oral every 6 hours PRN Cough  ondansetron Injectable 4 milliGRAM(s) IV Push every 8 hours PRN Nausea and/or Vomiting         Vitals log        ICU Vital Signs Last 24 Hrs  T(C): 36.2 (22 Jan 2023 04:42), Max: 36.8 (21 Jan 2023 13:36)  T(F): 97.2 (22 Jan 2023 04:42), Max: 98.2 (21 Jan 2023 13:36)  HR: 72 (22 Jan 2023 04:42) (72 - 93)  BP: 120/63 (22 Jan 2023 04:42) (104/61 - 123/73)  BP(mean): --  ABP: --  ABP(mean): --  RR: 19 (22 Jan 2023 04:42) (18 - 19)  SpO2: 91% (22 Jan 2023 04:42) (91% - 94%)    O2 Parameters below as of 22 Jan 2023 04:42  Patient On (Oxygen Delivery Method): room air                 Input and Output:  I&O's Detail      Lab Data                        12.1   9.25  )-----------( 402      ( 21 Jan 2023 07:36 )             37.0     01-21    136  |  96  |  10  ----------------------------<  132<H>  3.9   |  36<H>  |  0.84    Ca    8.8      21 Jan 2023 07:36  Phos  1.8     01-21  Mg     1.9     01-21    TPro  7.7  /  Alb  2.1<L>  /  TBili  0.9  /  DBili  x   /  AST  29  /  ALT  22  /  AlkPhos  102  01-21            Review of Systems	      Objective     Physical Examination    heart s1s2  lung dc BS  head nc      Pertinent Lab findings & Imaging      Christiano:  NO   Adequate UO     I&O's Detail           Discussed with:     Cultures:	        Radiology

## 2023-01-22 NOTE — PROGRESS NOTE ADULT - SUBJECTIVE AND OBJECTIVE BOX
[facility] -- Pierre Mcfarlane, Donna, Ge Morejon Savella, Goodger  Office # [phone]    Follow Up:   Hx CAD s/p CABG, Afib with RVR    Subjective/Observations:  Tolerating RA. States, he is ready to be discharged.  Denies any form of respiratory or cardiac discomfort    REVIEW OF SYSTEMS: All other review of systems is negative unless indicated above  PAST MEDICAL & SURGICAL HISTORY:  HLD (hyperlipidemia)  HTN (hypertension)  CAD (coronary artery disease)  S/P coronary artery stent placement  S/P CABG (coronary artery bypass graft)    MEDICATIONS  (STANDING):  apixaban 5 milliGRAM(s) Oral every 12 hours  atorvastatin 10 milliGRAM(s) Oral at bedtime  benzonatate 100 milliGRAM(s) Oral every 8 hours  clopidogrel Tablet 75 milliGRAM(s) Oral daily  diltiazem    milliGRAM(s) Oral every 24 hours  FIRST- Mouthwash  BLM 5 milliLiter(s) Swish and Spit three times a day  furosemide    Tablet 40 milliGRAM(s) Oral daily  gabapentin 100 milliGRAM(s) Oral two times a day  isosorbide   mononitrate ER Tablet (IMDUR) 60 milliGRAM(s) Oral daily  metoprolol tartrate 50 milliGRAM(s) Oral every 6 hours  polyethylene glycol 3350 17 Gram(s) Oral daily  senna 2 Tablet(s) Oral at bedtime  sucralfate suspension 1 Gram(s) Oral two times a day    MEDICATIONS  (PRN):  benzocaine 20% Spray 1 Spray(s) Topical <User Schedule> PRN Sore Throat  bisacodyl 5 milliGRAM(s) Oral every 12 hours PRN Constipation  guaiFENesin Oral Liquid (Sugar-Free) 200 milliGRAM(s) Oral every 6 hours PRN Cough  ondansetron Injectable 4 milliGRAM(s) IV Push every 8 hours PRN Nausea and/or Vomiting    Allergies    Levaquin (Unknown)  penicillin (Rash)    Intolerances    Vital Signs Last 24 Hrs  T(C): 36.2 (22 Jan 2023 04:42), Max: 36.8 (21 Jan 2023 13:36)  T(F): 97.2 (22 Jan 2023 04:42), Max: 98.2 (21 Jan 2023 13:36)  HR: 72 (22 Jan 2023 04:42) (72 - 93)  BP: 120/63 (22 Jan 2023 04:42) (104/61 - 123/73)  BP(mean): --  RR: 19 (22 Jan 2023 04:42) (18 - 19)  SpO2: 91% (22 Jan 2023 04:42) (91% - 94%)    Parameters below as of 22 Jan 2023 04:42  Patient On (Oxygen Delivery Method): room air    I&O's Summary        LABS: All Labs Reviewed:                        12.1   9.25  )-----------( 402      ( 21 Jan 2023 07:36 )             37.0                         11.1   9.51  )-----------( 345      ( 20 Jan 2023 06:47 )             33.5     22 Jan 2023 07:17    137    |  98     |  12     ----------------------------<  127    3.4     |  36     |  0.85   21 Jan 2023 07:36    136    |  96     |  10     ----------------------------<  132    3.9     |  36     |  0.84   20 Jan 2023 06:47    134    |  94     |  12     ----------------------------<  130    3.4     |  36     |  0.70     Ca    8.4        22 Jan 2023 07:17  Ca    8.8        21 Jan 2023 07:36  Ca    8.4        20 Jan 2023 06:47  Phos  2.3       22 Jan 2023 07:17  Phos  1.8       21 Jan 2023 07:36  Phos  2.6       20 Jan 2023 06:47  Mg     1.7       22 Jan 2023 07:17  Mg     1.9       21 Jan 2023 07:36  Mg     1.8       20 Jan 2023 06:47    TPro  7.7    /  Alb  2.1    /  TBili  0.9    /  DBili  x      /  AST  29     /  ALT  22     /  AlkPhos  102    21 Jan 2023 07:36             Ana Luisa Holcomb DNP, NP-C  Cardiology   Spectra #[phone]      [facility] -- Pierre Mcfarlane, Donna, Ge Morejon Savella, Goodger  Office # [phone]    Follow Up:   Hx CAD s/p CABG, Afib with RVR    Subjective/Observations:  Tolerating RA. States, he is ready to be discharged.  Denies any form of respiratory or cardiac discomfort    REVIEW OF SYSTEMS: All other review of systems is negative unless indicated above  PAST MEDICAL & SURGICAL HISTORY:  HLD (hyperlipidemia)  HTN (hypertension)  CAD (coronary artery disease)  S/P coronary artery stent placement  S/P CABG (coronary artery bypass graft)    MEDICATIONS  (STANDING):  apixaban 5 milliGRAM(s) Oral every 12 hours  atorvastatin 10 milliGRAM(s) Oral at bedtime  benzonatate 100 milliGRAM(s) Oral every 8 hours  clopidogrel Tablet 75 milliGRAM(s) Oral daily  diltiazem    milliGRAM(s) Oral every 24 hours  FIRST- Mouthwash  BLM 5 milliLiter(s) Swish and Spit three times a day  furosemide    Tablet 40 milliGRAM(s) Oral daily  gabapentin 100 milliGRAM(s) Oral two times a day  isosorbide   mononitrate ER Tablet (IMDUR) 60 milliGRAM(s) Oral daily  metoprolol tartrate 50 milliGRAM(s) Oral every 6 hours  polyethylene glycol 3350 17 Gram(s) Oral daily  senna 2 Tablet(s) Oral at bedtime  sucralfate suspension 1 Gram(s) Oral two times a day    MEDICATIONS  (PRN):  benzocaine 20% Spray 1 Spray(s) Topical <User Schedule> PRN Sore Throat  bisacodyl 5 milliGRAM(s) Oral every 12 hours PRN Constipation  guaiFENesin Oral Liquid (Sugar-Free) 200 milliGRAM(s) Oral every 6 hours PRN Cough  ondansetron Injectable 4 milliGRAM(s) IV Push every 8 hours PRN Nausea and/or Vomiting    Allergies    Levaquin (Unknown)  penicillin (Rash)    Intolerances    Vital Signs Last 24 Hrs  T(C): 36.2 (22 Jan 2023 04:42), Max: 36.8 (21 Jan 2023 13:36)  T(F): 97.2 (22 Jan 2023 04:42), Max: 98.2 (21 Jan 2023 13:36)  HR: 72 (22 Jan 2023 04:42) (72 - 93)  BP: 120/63 (22 Jan 2023 04:42) (104/61 - 123/73)  BP(mean): --  RR: 19 (22 Jan 2023 04:42) (18 - 19)  SpO2: 91% (22 Jan 2023 04:42) (91% - 94%)    Parameters below as of 22 Jan 2023 04:42  Patient On (Oxygen Delivery Method): room air    I&O's Summary      PHYSICAL EXAM:  TELE: Not on tele  Constitutional: NAD, awake and alert, obese  HEENT: Moist Mucous Membranes, Anicteric  Pulmonary: Non-labored, breath sounds are diminished bilaterally, No wheezing, rales + rhonchi R>L  Cardiovascular: IRRR, S1 and S2, No murmurs, rubs, gallops or clicks  Gastrointestinal: Bowel Sounds present, soft, nontender.   Lymph: No peripheral edema. No lymphadenopathy.  Skin: No visible rashes or ulcers.  Psych:  Mood & affect appropriate  LABS: All Labs Reviewed:                        12.1   9.25  )-----------( 402      ( 21 Jan 2023 07:36 )             37.0                         11.1   9.51  )-----------( 345      ( 20 Jan 2023 06:47 )             33.5     22 Jan 2023 07:17    137    |  98     |  12     ----------------------------<  127    3.4     |  36     |  0.85   21 Jan 2023 07:36    136    |  96     |  10     ----------------------------<  132    3.9     |  36     |  0.84   20 Jan 2023 06:47    134    |  94     |  12     ----------------------------<  130    3.4     |  36     |  0.70     Ca    8.4        22 Jan 2023 07:17  Ca    8.8        21 Jan 2023 07:36  Ca    8.4        20 Jan 2023 06:47  Phos  2.3       22 Jan 2023 07:17  Phos  1.8       21 Jan 2023 07:36  Phos  2.6       20 Jan 2023 06:47  Mg     1.7       22 Jan 2023 07:17  Mg     1.9       21 Jan 2023 07:36  Mg     1.8       20 Jan 2023 06:47    TPro  7.7    /  Alb  2.1    /  TBili  0.9    /  DBili  x      /  AST  29     /  ALT  22     /  AlkPhos  102    21 Jan 2023 07:36    ACC: [id number] EXAM:  ECHO TTE WO CON COMP W DOPP                          PROCEDURE DATE:  01/07/2023          INTERPRETATION:  INDICATION: Atrial fibrillation  Sonographer KL    Blood Pressure 130/77    Height 175 cm     Weight 99.8 kg       BSA 2.13   sq m    Dimensions:  LA 4.5       Normal Values: 2.0 - 4.0 cm  Ao 3.4        Normal Values: 2.0 - 3.8 cm  SEPTUM 0.9       Normal Values: 0.6 - 1.2 cm  PWT 0.8       Normal Values: 0.6 - 1.1 cm  LVIDd 5.3         Normal Values: 3.0 - 5.6 cm  LVIDs 3.3         Normal Values: 1.8 - 4.0 cm      OBSERVATIONS:  Technically difficult study  Mitral Valve: normal, trace physiologic MR.  Aortic Valve/Aorta: Sclerotic trileaflet aortic valve with grossly normal   opening.  Tricuspid Valve: normal withtrace TR.  Pulmonic Valve: Trace PI  Left Atrium: Enlarged  Right Atrium: Not well-visualized  Left Ventricle: The left ventricular endocardium is not well-visualized.   Overall, grossly normal LV size and systolic function, estimated LVEF of   55%. Cannot rule out segmental abnormalities  Right Ventricle: Not well-visualized  Pericardium: no significant pericardial effusion.    IMPRESSION:  Technically difficult study  The left ventricular endocardium is not well-visualized. Overall, grossly  normal LV size and systolic function, estimated LVEF of 55%.  The right ventricle is not well-visualized  Left atrial enlargement  Sclerotic trileaflet aortic valve with grossly normal opening, without AI.  Trace physiologic MR and TR.  No significant pericardial effusion.    --- End of Report ---    LIANNA CHAIREZ MD; Attending Cardiologist  This document has been electronically signed. Jan 7 2023  2:15PM      ACC: [id number] EXAM:  XR CHEST PA LAT 2V   ORDERED BY: JAMAL GRAYSON     PROCEDURE DATE:  01/18/2023          INTERPRETATION:  Chest one view    HISTORY: Hypoxia    COMPARISON STUDY: 1/6/2023    Frontal expiratory view of the chest shows the heart to be similar in   size. Sternal wires are again noted.    The lungs show mild basilar atelectasis with small pleural effusions and   there is no evidence of pneumothorax.    IMPRESSION:  Mild atelectasis.    Thank you for the courtesy of this referral.    --- End of Report ---    TRAVIS YANES MD; Attending Interventional Radiologist  This document has been electronically signed. Jan 19 2023  4:04PM    Ventricular Rate 106 BPM    QRS Duration 90 ms    Q-T Interval 328 ms    QTC Calculation(Bazett) 435 ms    R Axis -26 degrees    T Axis 59 degrees    Diagnosis Line Atrial fibrillation with rapid ventricular response with premature ventricular or aberrantly conducted complexes  Nonspecific ST abnormality  Abnormal ECG  No previous ECGs available  Confirmed by munir Salazar ([id number]) on 1/7/2023 12:56:36 PM

## 2023-01-22 NOTE — PROGRESS NOTE ADULT - PROBLEM SELECTOR PLAN 6
Patient has history of CAD s/p stent, CABG  - TTE with EF 55%, LAE, with no significant valvular pathology  - pro BNP 2418-->959-->768  - now on room air  - Per Cardio, PO Lasix 40  - Cardio following, recs appreciated

## 2023-01-22 NOTE — PROGRESS NOTE ADULT - ASSESSMENT
83 y/o m w/ PMH od CAD s/p stent, s/p CABG, HTN, HLD, CHF p/w shortness of breath and cough    on lasix  SLP noted    s/p tamiflu  s/p diflucan  s/p emp ABX for pna  vs noted - labs reviewed - imaging reviewed -   [facility] rx regimen - diuresis   I and O  s/p ICU care and course  Cardio and ID follow up  cough rx regimen  nutrition  GOC discussion  replete lytes  on AC for AF

## 2023-01-22 NOTE — SOCIAL WORK PROGRESS NOTE - NSSWPROGRESSNOTE_GEN_ALL_CORE
SW initiated AUTH with HIP for pt to go to [facility] upon DC. AUTH pending. Clinicals faxed. SW to f/u regarding DC plan and remain available for any needs.

## 2023-01-22 NOTE — PROGRESS NOTE ADULT - SUBJECTIVE AND OBJECTIVE BOX
Patient is a 82y old  Male who presents with a chief complaint of CHF, A fib, Flu (22 Jan 2023 09:39)      INTERVAL HPI/OVERNIGHT EVENTS: Patient seen and examined, sitting in chair. No overnight events occurred. Patient has no complaints at this time. He is anxious to get out of the hospital. Appears in good spirits. Continues to have cough but he reports it is his baseline. Patient took a day off from his incentive spirometer but plans to use it today. Denies fevers, chills, headache, lightheadedness, chest pain, dyspnea, abdominal pain, n/v/d/c.    MEDICATIONS  (STANDING):  apixaban 5 milliGRAM(s) Oral every 12 hours  atorvastatin 10 milliGRAM(s) Oral at bedtime  benzonatate 100 milliGRAM(s) Oral every 8 hours  clopidogrel Tablet 75 milliGRAM(s) Oral daily  diltiazem    milliGRAM(s) Oral every 24 hours  FIRST- Mouthwash  BLM 5 milliLiter(s) Swish and Spit three times a day  furosemide    Tablet 40 milliGRAM(s) Oral daily  gabapentin 100 milliGRAM(s) Oral two times a day  isosorbide   mononitrate ER Tablet (IMDUR) 60 milliGRAM(s) Oral daily  metoprolol tartrate 50 milliGRAM(s) Oral every 6 hours  polyethylene glycol 3350 17 Gram(s) Oral daily  potassium phosphate / sodium phosphate Powder (PHOS-NaK) 1 Packet(s) Oral four times a day before meals  senna 2 Tablet(s) Oral at bedtime  sucralfate suspension 1 Gram(s) Oral two times a day    MEDICATIONS  (PRN):  benzocaine 20% Spray 1 Spray(s) Topical <User Schedule> PRN Sore Throat  bisacodyl 5 milliGRAM(s) Oral every 12 hours PRN Constipation  guaiFENesin Oral Liquid (Sugar-Free) 200 milliGRAM(s) Oral every 6 hours PRN Cough  ondansetron Injectable 4 milliGRAM(s) IV Push every 8 hours PRN Nausea and/or Vomiting      Allergies    Levaquin (Unknown)  penicillin (Rash)    Intolerances        REVIEW OF SYSTEMS:  CONSTITUTIONAL: No fever or chills  HEENT:  No headache, no sore throat  RESPIRATORY: + cough, no wheezing, or shortness of breath  CARDIOVASCULAR: No chest pain, palpitations  GASTROINTESTINAL: No abd pain, nausea, vomiting, or diarrhea  GENITOURINARY: No dysuria, frequency, or hematuria  NEUROLOGICAL: no focal weakness or dizziness  MUSCULOSKELETAL: no myalgias     Vital Signs Last 24 Hrs  T(C): 36.2 (22 Jan 2023 04:42), Max: 36.8 (21 Jan 2023 13:36)  T(F): 97.2 (22 Jan 2023 04:42), Max: 98.2 (21 Jan 2023 13:36)  HR: 72 (22 Jan 2023 04:42) (72 - 93)  BP: 120/63 (22 Jan 2023 04:42) (104/61 - 123/73)  BP(mean): --  RR: 19 (22 Jan 2023 04:42) (18 - 19)  SpO2: 91% (22 Jan 2023 04:42) (91% - 94%)    Parameters below as of 22 Jan 2023 04:42  Patient On (Oxygen Delivery Method): room air        PHYSICAL EXAM:  GENERAL: NAD  HEENT:  anicteric, moist mucous membranes  CHEST/LUNG:  CTA b/l, + bibasilar crackles  HEART:  RRR, S1, S2  ABDOMEN:  BS+, soft, nontender, nondistended  EXTREMITIES: no edema, cyanosis, or calf tenderness  NERVOUS SYSTEM: answers questions and follows commands appropriately    LABS:    CBC Full  -  ( 21 Jan 2023 07:36 )  WBC Count : 9.25 K/uL  Hemoglobin : 12.1 g/dL  Hematocrit : 37.0 %  Platelet Count - Automated : 402 K/uL  Mean Cell Volume : 87.9 fl  Mean Cell Hemoglobin : 28.7 pg  Mean Cell Hemoglobin Concentration : 32.7 gm/dL  Auto Neutrophil # : 5.97 K/uL  Auto Lymphocyte # : 2.27 K/uL  Auto Monocyte # : 0.64 K/uL  Auto Eosinophil # : 0.14 K/uL  Auto Basophil # : 0.06 K/uL  Auto Neutrophil % : 64.7 %  Auto Lymphocyte % : 24.5 %  Auto Monocyte % : 6.9 %  Auto Eosinophil % : 1.5 %  Auto Basophil % : 0.6 %    22 Jan 2023 07:17    137    |  98     |  12     ----------------------------<  127    3.4     |  36     |  0.85     Ca    8.4        22 Jan 2023 07:17  Phos  2.3       22 Jan 2023 07:17  Mg     1.7       22 Jan 2023 07:17          CAPILLARY BLOOD GLUCOSE              RADIOLOGY & ADDITIONAL TESTS:    Personally reviewed.     Consultant(s) Notes Reviewed:  [x] YES  [ ] NO

## 2023-01-22 NOTE — PROGRESS NOTE ADULT - PROBLEM SELECTOR PLAN 8
Afib on Eliquis for DVT ppx    #DISPO DC to [facility], pending insurance authorization. SW/CM following. Awaiting bed.

## 2023-01-22 NOTE — PROGRESS NOTE ADULT - ASSESSMENT
82 year old male PMH of CAD s/p stent, s/p CABG, HTN, HLD, CHF p/w shortness of breath and cough for the last 5 days found to be in A fib with RVR, congestive HF, and influenza positive.    CAD s/p CABG, stent, Afib with RVR, HFpEF, HTN  - Hypoxemia more of pulmonary in etiology (+Flu/Pna), s/p Tamiflu and Abx  - Now on RA.  Ambulates with no SIDHU  - TTE with EF 55%, LAE, with no significant valvular pathology  - pro BNP 2418-->959 , no clear evidence of volume overload  - Continue to encourage incentive spirometer  - Continue Lasix PO 40 mg daily    - No anginal symptoms, no sign of cardiac ischemia  - Continue ASA, Plavix and statin  - Continue Imdur    - Afib with rates improved and controlled, tachycardia  with some reactive component (in the setting of flu)  - Continue BB and Cardizem   - Continue Eliquis    - BP stable and controlled     - Monitor and replete lytes, keep K>4, Mg>2.  - D/C planning in progress.  Will continue to follow.    Ana Luisa Holcomb DNP, NP-C  Cardiology   Spectra #[phone]

## 2023-01-22 NOTE — PROGRESS NOTE ADULT - PROBLEM SELECTOR PLAN 1
Acute respiratory failure as a result of Influenza with superimposed bacterial PNA  - Sputum culture growing Pseudomonas +  - MRSA/MSSA nares PCR negative  - Legionella negative   - Completed Cefepime 2gm q8hr - last dose 1/18  - Saturating 91% on RA  - ID Dr. Barrera following, recs appreciated

## 2023-01-23 LAB
ANION GAP SERPL CALC-SCNC: 4 MMOL/L — LOW (ref 5–17)
BUN SERPL-MCNC: 11 MG/DL — SIGNIFICANT CHANGE UP (ref 7–23)
CALCIUM SERPL-MCNC: 8.5 MG/DL — SIGNIFICANT CHANGE UP (ref 8.5–10.1)
CHLORIDE SERPL-SCNC: 101 MMOL/L — SIGNIFICANT CHANGE UP (ref 96–108)
CO2 SERPL-SCNC: 33 MMOL/L — HIGH (ref 22–31)
CREAT SERPL-MCNC: 0.88 MG/DL — SIGNIFICANT CHANGE UP (ref 0.5–1.3)
EGFR: 87 ML/MIN/1.73M2 — SIGNIFICANT CHANGE UP
GLUCOSE SERPL-MCNC: 128 MG/DL — HIGH (ref 70–99)
MAGNESIUM SERPL-MCNC: 1.8 MG/DL — SIGNIFICANT CHANGE UP (ref 1.6–2.6)
PHOSPHATE SERPL-MCNC: 2.6 MG/DL — SIGNIFICANT CHANGE UP (ref 2.5–4.5)
POTASSIUM SERPL-MCNC: 3.8 MMOL/L — SIGNIFICANT CHANGE UP (ref 3.5–5.3)
POTASSIUM SERPL-SCNC: 3.8 MMOL/L — SIGNIFICANT CHANGE UP (ref 3.5–5.3)
SARS-COV-2 RNA SPEC QL NAA+PROBE: SIGNIFICANT CHANGE UP
SODIUM SERPL-SCNC: 138 MMOL/L — SIGNIFICANT CHANGE UP (ref 135–145)

## 2023-01-23 PROCEDURE — 99232 SBSQ HOSP IP/OBS MODERATE 35: CPT | Mod: GC

## 2023-01-23 PROCEDURE — 99232 SBSQ HOSP IP/OBS MODERATE 35: CPT

## 2023-01-23 RX ADMIN — GABAPENTIN 100 MILLIGRAM(S): 400 CAPSULE ORAL at 17:34

## 2023-01-23 RX ADMIN — Medication 100 MILLIGRAM(S): at 13:22

## 2023-01-23 RX ADMIN — Medication 50 MILLIGRAM(S): at 17:35

## 2023-01-23 RX ADMIN — Medication 100 MILLIGRAM(S): at 21:27

## 2023-01-23 RX ADMIN — Medication 100 MILLIGRAM(S): at 05:33

## 2023-01-23 RX ADMIN — CLOPIDOGREL BISULFATE 75 MILLIGRAM(S): 75 TABLET, FILM COATED ORAL at 11:18

## 2023-01-23 RX ADMIN — Medication 1 PACKET(S): at 05:32

## 2023-01-23 RX ADMIN — Medication 50 MILLIGRAM(S): at 23:27

## 2023-01-23 RX ADMIN — Medication 40 MILLIGRAM(S): at 05:32

## 2023-01-23 RX ADMIN — ATORVASTATIN CALCIUM 10 MILLIGRAM(S): 80 TABLET, FILM COATED ORAL at 21:27

## 2023-01-23 RX ADMIN — Medication 50 MILLIGRAM(S): at 05:33

## 2023-01-23 RX ADMIN — APIXABAN 5 MILLIGRAM(S): 2.5 TABLET, FILM COATED ORAL at 05:32

## 2023-01-23 RX ADMIN — DIPHENHYDRAMINE HYDROCHLORIDE AND LIDOCAINE HYDROCHLORIDE AND ALUMINUM HYDROXIDE AND MAGNESIUM HYDRO 5 MILLILITER(S): KIT at 05:33

## 2023-01-23 RX ADMIN — APIXABAN 5 MILLIGRAM(S): 2.5 TABLET, FILM COATED ORAL at 17:35

## 2023-01-23 RX ADMIN — Medication 1 GRAM(S): at 17:35

## 2023-01-23 RX ADMIN — Medication 1 GRAM(S): at 05:33

## 2023-01-23 RX ADMIN — DIPHENHYDRAMINE HYDROCHLORIDE AND LIDOCAINE HYDROCHLORIDE AND ALUMINUM HYDROXIDE AND MAGNESIUM HYDRO 5 MILLILITER(S): KIT at 13:22

## 2023-01-23 RX ADMIN — ISOSORBIDE MONONITRATE 60 MILLIGRAM(S): 60 TABLET, EXTENDED RELEASE ORAL at 11:18

## 2023-01-23 RX ADMIN — DIPHENHYDRAMINE HYDROCHLORIDE AND LIDOCAINE HYDROCHLORIDE AND ALUMINUM HYDROXIDE AND MAGNESIUM HYDRO 5 MILLILITER(S): KIT at 21:28

## 2023-01-23 RX ADMIN — SENNA PLUS 2 TABLET(S): 8.6 TABLET ORAL at 21:27

## 2023-01-23 RX ADMIN — GABAPENTIN 100 MILLIGRAM(S): 400 CAPSULE ORAL at 05:33

## 2023-01-23 RX ADMIN — Medication 50 MILLIGRAM(S): at 11:18

## 2023-01-23 RX ADMIN — Medication 240 MILLIGRAM(S): at 11:18

## 2023-01-23 NOTE — PROGRESS NOTE ADULT - PROBLEM SELECTOR PROBLEM 3
Atrial fibrillation
Acute CHF
Atrial fibrillation
Influenza

## 2023-01-23 NOTE — PROGRESS NOTE ADULT - SUBJECTIVE AND OBJECTIVE BOX
Date/Time Patient Seen:  		  Referring MD:   Data Reviewed	       Patient is a 82y old  Male who presents with a chief complaint of CHF, A fib, Flu (22 Jan 2023 11:43)      Subjective/HPI     PAST MEDICAL & SURGICAL HISTORY:  Congestive heart failure (CHF)    Atrial fibrillation    HLD (hyperlipidemia)    HTN (hypertension)    CAD (coronary artery disease)    S/P coronary artery stent placement    S/P CABG (coronary artery bypass graft)          Medication list         MEDICATIONS  (STANDING):  apixaban 5 milliGRAM(s) Oral every 12 hours  atorvastatin 10 milliGRAM(s) Oral at bedtime  benzonatate 100 milliGRAM(s) Oral every 8 hours  clopidogrel Tablet 75 milliGRAM(s) Oral daily  diltiazem    milliGRAM(s) Oral every 24 hours  FIRST- Mouthwash  BLM 5 milliLiter(s) Swish and Spit three times a day  furosemide    Tablet 40 milliGRAM(s) Oral daily  gabapentin 100 milliGRAM(s) Oral two times a day  isosorbide   mononitrate ER Tablet (IMDUR) 60 milliGRAM(s) Oral daily  metoprolol tartrate 50 milliGRAM(s) Oral every 6 hours  polyethylene glycol 3350 17 Gram(s) Oral daily  senna 2 Tablet(s) Oral at bedtime  sucralfate suspension 1 Gram(s) Oral two times a day    MEDICATIONS  (PRN):  benzocaine 20% Spray 1 Spray(s) Topical <User Schedule> PRN Sore Throat  bisacodyl 5 milliGRAM(s) Oral every 12 hours PRN Constipation  guaiFENesin Oral Liquid (Sugar-Free) 200 milliGRAM(s) Oral every 6 hours PRN Cough  ondansetron Injectable 4 milliGRAM(s) IV Push every 8 hours PRN Nausea and/or Vomiting         Vitals log        ICU Vital Signs Last 24 Hrs  T(C): 36.6 (23 Jan 2023 05:47), Max: 36.7 (22 Jan 2023 19:27)  T(F): 97.8 (23 Jan 2023 05:47), Max: 98.1 (22 Jan 2023 19:27)  HR: 65 (23 Jan 2023 05:47) (65 - 76)  BP: 125/61 (23 Jan 2023 05:47) (110/61 - 126/77)  BP(mean): --  ABP: --  ABP(mean): --  RR: 18 (23 Jan 2023 05:47) (18 - 18)  SpO2: 92% (23 Jan 2023 05:47) (92% - 93%)    O2 Parameters below as of 23 Jan 2023 05:47  Patient On (Oxygen Delivery Method): room air                 Input and Output:  I&O's Detail      Lab Data    01-23    x   |  x   |  11  ----------------------------<  128<H>  x    |  33<H>  |  0.88    Ca    8.5      23 Jan 2023 07:40  Phos  2.6     01-23  Mg     1.8     01-23              Review of Systems	      Objective     Physical Examination    heart s1s2  lung dec bS  head nc      Pertinent Lab findings & Imaging      Christiano:  NO   Adequate UO     I&O's Detail           Discussed with:     Cultures:	        Radiology

## 2023-01-23 NOTE — PROGRESS NOTE ADULT - PROBLEM SELECTOR PLAN 8
Afib on Eliquis for DVT ppx    #DISPO DC to [facility], pending insurance authorization. ENA/OLIV[facility] following. Afib on Eliquis for DVT ppx    #DISPO DC to [facility], pending insurance authorization. SW/CM following.  Updated daughter Benjy via phone

## 2023-01-23 NOTE — PROGRESS NOTE ADULT - SUBJECTIVE AND OBJECTIVE BOX
Patient is a 82y old  Male who presents with a chief complaint of CHF, A fib, Flu (23 Jan 2023 11:32)      INTERVAL HPI/OVERNIGHT EVENTS: Patient seen and examined at bedside. No overnight events occurred. Patient has no complaints at this time. Feels well, eating breakast.     MEDICATIONS  (STANDING):  apixaban 5 milliGRAM(s) Oral every 12 hours  atorvastatin 10 milliGRAM(s) Oral at bedtime  benzonatate 100 milliGRAM(s) Oral every 8 hours  clopidogrel Tablet 75 milliGRAM(s) Oral daily  diltiazem    milliGRAM(s) Oral every 24 hours  FIRST- Mouthwash  BLM 5 milliLiter(s) Swish and Spit three times a day  furosemide    Tablet 40 milliGRAM(s) Oral daily  gabapentin 100 milliGRAM(s) Oral two times a day  isosorbide   mononitrate ER Tablet (IMDUR) 60 milliGRAM(s) Oral daily  metoprolol tartrate 50 milliGRAM(s) Oral every 6 hours  polyethylene glycol 3350 17 Gram(s) Oral daily  senna 2 Tablet(s) Oral at bedtime  sucralfate suspension 1 Gram(s) Oral two times a day    MEDICATIONS  (PRN):  benzocaine 20% Spray 1 Spray(s) Topical <User Schedule> PRN Sore Throat  bisacodyl 5 milliGRAM(s) Oral every 12 hours PRN Constipation  guaiFENesin Oral Liquid (Sugar-Free) 200 milliGRAM(s) Oral every 6 hours PRN Cough  ondansetron Injectable 4 milliGRAM(s) IV Push every 8 hours PRN Nausea and/or Vomiting      Allergies    Levaquin (Unknown)  penicillin (Rash)    Intolerances        REVIEW OF SYSTEMS:  CONSTITUTIONAL: No fever or chills  HEENT:  No headache, no sore throat  RESPIRATORY: No cough, wheezing, or shortness of breath  CARDIOVASCULAR: No chest pain, palpitations  GASTROINTESTINAL: No abd pain, nausea, vomiting, or diarrhea  GENITOURINARY: No dysuria, frequency, or hematuria  NEUROLOGICAL: no focal weakness or dizziness  MUSCULOSKELETAL: no myalgias     Vital Signs Last 24 Hrs  T(C): 36.6 (23 Jan 2023 12:27), Max: 36.7 (22 Jan 2023 19:27)  T(F): 97.8 (23 Jan 2023 12:27), Max: 98.1 (22 Jan 2023 19:27)  HR: 77 (23 Jan 2023 12:27) (65 - 90)  BP: 99/62 (23 Jan 2023 12:27) (99/62 - 132/74)  BP(mean): --  RR: 18 (23 Jan 2023 12:27) (18 - 18)  SpO2: 93% (23 Jan 2023 12:27) (92% - 95%)    Parameters below as of 23 Jan 2023 12:27  Patient On (Oxygen Delivery Method): room air        PHYSICAL EXAM:  GENERAL: NAD, sitting up in chair  HEENT:  anicteric, moist mucous membranes  CHEST/LUNG:  CTA b/l, + bibasilar crackles  HEART:  IRRR, S1, S2  ABDOMEN:  BS+, soft, nontender, nondistended  EXTREMITIES: no edema, cyanosis, or calf tenderness  NERVOUS SYSTEM: answers questions and follows commands appropriately    LABS:      23 Jan 2023 07:40    138    |  101    |  11     ----------------------------<  128    3.8     |  33     |  0.88     Ca    8.5        23 Jan 2023 07:40  Phos  2.6       23 Jan 2023 07:40  Mg     1.8       23 Jan 2023 07:40          CAPILLARY BLOOD GLUCOSE              RADIOLOGY & ADDITIONAL TESTS:    Personally reviewed.     Consultant(s) Notes Reviewed:  [x] YES  [ ] NO

## 2023-01-23 NOTE — PROGRESS NOTE ADULT - PROBLEM SELECTOR PROBLEM 4
Acute CHF
CAD (coronary artery disease)
Acute CHF
CAD (coronary artery disease)

## 2023-01-23 NOTE — PROGRESS NOTE ADULT - SUBJECTIVE AND OBJECTIVE BOX
[facility] -- Donna Jay Pannella, Patel, Savella, Goodger: Office # [phone]    Follow Up:   Hx CAD s/p CABG, Afib with RVR    Subjective/Observations:  Patient awake, alert, resting in bed. Denies chest pain, palpitations and dizziness. Denies any difficulty breathing. No orthopnea and PND. Tolerating room air.     REVIEW OF SYSTEMS: All other review of systems are negative unless indicated above    PAST MEDICAL & SURGICAL HISTORY:  HLD (hyperlipidemia)      HTN (hypertension)      CAD (coronary artery disease)      S/P coronary artery stent placement      S/P CABG (coronary artery bypass graft)    MEDICATIONS  (STANDING):  apixaban 5 milliGRAM(s) Oral every 12 hours  atorvastatin 10 milliGRAM(s) Oral at bedtime  benzonatate 100 milliGRAM(s) Oral every 8 hours  clopidogrel Tablet 75 milliGRAM(s) Oral daily  diltiazem    milliGRAM(s) Oral every 24 hours  FIRST- Mouthwash  BLM 5 milliLiter(s) Swish and Spit three times a day  furosemide    Tablet 40 milliGRAM(s) Oral daily  gabapentin 100 milliGRAM(s) Oral two times a day  isosorbide   mononitrate ER Tablet (IMDUR) 60 milliGRAM(s) Oral daily  metoprolol tartrate 50 milliGRAM(s) Oral every 6 hours  polyethylene glycol 3350 17 Gram(s) Oral daily  senna 2 Tablet(s) Oral at bedtime  sucralfate suspension 1 Gram(s) Oral two times a day    MEDICATIONS  (PRN):  benzocaine 20% Spray 1 Spray(s) Topical <User Schedule> PRN Sore Throat  bisacodyl 5 milliGRAM(s) Oral every 12 hours PRN Constipation  guaiFENesin Oral Liquid (Sugar-Free) 200 milliGRAM(s) Oral every 6 hours PRN Cough  ondansetron Injectable 4 milliGRAM(s) IV Push every 8 hours PRN Nausea and/or Vomiting    Allergies  Levaquin (Unknown)  penicillin (Rash)      Vital Signs Last 24 Hrs  T(C): 36.6 (23 Jan 2023 05:47), Max: 36.7 (22 Jan 2023 19:27)  T(F): 97.8 (23 Jan 2023 05:47), Max: 98.1 (22 Jan 2023 19:27)  HR: 65 (23 Jan 2023 05:47) (65 - 76)  BP: 125/61 (23 Jan 2023 05:47) (110/61 - 126/77)  BP(mean): --  RR: 18 (23 Jan 2023 05:47) (18 - 18)  SpO2: 92% (23 Jan 2023 05:47) (92% - 93%)    Parameters below as of 23 Jan 2023 08:08  Patient On (Oxygen Delivery Method): room air      I&O's Summary    23 Jan 2023 07:01  -  23 Jan 2023 10:43  --------------------------------------------------------  IN: 0 mL / OUT: 1 mL / NET: -1 mL          TELE: Not on telemetry   PHYSICAL EXAM:  Constitutional: NAD, awake and alert, Well developed   HEENT: Moist Mucous Membranes, Anicteric  Pulmonary: Non-labored, breath sounds are clear bilaterally, No wheezing, rales or rhonchi  Cardiovascular: Regular, S1 and S2, No murmurs, No rubs, gallops or clicks  Gastrointestinal:  soft, nontender, nondistended   Lymph: No peripheral edema. No lymphadenopathy.   Skin: No visible rashes or ulcers.  Psych:  Mood & affect appropriate      LABS: All Labs Reviewed:                        12.1   9.25  )-----------( 402      ( 21 Jan 2023 07:36 )             37.0     23 Jan 2023 07:40    138    |  101    |  11     ----------------------------<  128    3.8     |  33     |  0.88   22 Jan 2023 07:17    137    |  98     |  12     ----------------------------<  127    3.4     |  36     |  0.85   21 Jan 2023 07:36    136    |  96     |  10     ----------------------------<  132    3.9     |  36     |  0.84     Ca    8.5        23 Jan 2023 07:40  Ca    8.4        22 Jan 2023 07:17  Ca    8.8        21 Jan 2023 07:36  Phos  2.6       23 Jan 2023 07:40  Phos  2.3       22 Jan 2023 07:17  Phos  1.8       21 Jan 2023 07:36  Mg     1.8       23 Jan 2023 07:40  Mg     1.7       22 Jan 2023 07:17  Mg     1.9       21 Jan 2023 07:36    TPro  7.7    /  Alb  2.1    /  TBili  0.9    /  DBili  x      /  AST  29     /  ALT  22     /  AlkPhos  102    21 Jan 2023 07:36     Cholesterol, Serum: 62 mg/dL (01-08-23 @ 06:00)  HDL Cholesterol, Serum: 26 mg/dL (01-08-23 @ 06:00)  Triglycerides, Serum: 66 mg/dL (01-08-23 @ 06:00)    12 Lead ECG:   Ventricular Rate 106 BPM    QRS Duration 90 ms    Q-T Interval 328 ms    QTC Calculation(Bazett) 435 ms    R Axis -26 degrees    T Axis 59 degrees    Diagnosis Line Atrial fibrillation with rapid ventricular response with premature ventricular or aberrantly conducted complexes  Nonspecific ST abnormality  Abnormal ECG  No previous ECGs available  Confirmed by munir Salazar ([id number]) on 1/7/2023 12:56:36 PM (01-06-23 @ 15:58)      ACC: [id number] EXAM:  ECHO TTE WO CON COMP W DOPP                          PROCEDURE DATE:  01/07/2023          INTERPRETATION:  INDICATION: Atrial fibrillation  Sonographer KL    Blood Pressure 130/77    Height 175 cm     Weight 99.8 kg       BSA 2.13   sq m    Dimensions:  LA 4.5       Normal Values: 2.0 - 4.0 cm  Ao 3.4        Normal Values: 2.0 - 3.8 cm  SEPTUM 0.9       Normal Values: 0.6 - 1.2 cm  PWT 0.8       Normal Values: 0.6 - 1.1 cm  LVIDd 5.3         Normal Values: 3.0 - 5.6 cm  LVIDs 3.3         Normal Values: 1.8 - 4.0 cm      OBSERVATIONS:  Technically difficult study  Mitral Valve: normal, trace physiologic MR.  Aortic Valve/Aorta: Sclerotic trileaflet aortic valve with grossly normal   opening.  Tricuspid Valve: normal withtrace TR.  Pulmonic Valve: Trace PI  Left Atrium: Enlarged  Right Atrium: Not well-visualized  Left Ventricle: The left ventricular endocardium is not well-visualized.   Overall, grossly normal LV size and systolic function, estimated LVEF of   55%. Cannot rule out segmental abnormalities  Right Ventricle: Not well-visualized  Pericardium: no significant pericardial effusion.    IMPRESSION:  Technically difficult study  The left ventricular endocardium is not well-visualized. Overall, grossly  normal LV size and systolic function, estimated LVEF of 55%.  The right ventricle is not well-visualized  Left atrial enlargement  Sclerotic trileaflet aortic valve with grossly normal opening, without AI.  Trace physiologic MR and TR.  No significant pericardial effusion.    --- End of Report ---  LIANNA CHAIREZ MD; Attending Cardiologist  This document has been electronically signed. Jan 7 2023  2:15PM

## 2023-01-23 NOTE — PROGRESS NOTE ADULT - PROBLEM SELECTOR PROBLEM 5
JANES (acute kidney injury)

## 2023-01-23 NOTE — SOCIAL WORK PROGRESS NOTE - NSSWPROGRESSNOTE_GEN_ALL_CORE
[facility]bin has reached out to [employer] today [phone]. They have a pending auth for [facility] [id number]  case still being reviewed at this time. [facility] has asked for case to be expedited spoke with Dottie HERNANDEZ today. Spoke with family who was updated and facility as well.

## 2023-01-23 NOTE — PROGRESS NOTE ADULT - ASSESSMENT
* Prerenal azotemia, CKD  * Acute HF  * influenza PNA  * New a.fib with RVR  * Hypokalemia  * Hyponatremia      Improved and stable sodium levels. PRN Potassium supplementation. Stable renal indices. PO fluid restriction.   To continue current meds. Will follow electrolytes and renal function trend.

## 2023-01-23 NOTE — PROGRESS NOTE ADULT - PROBLEM SELECTOR PLAN 1
Acute respiratory failure as a result of Influenza with superimposed bacterial PNA  - Sputum culture growing Pseudomonas +  - MRSA/MSSA nares PCR negative  - Legionella negative   - Completed Cefepime 2gm q8hr - last dose 1/18  - Saturating 92% on RA  - ID Dr. Barrera signed off, recs appreciated

## 2023-01-23 NOTE — PROGRESS NOTE ADULT - ASSESSMENT
82 year old male PMH of CAD s/p stent, s/p CABG, HTN, HLD, CHF p/w shortness of breath and cough for the last 5 days found to be in A fib with RVR, congestive HF, and influenza positive.    CAD s/p CABG, stent, Afib with RVR, HFpEF, HTN  - Hypoxemia more of pulmonary in etiology (+Flu/Pna), s/p Tamiflu and Abx  - Now on RA.  Ambulates with no SIDHU  - TTE with EF 55%, LAE, with no significant valvular pathology  - pro BNP 2418-->959 , no clear evidence of volume overload  - Continue Lasix PO 40 mg daily    - No anginal symptoms, no sign of cardiac ischemia  - Continue Plavix and statin  - Continue Imdur    - Afib with rates improved and controlled  - Continue BB and Cardizem   - Continue Eliquis    - BP stable and controlled     - D/C planning in progress.  - Monitor and replete lytes, keep K>4, Mg>2.  - Will continue to follow.    Lyly Arceo, MS FNP, [facility]  Nurse Practitioner- Cardiology   Spectra #[phone] /[phone]

## 2023-01-23 NOTE — PROGRESS NOTE ADULT - PROBLEM SELECTOR PROBLEM 2
Influenza
Atrial fibrillation
Influenza
Acute CHF
Influenza
Influenza

## 2023-01-23 NOTE — PROGRESS NOTE ADULT - ATTENDING COMMENTS
81 yo man with Hx CAD, CABG, HFpEF, admitted with dyspnea from influenza A and uncontrolled afib, acute pulmonary edema.  Improved with tamiflu, diuresis and rate control.  Then course complicated by rapidly progressing acute hypoxemic respiratory failure from superimposed pseudomonas pneumonia.    --somnolent, likely component of hypoactive delirium  continue reorienting  lidocaine patch for pain, avoid sedating meds  --Afib controlled on diltiazem, lopressor  AC with eliquis  --CAD, continue plavix, imdur, statin  --HFpEF, appears euvolemic/dry, reduce lasix to 40 PO daily  --acute hypoxemic respiratory failure   wean HFNC as tolerates, today weaned to 40L flow and FiO2 60%  influenza A, continue tamiflu  pseudomonas pneumonia, continue cefepime   --normal renal function  --continue regular diet  start bowel regimen for constipation  --pt and daughters updated in detail
83 yo man with Hx CAD, CABG, HFpEF, admitted with dyspnea from influenza A and uncontrolled afib, acute pulmonary edema.  Course complicated by rapidly progressing acute hypoxemic respiratory failure from superimposed pseudomonas pneumonia.    --delirium improved, continue reorienting  lidocaine patch for pain  --Afib controlled on diltiazem, lopressor  AC with eliquis  --CAD, continue plavix, imdur, statin  --HFpEF, appears euvolemic, continue lasix to 40 PO daily  --acute hypoxemic respiratory failure   today HFNC transitioned to 5L NC  influenza A, now has complted tamiflu  pseudomonas pneumonia, continue cefepime   --normal renal function  --continue regular diet  bowel regimen for constipation  --stable for remote tele with SpO2  --son-in-law updated
83 yo man with Hx CAD, CABG, HFpEF, admitted with dyspnea from influenza A and uncontrolled afib, acute pulmonary edema.  Improved with tamiflu, diuresis and rate control.  Then course complicated by rapidly progressing acute hypoxemic respiratory failure from superimposed pseudomonas pneumonia.    --somnolent, likely component of hypoactive delirium  continue reorienting  lidocaine patch for pain  --Afib controlled on diltiazem, lopressor  AC with eliquis  --CAD, continue plavix, imdur, statin  --HFpEF, appears euvolemic, continue lasix to 40 PO daily  --acute hypoxemic respiratory failure   wean HFNC as tolerates, today weaned to 40L flow and FiO2 40%  influenza A, now has complted tamiflu  pseudomonas pneumonia, continue cefepime   --normal renal function  --continue regular diet  bowel regimen for constipation  --daughter updated
83 yo man with Hx CAD, CABG, HFpEF, admitted with dyspnea from influenza A and uncontrolled afib, acute pulmonary edema.  Improved with tamiflu, diuresis and rate control.  Yesterday developed dyspnea and new, rapidly progressing hypoxemia.  CT showing no PE and b/l tree-in-bud opacities with more confluent consolidation at bases b/l.  CT appearance raising suspicion for atypical pneumonias.    --mentating well  --Afib controlled on diltiazem, lopressor  AC with eliquis  --CAD, continue plavix, imdur, statin  --acute hypoxemic respiratory failure   wean HFNC as tolerates  possible atypical pneumonia v bacterial v noninfectious  f/u infectious and rheum workup including fungal serologies, AMOS, ANCA, PCP PCR  continue cefepime and azithro, d/c vanc as MRSA swab negative  --normal renal function  --continue clear liquid diet  --pt and daughters updated in detail
83 yo man with Hx CAD, CABG, HFpEF, admitted with dyspnea from influenza A and uncontrolled afib, acute pulmonary edema.  Improved with tamiflu, diuresis and rate control.  1/10 developed dyspnea and new, rapidly progressing hypoxemic respiratory failure, ?superimposed bacterial v atypical pneumonia.    --somnolent, likely component of hypoactive delirium  --Afib controlled on diltiazem, lopressor  AC with eliquis  --CAD, continue plavix, imdur, statin  --HFpEF, appears euvolemic/dry, reduce lasix to daily  --acute hypoxemic respiratory failure   wean HFNC as tolerates, today weaned to FiO2 50%  influenza A, continue tamiflu  possible atypical pneumonia v bacterial v noninfectious  sputum Cx pending, other serologies unrevealing  continue cefepime   --normal renal function  --continue regular diet  --discussed with ID  --pt and daughters updated in detail
81 y/o m w/ PMH od CAD s/p stent, s/p CABG, HTN, HLD p/w shortness of breath and cough, found to have a fib. Admitted for acute CHF exacerbation, + influenza.     Patient seen and examined at bedside. States he is feeling better, breathing improved, hiccups resolved since initiation of thorazine. HR controlled. Completed course of abx for PNA. D/C planning to [facility] in AM. Wean O2 as tolerated
81 y/o m w/ PMH od CAD s/p stent, s/p CABG, HTN, HLD p/w shortness of breath and cough, found to have a fib. Admitted for acute CHF exacerbation, + influenza.     Patient seen and examined at bedside. States he is feeling well, ready to leave. HR controlled. Completed course of abx for PNA. Tolerating room air. D/C planning to [facility] pending insurance approval.
81 y/o m w/ PMH od CAD s/p stent, s/p CABG, HTN, HLD p/w shortness of breath and cough, found to have a fib. Admitted for acute CHF exacerbation, + influenza.     Patient seen and examined at bedside. States he is feeling well, ready to leave. HR controlled. Completed course of abx for PNA. Tolerating room air, however, did desaturate to 88% on ambulation, recommend supplemental O2 with ambulation. D/C planning to [facility] pending insurance approval.
83 y/o m w/ PMH od CAD s/p stent, s/p CABG, HTN, HLD p/w shortness of breath and cough, found to have a fib. Admitted for acute CHF exacerbation, + influenza.     Patient seen and examined at bedside. States he is feeling better, breathing improved, hiccups resolved since initiation of thorazine, can stop at this time. HR controlled. Completed course of abx for PNA. Tolerating room air. D/C planning to [facility] pending insurance approval.
81 y/o m w/ PMH od CAD s/p stent, s/p CABG, HTN, HLD p/w shortness of breath and cough, found to have a fib. Admitted for acute CHF exacerbation, + influenza.     Patient seen and examined at bedside. States he is feeling well, ready to leave. HR controlled. Completed course of abx for PNA. Tolerating room air. D/C planning to [facility] pending insurance approval.
83 y/o m w/ PMH od CAD s/p stent, s/p CABG, HTN, HLD p/w shortness of breath and cough, found to have a fib. Admitted for acute CHF exacerbation, + influenza.     Patient seen and examined at bedside. Lasix holiday today. HR controlled, will switch cardizem to long acting. Patient with intractable hiccups, will trial thorazine. Completed course of abx for PNA.

## 2023-01-23 NOTE — PROGRESS NOTE ADULT - SUBJECTIVE AND OBJECTIVE BOX
Patient is a 82y old  Male who presents with a chief complaint of CHF, A fib, Flu (09 Jan 2023 08:23)  Patient seen in follow up for prerenal azotemia.        PAST MEDICAL HISTORY:  Congestive heart failure (CHF)  Atrial fibrillation  HLD (hyperlipidemia)  HTN (hypertension)  CAD (coronary artery disease)      MEDICATIONS  (STANDING):  apixaban 5 milliGRAM(s) Oral every 12 hours  atorvastatin 10 milliGRAM(s) Oral at bedtime  benzonatate 100 milliGRAM(s) Oral every 8 hours  clopidogrel Tablet 75 milliGRAM(s) Oral daily  diltiazem    milliGRAM(s) Oral every 24 hours  FIRST- Mouthwash  BLM 5 milliLiter(s) Swish and Spit three times a day  furosemide    Tablet 40 milliGRAM(s) Oral daily  gabapentin 100 milliGRAM(s) Oral two times a day  isosorbide   mononitrate ER Tablet (IMDUR) 60 milliGRAM(s) Oral daily  metoprolol tartrate 50 milliGRAM(s) Oral every 6 hours  polyethylene glycol 3350 17 Gram(s) Oral daily  senna 2 Tablet(s) Oral at bedtime  sucralfate suspension 1 Gram(s) Oral two times a day    MEDICATIONS  (PRN):  benzocaine 20% Spray 1 Spray(s) Topical <User Schedule> PRN Sore Throat  bisacodyl 5 milliGRAM(s) Oral every 12 hours PRN Constipation  guaiFENesin Oral Liquid (Sugar-Free) 200 milliGRAM(s) Oral every 6 hours PRN Cough  ondansetron Injectable 4 milliGRAM(s) IV Push every 8 hours PRN Nausea and/or Vomiting    T(C): 36.6 (01-23-23 @ 05:47), Max: 36.8 (01-21-23 @ 13:36)  HR: 90 (01-23-23 @ 11:19) (65 - 93)  BP: 132/74 (01-23-23 @ 11:19) (104/61 - 132/74)  RR: 18 (01-23-23 @ 11:19)  SpO2: 95% (01-23-23 @ 11:19)  Wt(kg): --  I&O's Detail    23 Jan 2023 07:01  -  23 Jan 2023 11:33  --------------------------------------------------------  IN:  Total IN: 0 mL    OUT:    Voided (mL): 1 mL  Total OUT: 1 mL    Total NET: -1 mL                PHYSICAL EXAM:  General: No distress  Respiratory: b/l air entry  Cardiovascular: S1 S2  Gastrointestinal: soft  Extremities:  edema                           LABORATORY:    01-23    138  |  101  |  11  ----------------------------<  128<H>  3.8   |  33<H>  |  0.88    Ca    8.5      23 Jan 2023 07:40  Phos  2.6     01-23  Mg     1.8     01-23      Sodium, Serum: 138 mmol/L (01-23 @ 07:40)  Sodium, Serum: 137 mmol/L (01-22 @ 07:17)    Potassium, Serum: 3.8 mmol/L (01-23 @ 07:40)  Potassium, Serum: 3.4 mmol/L (01-22 @ 07:17)    Hemoglobin: 12.1 g/dL (01-21 @ 07:36)    Creatinine, Serum 0.88 (01-23 @ 07:40)  Creatinine, Serum 0.85 (01-22 @ 07:17)  Creatinine, Serum 0.84 (01-21 @ 07:36)

## 2023-01-23 NOTE — PROGRESS NOTE ADULT - PROBLEM SELECTOR PROBLEM 1
Acute respiratory failure with hypoxia
Atrial fibrillation
Acute respiratory failure with hypoxia
Influenza

## 2023-01-24 ENCOUNTER — TRANSCRIPTION ENCOUNTER (OUTPATIENT)
Age: 83
End: 2023-01-24

## 2023-01-24 VITALS
RESPIRATION RATE: 18 BRPM | SYSTOLIC BLOOD PRESSURE: 123 MMHG | DIASTOLIC BLOOD PRESSURE: 81 MMHG | OXYGEN SATURATION: 92 % | TEMPERATURE: 98 F | HEART RATE: 95 BPM

## 2023-01-24 PROCEDURE — 87640 STAPH A DNA AMP PROBE: CPT

## 2023-01-24 PROCEDURE — 71045 X-RAY EXAM CHEST 1 VIEW: CPT

## 2023-01-24 PROCEDURE — 93307 TTE W/O DOPPLER COMPLETE: CPT

## 2023-01-24 PROCEDURE — 97162 PT EVAL MOD COMPLEX 30 MIN: CPT

## 2023-01-24 PROCEDURE — 84100 ASSAY OF PHOSPHORUS: CPT

## 2023-01-24 PROCEDURE — 94640 AIRWAY INHALATION TREATMENT: CPT

## 2023-01-24 PROCEDURE — 80053 COMPREHEN METABOLIC PANEL: CPT

## 2023-01-24 PROCEDURE — 85730 THROMBOPLASTIN TIME PARTIAL: CPT

## 2023-01-24 PROCEDURE — 84132 ASSAY OF SERUM POTASSIUM: CPT

## 2023-01-24 PROCEDURE — 93005 ELECTROCARDIOGRAM TRACING: CPT

## 2023-01-24 PROCEDURE — 36415 COLL VENOUS BLD VENIPUNCTURE: CPT

## 2023-01-24 PROCEDURE — 71046 X-RAY EXAM CHEST 2 VIEWS: CPT

## 2023-01-24 PROCEDURE — 81001 URINALYSIS AUTO W/SCOPE: CPT

## 2023-01-24 PROCEDURE — 84145 PROCALCITONIN (PCT): CPT

## 2023-01-24 PROCEDURE — 85025 COMPLETE CBC W/AUTO DIFF WBC: CPT

## 2023-01-24 PROCEDURE — 83880 ASSAY OF NATRIURETIC PEPTIDE: CPT

## 2023-01-24 PROCEDURE — U0003: CPT

## 2023-01-24 PROCEDURE — 36600 WITHDRAWAL OF ARTERIAL BLOOD: CPT

## 2023-01-24 PROCEDURE — 86036 ANCA SCREEN EACH ANTIBODY: CPT

## 2023-01-24 PROCEDURE — 84484 ASSAY OF TROPONIN QUANT: CPT

## 2023-01-24 PROCEDURE — 84481 FREE ASSAY (FT-3): CPT

## 2023-01-24 PROCEDURE — 85027 COMPLETE CBC AUTOMATED: CPT

## 2023-01-24 PROCEDURE — 83735 ASSAY OF MAGNESIUM: CPT

## 2023-01-24 PROCEDURE — 97164 PT RE-EVAL EST PLAN CARE: CPT

## 2023-01-24 PROCEDURE — 87899 AGENT NOS ASSAY W/OPTIC: CPT

## 2023-01-24 PROCEDURE — 93306 TTE W/DOPPLER COMPLETE: CPT

## 2023-01-24 PROCEDURE — 97116 GAIT TRAINING THERAPY: CPT

## 2023-01-24 PROCEDURE — 83036 HEMOGLOBIN GLYCOSYLATED A1C: CPT

## 2023-01-24 PROCEDURE — 80061 LIPID PANEL: CPT

## 2023-01-24 PROCEDURE — 99285 EMERGENCY DEPT VISIT HI MDM: CPT | Mod: 25

## 2023-01-24 PROCEDURE — 99239 HOSP IP/OBS DSCHRG MGMT >30: CPT | Mod: GC

## 2023-01-24 PROCEDURE — 82803 BLOOD GASES ANY COMBINATION: CPT

## 2023-01-24 PROCEDURE — 80048 BASIC METABOLIC PNL TOTAL CA: CPT

## 2023-01-24 PROCEDURE — 85652 RBC SED RATE AUTOMATED: CPT

## 2023-01-24 PROCEDURE — 87186 SC STD MICRODIL/AGAR DIL: CPT

## 2023-01-24 PROCEDURE — 94760 N-INVAS EAR/PLS OXIMETRY 1: CPT

## 2023-01-24 PROCEDURE — 87077 CULTURE AEROBIC IDENTIFY: CPT

## 2023-01-24 PROCEDURE — 96375 TX/PRO/DX INJ NEW DRUG ADDON: CPT

## 2023-01-24 PROCEDURE — 85610 PROTHROMBIN TIME: CPT

## 2023-01-24 PROCEDURE — 87070 CULTURE OTHR SPECIMN AEROBIC: CPT

## 2023-01-24 PROCEDURE — 96374 THER/PROPH/DIAG INJ IV PUSH: CPT

## 2023-01-24 PROCEDURE — 84443 ASSAY THYROID STIM HORMONE: CPT

## 2023-01-24 PROCEDURE — 84439 ASSAY OF FREE THYROXINE: CPT

## 2023-01-24 PROCEDURE — 0225U NFCT DS DNA&RNA 21 SARSCOV2: CPT

## 2023-01-24 PROCEDURE — 87641 MR-STAPH DNA AMP PROBE: CPT

## 2023-01-24 PROCEDURE — 71275 CT ANGIOGRAPHY CHEST: CPT

## 2023-01-24 PROCEDURE — 87040 BLOOD CULTURE FOR BACTERIA: CPT

## 2023-01-24 PROCEDURE — 97530 THERAPEUTIC ACTIVITIES: CPT

## 2023-01-24 PROCEDURE — 87449 NOS EACH ORGANISM AG IA: CPT

## 2023-01-24 PROCEDURE — 92610 EVALUATE SWALLOWING FUNCTION: CPT

## 2023-01-24 PROCEDURE — U0005: CPT

## 2023-01-24 PROCEDURE — 87635 SARS-COV-2 COVID-19 AMP PRB: CPT

## 2023-01-24 PROCEDURE — 87305 ASPERGILLUS AG IA: CPT

## 2023-01-24 PROCEDURE — 86038 ANTINUCLEAR ANTIBODIES: CPT

## 2023-01-24 PROCEDURE — 99232 SBSQ HOSP IP/OBS MODERATE 35: CPT

## 2023-01-24 RX ORDER — METOPROLOL TARTRATE 50 MG
1 TABLET ORAL
Qty: 0 | Refills: 0 | DISCHARGE
Start: 2023-01-24

## 2023-01-24 RX ORDER — ATENOLOL 25 MG/1
1 TABLET ORAL
Qty: 0 | Refills: 0 | DISCHARGE

## 2023-01-24 RX ORDER — LOSARTAN POTASSIUM 100 MG/1
1 TABLET, FILM COATED ORAL
Qty: 0 | Refills: 0 | DISCHARGE

## 2023-01-24 RX ORDER — ASPIRIN/CALCIUM CARB/MAGNESIUM 324 MG
1 TABLET ORAL
Qty: 0 | Refills: 0 | DISCHARGE

## 2023-01-24 RX ORDER — METOPROLOL TARTRATE 50 MG
100 TABLET ORAL EVERY 12 HOURS
Refills: 0 | Status: DISCONTINUED | OUTPATIENT
Start: 2023-01-24 | End: 2023-01-24

## 2023-01-24 RX ADMIN — Medication 50 MILLIGRAM(S): at 05:15

## 2023-01-24 RX ADMIN — CLOPIDOGREL BISULFATE 75 MILLIGRAM(S): 75 TABLET, FILM COATED ORAL at 11:45

## 2023-01-24 RX ADMIN — GABAPENTIN 100 MILLIGRAM(S): 400 CAPSULE ORAL at 05:15

## 2023-01-24 RX ADMIN — Medication 1 GRAM(S): at 05:16

## 2023-01-24 RX ADMIN — Medication 40 MILLIGRAM(S): at 05:15

## 2023-01-24 RX ADMIN — Medication 100 MILLIGRAM(S): at 05:15

## 2023-01-24 RX ADMIN — Medication 240 MILLIGRAM(S): at 11:46

## 2023-01-24 RX ADMIN — ISOSORBIDE MONONITRATE 60 MILLIGRAM(S): 60 TABLET, EXTENDED RELEASE ORAL at 11:45

## 2023-01-24 RX ADMIN — APIXABAN 5 MILLIGRAM(S): 2.5 TABLET, FILM COATED ORAL at 05:15

## 2023-01-24 RX ADMIN — Medication 50 MILLIGRAM(S): at 11:46

## 2023-01-24 NOTE — PROGRESS NOTE ADULT - NS ATTEND AMEND GEN_ALL_CORE FT
Hypoxemia has improved. Na dropped. given lasix holiday. was not sig orthopnic on my exam. cont all other meds. Please continue to maintain strict I/Os, monitor daily weights, Cr, and K. Further cardiac workup will depend on clinical course.
Hypoxia mostly from flu.  Lasix holiday today.  To follow closely while admitted.
I have personally seen and examined the patient in detail.  I have spoken to the provider regarding the assessment and plan of care.  I have made changes to the note accordingly.
Stable CV POV.  Continue current management.  D/c planning per primary team.
better compensated. cont meds. Further cardiac workup will depend on clinical course. DC planning per primary team.
breathing better overall  cont po diuretics  af rate controlled and would cont ac and av giles blockers  oxygen supplementation as needed
Improved from resp pov.  D/c planning per primary team.
Patient with acute hypoxic resp failure more flu than cardiac.  Continue supp oxygen as needed.  To follow closely while admitted.  At risk for abrupt decompensation.
AF with uncontrolled rates.  Need to increase metoprolol to 50 tid.  May need to increase ccb.  To follow while admitted.  Supportive care for flu.  Monitor and replete lytes.  TO follow while admitted.
Please continue to maintain strict I/Os, monitor daily weights, Cr, and K. monitor hco3. inc bb. rx for viral illness. Further cardiac workup will depend on clinical course.
Seems clinically improved on RA.  Ambulate.  D/c planning per primary team.
short of breath in setting of af, hf and flu  diuresed nicely   ccb and bb for rate control  ef 55%  rx for flu

## 2023-01-24 NOTE — PROGRESS NOTE ADULT - PROVIDER SPECIALTY LIST ADULT
Cardiology
Cardiology
Critical Care
Infectious Disease
Nephrology
Nephrology
Pulmonology
Cardiology
Critical Care
Hospitalist
Hospitalist
Infectious Disease
Internal Medicine
Nephrology
Pulmonology
Pulmonology
Cardiology
Critical Care
Critical Care
Infectious Disease
Infectious Disease
Nephrology
Pulmonology
Cardiology
Critical Care
Nephrology
Pulmonology
Cardiology
Hospitalist
Hospitalist
Internal Medicine
Internal Medicine
Hospitalist
Hospitalist
Internal Medicine
Hospitalist
Internal Medicine
Internal Medicine
Hospitalist

## 2023-01-24 NOTE — PROGRESS NOTE ADULT - SUBJECTIVE AND OBJECTIVE BOX
Date/Time Patient Seen:  		  Referring MD:   Data Reviewed	       Patient is a 82y old  Male who presents with a chief complaint of CHF, A fib, Flu (23 Jan 2023 11:35)      Subjective/HPI     PAST MEDICAL & SURGICAL HISTORY:  Congestive heart failure (CHF)    Atrial fibrillation    HLD (hyperlipidemia)    HTN (hypertension)    CAD (coronary artery disease)    S/P coronary artery stent placement    S/P CABG (coronary artery bypass graft)          Medication list         MEDICATIONS  (STANDING):  apixaban 5 milliGRAM(s) Oral every 12 hours  atorvastatin 10 milliGRAM(s) Oral at bedtime  benzonatate 100 milliGRAM(s) Oral every 8 hours  clopidogrel Tablet 75 milliGRAM(s) Oral daily  diltiazem    milliGRAM(s) Oral every 24 hours  FIRST- Mouthwash  BLM 5 milliLiter(s) Swish and Spit three times a day  furosemide    Tablet 40 milliGRAM(s) Oral daily  gabapentin 100 milliGRAM(s) Oral two times a day  isosorbide   mononitrate ER Tablet (IMDUR) 60 milliGRAM(s) Oral daily  metoprolol tartrate 50 milliGRAM(s) Oral every 6 hours  polyethylene glycol 3350 17 Gram(s) Oral daily  senna 2 Tablet(s) Oral at bedtime  sucralfate suspension 1 Gram(s) Oral two times a day    MEDICATIONS  (PRN):  benzocaine 20% Spray 1 Spray(s) Topical <User Schedule> PRN Sore Throat  bisacodyl 5 milliGRAM(s) Oral every 12 hours PRN Constipation  guaiFENesin Oral Liquid (Sugar-Free) 200 milliGRAM(s) Oral every 6 hours PRN Cough  ondansetron Injectable 4 milliGRAM(s) IV Push every 8 hours PRN Nausea and/or Vomiting         Vitals log        ICU Vital Signs Last 24 Hrs  T(C): 36.6 (24 Jan 2023 05:27), Max: 36.9 (23 Jan 2023 20:07)  T(F): 97.8 (24 Jan 2023 05:27), Max: 98.5 (23 Jan 2023 20:07)  HR: 75 (24 Jan 2023 05:27) (65 - 90)  BP: 136/75 (24 Jan 2023 05:27) (99/62 - 136/75)  BP(mean): 84 (23 Jan 2023 17:52) (84 - 84)  ABP: --  ABP(mean): --  RR: 18 (24 Jan 2023 05:27) (18 - 18)  SpO2: 93% (24 Jan 2023 05:27) (93% - 95%)    O2 Parameters below as of 23 Jan 2023 20:07  Patient On (Oxygen Delivery Method): room air                 Input and Output:  I&O's Detail    23 Jan 2023 07:01  -  24 Jan 2023 06:13  --------------------------------------------------------  IN:    Oral Fluid: 980 mL  Total IN: 980 mL    OUT:    Voided (mL): 501 mL  Total OUT: 501 mL    Total NET: 479 mL          Lab Data    01-23    138  |  101  |  11  ----------------------------<  128<H>  3.8   |  33<H>  |  0.88    Ca    8.5      23 Jan 2023 07:40  Phos  2.6     01-23  Mg     1.8     01-23              Review of Systems	      Objective     Physical Examination    heart s1s2  lung dec bS  head nc      Pertinent Lab findings & Imaging      Christiano:  NO   Adequate UO     I&O's Detail    23 Jan 2023 07:01  -  24 Jan 2023 06:13  --------------------------------------------------------  IN:    Oral Fluid: 980 mL  Total IN: 980 mL    OUT:    Voided (mL): 501 mL  Total OUT: 501 mL    Total NET: 479 mL               Discussed with:     Cultures:	        Radiology

## 2023-01-24 NOTE — PROGRESS NOTE ADULT - ASSESSMENT
82 year old male PMH of CAD s/p stent, s/p CABG, HTN, HLD, CHF p/w shortness of breath and cough for the last 5 days found to be in A fib with RVR, congestive HF, and influenza positive.    CAD s/p CABG, stent, Afib with RVR, HFpEF, HTN  - Hypoxemia 2/2 +Flu/Pna,  s/p Tamiflu and Abx, now improved, on room air  - TTE with EF 55%, LAE, with no significant valvular pathology  - pro BNP 2418-->959 , no clear evidence of volume overload  - Continue Lasix PO 40 mg daily    - No anginal symptoms, no sign of cardiac ischemia  - Continue Plavix and statin  - Continue Imdur    - known A fib, rate controlled  - Continue BB and Cardizem   - Continue Eliquis    - BP stable and controlled     - D/C planning in progress.  - Monitor and replete lytes, keep K>4, Mg>2.  - Will continue to follow.    Lyly Arceo, MS FNP, [facility]  Nurse Practitioner- Cardiology   Spectra #[phone] /[phone]

## 2023-01-24 NOTE — DISCHARGE NOTE NURSING/CASE MANAGEMENT/SOCIAL WORK - PATIENT PORTAL LINK FT
You can access the FollowMyHealth Patient Portal offered by [facility] by registering at the following website: [email or web address]. By joining [facility]’s FollowMyHealth portal, you will also be able to view your health information using other applications (apps) compatible with our system.

## 2023-01-24 NOTE — PROGRESS NOTE ADULT - SUBJECTIVE AND OBJECTIVE BOX
Patient seen and examined at bedside. States he feels well, denies any chest pain, SOB, abd pain. Eager to leave to [facility] today so he can get stronger.    Physical Exam:  GENERAL: NAD, sitting up in chair  HEENT:  anicteric, moist mucous membranes  CHEST/LUNG:  CTA b/l, + mild bibasilar crackles  HEART:  IRRR, S1, S2  ABDOMEN:  BS+, soft, nontender, nondistended  EXTREMITIES: no edema, cyanosis, or calf tenderness  NERVOUS SYSTEM: answers questions and follows commands appropriately    Please refer to updated D/C note for further details.

## 2023-01-24 NOTE — PROGRESS NOTE ADULT - REASON FOR ADMISSION
CHF, A fib, Flu

## 2023-01-24 NOTE — PROGRESS NOTE ADULT - SUBJECTIVE AND OBJECTIVE BOX
[facility] -- Donna Jay Pannella, Patel, Savella, Goodger: Office # [phone]    Follow Up:   Hx CAD s/p CABG, Afib with RVR    Subjective/Observations:  Patient awake, alert, resting in bed. Denies chest pain, palpitations and dizziness. Denies any difficulty breathing. No orthopnea and PND. Tolerating room air.     REVIEW OF SYSTEMS: All other review of systems are negative unless indicated above    PAST MEDICAL & SURGICAL HISTORY:  HLD (hyperlipidemia)      HTN (hypertension)      CAD (coronary artery disease)      S/P coronary artery stent placement      S/P CABG (coronary artery bypass graft)    MEDICATIONS  (STANDING):  apixaban 5 milliGRAM(s) Oral every 12 hours  atorvastatin 10 milliGRAM(s) Oral at bedtime  benzonatate 100 milliGRAM(s) Oral every 8 hours  clopidogrel Tablet 75 milliGRAM(s) Oral daily  diltiazem    milliGRAM(s) Oral every 24 hours  FIRST- Mouthwash  BLM 5 milliLiter(s) Swish and Spit three times a day  furosemide    Tablet 40 milliGRAM(s) Oral daily  gabapentin 100 milliGRAM(s) Oral two times a day  isosorbide   mononitrate ER Tablet (IMDUR) 60 milliGRAM(s) Oral daily  metoprolol tartrate 50 milliGRAM(s) Oral every 6 hours  polyethylene glycol 3350 17 Gram(s) Oral daily  senna 2 Tablet(s) Oral at bedtime  sucralfate suspension 1 Gram(s) Oral two times a day    MEDICATIONS  (PRN):  benzocaine 20% Spray 1 Spray(s) Topical <User Schedule> PRN Sore Throat  bisacodyl 5 milliGRAM(s) Oral every 12 hours PRN Constipation  guaiFENesin Oral Liquid (Sugar-Free) 200 milliGRAM(s) Oral every 6 hours PRN Cough  ondansetron Injectable 4 milliGRAM(s) IV Push every 8 hours PRN Nausea and/or Vomiting    Allergies    Levaquin (Unknown)  penicillin (Rash)    Vital Signs Last 24 Hrs  T(C): 36.6 (24 Jan 2023 05:27), Max: 36.9 (23 Jan 2023 20:07)  T(F): 97.8 (24 Jan 2023 05:27), Max: 98.5 (23 Jan 2023 20:07)  HR: 75 (24 Jan 2023 05:27) (65 - 90)  BP: 136/75 (24 Jan 2023 05:27) (99/62 - 136/75)  BP(mean): 84 (23 Jan 2023 17:52) (84 - 84)  RR: 18 (24 Jan 2023 05:27) (18 - 18)  SpO2: 93% (24 Jan 2023 05:27) (93% - 95%)    Parameters below as of 24 Jan 2023 07:28  Patient On (Oxygen Delivery Method): room air      I&O's Summary    23 Jan 2023 07:01  -  24 Jan 2023 07:00  --------------------------------------------------------  IN: 980 mL / OUT: 501 mL / NET: 479 mL    24 Jan 2023 07:01  -  24 Jan 2023 10:20  --------------------------------------------------------  IN: 250 mL / OUT: 0 mL / NET: 250 mL    TELE: Not on telemetry   PHYSICAL EXAM:  Constitutional: NAD, awake and alert, Well developed   HEENT: Moist Mucous Membranes, Anicteric  Pulmonary: Non-labored, breath sounds are clear bilaterally, No wheezing, rales or rhonchi  Cardiovascular: Regular, S1 and S2, No murmurs, No rubs, gallops or clicks  Gastrointestinal:  soft, nontender, nondistended   Lymph: No peripheral edema. No lymphadenopathy.   Skin: No visible rashes or ulcers.  Psych:  Mood & affect appropriate      LABS: All Labs Reviewed:    23 Jan 2023 07:40    138    |  101    |  11     ----------------------------<  128    3.8     |  33     |  0.88   22 Jan 2023 07:17    137    |  98     |  12     ----------------------------<  127    3.4     |  36     |  0.85     Ca    8.5        23 Jan 2023 07:40  Ca    8.4        22 Jan 2023 07:17  Phos  2.6       23 Jan 2023 07:40  Phos  2.3       22 Jan 2023 07:17  Mg     1.8       23 Jan 2023 07:40  Mg     1.7       22 Jan 2023 07:17       Cholesterol, Serum: 62 mg/dL (01-08-23 @ 06:00)  HDL Cholesterol, Serum: 26 mg/dL (01-08-23 @ 06:00)  Triglycerides, Serum: 66 mg/dL (01-08-23 @ 06:00)    12 Lead ECG:   Ventricular Rate 106 BPM    QRS Duration 90 ms    Q-T Interval 328 ms    QTC Calculation(Bazett) 435 ms    R Axis -26 degrees    T Axis 59 degrees    Diagnosis Line Atrial fibrillation with rapid ventricular response with premature ventricular or aberrantly conducted complexes  Nonspecific ST abnormality  Abnormal ECG  No previous ECGs available  Confirmed by munir Salazar ([id number]) on 1/7/2023 12:56:36 PM (01-06-23 @ 15:58)      ACC: [id number] EXAM:  ECHO TTE WO CON COMP W DOPP                          PROCEDURE DATE:  01/07/2023          INTERPRETATION:  INDICATION: Atrial fibrillation  Sonographer KL    Blood Pressure 130/77    Height 175 cm     Weight 99.8 kg       BSA 2.13   sq m    Dimensions:  LA 4.5       Normal Values: 2.0 - 4.0 cm  Ao 3.4        Normal Values: 2.0 - 3.8 cm  SEPTUM 0.9       Normal Values: 0.6 - 1.2 cm  PWT 0.8       Normal Values: 0.6 - 1.1 cm  LVIDd 5.3         Normal Values: 3.0 - 5.6 cm  LVIDs 3.3         Normal Values: 1.8 - 4.0 cm      OBSERVATIONS:  Technically difficult study  Mitral Valve: normal, trace physiologic MR.  Aortic Valve/Aorta: Sclerotic trileaflet aortic valve with grossly normal   opening.  Tricuspid Valve: normal withtrace TR.  Pulmonic Valve: Trace PI  Left Atrium: Enlarged  Right Atrium: Not well-visualized  Left Ventricle: The left ventricular endocardium is not well-visualized.   Overall, grossly normal LV size and systolic function, estimated LVEF of   55%. Cannot rule out segmental abnormalities  Right Ventricle: Not well-visualized  Pericardium: no significant pericardial effusion.        IMPRESSION:  Technically difficult study  The left ventricular endocardium is not well-visualized. Overall, grossly  normal LV size and systolic function, estimated LVEF of 55%.  The right ventricle is not well-visualized  Left atrial enlargement  Sclerotic trileaflet aortic valve with grossly normal opening, without AI.  Trace physiologic MR and TR.  No significant pericardial effusion.  --- End of Report ---  LIANNA CHAIREZ MD; Attending Cardiologist  This document has been electronically signed. Jan 7 2023  2:15PM

## 2023-02-06 PROBLEM — I10 ESSENTIAL (PRIMARY) HYPERTENSION: Chronic | Status: ACTIVE | Noted: 2023-01-07

## 2023-02-06 PROBLEM — I25.10 ATHEROSCLEROTIC HEART DISEASE OF NATIVE CORONARY ARTERY WITHOUT ANGINA PECTORIS: Chronic | Status: ACTIVE | Noted: 2023-01-07

## 2023-02-06 PROBLEM — E78.5 HYPERLIPIDEMIA, UNSPECIFIED: Chronic | Status: ACTIVE | Noted: 2023-01-07

## 2023-02-10 RX ORDER — DILTIAZEM HYDROCHLORIDE 240 MG/1
240 CAPSULE, EXTENDED RELEASE ORAL
Refills: 0 | Status: ACTIVE | COMMUNITY

## 2023-02-10 RX ORDER — METOPROLOL TARTRATE 100 MG/1
100 TABLET, FILM COATED ORAL TWICE DAILY
Qty: 180 | Refills: 2 | Status: ACTIVE | COMMUNITY

## 2023-02-10 RX ORDER — ATORVASTATIN CALCIUM 10 MG/1
10 TABLET, FILM COATED ORAL
Qty: 90 | Refills: 2 | Status: ACTIVE | COMMUNITY

## 2023-02-10 RX ORDER — FUROSEMIDE 40 MG/1
40 TABLET ORAL DAILY
Qty: 30 | Refills: 5 | Status: ACTIVE | COMMUNITY

## 2023-02-10 RX ORDER — GABAPENTIN 100 MG/1
100 CAPSULE ORAL TWICE DAILY
Refills: 0 | Status: ACTIVE | COMMUNITY

## 2023-02-15 ENCOUNTER — APPOINTMENT (OUTPATIENT)
Dept: CARDIOLOGY | Facility: CLINIC | Age: 83
End: 2023-02-15
Payer: MEDICARE

## 2023-02-15 ENCOUNTER — NON-APPOINTMENT (OUTPATIENT)
Age: 83
End: 2023-02-15

## 2023-02-15 VITALS
BODY MASS INDEX: 28.04 KG/M2 | SYSTOLIC BLOOD PRESSURE: 153 MMHG | OXYGEN SATURATION: 99 % | WEIGHT: 185 LBS | DIASTOLIC BLOOD PRESSURE: 82 MMHG | HEART RATE: 83 BPM | HEIGHT: 68 IN

## 2023-02-15 DIAGNOSIS — Z87.01 PERSONAL HISTORY OF PNEUMONIA (RECURRENT): ICD-10-CM

## 2023-02-15 DIAGNOSIS — Z87.09 PERSONAL HISTORY OF OTHER DISEASES OF THE RESPIRATORY SYSTEM: ICD-10-CM

## 2023-02-15 DIAGNOSIS — Z20.822 ENCOUNTER FOR PREPROCEDURAL LABORATORY EXAMINATION: ICD-10-CM

## 2023-02-15 DIAGNOSIS — Z01.812 ENCOUNTER FOR PREPROCEDURAL LABORATORY EXAMINATION: ICD-10-CM

## 2023-02-15 PROCEDURE — 93000 ELECTROCARDIOGRAM COMPLETE: CPT

## 2023-02-15 PROCEDURE — 99215 OFFICE O/P EST HI 40 MIN: CPT | Mod: 25

## 2023-02-15 RX ORDER — POTASSIUM CHLORIDE 1500 MG/1
20 TABLET, FILM COATED, EXTENDED RELEASE ORAL
Qty: 90 | Refills: 3 | Status: ACTIVE | COMMUNITY
Start: 2023-02-15

## 2023-02-15 RX ORDER — SUCRALFATE 1 G/10ML
1 SUSPENSION ORAL
Refills: 0 | Status: DISCONTINUED | COMMUNITY
End: 2023-02-15

## 2023-03-01 ENCOUNTER — NON-APPOINTMENT (OUTPATIENT)
Age: 83
End: 2023-03-01

## 2023-03-06 LAB — SARS-COV-2 N GENE NPH QL NAA+PROBE: NOT DETECTED

## 2023-03-07 ENCOUNTER — TRANSCRIPTION ENCOUNTER (OUTPATIENT)
Age: 83
End: 2023-03-07

## 2023-03-07 ENCOUNTER — INPATIENT (INPATIENT)
Facility: HOSPITAL | Age: 83
LOS: 0 days | Discharge: HOME | DRG: 247 | End: 2023-03-08
Attending: INTERNAL MEDICINE | Admitting: INTERNAL MEDICINE
Payer: COMMERCIAL

## 2023-03-07 VITALS
HEIGHT: 68 IN | HEART RATE: 62 BPM | OXYGEN SATURATION: 99 % | SYSTOLIC BLOOD PRESSURE: 141 MMHG | TEMPERATURE: 98 F | RESPIRATION RATE: 17 BRPM | DIASTOLIC BLOOD PRESSURE: 71 MMHG | WEIGHT: 188.05 LBS

## 2023-03-07 DIAGNOSIS — I50.9 HEART FAILURE, UNSPECIFIED: ICD-10-CM

## 2023-03-07 DIAGNOSIS — Z95.5 PRESENCE OF CORONARY ANGIOPLASTY IMPLANT AND GRAFT: Chronic | ICD-10-CM

## 2023-03-07 DIAGNOSIS — Z95.1 PRESENCE OF AORTOCORONARY BYPASS GRAFT: Chronic | ICD-10-CM

## 2023-03-07 LAB
ALBUMIN SERPL ELPH-MCNC: 4.3 G/DL — SIGNIFICANT CHANGE UP (ref 3.3–5)
ALP SERPL-CCNC: 87 U/L — SIGNIFICANT CHANGE UP (ref 40–120)
ALT FLD-CCNC: 12 U/L — SIGNIFICANT CHANGE UP (ref 10–45)
ANION GAP SERPL CALC-SCNC: 11 MMOL/L — SIGNIFICANT CHANGE UP (ref 5–17)
AST SERPL-CCNC: 20 U/L — SIGNIFICANT CHANGE UP (ref 10–40)
BILIRUB SERPL-MCNC: 0.9 MG/DL — SIGNIFICANT CHANGE UP (ref 0.2–1.2)
BUN SERPL-MCNC: 8 MG/DL — SIGNIFICANT CHANGE UP (ref 7–23)
CALCIUM SERPL-MCNC: 10.2 MG/DL — SIGNIFICANT CHANGE UP (ref 8.4–10.5)
CHLORIDE SERPL-SCNC: 101 MMOL/L — SIGNIFICANT CHANGE UP (ref 96–108)
CO2 SERPL-SCNC: 28 MMOL/L — SIGNIFICANT CHANGE UP (ref 22–31)
CREAT SERPL-MCNC: 0.81 MG/DL — SIGNIFICANT CHANGE UP (ref 0.5–1.3)
EGFR: 89 ML/MIN/1.73M2 — SIGNIFICANT CHANGE UP
GLUCOSE SERPL-MCNC: 113 MG/DL — HIGH (ref 70–99)
HCT VFR BLD CALC: 40.2 % — SIGNIFICANT CHANGE UP (ref 39–50)
HGB BLD-MCNC: 13.1 G/DL — SIGNIFICANT CHANGE UP (ref 13–17)
HGB BLDA-MCNC: 13.2 G/DL — SIGNIFICANT CHANGE UP (ref 12.6–17.4)
HGB FLD-MCNC: 13.1 G/DL — SIGNIFICANT CHANGE UP (ref 12.6–17.4)
MCHC RBC-ENTMCNC: 29.6 PG — SIGNIFICANT CHANGE UP (ref 27–34)
MCHC RBC-ENTMCNC: 32.6 GM/DL — SIGNIFICANT CHANGE UP (ref 32–36)
MCV RBC AUTO: 91 FL — SIGNIFICANT CHANGE UP (ref 80–100)
NRBC # BLD: 0 /100 WBCS — SIGNIFICANT CHANGE UP (ref 0–0)
OXYHGB MFR BLDA: 94.9 % — SIGNIFICANT CHANGE UP (ref 90–95)
OXYHGB MFR BLDMV: 65.1 % — LOW (ref 90–95)
PLATELET # BLD AUTO: 234 K/UL — SIGNIFICANT CHANGE UP (ref 150–400)
POTASSIUM SERPL-MCNC: 4 MMOL/L — SIGNIFICANT CHANGE UP (ref 3.5–5.3)
POTASSIUM SERPL-SCNC: 4 MMOL/L — SIGNIFICANT CHANGE UP (ref 3.5–5.3)
PROT SERPL-MCNC: 8.6 G/DL — HIGH (ref 6–8.3)
RBC # BLD: 4.42 M/UL — SIGNIFICANT CHANGE UP (ref 4.2–5.8)
RBC # FLD: 15.5 % — HIGH (ref 10.3–14.5)
SAO2 % BLD: 66.8 % — SIGNIFICANT CHANGE UP (ref 60–90)
SAO2 % BLDA: 97.8 % — SIGNIFICANT CHANGE UP (ref 94–98)
SODIUM SERPL-SCNC: 140 MMOL/L — SIGNIFICANT CHANGE UP (ref 135–145)
WBC # BLD: 8.02 K/UL — SIGNIFICANT CHANGE UP (ref 3.8–10.5)
WBC # FLD AUTO: 8.02 K/UL — SIGNIFICANT CHANGE UP (ref 3.8–10.5)

## 2023-03-07 PROCEDURE — 99152 MOD SED SAME PHYS/QHP 5/>YRS: CPT

## 2023-03-07 PROCEDURE — 92928 PRQ TCAT PLMT NTRAC ST 1 LES: CPT | Mod: LC

## 2023-03-07 PROCEDURE — 93457 R HRT ART/GRFT ANGIO: CPT | Mod: 26,59

## 2023-03-07 PROCEDURE — 93010 ELECTROCARDIOGRAM REPORT: CPT | Mod: 76

## 2023-03-07 RX ORDER — METOPROLOL TARTRATE 50 MG
100 TABLET ORAL DAILY
Refills: 0 | Status: DISCONTINUED | OUTPATIENT
Start: 2023-03-08 | End: 2023-03-08

## 2023-03-07 RX ORDER — POTASSIUM CHLORIDE 20 MEQ
1 PACKET (EA) ORAL
Qty: 0 | Refills: 0 | DISCHARGE

## 2023-03-07 RX ORDER — ATORVASTATIN CALCIUM 80 MG/1
80 TABLET, FILM COATED ORAL AT BEDTIME
Refills: 0 | Status: DISCONTINUED | OUTPATIENT
Start: 2023-03-07 | End: 2023-03-08

## 2023-03-07 RX ORDER — GABAPENTIN 400 MG/1
2 CAPSULE ORAL
Qty: 0 | Refills: 0 | DISCHARGE
Start: 2023-03-07

## 2023-03-07 RX ORDER — FUROSEMIDE 40 MG
40 TABLET ORAL DAILY
Refills: 0 | Status: DISCONTINUED | OUTPATIENT
Start: 2023-03-08 | End: 2023-03-08

## 2023-03-07 RX ORDER — APIXABAN 2.5 MG/1
5 TABLET, FILM COATED ORAL
Refills: 0 | Status: DISCONTINUED | OUTPATIENT
Start: 2023-03-08 | End: 2023-03-08

## 2023-03-07 RX ORDER — POTASSIUM CHLORIDE 20 MEQ
10 PACKET (EA) ORAL DAILY
Refills: 0 | Status: DISCONTINUED | OUTPATIENT
Start: 2023-03-07 | End: 2023-03-08

## 2023-03-07 RX ORDER — DILTIAZEM HCL 120 MG
120 CAPSULE, EXT RELEASE 24 HR ORAL DAILY
Refills: 0 | Status: DISCONTINUED | OUTPATIENT
Start: 2023-03-07 | End: 2023-03-08

## 2023-03-07 RX ORDER — GABAPENTIN 400 MG/1
100 CAPSULE ORAL
Refills: 0 | Status: DISCONTINUED | OUTPATIENT
Start: 2023-03-07 | End: 2023-03-08

## 2023-03-07 RX ORDER — ATORVASTATIN CALCIUM 80 MG/1
0 TABLET, FILM COATED ORAL
Qty: 0 | Refills: 0 | DISCHARGE

## 2023-03-07 RX ORDER — ISOSORBIDE MONONITRATE 60 MG/1
1 TABLET, EXTENDED RELEASE ORAL
Qty: 0 | Refills: 0 | DISCHARGE

## 2023-03-07 RX ORDER — METOPROLOL TARTRATE 50 MG
1 TABLET ORAL
Qty: 0 | Refills: 0 | DISCHARGE

## 2023-03-07 RX ORDER — CLOPIDOGREL BISULFATE 75 MG/1
1 TABLET, FILM COATED ORAL
Qty: 0 | Refills: 0 | DISCHARGE

## 2023-03-07 RX ORDER — CLOPIDOGREL BISULFATE 75 MG/1
75 TABLET, FILM COATED ORAL DAILY
Refills: 0 | Status: DISCONTINUED | OUTPATIENT
Start: 2023-03-08 | End: 2023-03-08

## 2023-03-07 RX ORDER — ASPIRIN/CALCIUM CARB/MAGNESIUM 324 MG
81 TABLET ORAL DAILY
Refills: 0 | Status: DISCONTINUED | OUTPATIENT
Start: 2023-03-08 | End: 2023-03-08

## 2023-03-07 RX ORDER — GABAPENTIN 400 MG/1
100 CAPSULE ORAL ONCE
Refills: 0 | Status: COMPLETED | OUTPATIENT
Start: 2023-03-07 | End: 2023-03-07

## 2023-03-07 RX ORDER — GABAPENTIN 400 MG/1
1 CAPSULE ORAL
Qty: 0 | Refills: 0 | DISCHARGE
Start: 2023-03-07

## 2023-03-07 RX ADMIN — GABAPENTIN 100 MILLIGRAM(S): 400 CAPSULE ORAL at 21:31

## 2023-03-07 RX ADMIN — GABAPENTIN 100 MILLIGRAM(S): 400 CAPSULE ORAL at 18:33

## 2023-03-07 RX ADMIN — Medication 10 MILLIEQUIVALENT(S): at 18:33

## 2023-03-07 RX ADMIN — ATORVASTATIN CALCIUM 80 MILLIGRAM(S): 80 TABLET, FILM COATED ORAL at 21:31

## 2023-03-07 NOTE — ASU PATIENT PROFILE, ADULT - FALL HARM RISK - RISK INTERVENTIONS

## 2023-03-07 NOTE — H&P CARDIOLOGY - NSICDXPASTMEDICALHX_GEN_ALL_CORE_FT
PAST MEDICAL HISTORY:  Atrial fibrillation     CAD (coronary artery disease)     H/O CHF     HLD (hyperlipidemia)     HTN (hypertension)

## 2023-03-07 NOTE — DISCHARGE NOTE PROVIDER - NSDCCPTREATMENT_GEN_ALL_CORE_FT
PRINCIPAL PROCEDURE  Procedure: Complete cardiac catheterization  Findings and Treatment: 3/7/23 -  LHC: ALVINO mCx (80%), known %. Patent LIMA-LAD, SVG-OM closed, SVG-RCA closed.   RHC: RA 10/12/9, RV 35/5/8, PA 41/15/27, /56/61       PRINCIPAL PROCEDURE  Procedure: Complete cardiac catheterization  Findings and Treatment: 3/7/23 -  1.    Arterial Access - Left Femoral   2.    Venous Access - Left Femoral   3.    Diagnostic Coronary Angiography   4.    RHC   5.    Ultrasound Guided Access   6.    Diagnostic Graft Angiography   7.    PCI: ALVINO   Indications:                Positive Stress Test   CCS Class II   Congestive heart failure, acute on chronic systolic   PCI Status:                elective   Conclusions:   Successful PCI of native dominant LCX.   Coronary Angiography   The coronary circulation is left dominant.    LM   Left main artery: Angiography shows no disease.    LAD   Mid left anterior descending: There is a 100 % stenosis.    CX   Mid circumflex: There is an 80 % stenosis. First obtuse marginal:  There is a 100 % stenosis.  RCA   Right coronary artery: There is a 100 % stenosis.    Graft Angiography   LIMA graft to Mid left anterior descending: Angiography shows no  disease.  SVG graft to First obtuse marginal: Angiography shows complete  occlusion.  SVG graft to Right posterior descending artery: Angiography shows  complete occlusion.  RHC: RA 10/12/9, RV 35/5/8, PA 41/15/27, /56/61

## 2023-03-07 NOTE — CHART NOTE - NSCHARTNOTEFT_GEN_A_CORE
Removal of Femoral Sheath    Pulses in the left lower extremity are palpable. The patient was placed in the supine position. The insertion site was identified and the sutures were removed per protocol.  The 5 Kittitian and 7 Kittitian femoral sheaths were then removed. Direct pressure was applied for  25 minutes.     Monitoring of the left groin and both lower extremities including neuro-vascular checks and vital signs every 15 minutes x 4, then every 30 minutes x 2, then every 1 hour was ordered.    Complications: None/Other    Comments:  Patient tolerated procedure well. Good hemostasis achieved post sheath pull. No acute distress noted. Importance of medication adherence with TAPT (ASA to be discontinued after 1 week) stressed to patient, as well as groin care. Patient verbalized understanding. Will continue to monitor closely. Pounds Preamble Statement (Weight Entered In Details Tab): Reported Weight in pounds:

## 2023-03-07 NOTE — PATIENT PROFILE ADULT - FALL HARM RISK - CONCLUSION
---Spoke to pt notify US & CT NEGATIVE PT AGREED PT F/U WITH PHYSICAL THERAPY     - Message from Germain Mcmahan DO sent at 5/29/2019 12:19 PM CDT -----  CT head was unremarkable. Awaiting results of ultrasound carotid, ultrasound lower extremities    
Fall with Harm Risk

## 2023-03-07 NOTE — H&P CARDIOLOGY - OTHER
patient on daily Lasix, recent admit for Afib with RVR and fluid volume ol   will hold off on hydration protocol

## 2023-03-07 NOTE — DISCHARGE NOTE PROVIDER - NSDCCPCAREPLAN_GEN_ALL_CORE_FT
PRINCIPAL DISCHARGE DIAGNOSIS  Diagnosis: CAD (coronary artery disease)  Assessment and Plan of Treatment: You have a diagnosis of coronary artery disease and underwent a cardiac catheterization where you received a stent to your right coronary artery. You have been started on Aspirin 81mg daily, Plavix 75mg daily along with your Eliquis 5mg twice a day. Continue taking all medications as prescribed. You will STOP taking Aspirin after 1 week (on 3/14/23).  The procedure was done through the wrist. Please avoid any heavy lifting (no more than 3 to 5 lbs), strenuous activity, bending, straining, or unnecessary stair climbing for 2 weeks. No driving for 2 days. You may shower 24 hours following the procedure but avoid baths/swimming for 1 week. If you develop any swelling, bleeding, hardening of the skin (hematoma formation), acute pain, numbness/tingling in your arm or leg, or have any questions/concerns regarding your procedure, please call [facility] [phone]  NEVER miss a dose of Aspirin and Plavix to ensure your stent does not close. DO NOT STOP THESE MEDICATIONS FOR ANY REASON UNLESS OTHERWISE INDICATED BY YOUR CARDIOLOGIST BECAUSE THIS WILL PUT YOU AT RISK OF YOUR STENT CLOSING AND HAVING A HEART ATTACK OR SUDDEN SEVERE LEG PAIN DUE TO BLOCKAGE OF BLOOD FLOW TO LEG.  You have been given a Stent Card to carry with you in your wallet.  Make Photocopies or take a picture of card so you have a backup copy.  This card has important information for any possible future Radiology/MRI studies.    Please make a follow up appointment with your cardiologist within 1-2 weeks of your discharge. All of your prescriptions have been sent electronically to your pharmacy.      SECONDARY DISCHARGE DIAGNOSES  Diagnosis: HTN (hypertension)  Assessment and Plan of Treatment: You have high blood pressure. Continue taking your blood pressure medications as prescribed. Eat a heart healthy diet with low salt; exercise regularly (if cleared by your primary care doctor or cardiologist). Maintain a heart healthy weight and include healthy ways to manage stress. If you smoke, quit. If you need assistance to help you stop smoking, please use the following resource: [facility] – ([phone]). Follow up with your primary care doctor to continue having your blood pressure checked on a continual basis.    Diagnosis: HLD (hyperlipidemia)  Assessment and Plan of Treatment: LDL<70   Goal is to keep LDL<70. Continue with your cholesterol medications as prescribed. Eat a heart healthy diet that is low in saturated fats and salt, and includes whole grains, fruits, vegetables and lean protein; exercise regularly (consult with your physician or cardiologist first); maintain a heart healthy weight; if you smoke - quit (A resource to help you stop smoking is the [facility] – phone number [phone].). Continue to follow with your primary physician or cardiologist.    Diagnosis: (HFpEF) heart failure with preserved ejection fraction  Assessment and Plan of Treatment: You have a history of weakened heart muscle called congestive heart failure. This means that your heart does not pump blood to the rest of the body as it normally should. You should take Lasix 40mg daily to help reduce the amount of excess fluid that can back up to the legs, kidneys, and lungs. Continue taking Metoprolol 100mg daily.  Please weigh yourself daily: if you have gained more than 2-3 lbs in one day or 5 lbs in one week contact your doctor immediately as you may be retaining water weight. In addition, restrict your salt intake to less than 2 grams a day  Please make sure you follow up with your cardiologist within one week of discharge.   If you develop worsening shortening of breath, leg swelling, fatigue, chest pain, difficulty sleeping at night due to shortness of breath, contact your cardiologist immediately.     risk factors

## 2023-03-07 NOTE — H&P CARDIOLOGY - HISTORY OF PRESENT ILLNESS
This is a 83 y/o m w/ PMH of CAD s/p x4 stent, s/p CABG, HTN, HLD,  and CHF with recent admission to [facility] on 01/07/22023 for c/c of shortness of breath; found to be in A fib with RVR, congestive HF, and influenza positive. TTE with EF 55%, LAE, with no significant valvular pathology, pro BNP 2418-->959 , no clear evidence of volume overload. Now on Eliquis ( last dose 3/5 PM), on Cardizem and Metoprolol for rate control, dc to [facility] on 01/24.  Presents here today for RHC/LHC for further evaluation of CAD and HF.       cards Winnie Collins       .

## 2023-03-07 NOTE — DISCHARGE NOTE PROVIDER - HOSPITAL COURSE
82M w/ PMHx of HTN, HLD, HFpEF (EF 55% 1/2023), CAD s/p CABG and ALVINO x4, recently dx w/ Afib RVR, recent admission to [facility] 1/7/23 for SOB presented to the hospital for RHC and LHC w/ Dr. England for further evaluation of CAD and HFpEF. Of note, patient admitted to [facility] 1/7/23 for SOB, found to be in Afib RVR (on Eliquis, last dose 3/5PM).     Now s/p RHC/LHC 3/7/23 --  LHC: ALVINO mCx (80%), known %. Patent LIMA-LAD, SVG-OM closed, SVG-RCA closed.   RHC: RA 10/12/9, RV 35/5/8, PA 41/15/27, /56/61   RFA 5Fr / LFV 7Fr - 2:40    Patient to complete triple therapy x1 week, then drop ASA.    Patient seen and examined at the bedside on ______. No significant events on telemetry overnight. AM labs wnl, VSS/HD stable. Right  and left groin site stable; no hematoma or bruit. Denies any complaints at this time. Patient has been medically cleared for discharge as per  ______. Patient has been given appropriate discharge instructions including medication regimen, access site management and follow up. Medications that patient needs refills on (+/- new medications) have been e-prescribed to preferred pharmacy. Patient will f/u with  ______ in 1-2 weeks for further management.     VSS  Gen: NAD, A&O x3  Cards: RRR, clear S1 and S2 without murmur  Pulm: CTA B/L without w/r/r  Vascular: Right and groin site w/o hematoma, ooze or bruit. Peripheral pulses 2+ B/L  Abd: soft, NT  Ext: no LE edema or ulcerations B/L 82M w/ PMHx of HTN, HLD, HFpEF (EF 55% 1/2023), CAD s/p CABG and ALVINO x4, recently dx w/ Afib RVR, recent admission to [facility] 1/7/23 for SOB presented to the hospital for RHC and LHC w/ Dr. England for further evaluation of CAD and HFpEF. Of note, patient admitted to [facility] 1/7/23 for SOB, found to be in Afib RVR (on Eliquis, last dose 3/5PM).     Now s/p RHC/LHC 3/7/23 --  LHC: ALVINO mCx (80%), known %. Patent LIMA-LAD, SVG-OM closed, SVG-RCA closed.   RHC: RA 10/12/9, RV 35/5/8, PA 41/15/27, /56/61   LFA 5Fr / LFV 7Fr - 2:40    Patient to complete triple therapy x1 week, then drop ASA.    Patient seen and examined at the bedside on ______. No significant events on telemetry overnight. AM labs wnl, VSS/HD stable. Right  and left groin site stable; no hematoma or bruit. Denies any complaints at this time. Patient has been medically cleared for discharge as per Dr. England. Patient has been given appropriate discharge instructions including medication regimen, access site management and follow up. Medications that patient needs refills on (+/- new medications) have been e-prescribed to preferred pharmacy. Patient will f/u with Dr. Zhou in 1-2 weeks for further management.     VSS  Gen: NAD, A&O x3  Cards: RRR, clear S1 and S2 without murmur  Pulm: CTA B/L without w/r/r  Vascular: Right and groin site w/o hematoma, ooze or bruit. Peripheral pulses 2+ B/L  Abd: soft, NT  Ext: no LE edema or ulcerations B/L INCOMPLETE -- NEEDS MED REC    82M w/ PMHx of HTN, HLD, HFpEF (EF 55% 1/2023), CAD s/p CABG and ALVINO x4, recently dx w/ Afib RVR, recent admission to [facility] 1/7/23 for SOB presented to the hospital for RHC and LHC w/ Dr. England for further evaluation of CAD and HFpEF. Of note, [facility]B, found to be in Afib RVR (on Eliquis, last dose 3/5PM).     Now s/p RHC/LHC 3/7/23 --  LHC: ALVINO mCx (80%), known %. Patent LIMA-LAD, SVG-OM closed, SVG-RCA closed.   RHC: RA 10/12/9, RV 35/5/8, PA 41/15/27, /56/61   LFA 5Fr / LFV 7Fr - 2:40    Patient to complete triple therapy x1 week, then drop ASA.    Patient seen and examined at the bedside on ______. No significant events on telemetry overnight. AM labs wnl, VSS/HD stable. Right  and left groin site stable; no hematoma or bruit. Denies any complaints at this time. Patient has been medically cleared for discharge as per Dr. England. Patient has been given appropriate discharge instructions including medication regimen, access site management and follow up. Medications that patient needs refills on (+/- new medications) have been e-prescribed to preferred pharmacy. Patient will f/u with Dr. Zhou in 1-2 weeks for further management.     VSS  Gen: NAD, A&O x3  Cards: RRR, clear S1 and S2 without murmur  Pulm: CTA B/L without w/r/r  Vascular: Right and groin site w/o hematoma, ooze or bruit. Peripheral pulses 2+ B/L  Abd: soft, NT  Ext: no LE edema or ulcerations B/L INCOMPLETE -- NEEDS MED REC    82M w/ PMHx of HTN, HLD, HFpEF (EF 55% 1/2023), CAD s/p CABG and ALVINO x4, recently dx w/ Afib RVR, recent admission to [facility] 1/7/23 for SOB presented to the hospital for RHC and LHC w/ Dr. England for further evaluation of CAD and HFpEF. Of note, patient admitted to [facility] 1/7/23 for SOB, found to be in Afib RVR (on Eliquis, last dose 3/5PM).     Now s/p RHC/LHC 3/7/23 --  LHC: ALVINO mCx (80%), known %. Patent LIMA-LAD, SVG-OM closed, SVG-RCA closed.   RHC: RA 10/12/9, RV 35/5/8, PA 41/15/27, /56/61   LFA 5Fr / LFV 7Fr - 2:40    Patient to complete triple therapy x1 week, then drop ASA.    Patient seen and examined at the bedside on 3/8/23. No significant events on telemetry overnight. AM labs wnl, VSS/HD stable. Right  and left groin site stable; no hematoma or bruit. Denies any complaints at this time. Patient has been medically cleared for discharge as per Dr. England. Patient has been given appropriate discharge instructions including medication regimen, access site management and follow up. Medications that patient needs refills on (+/- new medications) have been e-prescribed to preferred pharmacy. Patient will f/u with Dr. Zhou in 1-2 weeks for further management.     VSS  Gen: NAD, A&O x3  Cards: RRR, clear S1 and S2 without murmur  Pulm: CTA B/L without w/r/r  Vascular: Right and groin site w/o hematoma, ooze or bruit. Peripheral pulses 2+ B/L  Abd: soft, NT  Ext: no LE edema or ulcerations B/L 82M w/ PMHx of HTN, HLD, HFpEF (EF 55% 1/2023), CAD s/p CABG and ALVINO x4, recently dx w/ Afib RVR, recent admission to  1/7/23 for SOB presented to the hospital for RHC and LHC w/ Dr. England for further evaluation of CAD and HFpEF. Of note, patient admitted to [facility] 1/7/23 for SOB, found to be in Afib RVR (on Eliquis, last dose 3/5PM).     Now s/p RHC/LHC 3/7/23 --  LHC: ALVINO mCx (80%), known %. Patent LIMA-LAD, SVG-OM closed, SVG-RCA closed.   RHC: RA 10/12/9, RV 35/5/8, PA 41/15/27, /56/61   LFA 5Fr / LFV 7Fr - 2:40    Patient to complete triple therapy x1 week, then drop ASA.    Patient seen and examined at the bedside on 3/8/23. No significant events on telemetry overnight. AM labs wnl, VSS/HD stable. Right  and left groin site stable; no hematoma or bruit. Denies any complaints at this time. Patient has been medically cleared for discharge as per Dr. England. Patient has been given appropriate discharge instructions including medication regimen, access site management and follow up. Medications that patient needs refills on (+/- new medications) have been e-prescribed to preferred pharmacy. Patient will f/u with Dr. Zhou in 1-2 weeks for further management.     VSS  Gen: NAD, A&O x3  Cards: RRR, clear S1 and S2 without murmur  Pulm: CTA B/L without w/r/r  Vascular: Right and groin site w/o hematoma, ooze or bruit. Peripheral pulses 2+ B/L  Abd: soft, NT  Ext: no LE edema or ulcerations B/L

## 2023-03-07 NOTE — DISCHARGE NOTE PROVIDER - NSDCFUADDAPPT_GEN_ALL_CORE_FT
Please follow up with your cardiologist within 1-2 weeks of discharge Please follow up with your Cardiologist Dr. JALEESA Zhou within 1-2 weeks of discharge

## 2023-03-07 NOTE — DISCHARGE NOTE PROVIDER - NSDCMRMEDTOKEN_GEN_ALL_CORE_FT
apixaban 5 mg oral tablet: 1 tab(s) orally every 12 hours  atorvastatin 10 mg oral tablet: tab(s) orally once a day  clopidogrel 75 mg oral tablet: 1 tab(s) orally once a day  dilTIAZem 120 mg/24 hours oral capsule, extended release: 1 cap(s) orally once a day  furosemide 40 mg oral tablet: 1 tab(s) orally once a day  Klor-Con 10 oral tablet, extended release: 1 tab(s) orally once a day  Metoprolol Succinate  mg oral tablet, extended release: 1 tab(s) orally once a day  Neurontin 100 mg oral capsule: 1 cap(s) orally 2 times a day   apixaban 5 mg oral tablet: 1 tab(s) orally every 12 hours  aspirin 81 mg oral delayed release tablet: 1 tab(s) orally once a day for 7 days  atorvastatin 10 mg oral tablet: tab(s) orally once a day  clopidogrel 75 mg oral tablet: 1 tab(s) orally once a day  dilTIAZem 120 mg/24 hours oral capsule, extended release: 1 cap(s) orally once a day  furosemide 40 mg oral tablet: 1 tab(s) orally once a day  gabapentin 100 mg oral capsule: 1 cap(s) orally once a day in the morning  gabapentin 100 mg oral capsule: 2 cap(s) orally once a day in the evening  Klor-Con 10 oral tablet, extended release: 1 tab(s) orally once a day  Metoprolol Succinate  mg oral tablet, extended release: 1 tab(s) orally once a day

## 2023-03-07 NOTE — DISCHARGE NOTE PROVIDER - NSDCFUADDINST_GEN_ALL_CORE_FT
Wound Care:   the day AFTER your procedure remove bandage GENTLY, and clean using  mild soap and gentle warm, water stream, pat dry. leave OPEN to air. YOU MAY SHOWER   DO NOT apply lotions, creams, ointments, powder, perfumes to your incision site  DO NOT SOAK your site for 1 week ( no baths, no pools, no tubs, etc...)  Check  your groin and/or wrist daily. A small amount of bruising, and soreness are normal    ACTIVITY: for 24 hours   - DO NOT DRIVE  - DO NOT make any important decisions or sign legal documents   - DO NOT operate heavy machineries   - you may resume sexual activity in 48 hours, unless otherwise instructed by your cardiologist     If your procedure was done through the WRIST: for the NEXT 3 DAYS  - avoid pushing, pulling, with that affected wrist   - avoid repeated movement of that hand and wrist ( eg: typing, hammering)  - DO NOT LIFT anything more than 5 lbs     If your procedure was done through the GROIN: for the NEXT 5 DAYS  - Limit climbing stairs, DO NOT soak in bathtub or pool  - no strenuous activities, pushing, pulling, straining  - Do not lift anything 10lbs or heavier     MEDICATION:   take your medications as explained ( see discharge paperwork)   If you received a STENT, you will be taking antiplatelet medications to KEEP YOUR STENT OPEN ( eg: Aspirin, Plavix, Brilinta, Effient, etc).  Take as prescribed DO NOT STOP taking them without consulting with your cardiologist first.     Follow heart healthy diet recommended by your doctor, , if you smoke STOP SMOKING ( may call [phone] for center of tobacco control if you need assistance)     CALL your doctor to make appointment in 2 WEEKS     ***CALL YOUR DOCTOR***  if you experience: fever, chills, body aches, or severe pain, swelling, redness, heat or yellow discharge at incision site  If you experience Bleeding or excruciating pain at the procedural site, swelling (golf ball size) at your procedural site  If you experience CHEST PAIN  If you experience extremity numbness, tingling, temperature change (of your procedural site)   If you are unable to reach your doctor, you may contact:   -Cardiology Office at [facility] at [phone] or   - [facility] [phone]  - [facility] [phone]

## 2023-03-07 NOTE — DISCHARGE NOTE PROVIDER - CARE PROVIDER_API CALL
Zhao Zhou)  Cardiovascular Disease; Internal Medicine  [address]  [place] [address]  Phone: [phone]  Fax: [phone]  Established Patient  Follow Up Time: 1 week

## 2023-03-07 NOTE — PATIENT PROFILE ADULT - FALL HARM RISK - HARM RISK INTERVENTIONS

## 2023-03-08 ENCOUNTER — TRANSCRIPTION ENCOUNTER (OUTPATIENT)
Age: 83
End: 2023-03-08

## 2023-03-08 VITALS
SYSTOLIC BLOOD PRESSURE: 121 MMHG | RESPIRATION RATE: 18 BRPM | OXYGEN SATURATION: 97 % | DIASTOLIC BLOOD PRESSURE: 51 MMHG | HEART RATE: 79 BPM | TEMPERATURE: 98 F

## 2023-03-08 DIAGNOSIS — E78.5 HYPERLIPIDEMIA, UNSPECIFIED: ICD-10-CM

## 2023-03-08 DIAGNOSIS — I25.10 ATHEROSCLEROTIC HEART DISEASE OF NATIVE CORONARY ARTERY WITHOUT ANGINA PECTORIS: ICD-10-CM

## 2023-03-08 DIAGNOSIS — I48.91 UNSPECIFIED ATRIAL FIBRILLATION: ICD-10-CM

## 2023-03-08 DIAGNOSIS — I10 ESSENTIAL (PRIMARY) HYPERTENSION: ICD-10-CM

## 2023-03-08 PROCEDURE — C1769: CPT

## 2023-03-08 PROCEDURE — C9600: CPT | Mod: LC

## 2023-03-08 PROCEDURE — C1887: CPT

## 2023-03-08 PROCEDURE — 93457 R HRT ART/GRFT ANGIO: CPT | Mod: 59

## 2023-03-08 PROCEDURE — 85027 COMPLETE CBC AUTOMATED: CPT

## 2023-03-08 PROCEDURE — 99232 SBSQ HOSP IP/OBS MODERATE 35: CPT

## 2023-03-08 PROCEDURE — C1889: CPT

## 2023-03-08 PROCEDURE — 80053 COMPREHEN METABOLIC PANEL: CPT

## 2023-03-08 PROCEDURE — C1894: CPT

## 2023-03-08 PROCEDURE — 36415 COLL VENOUS BLD VENIPUNCTURE: CPT

## 2023-03-08 PROCEDURE — C1725: CPT

## 2023-03-08 PROCEDURE — 82803 BLOOD GASES ANY COMBINATION: CPT

## 2023-03-08 PROCEDURE — 93005 ELECTROCARDIOGRAM TRACING: CPT

## 2023-03-08 PROCEDURE — C1874: CPT

## 2023-03-08 RX ADMIN — APIXABAN 5 MILLIGRAM(S): 2.5 TABLET, FILM COATED ORAL at 05:47

## 2023-03-08 RX ADMIN — Medication 81 MILLIGRAM(S): at 05:47

## 2023-03-08 RX ADMIN — GABAPENTIN 100 MILLIGRAM(S): 400 CAPSULE ORAL at 05:46

## 2023-03-08 RX ADMIN — CLOPIDOGREL BISULFATE 75 MILLIGRAM(S): 75 TABLET, FILM COATED ORAL at 05:47

## 2023-03-08 RX ADMIN — Medication 120 MILLIGRAM(S): at 05:46

## 2023-03-08 RX ADMIN — Medication 40 MILLIGRAM(S): at 05:47

## 2023-03-08 RX ADMIN — Medication 100 MILLIGRAM(S): at 05:47

## 2023-03-08 NOTE — PROGRESS NOTE ADULT - SUBJECTIVE AND OBJECTIVE BOX
HPI:  This is a 81 y/o m w/ PMH of CAD s/p x4 stent, s/p CABG, HTN, HLD,  and CHF with recent admission to [facility] on 01/07/22023 for c/c of shortness of breath; found to be in A fib with RVR, congestive HF, and influenza positive. TTE with EF 55%, LAE, with no significant valvular pathology, pro BNP 2418-->959 , no clear evidence of volume overload. Now on Eliquis ( last dose 3/5 PM), on Cardizem and Metoprolol for rate control, dc to [facility] on 01/24.  Presents here today for RHC/C for further evaluation of CAD and HF.       cards Winnei Collins       Patient is a 82y old  Male who presents with a chief complaint of         Allergies    Levaquin (Unknown)  penicillin (Rash)  shellfish (Hives)    Intolerances        Medications:  apixaban 5 milliGRAM(s) Oral two times a day  aspirin enteric coated 81 milliGRAM(s) Oral daily  atorvastatin 80 milliGRAM(s) Oral at bedtime  clopidogrel Tablet 75 milliGRAM(s) Oral daily  diltiazem    milliGRAM(s) Oral daily  furosemide    Tablet 40 milliGRAM(s) Oral daily  gabapentin 100 milliGRAM(s) Oral two times a day  metoprolol succinate  milliGRAM(s) Oral daily  potassium chloride    Tablet ER 10 milliEquivalent(s) Oral daily      Vitals:  T(C): 36.8 (03-07-23 @ 20:40), Max: 36.8 (03-07-23 @ 20:40)  HR: 78 (03-07-23 @ 20:40) (57 - 89)  BP: 110/57 (03-07-23 @ 20:40) (110/57 - 141/71)  BP(mean): 69 (03-07-23 @ 20:40) (62 - 87)  RR: 18 (03-07-23 @ 20:40) (16 - 22)  SpO2: 98% (03-07-23 @ 20:40) (93% - 99%)  Wt(kg): --  Daily Height in cm: 172.72 (07 Mar 2023 10:54)    Daily   I&O's Summary    07 Mar 2023 07:01  -  08 Mar 2023 03:42  --------------------------------------------------------  IN: 240 mL / OUT: 0 mL / NET: 240 mL          Physical Exam:  Appearance: Normal  Eyes: PERRL, EOMI  HENT: Normal oral muscosa, NC/AT  Cardiovascular: S1S2, RRR, No M/R/G, no JVD, No Lower extremity edema  Procedural Access Site: No hematoma, Non-tender to palpation, 2+ pulse, No bruit, No Ecchymosis  Respiratory: Clear to auscultation bilaterally  Gastrointestinal: Soft, Non tender, Normal Bowel Sounds  Musculoskeletal: No clubbing, No joint deformity   Neurologic: Non-focal  Lymphatic: No lymphadenopathy  Psychiatry: AAOx3, Mood & affect appropriate  Skin: No rashes, No ecchymoses, No cyanosis    03-07    140  |  101  |  8   ----------------------------<  113<H>  4.0   |  28  |  0.81    Ca    10.2      07 Mar 2023 11:12    TPro  8.6<H>  /  Alb  4.3  /  TBili  0.9  /  DBili  x   /  AST  20  /  ALT  12  /  AlkPhos  87  03-07            Lipid panel   Hgb A1c                         13.1   8.02  )-----------( 234      ( 07 Mar 2023 14:21 )             40.2         ECG: Afib 62 bpm

## 2023-03-08 NOTE — DISCHARGE NOTE NURSING/CASE MANAGEMENT/SOCIAL WORK - NSDCPEFALRISK_GEN_ALL_CORE
For information on Fall & Injury Prevention, visit: [email or web address] OR  [email or web address] OR  https://www.cdc.gov/steadi/patient.html

## 2023-03-08 NOTE — DISCHARGE NOTE NURSING/CASE MANAGEMENT/SOCIAL WORK - PATIENT PORTAL LINK FT
You can access the FollowMyHealth Patient Portal offered by [facility] by registering at the following website: [email or web address]. By joining [facility]’s FollowMyHealth portal, you will also be able to view your health information using other applications (apps) compatible with our system.

## 2023-03-09 RX ORDER — ASPIRIN/CALCIUM CARB/MAGNESIUM 324 MG
1 TABLET ORAL
Qty: 0 | Refills: 0 | DISCHARGE
Start: 2023-03-09 | End: 2023-03-15

## 2023-03-09 RX ORDER — APIXABAN 5 MG/1
5 TABLET, FILM COATED ORAL
Qty: 60 | Refills: 6 | Status: ACTIVE | COMMUNITY

## 2023-03-16 NOTE — HISTORY OF PRESENT ILLNESS
[FreeTextEntry1] : 82 year old male with HTN, HLD, AF who presents with several weeks of progressive SIDHU.  No CP, rest sx, or H/A

## 2023-03-16 NOTE — DISCUSSION/SUMMARY
[FreeTextEntry1] : CHF- increasing SOB.  Needs ischemic eval.  Plan cath ASAP\par \par HTN- stable\par \par HLD- controlled\par \par Followup in 3 weeks\par \par time spent reviewing history, exam, EKG, prior testing, pt discussion and note writing [EKG obtained to assist in diagnosis and management of assessed problem(s)] : EKG obtained to assist in diagnosis and management of assessed problem(s)

## 2023-03-22 ENCOUNTER — NON-APPOINTMENT (OUTPATIENT)
Age: 83
End: 2023-03-22

## 2023-03-22 ENCOUNTER — APPOINTMENT (OUTPATIENT)
Dept: CARDIOLOGY | Facility: CLINIC | Age: 83
End: 2023-03-22
Payer: MEDICARE

## 2023-03-22 VITALS
DIASTOLIC BLOOD PRESSURE: 81 MMHG | SYSTOLIC BLOOD PRESSURE: 156 MMHG | OXYGEN SATURATION: 99 % | BODY MASS INDEX: 28.49 KG/M2 | HEIGHT: 68 IN | WEIGHT: 188 LBS | HEART RATE: 75 BPM

## 2023-03-22 PROCEDURE — 93000 ELECTROCARDIOGRAM COMPLETE: CPT

## 2023-03-22 PROCEDURE — 99215 OFFICE O/P EST HI 40 MIN: CPT | Mod: 25

## 2023-04-05 PROBLEM — I48.91 UNSPECIFIED ATRIAL FIBRILLATION: Chronic | Status: ACTIVE | Noted: 2023-03-07

## 2023-04-05 PROBLEM — Z86.79 PERSONAL HISTORY OF OTHER DISEASES OF THE CIRCULATORY SYSTEM: Chronic | Status: ACTIVE | Noted: 2023-03-07

## 2023-04-29 NOTE — HISTORY OF PRESENT ILLNESS
[FreeTextEntry1] : 83 year old male with HTN, HLD, AF who presents with several weeks of progressive SIDHU.  No CP, rest sx, or H/A. ,  Cath with PCI of LCX and sx have improved

## 2023-04-29 NOTE — DISCUSSION/SUMMARY
[FreeTextEntry1] : CHF- increasing SOB. sx improved after ALVINO to LCX\par \par HTN- stable\par \par HLD- controlled\par \par Followup in 3 mths\par \par time spent reviewing history,cath films,  exam, EKG, prior testing, pt discussion and note writing [EKG obtained to assist in diagnosis and management of assessed problem(s)] : EKG obtained to assist in diagnosis and management of assessed problem(s)

## 2023-05-22 ENCOUNTER — NON-APPOINTMENT (OUTPATIENT)
Age: 83
End: 2023-05-22

## 2023-05-22 ENCOUNTER — APPOINTMENT (OUTPATIENT)
Dept: CARDIOLOGY | Facility: CLINIC | Age: 83
End: 2023-05-22
Payer: MEDICARE

## 2023-05-22 VITALS — HEART RATE: 65 BPM | BODY MASS INDEX: 28.79 KG/M2 | WEIGHT: 190 LBS | OXYGEN SATURATION: 97 % | HEIGHT: 68 IN

## 2023-05-22 VITALS — SYSTOLIC BLOOD PRESSURE: 131 MMHG | DIASTOLIC BLOOD PRESSURE: 76 MMHG

## 2023-05-22 PROCEDURE — 93000 ELECTROCARDIOGRAM COMPLETE: CPT

## 2023-05-22 PROCEDURE — 99215 OFFICE O/P EST HI 40 MIN: CPT | Mod: 25

## 2023-05-22 RX ORDER — APIXABAN 5 MG/1
5 TABLET, FILM COATED ORAL
Qty: 60 | Refills: 11 | Status: DISCONTINUED | COMMUNITY
End: 2023-05-22

## 2023-09-20 ENCOUNTER — APPOINTMENT (OUTPATIENT)
Dept: CARDIOLOGY | Facility: CLINIC | Age: 83
End: 2023-09-20
Payer: MEDICARE

## 2023-09-20 VITALS
HEIGHT: 68 IN | OXYGEN SATURATION: 99 % | WEIGHT: 190 LBS | HEART RATE: 65 BPM | DIASTOLIC BLOOD PRESSURE: 80 MMHG | SYSTOLIC BLOOD PRESSURE: 149 MMHG | BODY MASS INDEX: 28.79 KG/M2

## 2023-09-20 PROCEDURE — 93000 ELECTROCARDIOGRAM COMPLETE: CPT

## 2023-09-20 PROCEDURE — 99215 OFFICE O/P EST HI 40 MIN: CPT | Mod: 25

## 2023-09-20 RX ORDER — CLOPIDOGREL BISULFATE 75 MG/1
75 TABLET, FILM COATED ORAL DAILY
Qty: 1 | Refills: 2 | Status: DISCONTINUED | COMMUNITY
End: 2023-09-20

## 2023-09-20 RX ORDER — ASPIRIN ENTERIC COATED TABLETS 81 MG 81 MG/1
81 TABLET, DELAYED RELEASE ORAL
Refills: 0 | Status: ACTIVE | COMMUNITY

## 2023-09-20 RX ORDER — ISOSORBIDE MONONITRATE 60 MG/1
60 TABLET, EXTENDED RELEASE ORAL DAILY
Refills: 1 | Status: DISCONTINUED | COMMUNITY
End: 2023-09-20

## 2023-10-05 NOTE — PROGRESS NOTE ADULT - ASSESSMENT
Problem: Adult Inpatient Plan of Care  Goal: Plan of Care Review  10/5/2023 1509 by Batsheva Doherty RN  Outcome: Ongoing, Progressing  Flowsheets (Taken 10/5/2023 1509)  Plan of Care Reviewed With: patient  10/5/2023 1300 by Batsheva Doherty RN  Outcome: Ongoing, Progressing  Flowsheets (Taken 10/5/2023 1300)  Plan of Care Reviewed With: patient  Goal: Patient-Specific Goal (Individualized)  10/5/2023 1509 by Batsheva Doherty RN  Outcome: Ongoing, Progressing  Flowsheets (Taken 10/5/2023 1509)  Anxieties, Fears or Concerns: None  Individualized Care Needs: Maintian sternal precautions  10/5/2023 1300 by Batsheva Doherty RN  Outcome: Ongoing, Progressing  Flowsheets (Taken 10/5/2023 1300)  Anxieties, Fears or Concerns: None  Individualized Care Needs: Maintain sternal precautions  Goal: Absence of Hospital-Acquired Illness or Injury  10/5/2023 1509 by Batsheva oDherty RN  Outcome: Ongoing, Progressing  10/5/2023 1300 by Batsheva Doherty RN  Outcome: Ongoing, Progressing  Intervention: Identify and Manage Fall Risk  10/5/2023 1509 by Batsheva Doherty RN  Flowsheets (Taken 10/5/2023 1509)  Safety Promotion/Fall Prevention:   assistive device/personal item within reach   bed alarm set   in recliner, wheels locked   muscle strengthening facilitated   nonskid shoes/socks when out of bed   side rails raised x 3   instructed to call staff for mobility  10/5/2023 1300 by Batsheva Doherty RN  Flowsheets (Taken 10/5/2023 1300)  Safety Promotion/Fall Prevention:   assistive device/personal item within reach   bed alarm set   chair alarm set   Fall Risk reviewed with patient/family   in recliner, wheels locked   lighting adjusted   medications reviewed   muscle strengthening facilitated   nonskid shoes/socks when out of bed   instructed to call staff for mobility   side rails raised x 3  Intervention: Prevent Skin Injury  10/5/2023 1509 by Batsheva Doherty RN  Flowsheets (Taken 10/5/2023 1509)  Body Position:   legs  81 y/o m w/ PMH od CAD s/p stent, s/p CABG, HTN, HLD p/w shortness of breath and cough, found to have a fib, concern for acute CHF exacerbation, + influenza.  1/10- patient noted to be significantly hypoxic despite increased supplemental oxygen. He was placed n Vent mask and then NRM with only slight improvement in saturation Lung exam with decreased air entry; CT angio ordered- PE rule out however there was evidence of consolidations concerning for infection. ABG reviewed   Broad spectrum IV antibiotics ordered.   Due to progressive hypoxia and concern for risk of decompensation, ICU consulted.       1. Hypoxic respiratory failure  2. Influenza A infection with suspected superimposed Bacterial infection  3. Congestive heart failure  4, AFIB - with RVR  5. JANES  6. CAD- s/p coronary stent, s/p CABG; - continue asa, Plavix statin  7. Essential HTN- cont pt on atenolol, Imdur Norvasc - holding losartan in setting of JANES  8. Hyperlipidemia- continue statins elevated   position changed independently   weight shifting   supine   sitting up in bed  Skin Protection:   adhesive use limited   incontinence pads utilized  10/5/2023 1300 by Batsheva Doherty RN  Flowsheets (Taken 10/5/2023 1300)  Skin Protection:   adhesive use limited   incontinence pads utilized  Intervention: Prevent and Manage VTE (Venous Thromboembolism) Risk  10/5/2023 1509 by Batsheva Doherty RN  Flowsheets (Taken 10/5/2023 1509)  Activity Management:   Rolling - L1   Sitting at edge of bed - L2   Standing - L3  VTE Prevention/Management:   ambulation promoted   fluids promoted   bleeding precations maintained  10/5/2023 1300 by Batsheva Doherty RN  Flowsheets (Taken 10/5/2023 1300)  Activity Management:   Ambulated -L4   Standing - L3   Rolling - L1   Up in chair - L3  Range of Motion: active ROM (range of motion) encouraged  Intervention: Prevent Infection  10/5/2023 1509 by Batsheva Doherty RN  Flowsheets (Taken 10/5/2023 1509)  Infection Prevention:   cohorting utilized   environmental surveillance performed   equipment surfaces disinfected   hand hygiene promoted   rest/sleep promoted   single patient room provided  10/5/2023 1300 by Batsheva Doherty RN  Flowsheets (Taken 10/5/2023 1300)  Infection Prevention:   cohorting utilized   environmental surveillance performed   equipment surfaces disinfected   hand hygiene promoted   rest/sleep promoted   single patient room provided  Goal: Optimal Comfort and Wellbeing  10/5/2023 1509 by Batsheva Doherty RN  Outcome: Ongoing, Progressing  10/5/2023 1300 by Batsheva Doherty RN  Outcome: Ongoing, Progressing  Intervention: Monitor Pain and Promote Comfort  10/5/2023 1509 by Batsheva Doherty RN  Flowsheets (Taken 10/5/2023 1509)  Pain Management Interventions:   care clustered   pillow support provided   medication offered   position adjusted  10/5/2023 1300 by Batsheva Doherty RN  Flowsheets (Taken 10/5/2023 1300)  Pain Management Interventions:    care clustered   medication offered   medication offered but refused   pillow support provided  Intervention: Provide Person-Centered Care  10/5/2023 1509 by Batsheva Doherty RN  Flowsheets (Taken 10/5/2023 1509)  Trust Relationship/Rapport:   care explained   questions answered   questions encouraged  10/5/2023 1300 by Batsheva Doherty RN  Flowsheets (Taken 10/5/2023 1300)  Trust Relationship/Rapport:   care explained   questions encouraged   questions answered  Goal: Readiness for Transition of Care  10/5/2023 1509 by Batsheva Doherty RN  Outcome: Ongoing, Progressing  10/5/2023 1300 by Batsheva Doherty RN  Outcome: Ongoing, Progressing     Problem: Fall Injury Risk  Goal: Absence of Fall and Fall-Related Injury  10/5/2023 1509 by Batsheva Doherty RN  Outcome: Ongoing, Progressing  10/5/2023 1300 by Batsheva Doherty RN  Outcome: Ongoing, Progressing  Intervention: Identify and Manage Contributors  Flowsheets (Taken 10/5/2023 1509)  Self-Care Promotion:   independence encouraged   BADL personal objects within reach   meal set-up provided   safe use of adaptive equipment encouraged  Intervention: Promote Injury-Free Environment  Flowsheets (Taken 10/5/2023 1509)  Safety Promotion/Fall Prevention:   assistive device/personal item within reach   bed alarm set   in recliner, wheels locked   muscle strengthening facilitated   nonskid shoes/socks when out of bed   side rails raised x 3   instructed to call staff for mobility     Problem: Infection  Goal: Absence of Infection Signs and Symptoms  10/5/2023 1509 by Batsheva Doherty RN  Outcome: Ongoing, Progressing  10/5/2023 1300 by Batsheva Doherty RN  Outcome: Ongoing, Progressing  Intervention: Prevent or Manage Infection  Flowsheets (Taken 10/5/2023 1509)  Infection Management: aseptic technique maintained     Problem: Skin Injury Risk Increased  Goal: Skin Health and Integrity  10/5/2023 1509 by Batsheva Doherty RN  Outcome: Ongoing, Progressing  10/5/2023  1300 by Batsheva Doherty RN  Outcome: Ongoing, Progressing  Intervention: Optimize Skin Protection  Flowsheets (Taken 10/5/2023 1509)  Skin Protection:   adhesive use limited   incontinence pads utilized  Head of Bed (HOB) Positioning: HOB at 30-45 degrees  Intervention: Promote and Optimize Oral Intake  Flowsheets (Taken 10/5/2023 1509)  Oral Nutrition Promotion:   calorie-dense foods provided   calorie-dense liquids provided   physical activity promoted   rest periods promoted   safe use of adaptive equipment encouraged     Problem: Gas Exchange Impaired  Goal: Optimal Gas Exchange  10/5/2023 1509 by Batsheva Doherty RN  Outcome: Ongoing, Progressing  10/5/2023 1300 by Batsheva Doherty RN  Outcome: Ongoing, Progressing  Intervention: Optimize Oxygenation and Ventilation  Flowsheets (Taken 10/5/2023 1509)  Airway/Ventilation Management: airway patency maintained  Head of Bed (HOB) Positioning: HOB at 30-45 degrees     Problem: Pain Acute  Goal: Acceptable Pain Control and Functional Ability  10/5/2023 1509 by Batsheva Doherty RN  Outcome: Ongoing, Progressing  10/5/2023 1300 by Batsheva Doherty RN  Outcome: Ongoing, Progressing  Intervention: Develop Pain Management Plan  Flowsheets (Taken 10/5/2023 1509)  Pain Management Interventions:   care clustered   pillow support provided   medication offered   position adjusted  Intervention: Prevent or Manage Pain  Flowsheets (Taken 10/5/2023 1509)  Bowel Elimination Promotion:   adequate fluid intake promoted   ambulation promoted     Problem: Device-Related Complication Risk (Cardiac Rhythm Management Device)  Goal: Effective Device Function  10/5/2023 1509 by Batsheva Doherty RN  Outcome: Ongoing, Progressing  10/5/2023 1300 by Batsheva Doherty RN  Outcome: Ongoing, Progressing     Problem: Infection (Cardiac Rhythm Management Device)  Goal: Absence of Infection Signs and Symptoms  10/5/2023 1509 by Batsheva Doherty RN  Outcome: Ongoing, Progressing  10/5/2023  1300 by Batsheva Doherty RN  Outcome: Ongoing, Progressing  Intervention: Prevent or Manage Infection  Flowsheets (Taken 10/5/2023 1509)  Infection Management: aseptic technique maintained     Problem: Pain (Cardiac Rhythm Management Device)  Goal: Acceptable Pain Level  10/5/2023 1509 by Batsheva Doherty RN  Outcome: Ongoing, Progressing  10/5/2023 1300 by Batsheva Doherty RN  Outcome: Ongoing, Progressing  Intervention: Prevent or Manage Pain  Flowsheets (Taken 10/5/2023 1509)  Pain Management Interventions:   care clustered   pillow support provided   medication offered   position adjusted     Problem: Impaired Wound Healing  Goal: Optimal Wound Healing  10/5/2023 1509 by Batsheva Doherty RN  Outcome: Ongoing, Progressing  10/5/2023 1300 by Batsheva Doherty RN  Outcome: Ongoing, Progressing  Intervention: Promote Wound Healing  Flowsheets (Taken 10/5/2023 1509)  Oral Nutrition Promotion:   calorie-dense foods provided   calorie-dense liquids provided   physical activity promoted   rest periods promoted   safe use of adaptive equipment encouraged  Activity Management:   Rolling - L1   Sitting at edge of bed - L2   Standing - L3  Pain Management Interventions:   care clustered   pillow support provided   medication offered   position adjusted

## 2024-03-28 ENCOUNTER — APPOINTMENT (OUTPATIENT)
Dept: CARDIOLOGY | Facility: CLINIC | Age: 84
End: 2024-03-28
Payer: MEDICARE

## 2024-03-28 VITALS
DIASTOLIC BLOOD PRESSURE: 68 MMHG | HEART RATE: 75 BPM | TEMPERATURE: 97.1 F | SYSTOLIC BLOOD PRESSURE: 135 MMHG | OXYGEN SATURATION: 96 %

## 2024-03-28 DIAGNOSIS — I50.9 HEART FAILURE, UNSPECIFIED: ICD-10-CM

## 2024-03-28 DIAGNOSIS — I10 ESSENTIAL (PRIMARY) HYPERTENSION: ICD-10-CM

## 2024-03-28 DIAGNOSIS — E78.5 HYPERLIPIDEMIA, UNSPECIFIED: ICD-10-CM

## 2024-03-28 DIAGNOSIS — I25.10 ATHEROSCLEROTIC HEART DISEASE OF NATIVE CORONARY ARTERY W/OUT ANGINA PECTORIS: ICD-10-CM

## 2024-03-28 DIAGNOSIS — I48.91 UNSPECIFIED ATRIAL FIBRILLATION: ICD-10-CM

## 2024-03-28 PROCEDURE — 99214 OFFICE O/P EST MOD 30 MIN: CPT

## 2024-03-28 PROCEDURE — G2211 COMPLEX E/M VISIT ADD ON: CPT

## 2024-05-05 PROBLEM — I50.9 CHF (CONGESTIVE HEART FAILURE): Status: ACTIVE | Noted: 2023-02-10

## 2024-05-05 PROBLEM — I10 BENIGN ESSENTIAL HYPERTENSION: Status: ACTIVE | Noted: 2023-02-10

## 2024-05-05 PROBLEM — I48.91 ATRIAL FIBRILLATION, UNSPECIFIED TYPE: Status: ACTIVE | Noted: 2023-02-10

## 2024-05-05 PROBLEM — E78.5 HYPERLIPEMIA: Status: ACTIVE | Noted: 2023-02-10

## 2024-05-05 PROBLEM — I25.10 CORONARY ARTERY DISEASE: Status: ACTIVE | Noted: 2023-02-10

## 2024-05-05 NOTE — HISTORY OF PRESENT ILLNESS
[FreeTextEntry1] : 84 year old male with HTN, HLD, AF who presents with several weeks of progressive SIDHU.  No CP, rest sx, or H/A. ,  Cath with PCI of LCX and sx have improved

## 2024-05-05 NOTE — DISCUSSION/SUMMARY
[FreeTextEntry1] : CHF- increasing SOB. sx improved after ALVINO to LCX  HTN- stable  HLD- controlled  Followup in 6 mths  time spent reviewing history,cath films,  exam, EKG, prior testing, pt discussion and note writing

## 2024-08-13 NOTE — PATIENT PROFILE ADULT - FALL HARM RISK - FACTORS NURSING JUDGEMENT
[facility]  part of [facility]    Report of Consultation    Sheri Garzon Patient Status:  Inpatient    1958 MRN [id number]   Location [facility] 5SW/SE Attending All Arroyo MD   Hosp Day # 1 PCP TERI MCKENNA MD     Date of Admission:  2024  Date of Consult:  2024  Reason for Consultation:   Cellulitis of right lower extremity    History of Present Illness:   Patient is a 66 year old female who was admitted to the hospital for Cellulitis of right lower extremity:    Sheri is a 65yo female with history of rheumatoid arthritis, HTN, and chronic venous stasis who presented to the ED with leg swelling and right lower leg wound. Wound present for around 2 months, slowly worsening over time. Wound associated with pain, no fever, chills, nausea, or vomiting. She reports her leg swelling has improved recently. WBC 5.2, afebrile.     Past Medical History  Past Medical History:    Allergic rhinitis    Anxiety    Arthritis    RA and Osteoarthritis    Asthma (HCC)    Depression    Not officially diagnosed    Esophageal reflux    Essential hypertension    High blood pressure    High cholesterol    Hyperlipidemia    Myocardial infarction (HCC)    Cardiac event    Osteoarthritis    Pancreatitis (HCC)    Problems with swallowing    RA (rheumatoid arthritis) (HCC)    Sleep apnea       Past Surgical History  Past Surgical History:   Procedure Laterality Date    Colonoscopy      Other surgical history      Revision of uterine anomaly      Rotator cuff repair      Wrist fracture surgery         Family History  Family History   Problem Relation Age of Onset    Diabetes Mother     Genetic Disease Mother     Heart Disorder Mother     Hypertension Mother     Obesity Mother     Diabetes Father     Hypertension Father     Depression Daughter     Psychiatric Daughter        Social History  Social History     Socioeconomic History    Marital status: Single   Tobacco Use    Smoking status:  Never    Smokeless tobacco: Never   Vaping Use    Vaping status: Never Used   Substance and Sexual Activity    Alcohol use: Not Currently    Drug use: Never     Social Determinants of Health     Food Insecurity: No Food Insecurity (8/12/2024)    Food Insecurity     Food Insecurity: Never true   Transportation Needs: No Transportation Needs (8/12/2024)    Transportation Needs     Lack of Transportation: No   Housing Stability: Low Risk  (8/12/2024)    Housing Stability     Housing Instability: No           Current Medications:  Current Facility-Administered Medications   Medication Dose Route Frequency    collagenase (Santyl) 250 UNIT/GM ointment   Topical Daily    albuterol (Ventolin HFA) 108 (90 Base) MCG/ACT inhaler 2 puff  2 puff Inhalation Q4H PRN    amLODIPine (Norvasc) tab 5 mg  5 mg Oral Daily    aspirin chewable tab 81 mg  81 mg Oral Daily    atorvastatin (Lipitor) tab 10 mg  10 mg Oral Daily    clopidogrel (Plavix) tab 75 mg  75 mg Oral Daily    furosemide (Lasix) tab 40 mg  40 mg Oral Daily    hydroxychloroquine (Plaquenil) tab 200 mg  200 mg Oral BID    losartan (Cozaar) tab 50 mg  50 mg Oral Daily    oxybutynin ER (Ditropan-XL) 24 hr tab 10 mg  10 mg Oral Daily    glucose (Dex4) 15 GM/59ML oral liquid 15 g  15 g Oral PRN    sodium chloride 0.9% infusion   Intravenous Continuous    enoxaparin (Lovenox) 40 MG/0.4ML SUBQ injection 40 mg  40 mg Subcutaneous Daily    acetaminophen (Tylenol Extra Strength) tab 500 mg  500 mg Oral Q4H PRN    ondansetron (Zofran) 4 MG/2ML injection 4 mg  4 mg Intravenous Q6H PRN    prochlorperazine (Compazine) 10 MG/2ML injection 5 mg  5 mg Intravenous Q8H PRN    acetaminophen (Tylenol) tab 650 mg  650 mg Oral Q4H PRN    Or    HYDROcodone-acetaminophen (Norco) 5-325 MG per tab 1 tablet  1 tablet Oral Q4H PRN    Or    HYDROcodone-acetaminophen (Norco) 5-325 MG per tab 2 tablet  2 tablet Oral Q4H PRN    hydrocortisone Na succinate PF (Solu-CORTEF) injection 100 mg  100 mg  Intravenous Q8H    vancomycin (Vancocin) 1,000 mg in sodium chloride 0.9% 250 mL IVPB-ADDV  1,000 mg Intravenous Q12H     Medications Prior to Admission   Medication Sig    albuterol 108 (90 Base) MCG/ACT Inhalation Aero Soln Inhale 2 puffs into the lungs every 4 to 6 hours as needed for Wheezing.    predniSONE 20 MG Oral Tab Take 2 tablets (40 mg total) by mouth daily. Pt is taking different than order:  takes 2 - 10 mg tablets by mouth daily    furosemide 40 MG Oral Tab Take 1 tablet (40 mg total) by mouth daily.    losartan 50 MG Oral Tab Take 1 tablet (50 mg total) by mouth daily.    hydroxychloroquine 200 MG Oral Tab Take 1 tablet (200 mg total) by mouth 2 (two) times daily.    HYDROcodone-acetaminophen (NORCO)  MG Oral Tab Take 1 tablet by mouth in the morning and 1 tablet at noon and 1 tablet in the evening.    pantoprazole 40 MG Oral Tab EC Take 1 tablet (40 mg total) by mouth 2 (two) times daily before meals.    sodium bicarbonate 650 MG Oral Tab Take 1 tablet (650 mg total) by mouth 2 (two) times daily.    amLODIPine 5 MG Oral Tab Take 1 tablet (5 mg total) by mouth daily.    Aspirin 81 MG Oral Cap 81 mg.    atorvastatin 10 MG Oral Tab Take 1 tablet (10 mg total) by mouth daily.    clopidogrel 75 MG Oral Tab Take 1 tablet (75 mg total) by mouth daily.    ergocalciferol 1.25 MG (33911 UT) Oral Cap Take 1 capsule (50,000 Units total) by mouth once a week.    methotrexate 2.5 MG Oral Tab Take 1 tablet (2.5 mg total) by mouth once a week.    oxybutynin ER 10 MG Oral Tablet 24 Hr Take 1 tablet (10 mg total) by mouth daily.    Potassium Chloride ER 10 MEQ Oral Tab CR Take 1 tablet (10 mEq total) by mouth 2 (two) times daily.    folic acid 1 MG Oral Tab Take 1 tablet (1 mg total) by mouth daily.       Allergies  Allergies   Allergen Reactions    Cephalosporins ANAPHYLAXIS, NAUSEA AND VOMITING and OTHER (SEE COMMENTS)     Other reaction(s): Fever  Pt received multiple doses of ceftriaxone on 7/2024 without  any complcations    Gadolinium Derivatives FEVER    Penicillins OTHER (SEE COMMENTS)     esophagitis    Sulfa Antibiotics OTHER (SEE COMMENTS)     esophagitis       Review of Systems:   Review of Systems   Constitutional:  Negative for chills, diaphoresis and fever.   Respiratory:  Negative for shortness of breath.    Cardiovascular:  Negative for chest pain.   Gastrointestinal:  Negative for nausea, vomiting and abdominal pain.   Skin:  Positive for wound.   Neurological:  Negative for weakness.        Physical Exam:   Vital Signs:  Blood pressure (!) 136/91, pulse 97, temperature 97.8 °F (36.6 °C), temperature source Oral, resp. rate 18, height 5' 2\" (1.575 m), weight 180 lb 9.6 oz (81.9 kg), SpO2 99%.     Physical Exam  Vitals reviewed.   Constitutional:       General: She is not in acute distress.     Appearance: Normal appearance.   HENT:      Head: Normocephalic and atraumatic.      Right Ear: External ear normal.      Left Ear: External ear normal.      Nose: Nose normal.   Pulmonary:      Effort: Pulmonary effort is normal. No respiratory distress.   Abdominal:      Palpations: Abdomen is soft.      Tenderness: There is no abdominal tenderness.   Skin:     Comments: Right lower leg wound, minimal surrounding erythema and tenderness.    Neurological:      Mental Status: She is alert and oriented to person, place, and time.   Psychiatric:         Mood and Affect: Mood normal.         Behavior: Behavior normal.         Thought Content: Thought content normal.         Judgment: Judgment normal.         Results:     Laboratory Data:  Lab Results   Component Value Date    WBC 5.2 08/13/2024    HGB 9.3 (L) 08/13/2024    HCT 29.9 (L) 08/13/2024    .0 (L) 08/13/2024    CREATSERUM 0.49 (L) 08/13/2024    BUN 6 (L) 08/13/2024     08/13/2024    K 3.5 08/13/2024     (H) 08/13/2024    CO2 13.0 (L) 08/13/2024     (H) 08/13/2024    CA 7.9 (L) 08/13/2024    ALB 3.1 (L) 07/30/2024    ALKPHO 85  07/30/2024    TP 5.6 (L) 07/30/2024    AST 35 (H) 07/30/2024    ALT 26 07/30/2024    LIP 34 07/21/2024    DDIMER 1.69 (H) 07/23/2024    MG 1.7 07/26/2024    PHOS 2.1 (L) 07/27/2024         Imaging:  CTA LOWER EXTREMITY BILATERAL (W+WO) (CPT=73706-50)    Result Date: 8/12/2024  CONCLUSION:   1. RIGHT lower extremity:  Stable findings since recent prior July, 2024 CT angiogram.  Iliac, femoral, and popliteal arteries demonstrate mild-to-moderate scattered atherosclerosis, but no high-grade stenosis or occlusion.  There is a three-vessel runoff with mild-to-moderate multifocal stenoses throughout the runoff vessels related to atherosclerotic disease.  Plantar artery again demonstrates thready flow and/or partial occlusion.  2. LEFT lower extremity:  Mild scattered atherosclerotic disease throughout the iliac, femoral, and popliteal vessels, but with no high-grade stenosis or occlusion.  Note that on arterial phase, there is no significant opacification of the runoff vessels  or distal popliteal artery.  This does, however, improved on delayed phase with opacification of the distal popliteal and proximal run-off arteries.  There is a three-vessel runoff proximally.  On delayed phase, there is thready flow throughout the distal run-off arteries.  This finding is favored to represent sequelae of patient hemodynamics and lower extremity edema.  Nonetheless, please correlate with left lower extremity arterial Dopplers given asymmetry.  3. Marked subcutaneous edema and soft tissue swelling throughout the right greater than left lower extremities.  No definite associated destructive/erosive osseous changes to suggest osteomyelitis by CT. 4. Advanced bilateral knee osteoarthritis with bilateral suprapatellar joint effusions and left knee chondrocalcinosis. 5. Colonic diverticulosis.   Dictated by (CST): Bandar Roy MD on 8/12/2024 at 5:59 PM     Finalized by (CST): Bandar Roy MD on 8/12/2024 at 6:18 PM          XR  FOOT, COMPLETE (MIN 3 VIEWS), RIGHT (CPT=73630)    Result Date: 8/12/2024  CONCLUSION:  1. Marked dorsal midfoot soft tissue swelling.  Small nonmetallic polygonal morphology radiodense foci within the dorsal soft tissues, which could relate to debris, packing material, or less likely foreign bodies. 2. No acute fracture or dislocation of the right foot.  No destructive/erosive osseous changes are seen to suggest osteomyelitis radiographically.   Dictated by (CST): Bandar Roy MD on 8/12/2024 at 4:05 PM     Finalized by (CST): Bandar Roy MD on 8/12/2024 at 4:07 PM              Impression:     Cellulitis of right lower extremity      Cellulitis        Recommendations:  Pt seen. Right lower leg wound with some cellulitis. No acute surgical intervention indicated at this time. Recommend wound care and IV abx, can consider debridement if clinically worsening despite supportive care.    Thank you for allowing me to participate in the care of your patient.    Ron Latif PA-C  8/13/2024    Addendum:    Pt seen and examined.  I agree with Ron Latif'sDESIRE note. Paint wound area with betadine sticks 2-3 times a day to turn area to a dry eschar.  May need surgical debridement if erythema worsen or leg pain becomes worse.    Ruben Henriquez MD     No

## 2024-09-12 ENCOUNTER — NON-APPOINTMENT (OUTPATIENT)
Age: 84
End: 2024-09-12

## 2024-09-12 ENCOUNTER — APPOINTMENT (OUTPATIENT)
Dept: CARDIOLOGY | Facility: CLINIC | Age: 84
End: 2024-09-12

## 2024-09-12 VITALS
HEART RATE: 71 BPM | DIASTOLIC BLOOD PRESSURE: 65 MMHG | OXYGEN SATURATION: 95 % | TEMPERATURE: 98 F | SYSTOLIC BLOOD PRESSURE: 112 MMHG

## 2024-09-12 PROCEDURE — 99215 OFFICE O/P EST HI 40 MIN: CPT

## 2024-09-12 PROCEDURE — 93000 ELECTROCARDIOGRAM COMPLETE: CPT

## 2024-09-12 PROCEDURE — G2211 COMPLEX E/M VISIT ADD ON: CPT

## 2024-10-27 ENCOUNTER — EMERGENCY (EMERGENCY)
Facility: HOSPITAL | Age: 84
LOS: 1 days | Discharge: HOME | End: 2024-10-27
Attending: EMERGENCY MEDICINE | Admitting: EMERGENCY MEDICINE
Payer: COMMERCIAL

## 2024-10-27 VITALS
WEIGHT: 199.96 LBS | DIASTOLIC BLOOD PRESSURE: 67 MMHG | OXYGEN SATURATION: 96 % | HEIGHT: 68 IN | HEART RATE: 81 BPM | TEMPERATURE: 99 F | RESPIRATION RATE: 20 BRPM | SYSTOLIC BLOOD PRESSURE: 183 MMHG

## 2024-10-27 VITALS
HEART RATE: 69 BPM | TEMPERATURE: 98 F | RESPIRATION RATE: 18 BRPM | DIASTOLIC BLOOD PRESSURE: 78 MMHG | SYSTOLIC BLOOD PRESSURE: 136 MMHG | OXYGEN SATURATION: 95 %

## 2024-10-27 DIAGNOSIS — Z95.1 PRESENCE OF AORTOCORONARY BYPASS GRAFT: Chronic | ICD-10-CM

## 2024-10-27 DIAGNOSIS — Z95.5 PRESENCE OF CORONARY ANGIOPLASTY IMPLANT AND GRAFT: Chronic | ICD-10-CM

## 2024-10-27 PROCEDURE — 99284 EMERGENCY DEPT VISIT MOD MDM: CPT | Mod: 25

## 2024-10-27 PROCEDURE — 70450 CT HEAD/BRAIN W/O DYE: CPT | Mod: MC

## 2024-10-27 PROCEDURE — 70450 CT HEAD/BRAIN W/O DYE: CPT | Mod: 26,MC

## 2024-10-27 PROCEDURE — 99284 EMERGENCY DEPT VISIT MOD MDM: CPT

## 2024-10-27 RX ORDER — VALACYCLOVIR 1000 MG/1
1000 TABLET ORAL ONCE
Refills: 0 | Status: COMPLETED | OUTPATIENT
Start: 2024-10-27 | End: 2024-10-27

## 2024-10-27 RX ORDER — VALACYCLOVIR 1000 MG/1
1 TABLET ORAL
Qty: 30 | Refills: 0
Start: 2024-10-27 | End: 2024-11-05

## 2024-10-27 RX ORDER — PREDNISONE 5 MG/1
40 TABLET ORAL ONCE
Refills: 0 | Status: COMPLETED | OUTPATIENT
Start: 2024-10-27 | End: 2024-10-27

## 2024-10-27 RX ORDER — PREDNISONE 5 MG/1
2 TABLET ORAL
Qty: 10 | Refills: 0
Start: 2024-10-27 | End: 2024-10-31

## 2024-10-27 RX ADMIN — VALACYCLOVIR 1000 MILLIGRAM(S): 1000 TABLET ORAL at 13:17

## 2024-10-27 RX ADMIN — PREDNISONE 40 MILLIGRAM(S): 5 TABLET ORAL at 13:18

## 2024-10-27 NOTE — ED ADULT NURSE NOTE - CHIEF COMPLAINT QUOTE
I developed a right sided facial droop on friday ( I think it was friday- maybe thursday night).  I can't close my right eye.  I have no pain.  speaks clearly.  no drift.  good strength al extremities.  sensation intact.  ambulates with steady gait..  able to swallow secretions.  able to eat / drink.  patient taken to md BARRIE and rn made aware.

## 2024-10-27 NOTE — ED PROVIDER NOTE - NSFOLLOWUPINSTRUCTIONS_ED_ALL_ED_FT
Rest.  Make sure you apply artificial tears in the right eye at least every 2 hours while awake.  Use paper eyepatch right eye at night while asleep.  Valtrex 1 tablet 3 times a day for the next 10 days.  Prednisone 40 mg a day x 5 days.  Follow-up with your primary care physician this week for reevaluation.  Return here if needed.

## 2024-10-27 NOTE — ED PROVIDER NOTE - TIMING
60 y/o male with hx of left shoulder pain x several years, recently worsening.  Dx with rotator cuff tear, impingement syndrome of left shoulder scheduled for left shoulder major debridement, subacromial decompression/ acromioplasty 12/15/17. gradual onset/worsening

## 2024-10-27 NOTE — ED ADULT TRIAGE NOTE - CHIEF COMPLAINT QUOTE
I developed a right sided facial droop on friday ( I think it was friday- maybe thursday night).  I can't close my right eye.  I have no pain.  speaks clearly.  no drift.  good strength al extremities.  ambulates with steady gait..  able to swallow secretions.  able to eat / drink.  patient taken to md BARRIE and rn made aware. I developed a right sided facial droop on friday ( I think it was friday- maybe thursday night).  I can't close my right eye.  I have no pain.  speaks clearly.  no drift.  good strength al extremities.  sensation intact.  ambulates with steady gait..  able to swallow secretions.  able to eat / drink.  patient taken to md BARRIE and rn made aware.

## 2024-10-27 NOTE — ED PROVIDER NOTE - PHYSICAL EXAMINATION
Examination reveals a well-developed well-nourished white male who appears his stated age in no acute distress with the following on exam HEENT normocephalic atraumatic pupils are equal round reactive to light and accommodation extraocular movements are intact neck is supple lungs are clear heart S1-S2 with any murmurs rubs gallops neurologically patient's alert and oriented x 4 without any focal neurologic deficits patient does have a peripheral cranial nerve VII palsy consistent with Bell's palsy no rash/lesions noted on face head neck or scalp.

## 2024-10-27 NOTE — ED PROVIDER NOTE - DIFFERENTIAL DIAGNOSIS
Bell's palsy most likely related to herpes although possibility of Lyme though unlikely at this time. Differential Diagnosis

## 2024-10-27 NOTE — ED PROVIDER NOTE - OBJECTIVE STATEMENT
Patient is a 84-year-old pleasant white male with history of atrial fibrillation coronary artery disease CHF hypertension and hyperlipidemia who developed slight tearing and inability to close his right eye well 3 days ago.  This progressed the following day Friday morning to drooling from the right side of his mouth with problems smiling from the right side of his mouth.  Also developed slight pain in the scalp on the right posterior aspect of his head.  No altered vision no weakness in any arm or leg.  No numbness in any arm or leg.  No problems speaking no problems swallowing.

## 2024-10-27 NOTE — ED ADULT NURSE NOTE - NSFALLRISKASMT_ED_ALL_ED_DT
Additional Notes: Complete clearance on scalp. Use clobetasol PRN. Additional Notes: Per patient she has  OTC cortisone cream she will use . Advised her to use bid and if not improved in 2 weeks, advised patient to call for Rx of TAC. 27-Oct-2024 12:47

## 2024-10-27 NOTE — ED ADULT NURSE NOTE - OBJECTIVE STATEMENT
Patient states he had a dermatological treatment to his face on Tuesday, during the day on Thursday his right eye began to droop, and the droop worsened and now his mouth is also drooping on the right.  Patient unable to move right eyelid or eyebrow.  Stroke assessment performed.  Negative for any stroke symptoms.

## 2024-10-27 NOTE — ED PROVIDER NOTE - CLINICAL SUMMARY MEDICAL DECISION MAKING FREE TEXT BOX
Peripheral cranial nerve VII palsy x 3 to 4 days requiring thorough independent history and physical to be performed CAT scan as well as medications.

## 2024-10-27 NOTE — ED PROVIDER NOTE - PATIENT PORTAL LINK FT
You can access the FollowMyHealth Patient Portal offered by [facility] by registering at the following website: [email or web address]. By joining [email or web address]’s FollowMyHealth portal, you will also be able to view your health information using other applications (apps) compatible with our system.

## 2024-10-27 NOTE — ED ADULT TRIAGE NOTE - GLASGOW COMA SCALE: BEST VERBAL RESPONSE, MLM
----- Message from Jean Claude Galvez MA sent at 2/19/2022 11:38 AM EST -----  Regarding: FW: Trazodone  100mgs  Please advise can she stop or does she have to wean  Thank you! Jean Claude Galvez MA  ----- Message -----  From: Nabil Chan  Sent: 2/19/2022  10:02 AM EST  To: [address] [place] [address]  Clinical  Subject: Trazodone  100mgs                                I would like to stop taking this med  Ive been having night terrors where Im screaming and kicking my   Im only taking half a pill and was wondering if I should break that in half until I can stop all together     Thank you, Geoffrey Hurt ([id number]) oriented

## 2024-10-27 NOTE — ED ADULT TRIAGE NOTE - INTERNATIONAL TRAVEL
Omeprazole 20mg diario  Tums cuando te sientes dolor de ladan  Elavil: vamos a aumentar murali a 40mg cada noche  Continue con rizatriptan cuando necesitas    
No

## 2024-10-27 NOTE — ED PROVIDER NOTE - CARE PROVIDER_API CALL
Alexander Corley  Neurology  [address] [place] [address]  Phone: [phone]  Fax: [phone]  Follow Up Time:

## 2025-02-19 NOTE — H&P CARDIOLOGY - [facility]
Rx Refill Note  Requested Prescriptions     Pending Prescriptions Disp Refills    vilazodone (VIIBRYD) 20 MG tablet tablet [Pharmacy Med Name: VILAZODONE HCL 20 MG TABLET] 90 tablet 0     Sig: TAKE 1 TABLET BY MOUTH DAILY      Last office visit with prescribing clinician: 11/25/2024   Last telemedicine visit with prescribing clinician: Visit date not found   Next office visit with prescribing clinician: Visit date not found     Gisela Youssef MA  02/19/25, 08:22 EST    
lower R

## 2025-02-19 NOTE — PROGRESS NOTE ADULT - PROBLEM SELECTOR PLAN 3
Rate better controlled; continue with Cardizem 60 mg po q6hr  and Metoprolol  50 mg po q6hr  will switch long acting if hr remain control  , full ac  Continue Eliquis  CAD: cont Imdur, statin and plavix Immediate family member Rate controlled  - s/p Cardizem 60 mg po q6hr  - Switch to LA Cardizem 240mg qd  - Continue Metoprolol 50 mg PO q6hr   - Continue Eliquis

## 2025-03-13 ENCOUNTER — APPOINTMENT (OUTPATIENT)
Dept: CARDIOLOGY | Facility: CLINIC | Age: 85
End: 2025-03-13

## 2025-03-13 ENCOUNTER — NON-APPOINTMENT (OUTPATIENT)
Age: 85
End: 2025-03-13

## 2025-03-13 VITALS
OXYGEN SATURATION: 93 % | BODY MASS INDEX: 31.98 KG/M2 | WEIGHT: 211 LBS | RESPIRATION RATE: 15 BRPM | SYSTOLIC BLOOD PRESSURE: 145 MMHG | HEIGHT: 68 IN | HEART RATE: 66 BPM | TEMPERATURE: 98 F | DIASTOLIC BLOOD PRESSURE: 69 MMHG

## 2025-03-13 PROCEDURE — 99215 OFFICE O/P EST HI 40 MIN: CPT

## 2025-03-13 PROCEDURE — G2211 COMPLEX E/M VISIT ADD ON: CPT

## 2025-04-15 ENCOUNTER — APPOINTMENT (OUTPATIENT)
Dept: INTERNAL MEDICINE | Facility: CLINIC | Age: 85
End: 2025-04-15

## 2025-05-08 ENCOUNTER — APPOINTMENT (OUTPATIENT)
Dept: INTERNAL MEDICINE | Facility: CLINIC | Age: 85
End: 2025-05-08
Payer: MEDICARE

## 2025-05-08 PROCEDURE — 93306 TTE W/DOPPLER COMPLETE: CPT

## 2025-06-12 ENCOUNTER — APPOINTMENT (OUTPATIENT)
Dept: CARDIOLOGY | Facility: CLINIC | Age: 85
End: 2025-06-12

## 2025-06-12 VITALS
HEART RATE: 82 BPM | OXYGEN SATURATION: 93 % | DIASTOLIC BLOOD PRESSURE: 57 MMHG | BODY MASS INDEX: 32.13 KG/M2 | SYSTOLIC BLOOD PRESSURE: 100 MMHG | WEIGHT: 212 LBS | HEIGHT: 68 IN

## 2025-06-12 PROCEDURE — G2211 COMPLEX E/M VISIT ADD ON: CPT

## 2025-06-12 PROCEDURE — 99214 OFFICE O/P EST MOD 30 MIN: CPT
